# Patient Record
Sex: MALE | Race: WHITE | Employment: OTHER | URBAN - METROPOLITAN AREA
[De-identification: names, ages, dates, MRNs, and addresses within clinical notes are randomized per-mention and may not be internally consistent; named-entity substitution may affect disease eponyms.]

---

## 2018-07-26 ENCOUNTER — HOSPITAL ENCOUNTER (OUTPATIENT)
Age: 78
Discharge: HOME | End: 2018-07-28
Payer: MEDICARE

## 2018-07-26 LAB — PROSTATE SPECIFIC ANTIGEN: 5.52 NG/ML (ref 0–4)

## 2018-07-26 PROCEDURE — 84153 ASSAY OF PSA TOTAL: CPT

## 2018-10-19 ENCOUNTER — HOSPITAL ENCOUNTER (OUTPATIENT)
Age: 78
Discharge: HOME | End: 2018-10-19
Payer: MEDICARE

## 2018-10-19 LAB
BASOPHILS ABSOLUTE: 0.03 E9/L (ref 0–0.2)
BASOPHILS RELATIVE PERCENT: 0.5 % (ref 0–2)
EOSINOPHILS ABSOLUTE: 0.26 E9/L (ref 0.05–0.5)
EOSINOPHILS RELATIVE PERCENT: 4.3 % (ref 0–6)
HCT VFR BLD CALC: 37.9 % (ref 37–54)
HEMOGLOBIN: 12.4 G/DL (ref 12.5–16.5)
IMMATURE GRANULOCYTES #: 0.02 E9/L
IMMATURE GRANULOCYTES %: 0.3 % (ref 0–5)
IMMATURE RETIC FRACT: 12.1 % (ref 2.3–13.4)
LYMPHOCYTES ABSOLUTE: 1.32 E9/L (ref 1.5–4)
LYMPHOCYTES RELATIVE PERCENT: 21.9 % (ref 20–42)
MCH RBC QN AUTO: 34.1 PG (ref 26–35)
MCHC RBC AUTO-ENTMCNC: 32.7 % (ref 32–34.5)
MCV RBC AUTO: 104.1 FL (ref 80–99.9)
MONOCYTES ABSOLUTE: 0.42 E9/L (ref 0.1–0.95)
MONOCYTES RELATIVE PERCENT: 7 % (ref 2–12)
NEUTROPHILS ABSOLUTE: 3.99 E9/L (ref 1.8–7.3)
NEUTROPHILS RELATIVE PERCENT: 66 % (ref 43–80)
PDW BLD-RTO: 12.7 FL (ref 11.5–15)
PLATELET # BLD: 163 E9/L (ref 130–450)
PMV BLD AUTO: 10.1 FL (ref 7–12)
RBC # BLD: 3.64 E12/L (ref 3.8–5.8)
RETIC HGB EQUIVALENT: 38 PG (ref 28.2–36.6)
RETICULOCYTE ABSOLUTE COUNT: 0.12 E12/L
RETICULOCYTE COUNT PCT: 3.2 % (ref 0.4–1.9)
WBC # BLD: 6 E9/L (ref 4.5–11.5)

## 2018-10-19 PROCEDURE — 85025 COMPLETE CBC W/AUTO DIFF WBC: CPT

## 2018-10-19 PROCEDURE — 36415 COLL VENOUS BLD VENIPUNCTURE: CPT

## 2018-10-19 PROCEDURE — 85045 AUTOMATED RETICULOCYTE COUNT: CPT

## 2019-01-02 LAB
AVERAGE GLUCOSE: NORMAL
HBA1C MFR BLD: 4 %
TSH SERPL DL<=0.05 MIU/L-ACNC: NORMAL M[IU]/L

## 2019-05-14 ENCOUNTER — TELEPHONE (OUTPATIENT)
Dept: PRIMARY CARE CLINIC | Age: 79
End: 2019-05-14

## 2019-05-14 DIAGNOSIS — E29.1 HYPOGONADISM IN MALE: Primary | ICD-10-CM

## 2019-05-14 DIAGNOSIS — M15.8 OTHER OSTEOARTHRITIS INVOLVING MULTIPLE JOINTS: ICD-10-CM

## 2019-05-14 RX ORDER — SILDENAFIL 100 MG/1
100 TABLET, FILM COATED ORAL PRN
Qty: 50 TABLET | Refills: 12 | Status: SHIPPED | OUTPATIENT
Start: 2019-05-14 | End: 2019-07-11 | Stop reason: SDUPTHER

## 2019-05-14 RX ORDER — SILDENAFIL CITRATE 20 MG/1
20 TABLET ORAL PRN
COMMUNITY
End: 2020-01-15 | Stop reason: ALTCHOICE

## 2019-05-14 NOTE — TELEPHONE ENCOUNTER
Patient is requesting a refill for viagra. He wants to know if you can increase the quantity because he is using it more frequently. Also requesting a renewal for his handicap placard.

## 2019-07-11 DIAGNOSIS — E29.1 HYPOGONADISM IN MALE: ICD-10-CM

## 2019-07-11 RX ORDER — SILDENAFIL 100 MG/1
100 TABLET, FILM COATED ORAL PRN
Qty: 50 TABLET | Refills: 12 | Status: SHIPPED
Start: 2019-07-11 | End: 2020-08-07 | Stop reason: SDUPTHER

## 2019-12-27 ENCOUNTER — TELEPHONE (OUTPATIENT)
Dept: PRIMARY CARE CLINIC | Age: 79
End: 2019-12-27

## 2019-12-27 DIAGNOSIS — I10 ESSENTIAL HYPERTENSION: ICD-10-CM

## 2019-12-27 DIAGNOSIS — E03.9 ACQUIRED HYPOTHYROIDISM: ICD-10-CM

## 2019-12-27 DIAGNOSIS — E78.2 MIXED HYPERLIPIDEMIA: ICD-10-CM

## 2019-12-27 DIAGNOSIS — M81.0 AGE-RELATED OSTEOPOROSIS WITHOUT CURRENT PATHOLOGICAL FRACTURE: Primary | ICD-10-CM

## 2019-12-27 DIAGNOSIS — R97.20 ELEVATED PSA: ICD-10-CM

## 2019-12-30 ENCOUNTER — HOSPITAL ENCOUNTER (OUTPATIENT)
Age: 79
Discharge: HOME | End: 2020-01-01
Payer: MEDICARE

## 2019-12-30 LAB
ALBUMIN SERPL-MCNC: 4.2 G/DL (ref 3.5–5.2)
ALP BLD-CCNC: 75 U/L (ref 40–129)
ALT SERPL-CCNC: 17 U/L (ref 0–40)
ANION GAP SERPL CALCULATED.3IONS-SCNC: 15 MMOL/L (ref 7–16)
AST SERPL-CCNC: 20 U/L (ref 0–39)
BACTERIA: ABNORMAL /HPF
BASOPHILS ABSOLUTE: 0.03 E9/L (ref 0–0.2)
BASOPHILS RELATIVE PERCENT: 0.5 % (ref 0–2)
BILIRUB SERPL-MCNC: 0.6 MG/DL (ref 0–1.2)
BILIRUBIN URINE: NEGATIVE
BLOOD, URINE: NORMAL
BUN BLDV-MCNC: 23 MG/DL (ref 8–23)
CALCIUM SERPL-MCNC: 9.4 MG/DL (ref 8.6–10.2)
CHLORIDE BLD-SCNC: 106 MMOL/L (ref 98–107)
CHOLESTEROL, TOTAL: 161 MG/DL (ref 0–199)
CLARITY: CLEAR
CO2: 20 MMOL/L (ref 22–29)
COLOR: YELLOW
CREAT SERPL-MCNC: 0.8 MG/DL (ref 0.7–1.2)
EOSINOPHILS ABSOLUTE: 0.23 E9/L (ref 0.05–0.5)
EOSINOPHILS RELATIVE PERCENT: 3.8 % (ref 0–6)
GFR AFRICAN AMERICAN: >60
GFR NON-AFRICAN AMERICAN: >60 ML/MIN/1.73
GLUCOSE BLD-MCNC: 117 MG/DL (ref 74–99)
GLUCOSE URINE: NEGATIVE MG/DL
HCT VFR BLD CALC: 39.2 % (ref 37–54)
HDLC SERPL-MCNC: 50 MG/DL
HEMOGLOBIN: 12.3 G/DL (ref 12.5–16.5)
IMMATURE GRANULOCYTES #: 0.01 E9/L
IMMATURE GRANULOCYTES %: 0.2 % (ref 0–5)
KETONES, URINE: NEGATIVE MG/DL
LDL CHOLESTEROL CALCULATED: 93 MG/DL (ref 0–99)
LEUKOCYTE ESTERASE, URINE: NEGATIVE
LYMPHOCYTES ABSOLUTE: 1.12 E9/L (ref 1.5–4)
LYMPHOCYTES RELATIVE PERCENT: 18.5 % (ref 20–42)
MCH RBC QN AUTO: 31.8 PG (ref 26–35)
MCHC RBC AUTO-ENTMCNC: 31.4 % (ref 32–34.5)
MCV RBC AUTO: 101.3 FL (ref 80–99.9)
MONOCYTES ABSOLUTE: 0.46 E9/L (ref 0.1–0.95)
MONOCYTES RELATIVE PERCENT: 7.6 % (ref 2–12)
NEUTROPHILS ABSOLUTE: 4.22 E9/L (ref 1.8–7.3)
NEUTROPHILS RELATIVE PERCENT: 69.4 % (ref 43–80)
NITRITE, URINE: NEGATIVE
PDW BLD-RTO: 13 FL (ref 11.5–15)
PH UA: 5.5 (ref 5–9)
PLATELET # BLD: 152 E9/L (ref 130–450)
PMV BLD AUTO: 10.9 FL (ref 7–12)
POTASSIUM SERPL-SCNC: 4.6 MMOL/L (ref 3.5–5)
PROSTATE SPECIFIC ANTIGEN: 6.82 NG/ML (ref 0–4)
PROTEIN UA: NEGATIVE MG/DL
RBC # BLD: 3.87 E12/L (ref 3.8–5.8)
RBC UA: ABNORMAL /HPF (ref 0–2)
SODIUM BLD-SCNC: 141 MMOL/L (ref 132–146)
SPECIFIC GRAVITY UA: >=1.03 (ref 1–1.03)
T4 TOTAL: 6.3 MCG/DL (ref 4.5–11.7)
TOTAL PROTEIN: 6.2 G/DL (ref 6.4–8.3)
TRIGL SERPL-MCNC: 90 MG/DL (ref 0–149)
TSH SERPL DL<=0.05 MIU/L-ACNC: 2.31 UIU/ML (ref 0.27–4.2)
UROBILINOGEN, URINE: 0.2 E.U./DL
VITAMIN D 25-HYDROXY: 83 NG/ML (ref 30–100)
VLDLC SERPL CALC-MCNC: 18 MG/DL
WBC # BLD: 6.1 E9/L (ref 4.5–11.5)
WBC UA: ABNORMAL /HPF (ref 0–5)

## 2019-12-30 PROCEDURE — 84436 ASSAY OF TOTAL THYROXINE: CPT

## 2019-12-30 PROCEDURE — 36415 COLL VENOUS BLD VENIPUNCTURE: CPT

## 2019-12-30 PROCEDURE — 85025 COMPLETE CBC W/AUTO DIFF WBC: CPT

## 2019-12-30 PROCEDURE — 84443 ASSAY THYROID STIM HORMONE: CPT

## 2019-12-30 PROCEDURE — 84153 ASSAY OF PSA TOTAL: CPT

## 2019-12-30 PROCEDURE — 81001 URINALYSIS AUTO W/SCOPE: CPT

## 2019-12-30 PROCEDURE — 82306 VITAMIN D 25 HYDROXY: CPT

## 2019-12-30 PROCEDURE — 80061 LIPID PANEL: CPT

## 2019-12-30 PROCEDURE — 80053 COMPREHEN METABOLIC PANEL: CPT

## 2020-01-11 ENCOUNTER — OFFICE VISIT (OUTPATIENT)
Dept: PRIMARY CARE CLINIC | Age: 80
End: 2020-01-11
Payer: MEDICARE

## 2020-01-11 VITALS
WEIGHT: 192 LBS | DIASTOLIC BLOOD PRESSURE: 72 MMHG | TEMPERATURE: 97.7 F | SYSTOLIC BLOOD PRESSURE: 132 MMHG | HEIGHT: 70 IN | OXYGEN SATURATION: 96 % | BODY MASS INDEX: 27.49 KG/M2 | HEART RATE: 56 BPM

## 2020-01-11 PROBLEM — D64.9 ABSOLUTE ANEMIA: Status: ACTIVE | Noted: 2020-01-11

## 2020-01-11 PROBLEM — I49.3 PVC (PREMATURE VENTRICULAR CONTRACTION): Status: ACTIVE | Noted: 2020-01-11

## 2020-01-11 PROBLEM — I10 ESSENTIAL HYPERTENSION: Status: ACTIVE | Noted: 2020-01-11

## 2020-01-11 PROCEDURE — 99214 OFFICE O/P EST MOD 30 MIN: CPT | Performed by: FAMILY MEDICINE

## 2020-01-11 PROCEDURE — 93000 ELECTROCARDIOGRAM COMPLETE: CPT | Performed by: FAMILY MEDICINE

## 2020-01-11 ASSESSMENT — ENCOUNTER SYMPTOMS
EYES NEGATIVE: 1
GASTROINTESTINAL NEGATIVE: 1
ALLERGIC/IMMUNOLOGIC NEGATIVE: 1
RESPIRATORY NEGATIVE: 1

## 2020-01-11 ASSESSMENT — PATIENT HEALTH QUESTIONNAIRE - PHQ9
SUM OF ALL RESPONSES TO PHQ9 QUESTIONS 1 & 2: 0
SUM OF ALL RESPONSES TO PHQ QUESTIONS 1-9: 0
SUM OF ALL RESPONSES TO PHQ QUESTIONS 1-9: 0
2. FEELING DOWN, DEPRESSED OR HOPELESS: 0
1. LITTLE INTEREST OR PLEASURE IN DOING THINGS: 0

## 2020-01-11 NOTE — PROGRESS NOTES
20  Name: Dafne Cuba    : 1940    Sex: male    Age: 70 y.o. Subjective:  Chief Complaint: Patient is here for one year  Ck  Re  meds     Feel ok  No  Cp or sob    Less  Stiff in past    No  Cp or sob  Lab with   Chol ok     psa up  19----5-3----  6-8  Taught in fall and again next fall  Up 4 lbs  psa up  They dneiesd  Donating blood due to    Extra heart beat      Review of Systems   Constitutional: Negative. HENT: Negative. Eyes: Negative. Respiratory: Negative. Cardiovascular: Negative. Gastrointestinal: Negative. Endocrine: Negative. Genitourinary: Negative. Musculoskeletal: Negative. Skin: Negative. Allergic/Immunologic: Negative. Neurological: Negative. Hematological: Negative. Psychiatric/Behavioral: Negative. Current Outpatient Medications:     sildenafil (VIAGRA) 100 MG tablet, Take 1 tablet by mouth as needed for Erectile Dysfunction, Disp: 50 tablet, Rfl: 12    Handicap Placard MISC, by Does not apply route, Disp: 1 each, Rfl: 0    sildenafil (REVATIO) 20 MG tablet, Take 20 mg by mouth as needed, Disp: , Rfl:     Biotin 5000 MCG TABS, Take by mouth daily, Disp: , Rfl:     IRON, FERROUS GLUCONATE, PO, Take  by mouth. 65 mg every other day, Disp: , Rfl:     vitamin B-12 (CYANOCOBALAMIN) 1000 MCG tablet, Take 1,000 mcg by mouth daily. , Disp: , Rfl:     omeprazole (PRILOSEC) 20 MG capsule, Take 20 mg by mouth daily. , Disp: , Rfl:     Multiple Vitamins-Minerals (THERAPEUTIC MULTIVITAMIN-MINERALS) tablet, Take 1 tablet by mouth daily. , Disp: , Rfl:     dapsone 25 MG tablet, Take 25 mg by mouth 2 times daily. , Disp: , Rfl:     nefazodone (SERZONE) 100 MG tablet, Take 150 mg by mouth 4 times daily. , Disp: , Rfl:     VITAMIN D-3 (COLECALCIFEROL) 400 UNITS TABS, Take  by mouth daily. , Disp: , Rfl:   No Known Allergies  Social History     Socioeconomic History    Marital status:      Spouse name: Not on file    Number of children: Not on file    Years of education: Not on file    Highest education level: Not on file   Occupational History    Not on file   Social Needs    Financial resource strain: Not on file    Food insecurity:     Worry: Not on file     Inability: Not on file    Transportation needs:     Medical: Not on file     Non-medical: Not on file   Tobacco Use    Smoking status: Former Smoker     Types: Cigarettes, Pipe     Last attempt to quit: 1977     Years since quittin.0    Smokeless tobacco: Never Used   Substance and Sexual Activity    Alcohol use: Yes     Comment: beer three times weekly    Drug use: No    Sexual activity: Not on file   Lifestyle    Physical activity:     Days per week: Not on file     Minutes per session: Not on file    Stress: Not on file   Relationships    Social connections:     Talks on phone: Not on file     Gets together: Not on file     Attends Roman Catholic service: Not on file     Active member of club or organization: Not on file     Attends meetings of clubs or organizations: Not on file     Relationship status: Not on file    Intimate partner violence:     Fear of current or ex partner: Not on file     Emotionally abused: Not on file     Physically abused: Not on file     Forced sexual activity: Not on file   Other Topics Concern    Not on file   Social History Narrative            Chronic Diagnosis: Depressive disorder, Mixed hyperlipidemia, Anemia, Peptic reflux disease.     HH    GERD    GASTRITIS    DEP---DR [id number]    LIPID    FE DEF ANEMIA    CHOLELITHIASIS    OPEN GB OR    R [address] HERNIA OR    Dorthey Desai  1940 Page #2    Wash Jenifer 1975 BUT PIPE---    TICS    R EAR TUBE    EGD AND COLON---    TMT -    DERMATITIS HERPETIFORMIS - RX WITH GLUTEN FREE DIET--- [facility] STANISLAV MOVED TO [facility]--HER EX HUS OUT OF retirement AND LIVES IN    [place]----elaine ==grand elaine moved here with pt----hs--preg ab---dui---    Daniel Leiva ELAINE LIVES IN [place]  NO KIDS    SON LIVES IN [place]--    EVAL WITH DR FRIED 10-14 MILD AORTIC STENOSIS    re eval with dr Thomes Spatz 2-16    ELEV PSA 1-18          Past Medical History:   Diagnosis Date    Anemia     Cholelithiasis     Depressive disorder     Dermatitis herpetiformis     GLUTEN FREE DIET    Diverticulitis     Gastritis     GERD (gastroesophageal reflux disease)     Hiatal hernia     Hyperlipidemia     Iron deficiency anemia     Peptic reflux disease      Family History   Problem Relation Age of Onset    Stroke Father       Past Surgical History:   Procedure Laterality Date    CARDIOVASCULAR STRESS TEST  2004    CHOLECYSTECTOMY        Vitals:    01/11/20 0853   BP: 132/72   Pulse: 56   Temp: 97.7 °F (36.5 °C)   SpO2: 96%   Weight: 192 lb (87.1 kg)   Height: 5' 9.5\" (1.765 m)       Objective:    Physical Exam  Vitals signs reviewed. Constitutional:       Appearance: He is well-developed. HENT:      Head: Normocephalic. Eyes:      Pupils: Pupils are equal, round, and reactive to light. Neck:      Musculoskeletal: Normal range of motion. Cardiovascular:      Rate and Rhythm: Normal rate. Rhythm irregular. Pulmonary:      Effort: Pulmonary effort is normal.      Breath sounds: Normal breath sounds. Abdominal:      Palpations: Abdomen is soft. Musculoskeletal: Normal range of motion. Skin:     General: Skin is warm. Neurological:      Mental Status: He is alert and oriented to person, place, and time. Psychiatric:         Behavior: Behavior normal.         Wanda Finn was seen today for discuss labs. Diagnoses and all orders for this visit:    Essential hypertension  -     EKG 12 lead; Future  -     EKG 12 lead    Other iron deficiency anemia    PVC (premature ventricular contraction)  -     [address] [facility]    Mixed hyperlipidemia        Comments: KG.  Appointment cardiology. Do not donate blood until I see him in 4 months.   Call if any symptoms of

## 2020-01-13 PROBLEM — I10 ESSENTIAL HYPERTENSION: Chronic | Status: ACTIVE | Noted: 2020-01-11

## 2020-01-13 PROBLEM — I49.3 PVC (PREMATURE VENTRICULAR CONTRACTION): Chronic | Status: ACTIVE | Noted: 2020-01-11

## 2020-01-13 PROBLEM — D64.9 ABSOLUTE ANEMIA: Chronic | Status: ACTIVE | Noted: 2020-01-11

## 2020-01-14 NOTE — PROGRESS NOTES
hematuria  Derm: negative for rash and skin lesion(s)  Neurological: negative for seizures and tremors  Endocrine: negative for diabetic symptoms including polydipsia and polyuria  Musculoskeletal: negative for CTD  Psychiatric: negative for psychosis and major depression    On examination, elderly man in no distress he is an alert pleasant  Skin is warm and dry. Respirations are unlabored. /70   Pulse 81   Ht 5' 9\" (1.753 m)   Wt 192 lb (87.1 kg)   BMI 28.35 kg/m² . HEENT negative for scleral icterus. Extraocular muscles intact. No facial asymmetry or central cyanosis. Neck without masses or goiter. No bruit or JVD. Cardiac apex not displaced. Rhythm regular. Abdomen normal.  Extremities without edema. EKG today shows normal sinus rhythm 81/min. Isolated monomorphic PVCs. QTc 438    Based on today's exam, the patient's aortic valve stenosis remains mild. He is not having any symptoms of angina or congestive heart failure. Cardiovascular imaging is not currently recommended. However, he now meets criteria to benefit from statin. This was discussed with him. It is recommended he use Lipitor 40 mg/day or its Crestor equivalent. This will be left to PCP discretion for implementation. The patient will be seen in cardiac follow-up in 1 year. If he develops any symptoms he will be seen prior to that. The current dose of Viagra is acceptable as long as it results in no significant symptoms. At completion of today's visit, medications include the following:    Current Outpatient Medications:     latanoprost (XALATAN) 0.005 % ophthalmic solution, , Disp: , Rfl:     sildenafil (VIAGRA) 100 MG tablet, Take 1 tablet by mouth as needed for Erectile Dysfunction, Disp: 50 tablet, Rfl: 12    Handicap Placard MISC, by Does not apply route, Disp: 1 each, Rfl: 0    Biotin 5000 MCG TABS, Take by mouth daily, Disp: , Rfl:     IRON, FERROUS GLUCONATE, PO, Take  by mouth.  65 mg every other day, Disp: , Rfl:     vitamin B-12 (CYANOCOBALAMIN) 1000 MCG tablet, Take 1,000 mcg by mouth daily. , Disp: , Rfl:     omeprazole (PRILOSEC) 20 MG capsule, Take 20 mg by mouth daily. , Disp: , Rfl:     Multiple Vitamins-Minerals (THERAPEUTIC MULTIVITAMIN-MINERALS) tablet, Take 1 tablet by mouth daily. , Disp: , Rfl:     dapsone 25 MG tablet, Take 25 mg by mouth 2 times daily. , Disp: , Rfl:     nefazodone (SERZONE) 100 MG tablet, Take 150 mg by mouth 4 times daily. , Disp: , Rfl:     VITAMIN D-3 (COLECALCIFEROL) 400 UNITS TABS, Take  by mouth daily. , Disp: , Rfl:       Note: This report was completed utilizing computer voice recognition software. Every effort has been made to ensure accuracy, however; inadvertent computerized transcription errors may be present. Arnoldo Snow.  Parth Ruggiero MD

## 2020-01-15 ENCOUNTER — OFFICE VISIT (OUTPATIENT)
Dept: CARDIOLOGY CLINIC | Age: 80
End: 2020-01-15
Payer: MEDICARE

## 2020-01-15 VITALS
WEIGHT: 192 LBS | SYSTOLIC BLOOD PRESSURE: 118 MMHG | HEART RATE: 81 BPM | BODY MASS INDEX: 28.44 KG/M2 | DIASTOLIC BLOOD PRESSURE: 70 MMHG | HEIGHT: 69 IN

## 2020-01-15 PROCEDURE — 93000 ELECTROCARDIOGRAM COMPLETE: CPT | Performed by: INTERNAL MEDICINE

## 2020-01-15 PROCEDURE — 99213 OFFICE O/P EST LOW 20 MIN: CPT | Performed by: INTERNAL MEDICINE

## 2020-01-15 RX ORDER — LATANOPROST 50 UG/ML
SOLUTION/ DROPS OPHTHALMIC
COMMUNITY
Start: 2020-01-04

## 2020-06-22 ENCOUNTER — OFFICE VISIT (OUTPATIENT)
Dept: PRIMARY CARE CLINIC | Age: 80
End: 2020-06-22
Payer: MEDICARE

## 2020-06-22 VITALS
WEIGHT: 192 LBS | SYSTOLIC BLOOD PRESSURE: 128 MMHG | BODY MASS INDEX: 28.35 KG/M2 | TEMPERATURE: 98.2 F | DIASTOLIC BLOOD PRESSURE: 83 MMHG

## 2020-06-22 PROCEDURE — 99214 OFFICE O/P EST MOD 30 MIN: CPT | Performed by: FAMILY MEDICINE

## 2020-06-22 RX ORDER — ATORVASTATIN CALCIUM 40 MG/1
40 TABLET, FILM COATED ORAL DAILY
Qty: 90 TABLET | Refills: 1 | Status: SHIPPED
Start: 2020-06-22 | End: 2020-06-22

## 2020-06-22 RX ORDER — ATORVASTATIN CALCIUM 40 MG/1
40 TABLET, FILM COATED ORAL DAILY
Qty: 90 TABLET | Refills: 1 | Status: SHIPPED
Start: 2020-06-22 | End: 2021-01-29 | Stop reason: SDUPTHER

## 2020-06-22 ASSESSMENT — ENCOUNTER SYMPTOMS
RESPIRATORY NEGATIVE: 1
ALLERGIC/IMMUNOLOGIC NEGATIVE: 1
GASTROINTESTINAL NEGATIVE: 1
EYES NEGATIVE: 1

## 2020-06-22 ASSESSMENT — PATIENT HEALTH QUESTIONNAIRE - PHQ9
1. LITTLE INTEREST OR PLEASURE IN DOING THINGS: 0
2. FEELING DOWN, DEPRESSED OR HOPELESS: 0
SUM OF ALL RESPONSES TO PHQ QUESTIONS 1-9: 0
SUM OF ALL RESPONSES TO PHQ QUESTIONS 1-9: 0
SUM OF ALL RESPONSES TO PHQ9 QUESTIONS 1 & 2: 0

## 2020-06-22 NOTE — PROGRESS NOTES
20  Name: Taylor Ga    : 1940    Sex: male    Age: 70 y.o. Subjective:  Chief Complaint: Patient is here for 4 mo ck re  Iron def anemia     He faield to get lab done. He tried to donate blood and it would not accept it because of a condition with a heart. He saw cardiology. Cardiology advised Lipitor 40 mg daily. Patient failed to call here but he is willing to take it. He has no chest pain or shortness of breath. Due to see cardiology back in a year. Review of Systems   Constitutional: Negative. HENT: Negative. Eyes: Negative. Respiratory: Negative. Cardiovascular: Negative. Gastrointestinal: Negative. Endocrine: Negative. Genitourinary: Negative. Musculoskeletal: Negative. Skin: Negative. Allergic/Immunologic: Negative. Neurological: Negative. Hematological: Negative. Psychiatric/Behavioral: Negative. Current Outpatient Medications:     atorvastatin (LIPITOR) 40 MG tablet, Take 1 tablet by mouth daily, Disp: 90 tablet, Rfl: 1    latanoprost (XALATAN) 0.005 % ophthalmic solution, , Disp: , Rfl:     sildenafil (VIAGRA) 100 MG tablet, Take 1 tablet by mouth as needed for Erectile Dysfunction, Disp: 50 tablet, Rfl: 12    Handicap Placard MISC, by Does not apply route, Disp: 1 each, Rfl: 0    Biotin 5000 MCG TABS, Take by mouth daily, Disp: , Rfl:     IRON, FERROUS GLUCONATE, PO, Take  by mouth. 65 mg every other day, Disp: , Rfl:     vitamin B-12 (CYANOCOBALAMIN) 1000 MCG tablet, Take 1,000 mcg by mouth daily. , Disp: , Rfl:     omeprazole (PRILOSEC) 20 MG capsule, Take 20 mg by mouth daily. , Disp: , Rfl:     Multiple Vitamins-Minerals (THERAPEUTIC MULTIVITAMIN-MINERALS) tablet, Take 1 tablet by mouth daily. , Disp: , Rfl:     dapsone 25 MG tablet, Take 25 mg by mouth 2 times daily. , Disp: , Rfl:     nefazodone (SERZONE) 100 MG tablet, Take 150 mg by mouth 4 times daily. , Disp: , Rfl:     VITAMIN D-3 (COLECALCIFEROL) 400 UNITS TABS, Take  by mouth daily. , Disp: , Rfl:   No Known Allergies  Social History     Socioeconomic History    Marital status:      Spouse name: Not on file    Number of children: Not on file    Years of education: Not on file    Highest education level: Not on file   Occupational History    Not on file   Social Needs    Financial resource strain: Not on file    Food insecurity     Worry: Not on file     Inability: Not on file    Transportation needs     Medical: Not on file     Non-medical: Not on file   Tobacco Use    Smoking status: Former Smoker     Types: Cigarettes, Pipe     Last attempt to quit: 1977     Years since quittin.5    Smokeless tobacco: Never Used   Substance and Sexual Activity    Alcohol use: Yes     Comment: beer three times weekly    Drug use: No    Sexual activity: Not on file   Lifestyle    Physical activity     Days per week: Not on file     Minutes per session: Not on file    Stress: Not on file   Relationships    Social connections     Talks on phone: Not on file     Gets together: Not on file     Attends Restorationist service: Not on file     Active member of club or organization: Not on file     Attends meetings of clubs or organizations: Not on file     Relationship status: Not on file    Intimate partner violence     Fear of current or ex partner: Not on file     Emotionally abused: Not on file     Physically abused: Not on file     Forced sexual activity: Not on file   Other Topics Concern    Not on file   Social History Narrative            Chronic Diagnosis: Depressive disorder, Mixed hyperlipidemia, Anemia, Peptic reflux disease.     HH    GERD    GASTRITIS    DEP---DR [id number]    LIPID    FE DEF ANEMIA    CHOLELITHIASIS    OPEN GB OR    R ING HERNIA OR    Jerrye Fend  1940 Page #2    Hunter Soni 1975 BUT PIPE---    TICS    R EAR TUBE    EGD AND COLON---    TMT -    DERMATITIS HERPETIFORMIS - RX WITH GLUTEN FREE DIET--- [facility] Take 1 tablet by mouth daily    Other iron deficiency anemia    PVC (premature ventricular contraction)        Comments: Cardiology advised Lipitor 40 mg daily. We will start him on same have a do every other day for 1 month then daily thereafter and add co-Q10 100 mg daily. Have laboratory for next visit. Symptoms will notify. Range of motion exercises taught for arthritis. Check blood pressure weekly at home. Continue his iron medication. Do not donate blood. A great deal of time spent reviewing medications, diet, exercise, social issues. Also reviewing the chart before entering the room with patient and finishing charting after leaving patient's room. More than half of that time was spent face to face with the patient in counseling and coordinating care. Follow Up: Return in about 3 months (around 9/22/2020) for lab  bfeore.      Seen by:  Shaina Lawton DO

## 2020-07-27 ENCOUNTER — TELEPHONE (OUTPATIENT)
Dept: PRIMARY CARE CLINIC | Age: 80
End: 2020-07-27

## 2020-07-27 NOTE — TELEPHONE ENCOUNTER
Pt calling in to let you know that when he started taking his atorvastatin daily he was having bloating and gas and he switched back to every other day. Please advise on any instruction.

## 2020-08-07 RX ORDER — SILDENAFIL 100 MG/1
100 TABLET, FILM COATED ORAL PRN
Qty: 50 TABLET | Refills: 12 | Status: SHIPPED
Start: 2020-08-07 | End: 2021-08-30 | Stop reason: SDUPTHER

## 2020-09-14 ENCOUNTER — HOSPITAL ENCOUNTER (OUTPATIENT)
Age: 80
Discharge: HOME | End: 2020-09-14
Payer: MEDICARE

## 2020-09-14 LAB
ALBUMIN SERPL-MCNC: 4.2 G/DL (ref 3.5–5.2)
ALP BLD-CCNC: 83 U/L (ref 40–129)
ALT SERPL-CCNC: 21 U/L (ref 0–40)
ANION GAP SERPL CALCULATED.3IONS-SCNC: 12 MMOL/L (ref 7–16)
AST SERPL-CCNC: 19 U/L (ref 0–39)
BACTERIA: ABNORMAL /HPF
BASOPHILS ABSOLUTE: 0.03 E9/L (ref 0–0.2)
BASOPHILS RELATIVE PERCENT: 0.4 % (ref 0–2)
BILIRUB SERPL-MCNC: 0.9 MG/DL (ref 0–1.2)
BILIRUBIN URINE: ABNORMAL
BLOOD, URINE: ABNORMAL
BUN BLDV-MCNC: 19 MG/DL (ref 8–23)
CALCIUM SERPL-MCNC: 9.2 MG/DL (ref 8.6–10.2)
CHLORIDE BLD-SCNC: 100 MMOL/L (ref 98–107)
CHOLESTEROL, TOTAL: 123 MG/DL (ref 0–199)
CLARITY: CLEAR
CO2: 24 MMOL/L (ref 22–29)
COLOR: YELLOW
CREAT SERPL-MCNC: 1 MG/DL (ref 0.7–1.2)
EOSINOPHILS ABSOLUTE: 0.18 E9/L (ref 0.05–0.5)
EOSINOPHILS RELATIVE PERCENT: 2.5 % (ref 0–6)
GFR AFRICAN AMERICAN: >60
GFR NON-AFRICAN AMERICAN: >60 ML/MIN/1.73
GLUCOSE BLD-MCNC: 98 MG/DL (ref 74–99)
GLUCOSE URINE: NEGATIVE MG/DL
HBA1C MFR BLD: 4.1 % (ref 4–5.6)
HCT VFR BLD CALC: 41.6 % (ref 37–54)
HDLC SERPL-MCNC: 46 MG/DL
HEMOGLOBIN: 13.8 G/DL (ref 12.5–16.5)
IMMATURE GRANULOCYTES #: 0.01 E9/L
IMMATURE GRANULOCYTES %: 0.1 % (ref 0–5)
IMMATURE RETIC FRACT: 15.5 % (ref 2.3–13.4)
KETONES, URINE: ABNORMAL MG/DL
LDL CHOLESTEROL CALCULATED: 61 MG/DL (ref 0–99)
LEUKOCYTE ESTERASE, URINE: NEGATIVE
LYMPHOCYTES ABSOLUTE: 1.45 E9/L (ref 1.5–4)
LYMPHOCYTES RELATIVE PERCENT: 19.9 % (ref 20–42)
MCH RBC QN AUTO: 33.3 PG (ref 26–35)
MCHC RBC AUTO-ENTMCNC: 33.2 % (ref 32–34.5)
MCV RBC AUTO: 100.2 FL (ref 80–99.9)
MONOCYTES ABSOLUTE: 0.49 E9/L (ref 0.1–0.95)
MONOCYTES RELATIVE PERCENT: 6.7 % (ref 2–12)
NEUTROPHILS ABSOLUTE: 5.13 E9/L (ref 1.8–7.3)
NEUTROPHILS RELATIVE PERCENT: 70.4 % (ref 43–80)
NITRITE, URINE: NEGATIVE
PDW BLD-RTO: 12.7 FL (ref 11.5–15)
PH UA: 5.5 (ref 5–9)
PLATELET # BLD: 151 E9/L (ref 130–450)
PMV BLD AUTO: 10.3 FL (ref 7–12)
POTASSIUM SERPL-SCNC: 4.3 MMOL/L (ref 3.5–5)
PROTEIN UA: NEGATIVE MG/DL
RBC # BLD: 4.15 E12/L (ref 3.8–5.8)
RBC UA: ABNORMAL /HPF (ref 0–2)
RETIC HGB EQUIVALENT: 38.6 PG (ref 28.2–36.6)
RETICULOCYTE ABSOLUTE COUNT: 0.11 E12/L
RETICULOCYTE COUNT PCT: 2.6 % (ref 0.4–1.9)
SODIUM BLD-SCNC: 136 MMOL/L (ref 132–146)
SPECIFIC GRAVITY UA: 1.02 (ref 1–1.03)
TOTAL PROTEIN: 6.1 G/DL (ref 6.4–8.3)
TRIGL SERPL-MCNC: 80 MG/DL (ref 0–149)
UROBILINOGEN, URINE: 1 E.U./DL
VLDLC SERPL CALC-MCNC: 16 MG/DL
WBC # BLD: 7.3 E9/L (ref 4.5–11.5)
WBC UA: ABNORMAL /HPF (ref 0–5)

## 2020-09-14 PROCEDURE — 80053 COMPREHEN METABOLIC PANEL: CPT

## 2020-09-14 PROCEDURE — 81001 URINALYSIS AUTO W/SCOPE: CPT

## 2020-09-14 PROCEDURE — 83036 HEMOGLOBIN GLYCOSYLATED A1C: CPT

## 2020-09-14 PROCEDURE — 85045 AUTOMATED RETICULOCYTE COUNT: CPT

## 2020-09-14 PROCEDURE — 36415 COLL VENOUS BLD VENIPUNCTURE: CPT

## 2020-09-14 PROCEDURE — 85025 COMPLETE CBC W/AUTO DIFF WBC: CPT

## 2020-09-14 PROCEDURE — 80061 LIPID PANEL: CPT

## 2020-09-22 ENCOUNTER — OFFICE VISIT (OUTPATIENT)
Dept: PRIMARY CARE CLINIC | Age: 80
End: 2020-09-22
Payer: MEDICARE

## 2020-09-22 PROCEDURE — G0438 PPPS, INITIAL VISIT: HCPCS | Performed by: FAMILY MEDICINE

## 2020-09-22 ASSESSMENT — LIFESTYLE VARIABLES
HOW OFTEN DURING THE LAST YEAR HAVE YOU NEEDED AN ALCOHOLIC DRINK FIRST THING IN THE MORNING TO GET YOURSELF GOING AFTER A NIGHT OF HEAVY DRINKING: 0
AUDIT-C TOTAL SCORE: 3
HOW OFTEN DURING THE LAST YEAR HAVE YOU BEEN UNABLE TO REMEMBER WHAT HAPPENED THE NIGHT BEFORE BECAUSE YOU HAD BEEN DRINKING: 0
HOW MANY STANDARD DRINKS CONTAINING ALCOHOL DO YOU HAVE ON A TYPICAL DAY: 0
HOW OFTEN DURING THE LAST YEAR HAVE YOU FAILED TO DO WHAT WAS NORMALLY EXPECTED FROM YOU BECAUSE OF DRINKING: 0
HOW OFTEN DURING THE LAST YEAR HAVE YOU FOUND THAT YOU WERE NOT ABLE TO STOP DRINKING ONCE YOU HAD STARTED: 0
HOW OFTEN DURING THE LAST YEAR HAVE YOU HAD A FEELING OF GUILT OR REMORSE AFTER DRINKING: 0
HOW OFTEN DO YOU HAVE A DRINK CONTAINING ALCOHOL: 3
HAS A RELATIVE, FRIEND, DOCTOR, OR ANOTHER HEALTH PROFESSIONAL EXPRESSED CONCERN ABOUT YOUR DRINKING OR SUGGESTED YOU CUT DOWN: 0
AUDIT TOTAL SCORE: 3
HOW OFTEN DO YOU HAVE SIX OR MORE DRINKS ON ONE OCCASION: 0
HAVE YOU OR SOMEONE ELSE BEEN INJURED AS A RESULT OF YOUR DRINKING: 0

## 2020-09-22 NOTE — PROGRESS NOTES
Medicare Annual Wellness Visit  Name: Liang Bay Date: 2020   MRN: <[id number]> Sex: Male   Age: 70 y.o. Ethnicity: Non-/Non    : 1940 Race: Naeem Rios is here for Medicare AWV    And 4 m ock  Re chol  Hb    He feels well. His laboratory studies show that his hemoglobin has improved. He did decrease on his own his cholesterol statin from 40 to 20 mg. Cardiology increase it to 40 but he is unable to tolerate. His cholesterol did drop and. He only took the 40 mg for a few weeks. He has no chest pain or shortness of breath        Screenings for behavioral, psychosocial and functional/safety risks, and cognitive dysfunction are all negative except as indicated below. These results, as well as other patient data from the [address] form, are documented in Flowsheets linked to this Encounter. No Known Allergies      Prior to Visit Medications    Medication Sig Taking? Authorizing Provider   sildenafil (VIAGRA) 100 MG tablet Take 1 tablet by mouth as needed for Erectile Dysfunction  Haseeb Phan DO   atorvastatin (LIPITOR) 40 MG tablet Take 1 tablet by mouth daily  Haseeb Phan DO   latanoprost (XALATAN) 0.005 % ophthalmic solution   Historical Provider, MD   Handicap Placard MISC by Does not apply route  Haseeb Phan DO   Biotin 5000 MCG TABS Take by mouth daily  Historical Provider, MD   IRON, FERROUS GLUCONATE, PO Take  by mouth. 65 mg every other day  Historical Provider, MD   vitamin B-12 (CYANOCOBALAMIN) 1000 MCG tablet Take 1,000 mcg by mouth daily. Historical Provider, MD   omeprazole (PRILOSEC) 20 MG capsule Take 20 mg by mouth daily. Historical Provider, MD   Multiple Vitamins-Minerals (THERAPEUTIC MULTIVITAMIN-MINERALS) tablet Take 1 tablet by mouth daily. Historical Provider, MD   dapsone 25 MG tablet Take 25 mg by mouth 2 times daily.   Historical Provider, MD   nefazodone (SERZONE) 100 MG tablet Take 150 mg by mouth 4 times daily. Historical Provider, MD   VITAMIN D-3 (COLECALCIFEROL) 400 UNITS TABS Take  by mouth daily. Historical Provider, MD         Past Medical History:   Diagnosis Date    Anemia     Cholelithiasis     Depressive disorder     Dermatitis herpetiformis     GLUTEN FREE DIET    Diverticulitis     Gastritis     GERD (gastroesophageal reflux disease)     Hiatal hernia     Hyperlipidemia     Iron deficiency anemia     Peptic reflux disease        Past Surgical History:   Procedure Laterality Date    CARDIOVASCULAR STRESS TEST  2004    CHOLECYSTECTOMY           Family History   Problem Relation Age of Onset    Stroke Father        CareTeam (Including outside providers/suppliers regularly involved in providing care):   Patient Care Team:  [place] DO as PCP - General (Family Medicine)  [place] DO as PCP - [facility] Empaneled Provider    Wt Readings from Last 3 Encounters:   09/22/20 197 lb (89.4 kg)   06/22/20 192 lb (87.1 kg)   01/15/20 192 lb (87.1 kg)     Vitals:    09/22/20 1408   BP: 130/78   Pulse: 67   Resp: 16   Temp: 98.3 °F (36.8 °C)   TempSrc: Temporal   SpO2: 93%   Weight: 197 lb (89.4 kg)   Height: 5' 8\" (1.727 m)     Body mass index is 29.95 kg/m². Based upon direct observation of the patient, evaluation of cognition reveals recent and remote memory intact.     General Appearance: alert and oriented to person, place and time, well developed and well- nourished, in no acute distress  Skin: warm and dry, no rash or erythema  Head: normocephalic and atraumatic  Eyes: pupils equal, round, and reactive to light, extraocular eye movements intact, conjunctivae normal  ENT: tympanic membrane, external ear and ear canal normal bilaterally, nose without deformity, nasal mucosa and turbinates normal without polyps  Neck: supple and non-tender without mass, no thyromegaly or thyroid nodules, no cervical lymphadenopathy  Pulmonary/Chest: clear to auscultation bilaterally- no wheezes, rales or rhonchi, normal air movement, no respiratory distress  Cardiovascular: normal rate, regular rhythm, normal S1 and S2, no murmurs, rubs, clicks, or gallops, distal pulses intact, no carotid bruits  Abdomen: soft, non-tender, non-distended, normal bowel sounds, no masses or organomegaly  Extremities: no cyanosis, clubbing or edema  Musculoskeletal: normal range of motion, no joint swelling, deformity or tenderness  Neurologic: reflexes normal and symmetric, no cranial nerve deficit, gait, coordination and speech normal    Patient's complete Health Risk Assessment and screening values have been reviewed and are found in Flowsheets. The following problems were reviewed today and where indicated follow up appointments were made and/or referrals ordered. Positive Risk Factor Screenings with Interventions:     General Health:  General  In general, how would you say your health is?: Good  In the past 7 days, have you experienced any of the following? New or Increased Pain, New or Increased Fatigue, Loneliness, Social Isolation, Stress or Anger?: (!) New or Increased Pain  Do you get the social and emotional support that you need?: Yes  Do you have a Living Will?: Yes  General Health Risk Interventions:  · none    Health Habits/Nutrition:  Health Habits/Nutrition  Do you exercise for at least 20 minutes 2-3 times per week?: (!) No  Have you lost any weight without trying in the past 3 months?: No  Do you eat fewer than 2 meals per day?: No  Have you seen a dentist within the past year?: Yes  Body mass index is 29.95 kg/m².   Health Habits/Nutrition Interventions:  · Inadequate physical activity:  patient agrees to exercise for at least 150 minutes/week    Hearing/Vision:  No exam data present  Hearing/Vision  Do you or your family notice any trouble with your hearing?: (!) Yes  Do you have difficulty driving, watching TV, or doing any of your daily activities because of your eyesight?: No  Have you had an eye exam coordinating care.       4 mo lab  before

## 2020-09-22 NOTE — PATIENT INSTRUCTIONS
Personalized Preventive Plan for Chato Landon - 9/22/2020  Medicare offers a range of preventive health benefits. Some of the tests and screenings are paid in full while other may be subject to a deductible, co-insurance, and/or copay. Some of these benefits include a comprehensive review of your medical history including lifestyle, illnesses that may run in your family, and various assessments and screenings as appropriate. After reviewing your medical record and screening and assessments performed today your provider may have ordered immunizations, labs, imaging, and/or referrals for you. A list of these orders (if applicable) as well as your Preventive Care list are included within your After Visit Summary for your review. Other Preventive Recommendations:    · A preventive eye exam performed by an eye specialist is recommended every 1-2 years to screen for glaucoma; cataracts, macular degeneration, and other eye disorders. · A preventive dental visit is recommended every 6 months. · Try to get at least 150 minutes of exercise per week or 10,000 steps per day on a pedometer . · Order or download the FREE \"Exercise & Physical Activity: Your Everyday Guide\" from [employer]. Call 6-108.802.5755 or search [employer] online. · You need 0778-4828 mg of calcium and 4654-4616 IU of vitamin D per day. It is possible to meet your calcium requirement with diet alone, but a vitamin D supplement is usually necessary to meet this goal.  · When exposed to the sun, use a sunscreen that protects against both UVA and UVB radiation with an SPF of 30 or greater. Reapply every 2 to 3 hours or after sweating, drying off with a towel, or swimming. · Always wear a seat belt when traveling in a car. Always wear a helmet when riding a bicycle or motorcycle.

## 2020-09-23 VITALS
WEIGHT: 197 LBS | OXYGEN SATURATION: 93 % | HEIGHT: 68 IN | TEMPERATURE: 98.3 F | RESPIRATION RATE: 16 BRPM | BODY MASS INDEX: 29.86 KG/M2 | HEART RATE: 67 BPM | DIASTOLIC BLOOD PRESSURE: 78 MMHG | SYSTOLIC BLOOD PRESSURE: 130 MMHG

## 2020-09-23 ASSESSMENT — PATIENT HEALTH QUESTIONNAIRE - PHQ9
SUM OF ALL RESPONSES TO PHQ QUESTIONS 1-9: 0
2. FEELING DOWN, DEPRESSED OR HOPELESS: 0
SUM OF ALL RESPONSES TO PHQ9 QUESTIONS 1 & 2: 0
1. LITTLE INTEREST OR PLEASURE IN DOING THINGS: 0
SUM OF ALL RESPONSES TO PHQ QUESTIONS 1-9: 0

## 2020-11-24 ENCOUNTER — TELEPHONE (OUTPATIENT)
Dept: ADMINISTRATIVE | Age: 80
End: 2020-11-24

## 2020-11-24 NOTE — TELEPHONE ENCOUNTER
Pt called to schedule his yrly with Dr. Jigna Girard in January. Pt also states he usually gets bloodwork before seeing Dr. Jigna Girard. Please call and advise.

## 2020-11-25 NOTE — TELEPHONE ENCOUNTER
Patient returned call. He does have orders in [facility] for labs ordered by Dr. Cherry Vizcaino and will be having those done in the next few weeks. Advised that we would work off those results and if he needed any additional labs at his office visit, they would be drawn at the time of his visit. Patient verbalized understanding.

## 2020-12-08 ENCOUNTER — OFFICE VISIT (OUTPATIENT)
Dept: FAMILY MEDICINE CLINIC | Age: 80
End: 2020-12-08
Payer: MEDICARE

## 2020-12-08 VITALS
BODY MASS INDEX: 28.49 KG/M2 | SYSTOLIC BLOOD PRESSURE: 126 MMHG | DIASTOLIC BLOOD PRESSURE: 78 MMHG | HEIGHT: 68 IN | WEIGHT: 188 LBS | TEMPERATURE: 98.8 F

## 2020-12-08 PROCEDURE — G8510 SCR DEP NEG, NO PLAN REQD: HCPCS | Performed by: FAMILY MEDICINE

## 2020-12-08 PROCEDURE — 99213 OFFICE O/P EST LOW 20 MIN: CPT | Performed by: FAMILY MEDICINE

## 2020-12-08 PROCEDURE — 3288F FALL RISK ASSESSMENT DOCD: CPT | Performed by: FAMILY MEDICINE

## 2020-12-08 ASSESSMENT — PATIENT HEALTH QUESTIONNAIRE - PHQ9
SUM OF ALL RESPONSES TO PHQ QUESTIONS 1-9: 0
SUM OF ALL RESPONSES TO PHQ QUESTIONS 1-9: 0
1. LITTLE INTEREST OR PLEASURE IN DOING THINGS: 0
SUM OF ALL RESPONSES TO PHQ QUESTIONS 1-9: 0
2. FEELING DOWN, DEPRESSED OR HOPELESS: 0
SUM OF ALL RESPONSES TO PHQ9 QUESTIONS 1 & 2: 0

## 2020-12-08 ASSESSMENT — ENCOUNTER SYMPTOMS: ROS SKIN COMMENTS: SEE   HPI

## 2020-12-08 NOTE — PROGRESS NOTES
20  Name: Benita Moreno    : 1940    Sex: male    Age: 68 y.o. Subjective:  Chief Complaint: Patient is here for right index finger     He cut with hachet mid oct  And   Did not go to er and went to  uc  Next day and glued  Healed ok    But feel sensitive at times      Review of Systems   Skin:        See   hpi         Current Outpatient Medications:     sildenafil (VIAGRA) 100 MG tablet, Take 1 tablet by mouth as needed for Erectile Dysfunction, Disp: 50 tablet, Rfl: 12    atorvastatin (LIPITOR) 40 MG tablet, Take 1 tablet by mouth daily, Disp: 90 tablet, Rfl: 1    latanoprost (XALATAN) 0.005 % ophthalmic solution, , Disp: , Rfl:     Handicap Placard MISC, by Does not apply route, Disp: 1 each, Rfl: 0    Biotin 5000 MCG TABS, Take by mouth daily, Disp: , Rfl:     IRON, FERROUS GLUCONATE, PO, Take  by mouth. 65 mg every other day, Disp: , Rfl:     vitamin B-12 (CYANOCOBALAMIN) 1000 MCG tablet, Take 1,000 mcg by mouth daily. , Disp: , Rfl:     omeprazole (PRILOSEC) 20 MG capsule, Take 20 mg by mouth daily. , Disp: , Rfl:     Multiple Vitamins-Minerals (THERAPEUTIC MULTIVITAMIN-MINERALS) tablet, Take 1 tablet by mouth daily. , Disp: , Rfl:     dapsone 25 MG tablet, Take 25 mg by mouth 2 times daily. , Disp: , Rfl:     nefazodone (SERZONE) 100 MG tablet, Take 150 mg by mouth 4 times daily. , Disp: , Rfl:     VITAMIN D-3 (COLECALCIFEROL) 400 UNITS TABS, Take  by mouth daily. , Disp: , Rfl:   No Known Allergies  Social History     Socioeconomic History    Marital status:      Spouse name: Not on file    Number of children: Not on file    Years of education: Not on file    Highest education level: Not on file   Occupational History    Not on file   Social Needs    Financial resource strain: Not on file    Food insecurity     Worry: Not on file     Inability: Not on file    Transportation needs     Medical: Not on file     Non-medical: Not on file   Tobacco Use    Smoking status: Former Smoker     Packs/day: 0.10     Years: 17.00     Pack years: 1.70     Types: Cigarettes, Pipe     Start date: 1960     Last attempt to quit: 1977     Years since quittin.9    Smokeless tobacco: Never Used   Substance and Sexual Activity    Alcohol use: Yes     Comment: beer three times weekly    Drug use: No    Sexual activity: Not on file   Lifestyle    Physical activity     Days per week: Not on file     Minutes per session: Not on file    Stress: Not on file   Relationships    Social connections     Talks on phone: Not on file     Gets together: Not on file     Attends Zoroastrianism service: Not on file     Active member of club or organization: Not on file     Attends meetings of clubs or organizations: Not on file     Relationship status: Not on file    Intimate partner violence     Fear of current or ex partner: Not on file     Emotionally abused: Not on file     Physically abused: Not on file     Forced sexual activity: Not on file   Other Topics Concern    Not on file   Social History Narrative            Chronic Diagnosis: Depressive disorder, Mixed hyperlipidemia, Anemia, Peptic reflux disease.     HH    GERD    GASTRITIS    DEP---DR [id number]    LIPID    FE DEF ANEMIA    CHOLELITHIASIS    OPEN GB OR    [facility]    [place]  1940 Page #2    [place]  BUT PIPE---    TICS    R EAR TUBE    EGD AND COLON---    TMT -    DERMATITIS HERPETIFORMIS - RX WITH GLUTEN FREE DIET---DR MAHLE    MIDD[facility]--HER EX HUS OUT OF skilled nursing AND LIVES IN    [place]----elaine ==grand elaine moved here with pt----hs--preg ab---dui---    YOUMGEST ELAINE LIVES IN [place]  NO KIDS    SON LIVES IN [place]--    EVAL WITH DR FRIED 10-14 MILD AORTIC STENOSIS    re eval with dr Luisa Amin 2-16    ELEV PSA 1-18          Past Medical History:   Diagnosis Date    Anemia     Cholelithiasis     Depressive disorder     Dermatitis herpetiformis GLUTEN FREE DIET    Diverticulitis     Gastritis     GERD (gastroesophageal reflux disease)     Hiatal hernia     Hyperlipidemia     Iron deficiency anemia     Peptic reflux disease      Family History   Problem Relation Age of Onset    Stroke Father       Past Surgical History:   Procedure Laterality Date    CARDIOVASCULAR STRESS TEST  2004    CHOLECYSTECTOMY        Vitals:    12/08/20 0811   BP: 126/78   Temp: 98.8 °F (37.1 °C)   Weight: 188 lb (85.3 kg)   Height: 5' 8\" (1.727 m)       Objective:    Physical Exam  Skin:     Comments: Right  Lat  Mid  Index finger with   V  Scar that not totally approximated with   Bump jsut below that      Full rom  With    No loss of sensation         Julia Caceres was seen today for other. Diagnoses and all orders for this visit:    Laceration of right index finger without foreign body without damage to nail, subsequent encounter        Comments: offer appt hand ortho and he  watns to wait    A great deal of time spent reviewing medications, diet, exercise, social issues. Also reviewing the chart before entering the room with patient and finishing charting after leaving patient's room. More than half of that time was spent face to face with the patient in counseling and coordinating care. Follow Up: No follow-ups on file.      Seen by:  Flower Howell DO

## 2021-01-29 DIAGNOSIS — E78.2 MIXED HYPERLIPIDEMIA: Chronic | ICD-10-CM

## 2021-01-29 RX ORDER — ATORVASTATIN CALCIUM 40 MG/1
40 TABLET, FILM COATED ORAL DAILY
Qty: 90 TABLET | Refills: 3 | Status: SHIPPED
Start: 2021-01-29 | End: 2021-10-06 | Stop reason: SDUPTHER

## 2021-02-01 ENCOUNTER — IMMUNIZATION (OUTPATIENT)
Dept: PRIMARY CARE CLINIC | Age: 81
End: 2021-02-01
Payer: MEDICARE

## 2021-02-01 PROCEDURE — 91300 COVID-19, PFIZER VACCINE 30MCG/0.3ML DOSE: CPT | Performed by: CLINICAL NURSE SPECIALIST

## 2021-02-01 PROCEDURE — 0001A COVID-19, PFIZER VACCINE 30MCG/0.3ML DOSE: CPT | Performed by: CLINICAL NURSE SPECIALIST

## 2021-02-23 ENCOUNTER — IMMUNIZATION (OUTPATIENT)
Dept: PRIMARY CARE CLINIC | Age: 81
End: 2021-02-23
Payer: MEDICARE

## 2021-02-23 PROCEDURE — 0002A COVID-19, PFIZER VACCINE 30MCG/0.3ML DOSE: CPT | Performed by: CLINICAL NURSE SPECIALIST

## 2021-02-23 PROCEDURE — 91300 COVID-19, PFIZER VACCINE 30MCG/0.3ML DOSE: CPT | Performed by: CLINICAL NURSE SPECIALIST

## 2021-03-01 ENCOUNTER — HOSPITAL ENCOUNTER (OUTPATIENT)
Age: 81
Discharge: HOME | End: 2021-03-01
Payer: MEDICARE

## 2021-03-01 PROCEDURE — 93005 ELECTROCARDIOGRAM TRACING: CPT | Performed by: PSYCHIATRY & NEUROLOGY

## 2021-03-02 LAB
EKG ATRIAL RATE: 70 BPM
EKG P AXIS: 45 DEGREES
EKG P-R INTERVAL: 154 MS
EKG Q-T INTERVAL: 422 MS
EKG QRS DURATION: 98 MS
EKG QTC CALCULATION (BAZETT): 455 MS
EKG R AXIS: 66 DEGREES
EKG T AXIS: 25 DEGREES
EKG VENTRICULAR RATE: 70 BPM

## 2021-03-02 PROCEDURE — 93010 ELECTROCARDIOGRAM REPORT: CPT | Performed by: INTERNAL MEDICINE

## 2021-03-11 ENCOUNTER — OFFICE VISIT (OUTPATIENT)
Dept: CARDIOLOGY CLINIC | Age: 81
End: 2021-03-11
Payer: MEDICARE

## 2021-03-11 VITALS
RESPIRATION RATE: 18 BRPM | HEIGHT: 70 IN | BODY MASS INDEX: 27.29 KG/M2 | SYSTOLIC BLOOD PRESSURE: 132 MMHG | WEIGHT: 190.6 LBS | DIASTOLIC BLOOD PRESSURE: 64 MMHG | HEART RATE: 75 BPM

## 2021-03-11 DIAGNOSIS — I35.0 AORTIC VALVE STENOSIS, ETIOLOGY OF CARDIAC VALVE DISEASE UNSPECIFIED: ICD-10-CM

## 2021-03-11 DIAGNOSIS — I49.3 FREQUENT UNIFOCAL PVCS: ICD-10-CM

## 2021-03-11 DIAGNOSIS — I10 ESSENTIAL HYPERTENSION: Primary | Chronic | ICD-10-CM

## 2021-03-11 DIAGNOSIS — R94.31 ABNORMAL EKG: ICD-10-CM

## 2021-03-11 DIAGNOSIS — R06.02 SHORTNESS OF BREATH: ICD-10-CM

## 2021-03-11 DIAGNOSIS — I35.2 NONRHEUMATIC AORTIC INSUFFICIENCY WITH AORTIC STENOSIS: ICD-10-CM

## 2021-03-11 PROCEDURE — 93000 ELECTROCARDIOGRAM COMPLETE: CPT | Performed by: INTERNAL MEDICINE

## 2021-03-11 PROCEDURE — 99213 OFFICE O/P EST LOW 20 MIN: CPT | Performed by: INTERNAL MEDICINE

## 2021-03-11 RX ORDER — VITAMIN B COMPLEX
100 TABLET ORAL DAILY
COMMUNITY

## 2021-03-11 NOTE — PROGRESS NOTES
Systems:  Constitutional: negative for fever and chills  Respiratory: negative for cough and hemoptysis  Cardiovascular:   Gastrointestinal: negative for abdominal pain, diarrhea, nausea and vomiting  Genitourinary:negative for dysuria and hematuria  Derm: negative for rash and skin lesion(s)  Neurological: negative for seizures and tremors  Endocrine: negative for diabetic symptoms including polydipsia and polyuria  Musculoskeletal: negative for CTD  Psychiatric: negative for psychosis and major depression    On examination he is alert pleasant elderly man in no distress   skin is warm and dry. Respirations are unlabored. /64   Pulse 75   Resp 18   Ht 5' 10\" (1.778 m)   Wt 190 lb 9.6 oz (86.5 kg)   BMI 27.35 kg/m² HEENT negative for scleral icterus. Extraocular muscles intact. No facial asymmetry or central cyanosis. Neck without masses or goiter. No bruit or JVD. Cardiac apex not displaced. Rhythm regular. Harsh low pitched grade 2 JOSE upper left sternal border radiating to the base of the carotids. Short decrescendo diastolic blowing murmur along the left sternal border. No definite gallop. Abdomen normal.  Extremities without edema. Lungs are clear    EKG today s sinus mechanism with monomorphic ventricular bigeminy. Normal OK interval.  Normal QRS duration    The patient is difficult to assess because he is sedentary for noncardiovascular reasons. However, he now notices dyspnea which was not present last year at the same level of activity. His murmur also has changed and now may be hemodynamically significant. He is also having very frequent PVCs. The substrate for his symptoms and ectopy may be ischemic or valvular. Therefore both an echo and a Lexiscan perfusion study will be obtained to make further recommendations. This was reviewed h with him at length and questions answered. He took notes and acknowledged understanding the approach.     At completion of today's visit, medications include the following:    Current Outpatient Medications:     Coenzyme Q10 (COQ10) 100 MG CAPS, Take 100 mg by mouth daily, Disp: , Rfl:     atorvastatin (LIPITOR) 40 MG tablet, Take 1 tablet by mouth daily, Disp: 90 tablet, Rfl: 3    sildenafil (VIAGRA) 100 MG tablet, Take 1 tablet by mouth as needed for Erectile Dysfunction, Disp: 50 tablet, Rfl: 12    latanoprost (XALATAN) 0.005 % ophthalmic solution, , Disp: , Rfl:     Handicap Placard MISC, by Does not apply route, Disp: 1 each, Rfl: 0    Biotin 5000 MCG TABS, Take by mouth daily, Disp: , Rfl:     IRON, FERROUS GLUCONATE, PO, Take  by mouth. 65 mg every other day, Disp: , Rfl:     vitamin B-12 (CYANOCOBALAMIN) 1000 MCG tablet, Take 1,000 mcg by mouth daily. , Disp: , Rfl:     omeprazole (PRILOSEC) 20 MG capsule, Take 20 mg by mouth daily. , Disp: , Rfl:     Multiple Vitamins-Minerals (THERAPEUTIC MULTIVITAMIN-MINERALS) tablet, Take 1 tablet by mouth daily. , Disp: , Rfl:     dapsone 25 MG tablet, Take 25 mg by mouth 2 times daily. , Disp: , Rfl:     nefazodone (SERZONE) 100 MG tablet, Take 150 mg by mouth 2 times daily , Disp: , Rfl:     VITAMIN D-3 (COLECALCIFEROL) 400 UNITS TABS, Take  by mouth daily. , Disp: , Rfl:       Note: This report was completed utilizing computer voice recognition software. Every effort has been made to ensure accuracy, however; inadvertent computerized transcription errors may be present.     --Kori Win MD on 3/11/2021 at 9:52 AM

## 2021-03-18 ENCOUNTER — OFFICE VISIT (OUTPATIENT)
Dept: PRIMARY CARE CLINIC | Age: 81
End: 2021-03-18
Payer: MEDICARE

## 2021-03-18 VITALS
SYSTOLIC BLOOD PRESSURE: 130 MMHG | HEIGHT: 70 IN | WEIGHT: 195 LBS | BODY MASS INDEX: 27.92 KG/M2 | TEMPERATURE: 98.9 F | DIASTOLIC BLOOD PRESSURE: 72 MMHG

## 2021-03-18 DIAGNOSIS — E78.2 MIXED HYPERLIPIDEMIA: Chronic | ICD-10-CM

## 2021-03-18 DIAGNOSIS — I49.3 PVC (PREMATURE VENTRICULAR CONTRACTION): Chronic | ICD-10-CM

## 2021-03-18 DIAGNOSIS — M15.9 PRIMARY OSTEOARTHRITIS INVOLVING MULTIPLE JOINTS: ICD-10-CM

## 2021-03-18 DIAGNOSIS — N52.8 OTHER MALE ERECTILE DYSFUNCTION: ICD-10-CM

## 2021-03-18 DIAGNOSIS — I49.9 IRREGULAR HEART BEAT: Chronic | ICD-10-CM

## 2021-03-18 DIAGNOSIS — D50.8 OTHER IRON DEFICIENCY ANEMIA: Chronic | ICD-10-CM

## 2021-03-18 DIAGNOSIS — I10 ESSENTIAL HYPERTENSION: Primary | Chronic | ICD-10-CM

## 2021-03-18 PROBLEM — M15.0 PRIMARY OSTEOARTHRITIS INVOLVING MULTIPLE JOINTS: Status: ACTIVE | Noted: 2021-03-18

## 2021-03-18 PROCEDURE — 99214 OFFICE O/P EST MOD 30 MIN: CPT | Performed by: FAMILY MEDICINE

## 2021-03-18 ASSESSMENT — PATIENT HEALTH QUESTIONNAIRE - PHQ9
1. LITTLE INTEREST OR PLEASURE IN DOING THINGS: 0
SUM OF ALL RESPONSES TO PHQ QUESTIONS 1-9: 0
SUM OF ALL RESPONSES TO PHQ QUESTIONS 1-9: 0
2. FEELING DOWN, DEPRESSED OR HOPELESS: 0

## 2021-03-18 ASSESSMENT — ENCOUNTER SYMPTOMS
RESPIRATORY NEGATIVE: 1
GASTROINTESTINAL NEGATIVE: 1
EYES NEGATIVE: 1
ALLERGIC/IMMUNOLOGIC NEGATIVE: 1

## 2021-03-18 NOTE — PROGRESS NOTES
3/18/21  Name: Estefani Martinez    : 1940    Sex: male    Age: 68 y.o. Subjective:  Chief Complaint: Patient is here for 6 mock  Re  choll   Anemia     cardic     He  Field to get lab done    sw   Dr Kameron Groves nd for  Echo and  TMT soon   No cp ros ob  inative       To get my lab in am and call for next or luiz  In  6 months   He wirste  eveyrting down    Erections poor   And   He used  Two  100 mg  viagra and wanred him not to do so      Review of Systems   Constitutional: Negative. HENT: Negative. Eyes: Negative. Respiratory: Negative. Cardiovascular: Negative. Gastrointestinal: Negative. Endocrine: Negative. Genitourinary: Negative. Musculoskeletal: Positive for arthralgias. Skin: Negative. Allergic/Immunologic: Negative. Neurological: Negative. Hematological: Negative. Psychiatric/Behavioral: Negative. Current Outpatient Medications:     Coenzyme Q10 (COQ10) 100 MG CAPS, Take 100 mg by mouth daily, Disp: , Rfl:     atorvastatin (LIPITOR) 40 MG tablet, Take 1 tablet by mouth daily, Disp: 90 tablet, Rfl: 3    sildenafil (VIAGRA) 100 MG tablet, Take 1 tablet by mouth as needed for Erectile Dysfunction, Disp: 50 tablet, Rfl: 12    latanoprost (XALATAN) 0.005 % ophthalmic solution, , Disp: , Rfl:     Handicap Placard MISC, by Does not apply route, Disp: 1 each, Rfl: 0    Biotin 5000 MCG TABS, Take by mouth daily, Disp: , Rfl:     IRON, FERROUS GLUCONATE, PO, Take  by mouth. 65 mg every other day, Disp: , Rfl:     vitamin B-12 (CYANOCOBALAMIN) 1000 MCG tablet, Take 1,000 mcg by mouth daily. , Disp: , Rfl:     omeprazole (PRILOSEC) 20 MG capsule, Take 20 mg by mouth daily. , Disp: , Rfl:     Multiple Vitamins-Minerals (THERAPEUTIC MULTIVITAMIN-MINERALS) tablet, Take 1 tablet by mouth daily. , Disp: , Rfl:     dapsone 25 MG tablet, Take 25 mg by mouth 2 times daily. , Disp: , Rfl:     nefazodone (SERZONE) 100 MG tablet, Take 150 mg by mouth 2 times daily , Disp: , Rfl:     VITAMIN D-3 (COLECALCIFEROL) 400 UNITS TABS, Take  by mouth daily. , Disp: , Rfl:   No Known Allergies  Social History     Socioeconomic History    Marital status:      Spouse name: Not on file    Number of children: Not on file    Years of education: Not on file    Highest education level: Not on file   Occupational History    Not on file   Social Needs    Financial resource strain: Not on file    Food insecurity     Worry: Not on file     Inability: Not on file    Transportation needs     Medical: Not on file     Non-medical: Not on file   Tobacco Use    Smoking status: Former Smoker     Packs/day: 0.10     Years: 17.00     Pack years: 1.70     Types: Cigarettes, Pipe     Start date: 1960     Quit date: 1977     Years since quittin.2    Smokeless tobacco: Never Used   Substance and Sexual Activity    Alcohol use: Yes     Comment: beer three times weekly    Drug use: No    Sexual activity: Not on file   Lifestyle    Physical activity     Days per week: Not on file     Minutes per session: Not on file    Stress: Not on file   Relationships    Social connections     Talks on phone: Not on file     Gets together: Not on file     Attends Restoration service: Not on file     Active member of club or organization: Not on file     Attends meetings of clubs or organizations: Not on file     Relationship status: Not on file    Intimate partner violence     Fear of current or ex partner: Not on file     Emotionally abused: Not on file     Physically abused: Not on file     Forced sexual activity: Not on file   Other Topics Concern    Not on file   Social History Narrative            Chronic Diagnosis: Depressive disorder, Mixed hyperlipidemia, Anemia, Peptic reflux disease.     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 Also reviewing the chart before entering the room with patient and finishing charting after leaving patient's room. More than half of that time was spent face to face with the patient in counseling and coordinating care. Follow Up: Return in about 6 months (around 9/18/2021) for Lab Before.      Seen by:  Ángela Bliss DO

## 2021-03-19 ENCOUNTER — HOSPITAL ENCOUNTER (OUTPATIENT)
Age: 81
Discharge: HOME | End: 2021-03-19
Payer: MEDICARE

## 2021-03-19 DIAGNOSIS — Z12.5 PROSTATE CANCER SCREENING: ICD-10-CM

## 2021-03-19 DIAGNOSIS — E03.9 ACQUIRED HYPOTHYROIDISM: ICD-10-CM

## 2021-03-19 DIAGNOSIS — E78.2 MIXED HYPERLIPIDEMIA: Chronic | ICD-10-CM

## 2021-03-19 DIAGNOSIS — I10 ESSENTIAL HYPERTENSION: Chronic | ICD-10-CM

## 2021-03-19 DIAGNOSIS — D50.8 OTHER IRON DEFICIENCY ANEMIA: Chronic | ICD-10-CM

## 2021-03-19 LAB
ALBUMIN SERPL-MCNC: 4.3 G/DL (ref 3.5–5.2)
ALP BLD-CCNC: 86 U/L (ref 40–129)
ALT SERPL-CCNC: 19 U/L (ref 0–40)
ANION GAP SERPL CALCULATED.3IONS-SCNC: 6 MMOL/L (ref 7–16)
AST SERPL-CCNC: 19 U/L (ref 0–39)
BACTERIA: NORMAL /HPF
BASOPHILS ABSOLUTE: 0.04 E9/L (ref 0–0.2)
BASOPHILS RELATIVE PERCENT: 0.6 % (ref 0–2)
BILIRUB SERPL-MCNC: 1.1 MG/DL (ref 0–1.2)
BILIRUBIN URINE: NEGATIVE
BLOOD, URINE: ABNORMAL
BUN BLDV-MCNC: 22 MG/DL (ref 8–23)
CALCIUM SERPL-MCNC: 9 MG/DL (ref 8.6–10.2)
CHLORIDE BLD-SCNC: 105 MMOL/L (ref 98–107)
CHOLESTEROL, TOTAL: 127 MG/DL (ref 0–199)
CLARITY: CLEAR
CO2: 28 MMOL/L (ref 22–29)
COLOR: YELLOW
CREAT SERPL-MCNC: 0.8 MG/DL (ref 0.7–1.2)
EOSINOPHILS ABSOLUTE: 0.25 E9/L (ref 0.05–0.5)
EOSINOPHILS RELATIVE PERCENT: 3.9 % (ref 0–6)
GFR AFRICAN AMERICAN: >60
GFR NON-AFRICAN AMERICAN: >60 ML/MIN/1.73
GLUCOSE BLD-MCNC: 108 MG/DL (ref 74–99)
GLUCOSE URINE: NEGATIVE MG/DL
HCT VFR BLD CALC: 40.8 % (ref 37–54)
HDLC SERPL-MCNC: 53 MG/DL
HEMOGLOBIN: 13.4 G/DL (ref 12.5–16.5)
IMMATURE GRANULOCYTES #: 0.03 E9/L
IMMATURE GRANULOCYTES %: 0.5 % (ref 0–5)
IRON: 132 MCG/DL (ref 59–158)
KETONES, URINE: NEGATIVE MG/DL
LDL CHOLESTEROL CALCULATED: 60 MG/DL (ref 0–99)
LEUKOCYTE ESTERASE, URINE: NEGATIVE
LYMPHOCYTES ABSOLUTE: 1.36 E9/L (ref 1.5–4)
LYMPHOCYTES RELATIVE PERCENT: 21.1 % (ref 20–42)
MCH RBC QN AUTO: 33.8 PG (ref 26–35)
MCHC RBC AUTO-ENTMCNC: 32.8 % (ref 32–34.5)
MCV RBC AUTO: 103 FL (ref 80–99.9)
MONOCYTES ABSOLUTE: 0.43 E9/L (ref 0.1–0.95)
MONOCYTES RELATIVE PERCENT: 6.7 % (ref 2–12)
NEUTROPHILS ABSOLUTE: 4.33 E9/L (ref 1.8–7.3)
NEUTROPHILS RELATIVE PERCENT: 67.2 % (ref 43–80)
NITRITE, URINE: NEGATIVE
PDW BLD-RTO: 12.9 FL (ref 11.5–15)
PH UA: 6 (ref 5–9)
PLATELET # BLD: 146 E9/L (ref 130–450)
PMV BLD AUTO: 10.1 FL (ref 7–12)
POTASSIUM SERPL-SCNC: 4.2 MMOL/L (ref 3.5–5)
PROSTATE SPECIFIC ANTIGEN: 8.28 NG/ML (ref 0–4)
PROTEIN UA: NEGATIVE MG/DL
RBC # BLD: 3.96 E12/L (ref 3.8–5.8)
RBC UA: NORMAL /HPF (ref 0–2)
SODIUM BLD-SCNC: 139 MMOL/L (ref 132–146)
SPECIFIC GRAVITY UA: 1.02 (ref 1–1.03)
T4 TOTAL: 6.6 MCG/DL (ref 4.5–11.7)
TOTAL PROTEIN: 6.5 G/DL (ref 6.4–8.3)
TRIGL SERPL-MCNC: 72 MG/DL (ref 0–149)
TSH SERPL DL<=0.05 MIU/L-ACNC: 1.88 UIU/ML (ref 0.27–4.2)
UROBILINOGEN, URINE: 1 E.U./DL
VLDLC SERPL CALC-MCNC: 14 MG/DL
WBC # BLD: 6.4 E9/L (ref 4.5–11.5)
WBC UA: NORMAL /HPF (ref 0–5)

## 2021-03-19 PROCEDURE — G0103 PSA SCREENING: HCPCS

## 2021-03-19 PROCEDURE — 81001 URINALYSIS AUTO W/SCOPE: CPT

## 2021-03-19 PROCEDURE — 84443 ASSAY THYROID STIM HORMONE: CPT

## 2021-03-19 PROCEDURE — 36415 COLL VENOUS BLD VENIPUNCTURE: CPT

## 2021-03-19 PROCEDURE — 85025 COMPLETE CBC W/AUTO DIFF WBC: CPT

## 2021-03-19 PROCEDURE — 83540 ASSAY OF IRON: CPT

## 2021-03-19 PROCEDURE — 84436 ASSAY OF TOTAL THYROXINE: CPT

## 2021-03-19 PROCEDURE — 80053 COMPREHEN METABOLIC PANEL: CPT

## 2021-03-19 PROCEDURE — 80061 LIPID PANEL: CPT

## 2021-03-22 ENCOUNTER — TELEPHONE (OUTPATIENT)
Dept: PRIMARY CARE CLINIC | Age: 81
End: 2021-03-22

## 2021-03-22 DIAGNOSIS — R97.20 ELEVATED PSA: Primary | ICD-10-CM

## 2021-03-22 NOTE — TELEPHONE ENCOUNTER
All lab is okay but PSA is higher at 8.28.   He really should see a urologist.  If he is willing to go and we need to do a referral let me know

## 2021-03-30 ENCOUNTER — TELEPHONE (OUTPATIENT)
Dept: CARDIOLOGY | Age: 81
End: 2021-03-30

## 2021-03-30 NOTE — TELEPHONE ENCOUNTER
Spoke with patient and confirmed echocardiogram and Lexiscan stress test appointments on April 1, 2021 starting at [address]. Instructions for tests and COVID-19 preprocedure checklist reviewed with patient.

## 2021-04-01 ENCOUNTER — HOSPITAL ENCOUNTER (OUTPATIENT)
Dept: CARDIOLOGY | Age: 81
Discharge: HOME | End: 2021-04-01
Payer: MEDICARE

## 2021-04-01 VITALS
DIASTOLIC BLOOD PRESSURE: 80 MMHG | HEART RATE: 66 BPM | WEIGHT: 195 LBS | HEIGHT: 70 IN | BODY MASS INDEX: 27.92 KG/M2 | SYSTOLIC BLOOD PRESSURE: 150 MMHG | TEMPERATURE: 96.9 F

## 2021-04-01 DIAGNOSIS — R06.02 SHORTNESS OF BREATH: ICD-10-CM

## 2021-04-01 DIAGNOSIS — R94.31 ABNORMAL EKG: ICD-10-CM

## 2021-04-01 DIAGNOSIS — I35.0 AORTIC VALVE STENOSIS, ETIOLOGY OF CARDIAC VALVE DISEASE UNSPECIFIED: ICD-10-CM

## 2021-04-01 LAB
LV EF: 60 %
LVEF MODALITY: NORMAL

## 2021-04-01 PROCEDURE — 93017 CV STRESS TEST TRACING ONLY: CPT

## 2021-04-01 PROCEDURE — 6360000002 HC RX W HCPCS: Performed by: INTERNAL MEDICINE

## 2021-04-01 PROCEDURE — 78452 HT MUSCLE IMAGE SPECT MULT: CPT

## 2021-04-01 PROCEDURE — 93306 TTE W/DOPPLER COMPLETE: CPT

## 2021-04-01 PROCEDURE — 3430000000 HC RX DIAGNOSTIC RADIOPHARMACEUTICAL: Performed by: INTERNAL MEDICINE

## 2021-04-01 PROCEDURE — 2580000003 HC RX 258: Performed by: INTERNAL MEDICINE

## 2021-04-01 PROCEDURE — A9500 TC99M SESTAMIBI: HCPCS | Performed by: INTERNAL MEDICINE

## 2021-04-01 RX ORDER — SODIUM CHLORIDE 0.9 % (FLUSH) 0.9 %
10 SYRINGE (ML) INJECTION PRN
Status: DISCONTINUED | OUTPATIENT
Start: 2021-04-01 | End: 2021-04-02 | Stop reason: HOSPADM

## 2021-04-01 RX ORDER — VENLAFAXINE HYDROCHLORIDE 75 MG/1
75 CAPSULE, EXTENDED RELEASE ORAL DAILY
COMMUNITY
Start: 2021-03-18

## 2021-04-01 RX ADMIN — SODIUM CHLORIDE, PRESERVATIVE FREE 10 ML: 5 INJECTION INTRAVENOUS at 11:49

## 2021-04-01 RX ADMIN — SODIUM CHLORIDE, PRESERVATIVE FREE 10 ML: 5 INJECTION INTRAVENOUS at 09:12

## 2021-04-01 RX ADMIN — SODIUM CHLORIDE, PRESERVATIVE FREE 10 ML: 5 INJECTION INTRAVENOUS at 11:50

## 2021-04-01 RX ADMIN — Medication 32.1 MILLICURIE: at 11:49

## 2021-04-01 RX ADMIN — Medication 10 MILLICURIE: at 09:12

## 2021-04-01 RX ADMIN — REGADENOSON 0.4 MG: 0.08 INJECTION, SOLUTION INTRAVENOUS at 11:49

## 2021-04-01 NOTE — PROCEDURES
[id number] [address]  [phone]                Pharmacologic Stress Nuclear Gated SPECT Study    Name: [facility] Account Number: [de-identified]    :  1940          Sex: male         Date of Study:  2021    Height: 5' 10\" (177.8 cm)         Weight: 195 lb (88.5 kg)     Ordering Provider: Shireen Rodriguez MD          PCP: Mario Hightower DO      Cardiologist: Shireen Rodriguez MD             Interpreting Physician: Shireen Rodriguez MD  _________________________________________________________________________________    Indication:   Detecting the presence and location of coronary artery disease    Clinical History:   Patient has no known history of coronary artery disease. Resting ECG:    MO int 0.16 sec, QRS int 0.08 sec, QT int  sec; HR 66 bpm  Normal sinus. Rhythm. Atrial bigeminy. Probable LVH    Procedure:   Pharmacologic stress testing was performed with regadenoson 0.4 mg for 15 seconds. Additionally, low-level exercise was performed along with the infusion. The heart rate was 66 at baseline and krupa to 103 beats during the infusion. This corresponds to 73% of maximum predicted heart rate. The blood pressure at baseline was 150/80 and blood pressure at the end of infusion was 110/70. Blood pressure response was hypotensive during the stress procedure. The patient tolerated the infusion well. ECG during the infusion did not change. IMAGING: Myocardial perfusion imaging was performed at rest 30-35 minutes following the intravenous injection of 10.0 mCi of (Tc-Sestamibi) followed by 10 ml of Normal Saline. As per infusion protocol, the patient was injected intravenously with 32.1 mCi of (Tc-Sestamibi) followed by 10 ml of Normal Saline. Gated post-stress tomographic imaging was performed 20-25 minutes after stress.      FINDINGS: The overall quality of the study was

## 2021-04-05 ENCOUNTER — TELEPHONE (OUTPATIENT)
Dept: CARDIOLOGY CLINIC | Age: 81
End: 2021-04-05

## 2021-04-05 NOTE — TELEPHONE ENCOUNTER
Spoke with patient's wife with test results and recommendations per Dr. Dylon Orlando. She verbalized understanding. Patient will have follow-up in one year. Letter forwarded to Dr. Sarah Borrego.    ----- Message from Lyudmila Kenney MD sent at 4/5/2021 12:26 PM EDT -----    Based on noninvasive testing, dyspnea may be multifactorial and in part related to diastolic dysfunction. Unless he has worsening symptoms or exertional symptoms, no medication changes or further cardiac testing is currently recommended.   He will be seen back in the office in 1 year or sooner at your request.

## 2021-07-23 DIAGNOSIS — C61 PROSTATE CA (HCC): Primary | ICD-10-CM

## 2021-08-03 ENCOUNTER — HOSPITAL ENCOUNTER (OUTPATIENT)
Age: 81
Discharge: HOME | End: 2021-08-03
Payer: MEDICARE

## 2021-08-03 LAB — PROSTATE SPECIFIC ANTIGEN: 8.93 NG/ML (ref 0–4)

## 2021-08-03 PROCEDURE — 84153 ASSAY OF PSA TOTAL: CPT

## 2021-08-03 PROCEDURE — 36415 COLL VENOUS BLD VENIPUNCTURE: CPT

## 2021-08-30 DIAGNOSIS — E29.1 HYPOGONADISM IN MALE: ICD-10-CM

## 2021-08-30 RX ORDER — SILDENAFIL 100 MG/1
100 TABLET, FILM COATED ORAL PRN
Qty: 50 TABLET | Refills: 12 | Status: SHIPPED | OUTPATIENT
Start: 2021-08-30

## 2021-09-09 ENCOUNTER — TELEPHONE (OUTPATIENT)
Dept: PRIMARY CARE CLINIC | Age: 81
End: 2021-09-09

## 2021-09-09 DIAGNOSIS — E11.9 DIET-CONTROLLED DIABETES MELLITUS (HCC): ICD-10-CM

## 2021-09-09 DIAGNOSIS — E78.2 MIXED HYPERLIPIDEMIA: ICD-10-CM

## 2021-09-09 DIAGNOSIS — D50.8 OTHER IRON DEFICIENCY ANEMIA: ICD-10-CM

## 2021-09-09 DIAGNOSIS — I10 ESSENTIAL HYPERTENSION: Primary | ICD-10-CM

## 2021-09-09 DIAGNOSIS — M81.0 AGE-RELATED OSTEOPOROSIS WITHOUT CURRENT PATHOLOGICAL FRACTURE: ICD-10-CM

## 2021-09-10 NOTE — TELEPHONE ENCOUNTER
Patient notified. Will fast for 12 hours but drink plenty of water.   [facility] [address]

## 2021-09-11 ENCOUNTER — HOSPITAL ENCOUNTER (OUTPATIENT)
Age: 81
Discharge: HOME | End: 2021-09-11
Payer: MEDICARE

## 2021-09-11 DIAGNOSIS — E11.9 DIET-CONTROLLED DIABETES MELLITUS (HCC): ICD-10-CM

## 2021-09-11 DIAGNOSIS — M81.0 AGE-RELATED OSTEOPOROSIS WITHOUT CURRENT PATHOLOGICAL FRACTURE: ICD-10-CM

## 2021-09-11 DIAGNOSIS — D50.8 OTHER IRON DEFICIENCY ANEMIA: ICD-10-CM

## 2021-09-11 DIAGNOSIS — I10 ESSENTIAL HYPERTENSION: ICD-10-CM

## 2021-09-11 LAB
ALBUMIN SERPL-MCNC: 4.3 G/DL (ref 3.5–5.2)
ALP BLD-CCNC: 85 U/L (ref 40–129)
ALT SERPL-CCNC: 19 U/L (ref 0–40)
ANION GAP SERPL CALCULATED.3IONS-SCNC: 9 MMOL/L (ref 7–16)
AST SERPL-CCNC: 18 U/L (ref 0–39)
BACTERIA: NORMAL /HPF
BASOPHILS ABSOLUTE: 0.04 E9/L (ref 0–0.2)
BASOPHILS RELATIVE PERCENT: 0.5 % (ref 0–2)
BILIRUB SERPL-MCNC: 1.1 MG/DL (ref 0–1.2)
BILIRUBIN URINE: NEGATIVE
BLOOD, URINE: ABNORMAL
BUN BLDV-MCNC: 27 MG/DL (ref 6–23)
CALCIUM SERPL-MCNC: 9.4 MG/DL (ref 8.6–10.2)
CHLORIDE BLD-SCNC: 103 MMOL/L (ref 98–107)
CHOLESTEROL, TOTAL: 104 MG/DL (ref 0–199)
CLARITY: CLEAR
CO2: 26 MMOL/L (ref 22–29)
COLOR: YELLOW
CREAT SERPL-MCNC: 1.1 MG/DL (ref 0.7–1.2)
EOSINOPHILS ABSOLUTE: 0.19 E9/L (ref 0.05–0.5)
EOSINOPHILS RELATIVE PERCENT: 2.6 % (ref 0–6)
EPITHELIAL CELLS, UA: NORMAL /HPF
GFR AFRICAN AMERICAN: >60
GFR NON-AFRICAN AMERICAN: >60 ML/MIN/1.73
GLUCOSE BLD-MCNC: 111 MG/DL (ref 74–99)
GLUCOSE URINE: NEGATIVE MG/DL
HBA1C MFR BLD: 4.2 % (ref 4–5.6)
HCT VFR BLD CALC: 40.7 % (ref 37–54)
HDLC SERPL-MCNC: 47 MG/DL
HEMOGLOBIN: 13.6 G/DL (ref 12.5–16.5)
IMMATURE GRANULOCYTES #: 0.01 E9/L
IMMATURE GRANULOCYTES %: 0.1 % (ref 0–5)
IRON: 88 MCG/DL (ref 59–158)
KETONES, URINE: NEGATIVE MG/DL
LDL CHOLESTEROL CALCULATED: 44 MG/DL (ref 0–99)
LEUKOCYTE ESTERASE, URINE: NEGATIVE
LYMPHOCYTES ABSOLUTE: 1.39 E9/L (ref 1.5–4)
LYMPHOCYTES RELATIVE PERCENT: 18.9 % (ref 20–42)
MCH RBC QN AUTO: 33 PG (ref 26–35)
MCHC RBC AUTO-ENTMCNC: 33.4 % (ref 32–34.5)
MCV RBC AUTO: 98.8 FL (ref 80–99.9)
MONOCYTES ABSOLUTE: 0.48 E9/L (ref 0.1–0.95)
MONOCYTES RELATIVE PERCENT: 6.5 % (ref 2–12)
NEUTROPHILS ABSOLUTE: 5.25 E9/L (ref 1.8–7.3)
NEUTROPHILS RELATIVE PERCENT: 71.4 % (ref 43–80)
NITRITE, URINE: NEGATIVE
PDW BLD-RTO: 13.1 FL (ref 11.5–15)
PH UA: 5.5 (ref 5–9)
PLATELET # BLD: 162 E9/L (ref 130–450)
PMV BLD AUTO: 10 FL (ref 7–12)
POTASSIUM SERPL-SCNC: 4.3 MMOL/L (ref 3.5–5)
PROTEIN UA: NEGATIVE MG/DL
RBC # BLD: 4.12 E12/L (ref 3.8–5.8)
RBC UA: NORMAL /HPF (ref 0–2)
SODIUM BLD-SCNC: 138 MMOL/L (ref 132–146)
SPECIFIC GRAVITY UA: >=1.03 (ref 1–1.03)
TOTAL PROTEIN: 6.4 G/DL (ref 6.4–8.3)
TRIGL SERPL-MCNC: 64 MG/DL (ref 0–149)
UROBILINOGEN, URINE: 0.2 E.U./DL
VITAMIN D 25-HYDROXY: 103 NG/ML (ref 30–100)
VLDLC SERPL CALC-MCNC: 13 MG/DL
WBC # BLD: 7.4 E9/L (ref 4.5–11.5)
WBC UA: NORMAL /HPF (ref 0–5)

## 2021-09-11 PROCEDURE — 85025 COMPLETE CBC W/AUTO DIFF WBC: CPT

## 2021-09-11 PROCEDURE — 80061 LIPID PANEL: CPT

## 2021-09-11 PROCEDURE — 81001 URINALYSIS AUTO W/SCOPE: CPT

## 2021-09-11 PROCEDURE — 83540 ASSAY OF IRON: CPT

## 2021-09-11 PROCEDURE — 80053 COMPREHEN METABOLIC PANEL: CPT

## 2021-09-11 PROCEDURE — 36415 COLL VENOUS BLD VENIPUNCTURE: CPT

## 2021-09-11 PROCEDURE — 83036 HEMOGLOBIN GLYCOSYLATED A1C: CPT

## 2021-09-11 PROCEDURE — 82306 VITAMIN D 25 HYDROXY: CPT

## 2021-09-16 ENCOUNTER — OFFICE VISIT (OUTPATIENT)
Dept: PRIMARY CARE CLINIC | Age: 81
End: 2021-09-16
Payer: MEDICARE

## 2021-09-16 VITALS
RESPIRATION RATE: 16 BRPM | WEIGHT: 185 LBS | HEART RATE: 55 BPM | OXYGEN SATURATION: 97 % | SYSTOLIC BLOOD PRESSURE: 126 MMHG | DIASTOLIC BLOOD PRESSURE: 78 MMHG | BODY MASS INDEX: 26.48 KG/M2 | HEIGHT: 70 IN | TEMPERATURE: 97.1 F

## 2021-09-16 DIAGNOSIS — E78.2 MIXED HYPERLIPIDEMIA: Chronic | ICD-10-CM

## 2021-09-16 DIAGNOSIS — E11.9 DIET-CONTROLLED DIABETES MELLITUS (HCC): ICD-10-CM

## 2021-09-16 DIAGNOSIS — D50.8 OTHER IRON DEFICIENCY ANEMIA: Chronic | ICD-10-CM

## 2021-09-16 DIAGNOSIS — M15.9 PRIMARY OSTEOARTHRITIS INVOLVING MULTIPLE JOINTS: ICD-10-CM

## 2021-09-16 DIAGNOSIS — E03.9 ACQUIRED HYPOTHYROIDISM: ICD-10-CM

## 2021-09-16 DIAGNOSIS — I49.3 PVC (PREMATURE VENTRICULAR CONTRACTION): Chronic | ICD-10-CM

## 2021-09-16 DIAGNOSIS — I10 ESSENTIAL HYPERTENSION: Primary | Chronic | ICD-10-CM

## 2021-09-16 DIAGNOSIS — M81.0 AGE-RELATED OSTEOPOROSIS WITHOUT CURRENT PATHOLOGICAL FRACTURE: ICD-10-CM

## 2021-09-16 PROCEDURE — 99214 OFFICE O/P EST MOD 30 MIN: CPT | Performed by: FAMILY MEDICINE

## 2021-09-16 ASSESSMENT — PATIENT HEALTH QUESTIONNAIRE - PHQ9
2. FEELING DOWN, DEPRESSED OR HOPELESS: 0
1. LITTLE INTEREST OR PLEASURE IN DOING THINGS: 0
SUM OF ALL RESPONSES TO PHQ QUESTIONS 1-9: 0
SUM OF ALL RESPONSES TO PHQ9 QUESTIONS 1 & 2: 0

## 2021-09-16 NOTE — PROGRESS NOTES
21  Name: Lottie Snow    : 1940    Sex: male    Age: 68 y.o. Subjective:  Chief Complaint: Patient is here for checkup regarding cholesterol, anemia, cardiology, blood pressure, rectal dysfunction, GERD, vitamin deficiency, anxiety    As well. No chest pain or shortness of breath. His laboratory studies show his hemoglobin is good. Sugars are staying elevated. BUN and creatinine normal.  Cholesterol good. Vitamin D level too high. 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 HENT: Negative. Eyes: Negative. Respiratory: Negative. Cardiovascular: Negative. Gastrointestinal: Negative. Endocrine: Negative. Genitourinary: Negative. Musculoskeletal: Negative. Skin: Negative. Allergic/Immunologic: Negative. Neurological: Negative. Hematological: Negative. Psychiatric/Behavioral: Negative. Current Outpatient Medications:     sildenafil (VIAGRA) 100 MG tablet, Take 1 tablet by mouth as needed for Erectile Dysfunction, Disp: 50 tablet, Rfl: 12    venlafaxine (EFFEXOR XR) 75 MG extended release capsule, Take 75 mg by mouth daily, Disp: , Rfl:     Coenzyme Q10 (COQ10) 100 MG CAPS, Take 100 mg by mouth daily, Disp: , Rfl:     atorvastatin (LIPITOR) 40 MG tablet, Take 1 tablet by mouth daily, Disp: 90 tablet, Rfl: 3    latanoprost (XALATAN) 0.005 % ophthalmic solution, , Disp: , Rfl:     Handicap Placard MISC, by Does not apply route, Disp: 1 each, Rfl: 0    Biotin 5000 MCG TABS, Take by mouth daily, Disp: , Rfl:     IRON, FERROUS GLUCONATE, PO, Take  by mouth. 65 mg every other day, Disp: , Rfl:     vitamin B-12 (CYANOCOBALAMIN) 1000 MCG tablet, Take 1,000 mcg by mouth daily. , Disp: , Rfl:     omeprazole (PRILOSEC) 20 MG capsule, Take 20 mg by mouth daily. , Disp: , Rfl:     Multiple Vitamins-Minerals (THERAPEUTIC MULTIVITAMIN-MINERALS) tablet, Take 1 tablet by mouth daily. , Disp: , Rfl:     dapsone 25 MG tablet, Take 25 mg by mouth 2 times daily. , Disp: , Rfl:     nefazodone (SERZONE) 100 MG tablet, Take 150 mg by mouth 2 times daily Indications: patient no longer takes , Disp: , Rfl:     VITAMIN D-3 (COLECALCIFEROL) 400 UNITS TABS, Take  by mouth daily. , Disp: , Rfl:   No Known Allergies  Social History     Socioeconomic History    Marital status:      Spouse name: Not on file    Number of children: Not on file    Years of education: Not on file    Highest education level: Not on file   Occupational History    Not on file   Tobacco Use    Smoking status: Former Smoker     Packs/day: 0.10     Years: 17.00     Pack years: 1.70     Types: Cigarettes, Pipe     Start date: 1960     Quit date: 1977     Years since quittin.7    Smokeless tobacco: Never Used   Vaping Use    Vaping Use: Never used   Substance and Sexual Activity    Alcohol use: Yes     Comment: beer three times weekly    Drug use: No    Sexual activity: Not on file   Other Topics Concern    Not on file   Social History Narrative            Chronic Diagnosis: Depressive disorder, Mixed hyperlipidemia, Anemia, Peptic reflux disease.         GERD    GASTRITIS    DEP--- [id number]    LIPID    FE DEF ANEMIA    CHOLELITHIASIS    OPEN GB OR    R ING HERNIA OR    [place]  1940 Page #2    [place]  BUT PIPE---    TICS    R EAR TUBE    EGD AND COLON--- -    DERMATITIS HERPETIFORMIS - RX WITH GLUTEN FREE DIET--- [facility] STANISLAV MOVED TO [facility]--HER EX HUS OUT OF [facility] AND LIVES IN    [place]----elaine ==grand elaine moved here with pt----hs--preg ab---dui---    YOUMGEST ELAINE LIVES IN [place]  NO KIDS    SON LIVES IN [place]--    EVAL WITH DR FRIED 10-14 MILD AORTIC STENOSIS    re eval with dr Melani Meek -16-----

## 2021-10-06 DIAGNOSIS — E78.2 MIXED HYPERLIPIDEMIA: Chronic | ICD-10-CM

## 2021-10-06 RX ORDER — ATORVASTATIN CALCIUM 40 MG/1
40 TABLET, FILM COATED ORAL DAILY
Qty: 90 TABLET | Refills: 3 | Status: SHIPPED | OUTPATIENT
Start: 2021-10-06

## 2021-10-19 DIAGNOSIS — R41.3 MEMORY IMPAIRMENT: Primary | ICD-10-CM

## 2021-12-09 ENCOUNTER — TELEPHONE (OUTPATIENT)
Dept: PRIMARY CARE CLINIC | Age: 81
End: 2021-12-09

## 2021-12-09 DIAGNOSIS — Z01.818 PRE-OP EVALUATION: ICD-10-CM

## 2021-12-09 DIAGNOSIS — I10 ESSENTIAL HYPERTENSION: Primary | ICD-10-CM

## 2021-12-09 NOTE — TELEPHONE ENCOUNTER
Pt is having surgery on 12/16 for prostate cancer at [facility]. They are asking if you can order a BMP and CBC. Pt unable to go to [facility] to have his labs drawn.

## 2021-12-10 ENCOUNTER — HOSPITAL ENCOUNTER (OUTPATIENT)
Age: 81
Discharge: HOME | End: 2021-12-10
Payer: MEDICARE

## 2021-12-10 DIAGNOSIS — E11.9 DIET-CONTROLLED DIABETES MELLITUS (HCC): ICD-10-CM

## 2021-12-10 DIAGNOSIS — E03.9 ACQUIRED HYPOTHYROIDISM: ICD-10-CM

## 2021-12-10 DIAGNOSIS — M81.0 AGE-RELATED OSTEOPOROSIS WITHOUT CURRENT PATHOLOGICAL FRACTURE: ICD-10-CM

## 2021-12-10 DIAGNOSIS — D50.8 OTHER IRON DEFICIENCY ANEMIA: Chronic | ICD-10-CM

## 2021-12-10 DIAGNOSIS — Z01.818 PRE-OP EVALUATION: ICD-10-CM

## 2021-12-10 DIAGNOSIS — E78.2 MIXED HYPERLIPIDEMIA: Chronic | ICD-10-CM

## 2021-12-10 DIAGNOSIS — I10 ESSENTIAL HYPERTENSION: ICD-10-CM

## 2021-12-10 LAB
ALBUMIN SERPL-MCNC: 4.4 G/DL (ref 3.5–5.2)
ALP BLD-CCNC: 77 U/L (ref 40–129)
ALT SERPL-CCNC: 18 U/L (ref 0–40)
ANION GAP SERPL CALCULATED.3IONS-SCNC: 13 MMOL/L (ref 7–16)
AST SERPL-CCNC: 17 U/L (ref 0–39)
BACTERIA: ABNORMAL /HPF
BASOPHILS ABSOLUTE: 0.03 E9/L (ref 0–0.2)
BASOPHILS RELATIVE PERCENT: 0.4 % (ref 0–2)
BILIRUB SERPL-MCNC: 1 MG/DL (ref 0–1.2)
BILIRUBIN URINE: NEGATIVE
BLOOD, URINE: ABNORMAL
BUN BLDV-MCNC: 23 MG/DL (ref 6–23)
CALCIUM SERPL-MCNC: 9.2 MG/DL (ref 8.6–10.2)
CHLORIDE BLD-SCNC: 105 MMOL/L (ref 98–107)
CHOLESTEROL, TOTAL: 131 MG/DL (ref 0–199)
CLARITY: CLEAR
CO2: 20 MMOL/L (ref 22–29)
COLOR: YELLOW
CREAT SERPL-MCNC: 0.9 MG/DL (ref 0.7–1.2)
EOSINOPHILS ABSOLUTE: 0.1 E9/L (ref 0.05–0.5)
EOSINOPHILS RELATIVE PERCENT: 1.3 % (ref 0–6)
EPITHELIAL CELLS, UA: ABNORMAL /HPF
GFR AFRICAN AMERICAN: >60
GFR NON-AFRICAN AMERICAN: >60 ML/MIN/1.73
GLUCOSE BLD-MCNC: 155 MG/DL (ref 74–99)
GLUCOSE URINE: NEGATIVE MG/DL
HBA1C MFR BLD: 4.2 % (ref 4–5.6)
HCT VFR BLD CALC: 39.3 % (ref 37–54)
HDLC SERPL-MCNC: 49 MG/DL
HEMOGLOBIN: 13.1 G/DL (ref 12.5–16.5)
IMMATURE GRANULOCYTES #: 0.02 E9/L
IMMATURE GRANULOCYTES %: 0.3 % (ref 0–5)
IRON: 109 MCG/DL (ref 59–158)
KETONES, URINE: ABNORMAL MG/DL
LDL CHOLESTEROL CALCULATED: 69 MG/DL (ref 0–99)
LEUKOCYTE ESTERASE, URINE: NEGATIVE
LYMPHOCYTES ABSOLUTE: 1.27 E9/L (ref 1.5–4)
LYMPHOCYTES RELATIVE PERCENT: 16.2 % (ref 20–42)
MCH RBC QN AUTO: 34.4 PG (ref 26–35)
MCHC RBC AUTO-ENTMCNC: 33.3 % (ref 32–34.5)
MCV RBC AUTO: 103.1 FL (ref 80–99.9)
MONOCYTES ABSOLUTE: 0.31 E9/L (ref 0.1–0.95)
MONOCYTES RELATIVE PERCENT: 3.9 % (ref 2–12)
NEUTROPHILS ABSOLUTE: 6.13 E9/L (ref 1.8–7.3)
NEUTROPHILS RELATIVE PERCENT: 77.9 % (ref 43–80)
NITRITE, URINE: NEGATIVE
PDW BLD-RTO: 12.9 FL (ref 11.5–15)
PH UA: 5.5 (ref 5–9)
PLATELET # BLD: 160 E9/L (ref 130–450)
PMV BLD AUTO: 10.5 FL (ref 7–12)
POTASSIUM SERPL-SCNC: 3.7 MMOL/L (ref 3.5–5)
PROTEIN UA: NEGATIVE MG/DL
RBC # BLD: 3.81 E12/L (ref 3.8–5.8)
RBC UA: ABNORMAL /HPF (ref 0–2)
SODIUM BLD-SCNC: 138 MMOL/L (ref 132–146)
SPECIFIC GRAVITY UA: >=1.03 (ref 1–1.03)
T4 TOTAL: 6.4 MCG/DL (ref 4.5–11.7)
TOTAL PROTEIN: 6 G/DL (ref 6.4–8.3)
TRIGL SERPL-MCNC: 66 MG/DL (ref 0–149)
TSH SERPL DL<=0.05 MIU/L-ACNC: 1.39 UIU/ML (ref 0.27–4.2)
UROBILINOGEN, URINE: 1 E.U./DL
VITAMIN D 25-HYDROXY: 87 NG/ML (ref 30–100)
VLDLC SERPL CALC-MCNC: 13 MG/DL
WBC # BLD: 7.9 E9/L (ref 4.5–11.5)
WBC UA: ABNORMAL /HPF (ref 0–5)

## 2021-12-10 PROCEDURE — 36415 COLL VENOUS BLD VENIPUNCTURE: CPT

## 2021-12-10 PROCEDURE — 82306 VITAMIN D 25 HYDROXY: CPT

## 2021-12-10 PROCEDURE — 83036 HEMOGLOBIN GLYCOSYLATED A1C: CPT

## 2021-12-10 PROCEDURE — 81001 URINALYSIS AUTO W/SCOPE: CPT

## 2021-12-10 PROCEDURE — 84436 ASSAY OF TOTAL THYROXINE: CPT

## 2021-12-10 PROCEDURE — 80053 COMPREHEN METABOLIC PANEL: CPT

## 2021-12-10 PROCEDURE — 84443 ASSAY THYROID STIM HORMONE: CPT

## 2021-12-10 PROCEDURE — 80061 LIPID PANEL: CPT

## 2021-12-10 PROCEDURE — 83540 ASSAY OF IRON: CPT

## 2021-12-10 PROCEDURE — 85025 COMPLETE CBC W/AUTO DIFF WBC: CPT

## 2021-12-11 ENCOUNTER — TELEPHONE (OUTPATIENT)
Dept: PRIMARY CARE CLINIC | Age: 81
End: 2021-12-11

## 2021-12-11 NOTE — TELEPHONE ENCOUNTER
Notify patient all lab okay. Is that we are getting it done before her March visit.   If he wants a copy mailed to him if he cannot get it on my chart

## 2021-12-17 ENCOUNTER — OFFICE VISIT (OUTPATIENT)
Dept: PRIMARY CARE CLINIC | Age: 81
End: 2021-12-17
Payer: MEDICARE

## 2021-12-17 VITALS
TEMPERATURE: 97.3 F | WEIGHT: 185 LBS | DIASTOLIC BLOOD PRESSURE: 68 MMHG | BODY MASS INDEX: 28.04 KG/M2 | SYSTOLIC BLOOD PRESSURE: 136 MMHG | HEIGHT: 68 IN | OXYGEN SATURATION: 96 % | HEART RATE: 40 BPM

## 2021-12-17 DIAGNOSIS — C61 CANCER OF PROSTATE (HCC): ICD-10-CM

## 2021-12-17 DIAGNOSIS — I10 ESSENTIAL HYPERTENSION: Primary | ICD-10-CM

## 2021-12-17 DIAGNOSIS — I49.3 PVC (PREMATURE VENTRICULAR CONTRACTION): Chronic | ICD-10-CM

## 2021-12-17 DIAGNOSIS — Z20.822 EXPOSURE TO COVID-19 VIRUS: ICD-10-CM

## 2021-12-17 PROCEDURE — 99214 OFFICE O/P EST MOD 30 MIN: CPT | Performed by: FAMILY MEDICINE

## 2021-12-17 NOTE — PROGRESS NOTES
21  Name: Byron Mckeon    : 1940    Sex: male    Age: 80 y.o. Subjective:  Chief Complaint: Patient is here for hospital discharge regarding see the prostate and blood pressure. She presents accompanied by his wife. He was diagnosed with prostate cancer had seed implant in [facility] clinic on . His blood pressure is elevated there at 171/87. He was discharged with Flomax 2 nightly and Cipro 500 twice daily. He was exposed to Covid on Tuesday has been isolating but he repetitiously was negative. His blood pressure at home is running around 135/95. Review of Systems   Constitutional: Negative. HENT: Negative. Eyes: Negative. Respiratory: Negative. Cardiovascular: Negative. Gastrointestinal: Negative. Endocrine: Negative. Genitourinary: Negative. Musculoskeletal: Negative. Skin: Negative. Allergic/Immunologic: Negative. Neurological: Negative. Hematological: Negative. Psychiatric/Behavioral: Negative. Current Outpatient Medications:     atorvastatin (LIPITOR) 40 MG tablet, Take 1 tablet by mouth daily, Disp: 90 tablet, Rfl: 3    sildenafil (VIAGRA) 100 MG tablet, Take 1 tablet by mouth as needed for Erectile Dysfunction, Disp: 50 tablet, Rfl: 12    venlafaxine (EFFEXOR XR) 75 MG extended release capsule, Take 75 mg by mouth daily, Disp: , Rfl:     Coenzyme Q10 (COQ10) 100 MG CAPS, Take 100 mg by mouth daily, Disp: , Rfl:     latanoprost (XALATAN) 0.005 % ophthalmic solution, , Disp: , Rfl:     Handicap Placard MISC, by Does not apply route, Disp: 1 each, Rfl: 0    Biotin 5000 MCG TABS, Take by mouth daily, Disp: , Rfl:     IRON, FERROUS GLUCONATE, PO, Take  by mouth. 65 mg every other day, Disp: , Rfl:     vitamin B-12 (CYANOCOBALAMIN) 1000 MCG tablet, Take 1,000 mcg by mouth daily. , Disp: , Rfl:     omeprazole (PRILOSEC) 20 MG capsule, Take 20 mg by mouth daily. , Disp: , Rfl:     Multiple Vitamins-Minerals (THERAPEUTIC MULTIVITAMIN-MINERALS) tablet, Take 1 tablet by mouth daily. , Disp: , Rfl:     dapsone 25 MG tablet, Take 25 mg by mouth 2 times daily. , Disp: , Rfl:     nefazodone (SERZONE) 100 MG tablet, Take 150 mg by mouth 2 times daily Indications: patient no longer takes , Disp: , Rfl:     VITAMIN D-3 (COLECALCIFEROL) 400 UNITS TABS, Take  by mouth daily. , Disp: , Rfl:   No Known Allergies  Social History     Socioeconomic History    Marital status:      Spouse name: Not on file    Number of children: Not on file    Years of education: Not on file    Highest education level: Not on file   Occupational History    Not on file   Tobacco Use    Smoking status: Former Smoker     Packs/day: 0.10     Years: 17.00     Pack years: 1.70     Types: Cigarettes, Pipe     Start date: 1960     Quit date: 1977     Years since quittin.9    Smokeless tobacco: Never Used   Vaping Use    Vaping Use: Never used   Substance and Sexual Activity    Alcohol use: Yes     Comment: beer three times weekly    Drug use: No    Sexual activity: Not on file   Other Topics Concern    Not on file   Social History Narrative            Chronic Diagnosis: Depressive disorder, Mixed hyperlipidemia, Anemia, Peptic reflux disease.         GERD    GASTRITIS    DEP---DR REYNOLDS    LIPID    FE DEF ANEMIA    CHOLELITHIASIS    OPEN GB OR    R ING HERNIA OR    Jean Severe  1940 Page #2    Unruly Rodriguez  BUT PIPE---    TICS    R EAR TUBE    EGD AND COLON--- -    DERMATITIS HERPETIFORMIS - RX WITH GLUTEN FREE DIET--- [facility] MOV[facility] TO [facility]--HER EX HUS OUT OF senior living AND LIVES IN    [place]----elaine ==grand elaine moved here with pt----hs--preg ab---dui---    YOUMGEST ELAINE LIVES IN [place]  NO KIDS    SON LIVES IN [place]--    EVAL WITH DR FRIED 10-14 MILD AORTIC STENOSIS    re eval with dr Jered Lawrence ----- galarza--------------------------------------plan chg to [facility] spring 2022    ELEV PSA 1-18------ca prostate--starts seed  12-21----[facility]dr Sidhu Slight dr smiley    echo and TMT done  4-21    Dec memory per wife   10-21     Social Determinants of Health     Financial Resource Strain:     Difficulty of Paying Living Expenses: Not on file   Food Insecurity:     Worried About Running Out of Food in the Last Year: Not on file    Jerad of Food in the Last Year: Not on file   Transportation Needs:     Lack of Transportation (Medical): Not on file    Lack of Transportation (Non-Medical):  Not on file   Physical Activity:     Days of Exercise per Week: Not on file    Minutes of Exercise per Session: Not on file   Stress:     Feeling of Stress : Not on file   Social Connections:     Frequency of Communication with Friends and Family: Not on file    Frequency of Social Gatherings with Friends and Family: Not on file    Attends Church Services: Not on file    Active Member of [address] [place] [address] [employer] or Organizations: Not on file    Attends Club or Organization Meetings: Not on file    Marital Status: Not on file   Intimate Partner Violence:     Fear of Current or Ex-Partner: Not on file    Emotionally Abused: Not on file    Physically Abused: Not on file    Sexually Abused: Not on file   Housing Stability:     Unable to Pay for Housing in the Last Year: Not on file    Number of [place] in the Last Year: Not on file    Unstable Housing in the Last Year: Not on file      Past Medical History:   Diagnosis Date    Anemia     Cholelithiasis     Depressive disorder     Dermatitis herpetiformis     GLUTEN FREE DIET    Diverticulitis     Gastritis     GERD (gastroesophageal reflux disease)     Hiatal hernia     Hyperlipidemia     Iron deficiency anemia     Peptic reflux disease      Family History   Problem Relation Age of Onset    Stroke Father       Past Surgical History:   Procedure Laterality Date    CARDIOVASCULAR STRESS TEST  2004    CHOLECYSTECTOMY        Vitals:    12/17/21 1052   BP: 136/68   Pulse: (!) 40   Temp: 97.3 °F (36.3 °C)   SpO2: 96%   Weight: 185 lb (83.9 kg)   Height: 5' 8\" (1.727 m)       Objective:    Physical Exam  Vitals reviewed. Constitutional:       Appearance: He is well-developed. HENT:      Head: Normocephalic. Eyes:      Pupils: Pupils are equal, round, and reactive to light. Cardiovascular:      Rate and Rhythm: Normal rate and regular rhythm. Comments: With occasional PVC  Pulmonary:      Effort: Pulmonary effort is normal.      Breath sounds: Normal breath sounds. Abdominal:      Palpations: Abdomen is soft. Musculoskeletal:         General: Normal range of motion. Cervical back: Normal range of motion. Skin:     General: Skin is warm. Neurological:      Mental Status: He is alert and oriented to person, place, and time. Psychiatric:         Behavior: Behavior normal.         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 Diagnoses and all orders for this visit:    Essential hypertension    Cancer of prostate ([address]    Exposure to COVID-19 virus    PVC (premature ventricular contraction)        Comments: There daughter brought him a new blood pressure cuff which is digital.  But I think the bottom number is reading way too high. I got a very good number today. Advised him check it and call me with the numbers twice a day. See me in 2 weeks. We will up with [facility] clinic. Stay isolated for another 7 days. Call if any palpitations. Check blood pressure at home twice a day. Low-salt low caffeine diet. Call if systolic blood pressure is above 194 and diastolic blood pressures above 85. Only use a upper arm digital cuff. I educated the patient about all medications. Make sure they were correct and educated  on the risk associated with missing meds or taking them incorrectly.   A list of medications is being sent home with patient

## 2021-12-19 PROBLEM — C61 CANCER OF PROSTATE (HCC): Status: ACTIVE | Noted: 2021-12-19

## 2021-12-19 PROBLEM — Z20.822 EXPOSURE TO COVID-19 VIRUS: Status: ACTIVE | Noted: 2021-12-19

## 2021-12-19 ASSESSMENT — PATIENT HEALTH QUESTIONNAIRE - PHQ9
1. LITTLE INTEREST OR PLEASURE IN DOING THINGS: 0
SUM OF ALL RESPONSES TO PHQ9 QUESTIONS 1 & 2: 0
SUM OF ALL RESPONSES TO PHQ QUESTIONS 1-9: 0
2. FEELING DOWN, DEPRESSED OR HOPELESS: 0

## 2021-12-19 ASSESSMENT — ENCOUNTER SYMPTOMS
GASTROINTESTINAL NEGATIVE: 1
EYES NEGATIVE: 1
RESPIRATORY NEGATIVE: 1
ALLERGIC/IMMUNOLOGIC NEGATIVE: 1

## 2022-01-03 ENCOUNTER — OFFICE VISIT (OUTPATIENT)
Dept: PRIMARY CARE CLINIC | Age: 82
End: 2022-01-03
Payer: MEDICARE

## 2022-01-03 VITALS
TEMPERATURE: 97.1 F | SYSTOLIC BLOOD PRESSURE: 134 MMHG | WEIGHT: 186 LBS | DIASTOLIC BLOOD PRESSURE: 78 MMHG | BODY MASS INDEX: 28.28 KG/M2

## 2022-01-03 DIAGNOSIS — I49.3 PVC (PREMATURE VENTRICULAR CONTRACTION): ICD-10-CM

## 2022-01-03 DIAGNOSIS — Z00.00 ROUTINE GENERAL MEDICAL EXAMINATION AT A HEALTH CARE FACILITY: Primary | ICD-10-CM

## 2022-01-03 DIAGNOSIS — R00.1 BRADYCARDIA: ICD-10-CM

## 2022-01-03 DIAGNOSIS — E11.9 DIET-CONTROLLED DIABETES MELLITUS (HCC): ICD-10-CM

## 2022-01-03 DIAGNOSIS — C61 CANCER OF PROSTATE (HCC): ICD-10-CM

## 2022-01-03 PROCEDURE — 93000 ELECTROCARDIOGRAM COMPLETE: CPT | Performed by: FAMILY MEDICINE

## 2022-01-03 PROCEDURE — G0439 PPPS, SUBSEQ VISIT: HCPCS | Performed by: FAMILY MEDICINE

## 2022-01-03 ASSESSMENT — PATIENT HEALTH QUESTIONNAIRE - PHQ9
1. LITTLE INTEREST OR PLEASURE IN DOING THINGS: 0
SUM OF ALL RESPONSES TO PHQ QUESTIONS 1-9: 0
2. FEELING DOWN, DEPRESSED OR HOPELESS: 0
SUM OF ALL RESPONSES TO PHQ9 QUESTIONS 1 & 2: 0
SUM OF ALL RESPONSES TO PHQ QUESTIONS 1-9: 0

## 2022-01-03 ASSESSMENT — LIFESTYLE VARIABLES
HOW OFTEN DO YOU HAVE A DRINK CONTAINING ALCOHOL: 0
HOW OFTEN DO YOU HAVE A DRINK CONTAINING ALCOHOL: NEVER
AUDIT TOTAL SCORE: INCOMPLETE
AUDIT-C TOTAL SCORE: INCOMPLETE

## 2022-01-03 NOTE — PATIENT INSTRUCTIONS
Personalized Preventive Plan for Princess Dmaon - 1/3/2022  Medicare offers a range of preventive health benefits. Some of the tests and screenings are paid in full while other may be subject to a deductible, co-insurance, and/or copay. Some of these benefits include a comprehensive review of your medical history including lifestyle, illnesses that may run in your family, and various assessments and screenings as appropriate. After reviewing your medical record and screening and assessments performed today your provider may have ordered immunizations, labs, imaging, and/or referrals for you. A list of these orders (if applicable) as well as your Preventive Care list are included within your After Visit Summary for your review. Other Preventive Recommendations:    · A preventive eye exam performed by an eye specialist is recommended every 1-2 years to screen for glaucoma; cataracts, macular degeneration, and other eye disorders. · A preventive dental visit is recommended every 6 months. · Try to get at least 150 minutes of exercise per week or 10,000 steps per day on a pedometer . · Order or download the FREE \"Exercise & Physical Activity: Your Everyday Guide\" from [employer]. Call 2-176.670.7087 or search [employer] online. · You need 3144-3423 mg of calcium and 7807-8491 IU of vitamin D per day. It is possible to meet your calcium requirement with diet alone, but a vitamin D supplement is usually necessary to meet this goal.  · When exposed to the sun, use a sunscreen that protects against both UVA and UVB radiation with an SPF of 30 or greater. Reapply every 2 to 3 hours or after sweating, drying off with a towel, or swimming. · Always wear a seat belt when traveling in a car. Always wear a helmet when riding a bicycle or motorcycle.

## 2022-01-03 NOTE — PROGRESS NOTES
Medicare Annual Wellness Visit  Name: Gabby Edwards Date: 2022   MRN: <[id number]> Sex: Male   Age: 80 y.o. Ethnicity: Non- / Non    : 1940 Race: White (non-)      Mohan Tatum is here for Medicare AWV   two week  Ck re  bp   Ca prostate    Urine ok      Some  freq    He ses cc   urol soob    He  Only bough t two bp radings from a friend  A roudn   80-70        Screenings for behavioral, psychosocial and functional/safety risks, and cognitive dysfunction are all negative except as indicated below. These results, as well as other patient data from the [address] form, are documented in Flowsheets linked to this Encounter. No Known Allergies      Prior to Visit Medications    Medication Sig Taking? Authorizing Provider   atorvastatin (LIPITOR) 40 MG tablet Take 1 tablet by mouth daily  Haseeb Phan DO   sildenafil (VIAGRA) 100 MG tablet Take 1 tablet by mouth as needed for Erectile Dysfunction  Haseeb Phan DO   venlafaxine (EFFEXOR XR) 75 MG extended release capsule Take 75 mg by mouth daily  Historical Provider, MD   Coenzyme Q10 (COQ10) 100 MG CAPS Take 100 mg by mouth daily  Historical Provider, MD   latanoprost (XALATAN) 0.005 % ophthalmic solution   Historical Provider, MD   Handicap Placard MISC by Does not apply route  Haseeb Phan DO   Biotin 5000 MCG TABS Take by mouth daily  Historical Provider, MD   IRON, FERROUS GLUCONATE, PO Take  by mouth. 65 mg every other day  Historical Provider, MD   vitamin B-12 (CYANOCOBALAMIN) 1000 MCG tablet Take 1,000 mcg by mouth daily. Historical Provider, MD   omeprazole (PRILOSEC) 20 MG capsule Take 20 mg by mouth daily. Historical Provider, MD   Multiple Vitamins-Minerals (THERAPEUTIC MULTIVITAMIN-MINERALS) tablet Take 1 tablet by mouth daily. Historical Provider, MD   dapsone 25 MG tablet Take 25 mg by mouth 2 times daily.   Historical Provider, MD   nefazodone (SERZONE) 100 MG tablet Take 150 mg by mouth 2 times daily Indications: patient no longer takes   Historical Provider, MD   VITAMIN D-3 (COLECALCIFEROL) 400 UNITS TABS Take  by mouth daily. Historical Provider, MD         Past Medical History:   Diagnosis Date    Anemia     Cholelithiasis     Depressive disorder     Dermatitis herpetiformis     GLUTEN FREE DIET    Diverticulitis     Gastritis     GERD (gastroesophageal reflux disease)     Hiatal hernia     Hyperlipidemia     Iron deficiency anemia     Peptic reflux disease        Past Surgical History:   Procedure Laterality Date    CARDIOVASCULAR STRESS TEST  2004    CHOLECYSTECTOMY           Family History   Problem Relation Age of Onset    Stroke Father        CareTeam (Including outside providers/suppliers regularly involved in providing care):   Patient Care Team:  Theresa Friend DO as PCP - General (Family Medicine)  Theresa Friend DO as PCP - REHABILITATION [facility] Empaneled Provider  Cb Meyer MD as Consulting Physician (Cardiology)    Wt Readings from Last 3 Encounters:   01/03/22 186 lb (84.4 kg)   12/17/21 185 lb (83.9 kg)   09/16/21 185 lb (83.9 kg)     Vitals:    01/03/22 1033   BP: 134/78   Temp: 97.1 °F (36.2 °C)   TempSrc: Infrared   Weight: 186 lb (84.4 kg)     Body mass index is 28.28 kg/m². Based upon direct observation of the patient, evaluation of cognition reveals recent and remote memory intact.     General Appearance: alert and oriented to person, place and time, well developed and well- nourished, in no acute distress  Skin: warm and dry, no rash or erythema  Head: normocephalic and atraumatic  Eyes: pupils equal, round, and reactive to light, extraocular eye movements intact, conjunctivae normal  ENT: tympanic membrane, external ear and ear canal normal bilaterally, nose without deformity, nasal mucosa and turbinates normal without polyps  Neck: supple and non-tender without mass, no thyromegaly or thyroid nodules, no cervical lymphadenopathy  Pulmonary/Chest: clear to auscultation bilaterally- no wheezes, rales or rhonchi, normal air movement, no respiratory distress  Cardiovascular: irregular , normal S1 and S2, no murmurs, rubs, clicks, or gallops, distal pulses intact, no carotid bruits  Abdomen: soft, non-tender, non-distended, normal bowel sounds, no masses or organomegaly  Extremities: no cyanosis, clubbing or edema  Musculoskeletal: normal range of motion, no joint swelling, deformity or tenderness  Neurologic: reflexes normal and symmetric, no cranial nerve deficit, gait, coordination and speech normal    Patient's complete Health Risk Assessment and screening values have been reviewed and are found in Flowsheets. The following problems were reviewed today and where indicated follow up appointments were made and/or referrals ordered. Positive Risk Factor Screenings with Interventions:              General Health and ACP:  General  In general, how would you say your health is?: Good  In the past 7 days, have you experienced any of the following?  New or Increased Pain, New or Increased Fatigue, Loneliness, Social Isolation, Stress or Anger?: None of These  Do you get the social and emotional support that you need?: Yes  Do you have a Living Will?: Yes  Advance Directives     Power of [address] [place] [address] Will ACP-Advance Directive ACP-Power of     Not on File Not on File Not on File Not on File      General Health Risk Interventions:  · No Living Will: Advance Care Planning addressed with patient today    Health Habits/Nutrition:  Health Habits/Nutrition  Do you exercise for at least 20 minutes 2-3 times per week?: (!) No  Have you lost any weight without trying in the past 3 months?: No  Do you eat only one meal per day?: No  Have you seen the dentist within the past year?: Yes     Health Habits/Nutrition Interventions:  · Inadequate physical activity:  patient agrees to exercise for at least 150 minutes/week         Personalized Preventive Plan   Current Health Maintenance Status  Immunization History   Administered Date(s) Administered    COVID-19, Pfizer, PF, 30mcg/0.3mL 02/01/2021, 02/23/2021, 10/05/2021    Influenza A ([id number]) Vaccine PF IM 12/15/2009    Influenza Virus Vaccine 10/31/2014    Influenza, High Dose (Fluzone 65 yrs and older) 10/04/2017    Influenza, Quadv, IM, PF (6 mo and older Fluzone, Flulaval, Fluarix, and 3 yrs and older Afluria) 09/22/2015, 08/26/2020    Influenza, Triv, inactivated, subunit, adjuvanted, IM (Fluad 65 yrs and older) 10/24/2018, 09/11/2019    Pneumococcal Polysaccharide ([id number]) 04/11/2019    Td vaccine (adult) 08/18/2009    Td, unspecified formulation 08/18/2009    Tetanus Toxoid, absorbed 09/07/2004    Zoster Recombinant (Shingrix) 06/25/2019, 09/11/2019        Health Maintenance   Topic Date Due    Hepatitis C screen  Never done    DTaP/Tdap/Td vaccine (1 - Tdap) 08/19/2009    Annual Wellness Visit (AWV)  09/23/2021    PSA counseling  08/03/2022    Lipid screen  12/10/2022    Potassium monitoring  12/10/2022    Creatinine monitoring  12/10/2022    Depression Screen  01/03/2023    Flu vaccine  Completed    Shingles Vaccine  Completed    Pneumococcal 65+ years Vaccine  Completed    COVID-19 Vaccine  Completed    Hepatitis A vaccine  Aged Out    Hib vaccine  Aged Out    Meningococcal (ACWY) vaccine  Aged Out     Recommendations for Appsee Due: see orders and patient instructions/AVS.  . Recommended screening schedule for the next 5-10 years is provided to the patient in written form: see Patient Instructions/AVS.    Teo Choi was seen today for medicare awv.     Diagnoses and all orders for this visit:    Routine general medical examination at a health care facility    Bradycardia  -     EKG 12 Lead; Future  -     EKG 12 [facility] Cardiology    Diet-controlled diabetes mellitus ([address]    Cancer of prostate ([facility] [address]    PVC (premature ventricular contraction)  -     [facility]

## 2022-01-04 ENCOUNTER — TELEPHONE (OUTPATIENT)
Dept: ADMINISTRATIVE | Age: 82
End: 2022-01-04

## 2022-01-04 PROBLEM — R00.1 BRADYCARDIA: Status: ACTIVE | Noted: 2022-01-04

## 2022-01-04 NOTE — TELEPHONE ENCOUNTER
Referral in the workque on Dr. Lyda Schirmer pt per Dr. Dave Lancaster dx: Bradycardia; PVCs. Last saw [facility] in [address] on: 3/11/21. Scheduling advise please. Scheduling into March with Dr. Dixie Root.

## 2022-01-27 ENCOUNTER — OFFICE VISIT (OUTPATIENT)
Dept: CARDIOLOGY CLINIC | Age: 82
End: 2022-01-27
Payer: MEDICARE

## 2022-01-27 VITALS
BODY MASS INDEX: 28.64 KG/M2 | SYSTOLIC BLOOD PRESSURE: 122 MMHG | WEIGHT: 189 LBS | DIASTOLIC BLOOD PRESSURE: 70 MMHG | RESPIRATION RATE: 18 BRPM | HEART RATE: 69 BPM | HEIGHT: 68 IN

## 2022-01-27 DIAGNOSIS — I10 ESSENTIAL HYPERTENSION: ICD-10-CM

## 2022-01-27 DIAGNOSIS — I35.2 NONRHEUMATIC AORTIC INSUFFICIENCY WITH AORTIC STENOSIS: ICD-10-CM

## 2022-01-27 DIAGNOSIS — R00.1 BRADYCARDIA: ICD-10-CM

## 2022-01-27 DIAGNOSIS — I49.3 PVC'S (PREMATURE VENTRICULAR CONTRACTIONS): Primary | ICD-10-CM

## 2022-01-27 DIAGNOSIS — I35.0 AORTIC VALVE STENOSIS, ETIOLOGY OF CARDIAC VALVE DISEASE UNSPECIFIED: ICD-10-CM

## 2022-01-27 PROCEDURE — 99213 OFFICE O/P EST LOW 20 MIN: CPT | Performed by: INTERNAL MEDICINE

## 2022-01-27 PROCEDURE — 93000 ELECTROCARDIOGRAM COMPLETE: CPT | Performed by: INTERNAL MEDICINE

## 2022-01-27 RX ORDER — FERROUS SULFATE 325(65) MG
325 TABLET ORAL
COMMUNITY

## 2022-01-27 RX ORDER — BUPROPION HYDROCHLORIDE 100 MG/1
100 TABLET, EXTENDED RELEASE ORAL DAILY
COMMUNITY

## 2022-01-27 RX ORDER — TAMSULOSIN HYDROCHLORIDE 0.4 MG/1
0.8 CAPSULE ORAL NIGHTLY
COMMUNITY
Start: 2021-12-15 | End: 2022-06-16 | Stop reason: SDUPTHER

## 2022-03-09 ENCOUNTER — HOSPITAL ENCOUNTER (OUTPATIENT)
Age: 82
Discharge: HOME | End: 2022-03-09
Payer: MEDICARE

## 2022-03-09 ENCOUNTER — TELEPHONE (OUTPATIENT)
Dept: PRIMARY CARE CLINIC | Age: 82
End: 2022-03-09

## 2022-03-09 DIAGNOSIS — D50.8 OTHER IRON DEFICIENCY ANEMIA: ICD-10-CM

## 2022-03-09 DIAGNOSIS — E11.9 DIET-CONTROLLED DIABETES MELLITUS (HCC): ICD-10-CM

## 2022-03-09 DIAGNOSIS — C61 CANCER OF PROSTATE (HCC): ICD-10-CM

## 2022-03-09 DIAGNOSIS — C61 CANCER OF PROSTATE (HCC): Primary | ICD-10-CM

## 2022-03-09 DIAGNOSIS — E03.9 ACQUIRED HYPOTHYROIDISM: ICD-10-CM

## 2022-03-09 DIAGNOSIS — M81.0 AGE-RELATED OSTEOPOROSIS WITHOUT CURRENT PATHOLOGICAL FRACTURE: ICD-10-CM

## 2022-03-09 DIAGNOSIS — I10 ESSENTIAL HYPERTENSION: ICD-10-CM

## 2022-03-09 LAB
ALBUMIN SERPL-MCNC: 4.3 G/DL (ref 3.5–5.2)
ALP BLD-CCNC: 87 U/L (ref 40–129)
ALT SERPL-CCNC: 23 U/L (ref 0–40)
ANION GAP SERPL CALCULATED.3IONS-SCNC: 16 MMOL/L (ref 7–16)
AST SERPL-CCNC: 20 U/L (ref 0–39)
BACTERIA: NORMAL /HPF
BASOPHILS ABSOLUTE: 0.03 E9/L (ref 0–0.2)
BASOPHILS RELATIVE PERCENT: 0.5 % (ref 0–2)
BILIRUB SERPL-MCNC: 1.1 MG/DL (ref 0–1.2)
BILIRUBIN URINE: NEGATIVE
BLOOD, URINE: ABNORMAL
BUN BLDV-MCNC: 21 MG/DL (ref 6–23)
CALCIUM SERPL-MCNC: 9 MG/DL (ref 8.6–10.2)
CHLORIDE BLD-SCNC: 106 MMOL/L (ref 98–107)
CHOLESTEROL, TOTAL: 110 MG/DL (ref 0–199)
CLARITY: CLEAR
CO2: 20 MMOL/L (ref 22–29)
COLOR: YELLOW
CREAT SERPL-MCNC: 1 MG/DL (ref 0.7–1.2)
EOSINOPHILS ABSOLUTE: 0.18 E9/L (ref 0.05–0.5)
EOSINOPHILS RELATIVE PERCENT: 3 % (ref 0–6)
GFR AFRICAN AMERICAN: >60
GFR NON-AFRICAN AMERICAN: >60 ML/MIN/1.73
GLUCOSE BLD-MCNC: 90 MG/DL (ref 74–99)
GLUCOSE URINE: NEGATIVE MG/DL
HBA1C MFR BLD: 3.9 % (ref 4–5.6)
HCT VFR BLD CALC: 39.2 % (ref 37–54)
HDLC SERPL-MCNC: 51 MG/DL
HEMOGLOBIN: 12.7 G/DL (ref 12.5–16.5)
IMMATURE GRANULOCYTES #: 0.01 E9/L
IMMATURE GRANULOCYTES %: 0.2 % (ref 0–5)
IRON: 81 MCG/DL (ref 59–158)
KETONES, URINE: NEGATIVE MG/DL
LDL CHOLESTEROL CALCULATED: 49 MG/DL (ref 0–99)
LEUKOCYTE ESTERASE, URINE: NEGATIVE
LYMPHOCYTES ABSOLUTE: 0.92 E9/L (ref 1.5–4)
LYMPHOCYTES RELATIVE PERCENT: 15.1 % (ref 20–42)
MCH RBC QN AUTO: 33 PG (ref 26–35)
MCHC RBC AUTO-ENTMCNC: 32.4 % (ref 32–34.5)
MCV RBC AUTO: 101.8 FL (ref 80–99.9)
MONOCYTES ABSOLUTE: 0.42 E9/L (ref 0.1–0.95)
MONOCYTES RELATIVE PERCENT: 6.9 % (ref 2–12)
NEUTROPHILS ABSOLUTE: 4.53 E9/L (ref 1.8–7.3)
NEUTROPHILS RELATIVE PERCENT: 74.3 % (ref 43–80)
NITRITE, URINE: NEGATIVE
PDW BLD-RTO: 13 FL (ref 11.5–15)
PH UA: 5.5 (ref 5–9)
PLATELET # BLD: 147 E9/L (ref 130–450)
PMV BLD AUTO: 10.3 FL (ref 7–12)
POTASSIUM SERPL-SCNC: 4.3 MMOL/L (ref 3.5–5)
PROSTATE SPECIFIC ANTIGEN: 2.46 NG/ML (ref 0–4)
PROTEIN UA: NEGATIVE MG/DL
RBC # BLD: 3.85 E12/L (ref 3.8–5.8)
RBC UA: NORMAL /HPF (ref 0–2)
SODIUM BLD-SCNC: 142 MMOL/L (ref 132–146)
SPECIFIC GRAVITY UA: 1.01 (ref 1–1.03)
T4 TOTAL: 6.1 MCG/DL (ref 4.5–11.7)
TOTAL PROTEIN: 6.3 G/DL (ref 6.4–8.3)
TRIGL SERPL-MCNC: 50 MG/DL (ref 0–149)
TSH SERPL DL<=0.05 MIU/L-ACNC: 4.34 UIU/ML (ref 0.27–4.2)
UROBILINOGEN, URINE: 0.2 E.U./DL
VITAMIN D 25-HYDROXY: 86 NG/ML (ref 30–100)
VLDLC SERPL CALC-MCNC: 10 MG/DL
WBC # BLD: 6.1 E9/L (ref 4.5–11.5)
WBC UA: NORMAL /HPF (ref 0–5)

## 2022-03-09 PROCEDURE — 82306 VITAMIN D 25 HYDROXY: CPT

## 2022-03-09 PROCEDURE — 83036 HEMOGLOBIN GLYCOSYLATED A1C: CPT

## 2022-03-09 PROCEDURE — 81001 URINALYSIS AUTO W/SCOPE: CPT

## 2022-03-09 PROCEDURE — 80053 COMPREHEN METABOLIC PANEL: CPT

## 2022-03-09 PROCEDURE — 84436 ASSAY OF TOTAL THYROXINE: CPT

## 2022-03-09 PROCEDURE — 84443 ASSAY THYROID STIM HORMONE: CPT

## 2022-03-09 PROCEDURE — 80061 LIPID PANEL: CPT

## 2022-03-09 PROCEDURE — 36415 COLL VENOUS BLD VENIPUNCTURE: CPT

## 2022-03-09 PROCEDURE — 84153 ASSAY OF PSA TOTAL: CPT

## 2022-03-09 PROCEDURE — 85025 COMPLETE CBC W/AUTO DIFF WBC: CPT

## 2022-03-09 PROCEDURE — 83540 ASSAY OF IRON: CPT

## 2022-03-09 NOTE — TELEPHONE ENCOUNTER
The pt is at the lab at [facility] to get his labs drawn but there are no orders can you please place an order for him

## 2022-03-17 ENCOUNTER — OFFICE VISIT (OUTPATIENT)
Dept: PRIMARY CARE CLINIC | Age: 82
End: 2022-03-17
Payer: MEDICARE

## 2022-03-17 VITALS
WEIGHT: 184 LBS | OXYGEN SATURATION: 95 % | SYSTOLIC BLOOD PRESSURE: 128 MMHG | HEART RATE: 56 BPM | BODY MASS INDEX: 27.98 KG/M2 | DIASTOLIC BLOOD PRESSURE: 78 MMHG | TEMPERATURE: 98.1 F

## 2022-03-17 DIAGNOSIS — C61 CANCER OF PROSTATE (HCC): Primary | ICD-10-CM

## 2022-03-17 DIAGNOSIS — E03.9 ACQUIRED HYPOTHYROIDISM: ICD-10-CM

## 2022-03-17 DIAGNOSIS — I10 ESSENTIAL HYPERTENSION: Chronic | ICD-10-CM

## 2022-03-17 DIAGNOSIS — D50.8 OTHER IRON DEFICIENCY ANEMIA: Chronic | ICD-10-CM

## 2022-03-17 PROCEDURE — 99214 OFFICE O/P EST MOD 30 MIN: CPT | Performed by: FAMILY MEDICINE

## 2022-03-17 ASSESSMENT — ENCOUNTER SYMPTOMS
ALLERGIC/IMMUNOLOGIC NEGATIVE: 1
EYES NEGATIVE: 1
RESPIRATORY NEGATIVE: 1
GASTROINTESTINAL NEGATIVE: 1

## 2022-03-17 ASSESSMENT — PATIENT HEALTH QUESTIONNAIRE - PHQ9
SUM OF ALL RESPONSES TO PHQ QUESTIONS 1-9: 0
SUM OF ALL RESPONSES TO PHQ9 QUESTIONS 1 & 2: 0
SUM OF ALL RESPONSES TO PHQ QUESTIONS 1-9: 0
2. FEELING DOWN, DEPRESSED OR HOPELESS: 0
1. LITTLE INTEREST OR PLEASURE IN DOING THINGS: 0
SUM OF ALL RESPONSES TO PHQ QUESTIONS 1-9: 0
SUM OF ALL RESPONSES TO PHQ QUESTIONS 1-9: 0

## 2022-03-17 NOTE — PROGRESS NOTES
3/17/22  Name: Felipe Fleischer    : 1940    Sex: male    Age: 80 y.o. Subjective:  Chief Complaint: Patient is here for 1 checkup regarding cardiology evaluation. Anemia,, blood pressure, GERD, anxiety, vitamin deficiency,    Nocturia  severl  And he has not claled [facility]   Yet---due there in July  No cp or sob---pt to call dr Roberto Keane cog  fx  Noted  tsh sl up  psa    2,46      Review of Systems   Constitutional: Negative. HENT: Negative. Eyes: Negative. Respiratory: Negative. Cardiovascular: Negative. Gastrointestinal: Negative. Endocrine: Negative. Genitourinary: Positive for frequency. Musculoskeletal: Negative. Skin: Negative. Allergic/Immunologic: Negative. Neurological: Negative. Hematological: Negative. Psychiatric/Behavioral: Negative. Current Outpatient Medications:     buPROPion (WELLBUTRIN SR) 100 MG extended release tablet, Take 100 mg by mouth daily, Disp: , Rfl:     tamsulosin (FLOMAX) 0.4 MG capsule, Take 0.8 mg by mouth nightly, Disp: , Rfl:     ferrous sulfate (IRON 325) 325 (65 Fe) MG tablet, Take 325 mg by mouth daily (with breakfast), Disp: , Rfl:     atorvastatin (LIPITOR) 40 MG tablet, Take 1 tablet by mouth daily, Disp: 90 tablet, Rfl: 3    sildenafil (VIAGRA) 100 MG tablet, Take 1 tablet by mouth as needed for Erectile Dysfunction, Disp: 50 tablet, Rfl: 12    venlafaxine (EFFEXOR XR) 75 MG extended release capsule, Take 75 mg by mouth daily, Disp: , Rfl:     Coenzyme Q10 (COQ10) 100 MG CAPS, Take 100 mg by mouth daily, Disp: , Rfl:     latanoprost (XALATAN) 0.005 % ophthalmic solution, , Disp: , Rfl:     Biotin 5000 MCG TABS, Take by mouth daily, Disp: , Rfl:     vitamin B-12 (CYANOCOBALAMIN) 1000 MCG tablet, Take 1,000 mcg by mouth daily. , Disp: , Rfl:     omeprazole (PRILOSEC) 20 MG capsule, Take 20 mg by mouth daily. , Disp: , Rfl:     Multiple Vitamins-Minerals (THERAPEUTIC MULTIVITAMIN-MINERALS) tablet, Take 1 tablet by mouth daily. , Disp: , Rfl:     dapsone 25 MG tablet, Take 25 mg by mouth 2 times daily. , Disp: , Rfl:     nefazodone (SERZONE) 100 MG tablet, Take 150 mg by mouth 2 times daily Indications: patient no longer takes , Disp: , Rfl:     VITAMIN D-3 (COLECALCIFEROL) 400 UNITS TABS, Take by mouth daily , Disp: , Rfl:   No Known Allergies  Social History     Socioeconomic History    Marital status:      Spouse name: Not on file    Number of children: Not on file    Years of education: Not on file    Highest education level: Not on file   Occupational History    Not on file   Tobacco Use    Smoking status: Former Smoker     Packs/day: 0.10     Years: 17.00     Pack years: 1.70     Types: Cigarettes, Pipe     Start date: 1960     Quit date: 1977     Years since quittin.2    Smokeless tobacco: Never Used   Vaping Use    Vaping Use: Never used   Substance and Sexual Activity    Alcohol use: Yes     Comment: beer three times weekly    Drug use: No    Sexual activity: Not on file   Other Topics Concern    Not on file   Social History Narrative            Chronic Diagnosis: Depressive disorder, Mixed hyperlipidemia, Anemia, Peptic reflux disease.         GERD    GASTRITIS    DEP---DR SUTTON    LIPID    FE DEF ANEMIA    CHOLELITHIASIS    OPEN GB OR    R ING HERNIA OR    Betty Damien  1940 Page #2    Lena Welch  BUT PIPE---    TICS    R EAR TUBE    EGD AND COLON---    TMT -04    DERMATITIS HERPETIFORMIS - RX WITH GLUTEN FREE DIET---DR MAHLE    MIDDLE DAUGTHER MOVED TO [facility]--HER EX HUS OUT OF retirement AND LIVES IN    [place]----carol ==grand carol moved here with pt----hs--preg ab---dui---    Shannon HENRY LIVES IN [place]  NO KIDS    SON LIVES IN [place]--    EVAL WITH DR FRIED 10-14 MILD AORTIC STENOSIS    re eval with dr Stacy Huitron -----dr galarza--------------------------------------plan chg to [facility] spring 2022    ELEV PSA 1-18------ca prostate--starts seed  12-21----[facility]----dr Simran armijo and TMT done  4-21    Dec memory per wife   10-21    Eval  memory with apez   1-21  very mild cognitive deficit    Early hypothyroid  3-22     Social Determinants of Health     Financial Resource Strain:     Difficulty of Paying Living Expenses: Not on file   Food Insecurity:     Worried About Running Out of Food in the Last Year: Not on file    Jerad of Food in the Last Year: Not on file   Transportation Needs:     Lack of Transportation (Medical): Not on file    Lack of Transportation (Non-Medical):  Not on file   Physical Activity:     Days of Exercise per Week: Not on file    Minutes of Exercise per Session: Not on file   Stress:     Feeling of Stress : Not on file   Social Connections:     Frequency of Communication with Friends and Family: Not on file    Frequency of Social Gatherings with Friends and Family: Not on file    Attends Restorationism Services: Not on file    Active Member of [address] [place] [address] or Organizations: Not on file    Attends Club or Organization Meetings: Not on file    Marital Status: Not on file   Intimate Partner Violence:     Fear of Current or Ex-Partner: Not on file    Emotionally Abused: Not on file    Physically Abused: Not on file    Sexually Abused: Not on file   Housing Stability:     Unable to Pay for Housing in the Last Year: Not on file    Number of [place] in the Last Year: Not on file    Unstable Housing in the Last Year: Not on file      Past Medical History:   Diagnosis Date    Anemia     Cholelithiasis     Depressive disorder     Dermatitis herpetiformis     GLUTEN FREE DIET    Diverticulitis     Gastritis     GERD (gastroesophageal reflux disease)     Hiatal hernia     Hyperlipidemia     Iron deficiency anemia     Peptic reflux disease      Family History   Problem Relation Age of Onset    Stroke Father       Past Surgical History:   Procedure Laterality Date  CARDIOVASCULAR STRESS TEST  2004    CHOLECYSTECTOMY        Vitals:    03/17/22 0902   BP: 128/78   Pulse: 56   Temp: 98.1 °F (36.7 °C)   SpO2: 95%   Weight: 184 lb (83.5 kg)       Objective:    Physical Exam  Vitals reviewed. Constitutional:       Appearance: He is well-developed. HENT:      Head: Normocephalic. Eyes:      Pupils: Pupils are equal, round, and reactive to light. Cardiovascular:      Rate and Rhythm: Normal rate and regular rhythm. Pulmonary:      Effort: Pulmonary effort is normal.      Breath sounds: Normal breath sounds. Abdominal:      Palpations: Abdomen is soft. Musculoskeletal:         General: Normal range of motion. Cervical back: Normal range of motion. Skin:     General: Skin is warm. Neurological:      Mental Status: He is alert and oriented to person, place, and time. Psychiatric:         Behavior: Behavior normal.         Tang Blackwell was seen today for discuss labs. Diagnoses and all orders for this visit:    Cancer of prostate ([facility] [address]  -     CBC with Auto Differential; Future  -     Comprehensive Metabolic Panel; Future  -     T4; Future  -     TSH; Future  -     PSA, Diagnostic; Future    Essential hypertension  -     CBC with Auto Differential; Future  -     Comprehensive Metabolic Panel; Future  -     T4; Future  -     TSH; Future  -     PSA, Diagnostic; Future    Other iron deficiency anemia  -     CBC with Auto Differential; Future  -     Comprehensive Metabolic Panel; Future  -     T4; Future  -     TSH; Future  -     PSA, Diagnostic; Future    Acquired hypothyroidism  -     T4; Future  -     TSH; Future        Comments: diet exer  Hm  Issues    fuwith  Dr Maxi Valle re  psa and  Rad onc  Due  In July      Watch thyroid      I educated the patient about all medications. Make sure they were correct and educated  on the risk associated with missing meds or taking them incorrectly. A list of medications is being sent home with patient today.     Check blood

## 2022-03-30 ENCOUNTER — TELEPHONE (OUTPATIENT)
Dept: PRIMARY CARE CLINIC | Age: 82
End: 2022-03-30

## 2022-06-08 ENCOUNTER — HOSPITAL ENCOUNTER (OUTPATIENT)
Age: 82
Discharge: HOME | End: 2022-06-08
Payer: MEDICARE

## 2022-06-08 DIAGNOSIS — E03.9 ACQUIRED HYPOTHYROIDISM: ICD-10-CM

## 2022-06-08 DIAGNOSIS — I10 ESSENTIAL HYPERTENSION: Chronic | ICD-10-CM

## 2022-06-08 DIAGNOSIS — D50.8 OTHER IRON DEFICIENCY ANEMIA: Chronic | ICD-10-CM

## 2022-06-08 DIAGNOSIS — C61 CANCER OF PROSTATE (HCC): ICD-10-CM

## 2022-06-08 LAB
ALBUMIN SERPL-MCNC: 4.2 G/DL (ref 3.5–5.2)
ALP BLD-CCNC: 94 U/L (ref 40–129)
ALT SERPL-CCNC: 26 U/L (ref 0–40)
ANION GAP SERPL CALCULATED.3IONS-SCNC: 11 MMOL/L (ref 7–16)
AST SERPL-CCNC: 21 U/L (ref 0–39)
BASOPHILS ABSOLUTE: 0.04 E9/L (ref 0–0.2)
BASOPHILS RELATIVE PERCENT: 0.7 % (ref 0–2)
BILIRUB SERPL-MCNC: 1.1 MG/DL (ref 0–1.2)
BUN BLDV-MCNC: 23 MG/DL (ref 6–23)
CALCIUM SERPL-MCNC: 9.2 MG/DL (ref 8.6–10.2)
CHLORIDE BLD-SCNC: 105 MMOL/L (ref 98–107)
CO2: 22 MMOL/L (ref 22–29)
CREAT SERPL-MCNC: 0.9 MG/DL (ref 0.7–1.2)
EOSINOPHILS ABSOLUTE: 0.16 E9/L (ref 0.05–0.5)
EOSINOPHILS RELATIVE PERCENT: 2.7 % (ref 0–6)
GFR AFRICAN AMERICAN: >60
GFR NON-AFRICAN AMERICAN: >60 ML/MIN/1.73
GLUCOSE BLD-MCNC: 100 MG/DL (ref 74–99)
HCT VFR BLD CALC: 38.8 % (ref 37–54)
HEMOGLOBIN: 12.7 G/DL (ref 12.5–16.5)
IMMATURE GRANULOCYTES #: 0.02 E9/L
IMMATURE GRANULOCYTES %: 0.3 % (ref 0–5)
LYMPHOCYTES ABSOLUTE: 0.9 E9/L (ref 1.5–4)
LYMPHOCYTES RELATIVE PERCENT: 15.3 % (ref 20–42)
MCH RBC QN AUTO: 34.2 PG (ref 26–35)
MCHC RBC AUTO-ENTMCNC: 32.7 % (ref 32–34.5)
MCV RBC AUTO: 104.6 FL (ref 80–99.9)
MONOCYTES ABSOLUTE: 0.43 E9/L (ref 0.1–0.95)
MONOCYTES RELATIVE PERCENT: 7.3 % (ref 2–12)
NEUTROPHILS ABSOLUTE: 4.34 E9/L (ref 1.8–7.3)
NEUTROPHILS RELATIVE PERCENT: 73.7 % (ref 43–80)
PDW BLD-RTO: 13.3 FL (ref 11.5–15)
PLATELET # BLD: 113 E9/L (ref 130–450)
PMV BLD AUTO: 10.2 FL (ref 7–12)
POTASSIUM SERPL-SCNC: 4.4 MMOL/L (ref 3.5–5)
PROSTATE SPECIFIC ANTIGEN: 1.44 NG/ML (ref 0–4)
RBC # BLD: 3.71 E12/L (ref 3.8–5.8)
SODIUM BLD-SCNC: 138 MMOL/L (ref 132–146)
T4 TOTAL: 6.3 MCG/DL (ref 4.5–11.7)
TOTAL PROTEIN: 6.2 G/DL (ref 6.4–8.3)
TSH SERPL DL<=0.05 MIU/L-ACNC: 3.46 UIU/ML (ref 0.27–4.2)
WBC # BLD: 5.9 E9/L (ref 4.5–11.5)

## 2022-06-08 PROCEDURE — 84443 ASSAY THYROID STIM HORMONE: CPT

## 2022-06-08 PROCEDURE — 80053 COMPREHEN METABOLIC PANEL: CPT

## 2022-06-08 PROCEDURE — 85025 COMPLETE CBC W/AUTO DIFF WBC: CPT

## 2022-06-08 PROCEDURE — 84436 ASSAY OF TOTAL THYROXINE: CPT

## 2022-06-08 PROCEDURE — 36415 COLL VENOUS BLD VENIPUNCTURE: CPT

## 2022-06-08 PROCEDURE — 84153 ASSAY OF PSA TOTAL: CPT

## 2022-06-16 ENCOUNTER — OFFICE VISIT (OUTPATIENT)
Dept: PRIMARY CARE CLINIC | Age: 82
End: 2022-06-16
Payer: MEDICARE

## 2022-06-16 VITALS
DIASTOLIC BLOOD PRESSURE: 78 MMHG | TEMPERATURE: 98.7 F | BODY MASS INDEX: 27.22 KG/M2 | WEIGHT: 179 LBS | SYSTOLIC BLOOD PRESSURE: 126 MMHG

## 2022-06-16 DIAGNOSIS — M81.0 AGE-RELATED OSTEOPOROSIS WITHOUT CURRENT PATHOLOGICAL FRACTURE: ICD-10-CM

## 2022-06-16 DIAGNOSIS — E11.9 DIET-CONTROLLED DIABETES MELLITUS (HCC): ICD-10-CM

## 2022-06-16 DIAGNOSIS — I10 ESSENTIAL HYPERTENSION: Chronic | ICD-10-CM

## 2022-06-16 DIAGNOSIS — C61 CANCER OF PROSTATE (HCC): ICD-10-CM

## 2022-06-16 DIAGNOSIS — E03.9 ACQUIRED HYPOTHYROIDISM: ICD-10-CM

## 2022-06-16 DIAGNOSIS — D50.8 OTHER IRON DEFICIENCY ANEMIA: Primary | Chronic | ICD-10-CM

## 2022-06-16 PROCEDURE — 3044F HG A1C LEVEL LT 7.0%: CPT | Performed by: FAMILY MEDICINE

## 2022-06-16 PROCEDURE — 1123F ACP DISCUSS/DSCN MKR DOCD: CPT | Performed by: FAMILY MEDICINE

## 2022-06-16 PROCEDURE — 99214 OFFICE O/P EST MOD 30 MIN: CPT | Performed by: FAMILY MEDICINE

## 2022-06-16 RX ORDER — TAMSULOSIN HYDROCHLORIDE 0.4 MG/1
0.8 CAPSULE ORAL NIGHTLY
Qty: 60 CAPSULE | Refills: 0 | Status: SHIPPED
Start: 2022-06-16 | End: 2022-07-16 | Stop reason: SDUPTHER

## 2022-06-16 ASSESSMENT — PATIENT HEALTH QUESTIONNAIRE - PHQ9
SUM OF ALL RESPONSES TO PHQ QUESTIONS 1-9: 0
SUM OF ALL RESPONSES TO PHQ QUESTIONS 1-9: 0
1. LITTLE INTEREST OR PLEASURE IN DOING THINGS: 0
SUM OF ALL RESPONSES TO PHQ QUESTIONS 1-9: 0
2. FEELING DOWN, DEPRESSED OR HOPELESS: 0
SUM OF ALL RESPONSES TO PHQ9 QUESTIONS 1 & 2: 0
SUM OF ALL RESPONSES TO PHQ QUESTIONS 1-9: 0

## 2022-06-16 NOTE — PROGRESS NOTES
22  Name: Lottie Snow    : 1940    Sex: male    Age: 80 y.o. Subjective:  Chief Complaint: Patient is here for   3 mock   Re      Anemia  thryoid  bp     anx    Vit  D     heart     Feel ok   No  Cp ro sob   Re ck  Due to thryoid        Due to see dr Renee Anderson in [facility]  And also rad onc  Lab with  tsh better    Lab ntoed  Hb ok    Wt sl dec    Stress with no cnetral air conditionng  At home      Review of Systems   Constitutional: Negative. HENT: Negative. Eyes: Negative. Respiratory: Negative. Cardiovascular: Negative. Gastrointestinal: Negative. Endocrine: Negative. Genitourinary: Negative. Musculoskeletal: Negative. Skin: Negative. Allergic/Immunologic: Negative. Neurological: Negative. Hematological: Negative. Psychiatric/Behavioral: Negative. Current Outpatient Medications:     buPROPion (WELLBUTRIN SR) 100 MG extended release tablet, Take 100 mg by mouth daily, Disp: , Rfl:     tamsulosin (FLOMAX) 0.4 MG capsule, Take 0.8 mg by mouth nightly, Disp: , Rfl:     ferrous sulfate (IRON 325) 325 (65 Fe) MG tablet, Take 325 mg by mouth daily (with breakfast), Disp: , Rfl:     atorvastatin (LIPITOR) 40 MG tablet, Take 1 tablet by mouth daily, Disp: 90 tablet, Rfl: 3    sildenafil (VIAGRA) 100 MG tablet, Take 1 tablet by mouth as needed for Erectile Dysfunction, Disp: 50 tablet, Rfl: 12    venlafaxine (EFFEXOR XR) 75 MG extended release capsule, Take 75 mg by mouth daily, Disp: , Rfl:     Coenzyme Q10 (COQ10) 100 MG CAPS, Take 100 mg by mouth daily, Disp: , Rfl:     latanoprost (XALATAN) 0.005 % ophthalmic solution, , Disp: , Rfl:     Biotin 5000 MCG TABS, Take by mouth daily, Disp: , Rfl:     vitamin B-12 (CYANOCOBALAMIN) 1000 MCG tablet, Take 1,000 mcg by mouth daily. , Disp: , Rfl:     omeprazole (PRILOSEC) 20 MG capsule, Take 20 mg by mouth daily. , Disp: , Rfl:     Multiple Vitamins-Minerals (THERAPEUTIC MULTIVITAMIN-MINERALS) tablet, Take 1 tablet by mouth daily. , Disp: , Rfl:     dapsone 25 MG tablet, Take 25 mg by mouth 2 times daily. , Disp: , Rfl:     nefazodone (SERZONE) 100 MG tablet, Take 150 mg by mouth 2 times daily Indications: patient no longer takes , Disp: , Rfl:     VITAMIN D-3 (COLECALCIFEROL) 400 UNITS TABS, Take by mouth daily , Disp: , Rfl:   No Known Allergies  Social History     Socioeconomic History    Marital status:      Spouse name: Not on file    Number of children: Not on file    Years of education: Not on file    Highest education level: Not on file   Occupational History    Not on file   Tobacco Use    Smoking status: Former Smoker     Packs/day: 0.10     Years: 17.00     Pack years: 1.70     Types: Cigarettes, Pipe     Start date: 1960     Quit date: 1977     Years since quittin.4    Smokeless tobacco: Never Used   Vaping Use    Vaping Use: Never used   Substance and Sexual Activity    Alcohol use: Yes     Comment: beer three times weekly    Drug use: No    Sexual activity: Not on file   Other Topics Concern    Not on file   Social History Narrative            Chronic Diagnosis: Depressive disorder, Mixed hyperlipidemia, Anemia, Peptic reflux disease.         GERD    GASTRITIS    DEP--- [id number]    LIPID    FE DEF ANEMIA    CHOLELITHIASIS    OPEN GB OR    R ING HERNIA OR    Bonnee Alexanders  1940 Page #2    Pamela Glarosita  BUT PIPE---    TICS    R EAR TUBE    EGD AND COLON---    TMT -04    DERMATITIS HERPETIFORMIS - RX WITH GLUTEN FREE DIET[facility]HER EX HUS [facility] OF [facility] AND LIVES IN    [place]----carol ==grand carol moved here with pt----hs--preg ab---dui---    Lazaro HENRY LIVES IN [place]  NO KIDS    SON LIVES IN [place]--    EVAL WITH DR Vazquez [facility], [address] 10-14 MILD AORTIC STENOSIS    re eval with dr Yuridia Win -----dr galarza--------------------------------------plan chg to [facility] 2022    ELEV PSA 1-18------ca prostate--starts seed  12-21----[facility]----dr Ruddy Whitaker  and  rad onc    Eval dr Ashish Ortega    echo and TMT done  4-21    Dec memory per wife   10-21    Eval  memory with apez   1-21  very mild cognitive deficit    Early hypothyroid  3-22    Refused colonosopy    6-22     Social Determinants of Health     Financial Resource Strain:     Difficulty of Paying Living Expenses: Not on file   Food Insecurity:     Worried About Running Out of Food in the Last Year: Not on file    Jerad of Food in the Last Year: Not on file   Transportation Needs:     Lack of Transportation (Medical): Not on file    Lack of Transportation (Non-Medical):  Not on file   Physical Activity:     Days of Exercise per Week: Not on file    Minutes of Exercise per Session: Not on file   Stress:     Feeling of Stress : Not on file   Social Connections:     Frequency of Communication with Friends and Family: Not on file    Frequency of Social Gatherings with Friends and Family: Not on file    Attends Anglican Services: Not on file    Active Member of [address] [place] [address] [employer] or Organizations: Not on file    Attends Club or Organization Meetings: Not on file    Marital Status: Not on file   Intimate Partner Violence:     Fear of Current or Ex-Partner: Not on file    Emotionally Abused: Not on file    Physically Abused: Not on file    Sexually Abused: Not on file   Housing Stability:     Unable to Pay for Housing in the Last Year: Not on file    Number of [place] in the Last Year: Not on file    Unstable Housing in the Last Year: Not on file      Past Medical History:   Diagnosis Date    Anemia     Cholelithiasis     Depressive disorder     Dermatitis herpetiformis     GLUTEN FREE DIET    Diverticulitis     Gastritis     GERD (gastroesophageal reflux disease)     Hiatal hernia     Hyperlipidemia     Iron deficiency anemia     Peptic reflux disease      Family History   Problem Relation Age of Onset    Stroke Father Past Surgical History:   Procedure Laterality Date    CARDIOVASCULAR STRESS TEST  2004    CHOLECYSTECTOMY        Vitals:    06/16/22 0752   BP: 126/78   Temp: 98.7 °F (37.1 °C)   TempSrc: Infrared   Weight: 179 lb (81.2 kg)       Objective:    Physical Exam  Vitals reviewed. Constitutional:       Appearance: He is well-developed. HENT:      Head: Normocephalic. Eyes:      Pupils: Pupils are equal, round, and reactive to light. Cardiovascular:      Rate and Rhythm: Normal rate and regular rhythm. Pulmonary:      Effort: Pulmonary effort is normal.      Breath sounds: Normal breath sounds. Abdominal:      Palpations: Abdomen is soft. Musculoskeletal:         General: Normal range of motion. Cervical back: Normal range of motion. Skin:     General: Skin is warm. Neurological:      Mental Status: He is alert and oriented to person, place, and time. Psychiatric:         Behavior: Behavior normal.         Qi Collins was seen today for discuss labs. Diagnoses and all orders for this visit:    Other iron deficiency anemia    Acquired hypothyroidism    Cancer of prostate [facility])    Essential hypertension        Comments: low sugar     Chol  Diet    Sees  sia vicente urol and ra onc    colonosocpy and pt  Ref  Aware risk  I educated the patient about all medications. Make sure they were correct and educated  on the risk associated with missing meds or taking them incorrectly. A list of medications is being sent home with patient today. Check blood pressure at home twice a day. Low-salt low caffeine diet. Call if systolic blood pressure is above 093 and diastolic blood pressures above 85. Only use a upper arm digital cuff. Aggressive low-fat diet. Avoid red meats, greasy fried foods, dairy products. Avoid processed foods. Take cholesterol medications without food.       I informed patient about the risk associated with noncompliance of medication and taking medications incorrectly. Appropriate follow-up with myself and all specialist.  Encourage family members to take active role in assisting with medications and medical care. If any confusion should develop to notify my office immediately to avoid risk of worsening medical condition    A great deal of time spent reviewing medications, diet, exercise, social issues. Also reviewing the chart before entering the room with patient and finishing charting after leaving patient's room. More than half of that time was spent face to face with the patient in counseling and coordinating care. Follow Up: Return in about 6 months (around 12/16/2022) for Lab Before.      Seen by:  Mi Naqvi DO

## 2022-07-16 RX ORDER — TAMSULOSIN HYDROCHLORIDE 0.4 MG/1
0.8 CAPSULE ORAL NIGHTLY
Qty: 180 CAPSULE | Refills: 3 | Status: SHIPPED | OUTPATIENT
Start: 2022-07-16

## 2022-09-17 DIAGNOSIS — U07.1 COVID-19: Primary | ICD-10-CM

## 2022-09-17 RX ORDER — AZITHROMYCIN 250 MG/1
250 TABLET, FILM COATED ORAL SEE ADMIN INSTRUCTIONS
Qty: 6 TABLET | Refills: 0 | Status: SHIPPED | OUTPATIENT
Start: 2022-09-17 | End: 2022-09-22

## 2022-09-17 RX ORDER — METHYLPREDNISOLONE 4 MG/1
TABLET ORAL
Qty: 1 KIT | Refills: 0 | Status: SHIPPED | OUTPATIENT
Start: 2022-09-17

## 2022-09-17 NOTE — PROGRESS NOTES
Pt called   with  coiv d but mild sx    daily testing turned out positive I gave him my instructions. To meds sent in.  Zinc vitamin C increase fluids eat 3 meals or protein shakes. Pulse ox if it goes below 90 go to ER. See me in 2 days      I educated the patient about all medications. Make sure they were correct and educated  on the risk associated with missing meds or taking them incorrectly. A list of medications is being sent home with patient today.

## 2022-10-10 ENCOUNTER — HOSPITAL ENCOUNTER (OUTPATIENT)
Age: 82
Discharge: HOME | End: 2022-10-10
Payer: MEDICARE

## 2022-10-10 LAB
BASOPHILS ABSOLUTE: 0.02 E9/L (ref 0–0.2)
BASOPHILS RELATIVE PERCENT: 0.3 % (ref 0–2)
EOSINOPHILS ABSOLUTE: 0.21 E9/L (ref 0.05–0.5)
EOSINOPHILS RELATIVE PERCENT: 3.2 % (ref 0–6)
HCT VFR BLD CALC: 39.5 % (ref 37–54)
HEMOGLOBIN: 13 G/DL (ref 12.5–16.5)
IMMATURE GRANULOCYTES #: 0.02 E9/L
IMMATURE GRANULOCYTES %: 0.3 % (ref 0–5)
IMMATURE RETIC FRACT: 13 % (ref 2.3–13.4)
LYMPHOCYTES ABSOLUTE: 1.22 E9/L (ref 1.5–4)
LYMPHOCYTES RELATIVE PERCENT: 18.6 % (ref 20–42)
MCH RBC QN AUTO: 34.4 PG (ref 26–35)
MCHC RBC AUTO-ENTMCNC: 32.9 % (ref 32–34.5)
MCV RBC AUTO: 104.5 FL (ref 80–99.9)
MONOCYTES ABSOLUTE: 0.5 E9/L (ref 0.1–0.95)
MONOCYTES RELATIVE PERCENT: 7.6 % (ref 2–12)
NEUTROPHILS ABSOLUTE: 4.59 E9/L (ref 1.8–7.3)
NEUTROPHILS RELATIVE PERCENT: 70 % (ref 43–80)
PDW BLD-RTO: 14.2 FL (ref 11.5–15)
PLATELET # BLD: 138 E9/L (ref 130–450)
PMV BLD AUTO: 10.1 FL (ref 7–12)
RBC # BLD: 3.78 E12/L (ref 3.8–5.8)
RETIC HGB EQUIVALENT: 37.2 PG (ref 28.2–36.6)
RETICULOCYTE ABSOLUTE COUNT: 0.13 E12/L
RETICULOCYTE COUNT PCT: 3.4 % (ref 0.4–1.9)
WBC # BLD: 6.6 E9/L (ref 4.5–11.5)

## 2022-10-10 PROCEDURE — 85045 AUTOMATED RETICULOCYTE COUNT: CPT

## 2022-10-10 PROCEDURE — 36415 COLL VENOUS BLD VENIPUNCTURE: CPT

## 2022-10-10 PROCEDURE — 85025 COMPLETE CBC W/AUTO DIFF WBC: CPT

## 2022-10-11 ENCOUNTER — HOSPITAL ENCOUNTER (OUTPATIENT)
Age: 82
Discharge: HOME | End: 2022-10-11
Payer: MEDICARE

## 2022-10-11 LAB
ANION GAP SERPL CALCULATED.3IONS-SCNC: 14 MMOL/L (ref 7–16)
BUN BLDV-MCNC: 20 MG/DL (ref 6–23)
CALCIUM SERPL-MCNC: 9.1 MG/DL (ref 8.6–10.2)
CHLORIDE BLD-SCNC: 106 MMOL/L (ref 98–107)
CO2: 20 MMOL/L (ref 22–29)
CREAT SERPL-MCNC: 0.9 MG/DL (ref 0.7–1.2)
GFR AFRICAN AMERICAN: >60
GFR NON-AFRICAN AMERICAN: >60 ML/MIN/1.73
GLUCOSE BLD-MCNC: 98 MG/DL (ref 74–99)
HCT VFR BLD CALC: 38.9 % (ref 37–54)
HEMOGLOBIN: 13.2 G/DL (ref 12.5–16.5)
MCH RBC QN AUTO: 34.9 PG (ref 26–35)
MCHC RBC AUTO-ENTMCNC: 33.9 % (ref 32–34.5)
MCV RBC AUTO: 102.9 FL (ref 80–99.9)
PDW BLD-RTO: 14.2 FL (ref 11.5–15)
PLATELET # BLD: 134 E9/L (ref 130–450)
PMV BLD AUTO: 10 FL (ref 7–12)
POTASSIUM SERPL-SCNC: 4.3 MMOL/L (ref 3.5–5)
RBC # BLD: 3.78 E12/L (ref 3.8–5.8)
SODIUM BLD-SCNC: 140 MMOL/L (ref 132–146)
WBC # BLD: 7.5 E9/L (ref 4.5–11.5)

## 2022-10-11 PROCEDURE — G0103 PSA SCREENING: HCPCS

## 2022-10-11 PROCEDURE — 85027 COMPLETE CBC AUTOMATED: CPT

## 2022-10-11 PROCEDURE — 36415 COLL VENOUS BLD VENIPUNCTURE: CPT

## 2022-10-11 PROCEDURE — 84153 ASSAY OF PSA TOTAL: CPT

## 2022-10-11 PROCEDURE — 80048 BASIC METABOLIC PNL TOTAL CA: CPT

## 2022-10-12 LAB — PROSTATE SPECIFIC ANTIGEN: 0.93 NG/ML (ref 0–4)

## 2022-11-21 DIAGNOSIS — E78.2 MIXED HYPERLIPIDEMIA: Chronic | ICD-10-CM

## 2022-11-22 RX ORDER — ATORVASTATIN CALCIUM 40 MG/1
40 TABLET, FILM COATED ORAL DAILY
Qty: 90 TABLET | Refills: 3 | Status: SHIPPED | OUTPATIENT
Start: 2022-11-22

## 2022-12-09 ENCOUNTER — HOSPITAL ENCOUNTER (OUTPATIENT)
Age: 82
Discharge: HOME | End: 2022-12-09
Payer: MEDICARE

## 2022-12-09 DIAGNOSIS — M81.0 AGE-RELATED OSTEOPOROSIS WITHOUT CURRENT PATHOLOGICAL FRACTURE: ICD-10-CM

## 2022-12-09 DIAGNOSIS — E03.9 ACQUIRED HYPOTHYROIDISM: ICD-10-CM

## 2022-12-09 DIAGNOSIS — C61 CANCER OF PROSTATE (HCC): ICD-10-CM

## 2022-12-09 DIAGNOSIS — D50.8 OTHER IRON DEFICIENCY ANEMIA: Chronic | ICD-10-CM

## 2022-12-09 DIAGNOSIS — E11.9 DIET-CONTROLLED DIABETES MELLITUS (HCC): ICD-10-CM

## 2022-12-09 DIAGNOSIS — I10 ESSENTIAL HYPERTENSION: Chronic | ICD-10-CM

## 2022-12-09 LAB
ALBUMIN SERPL-MCNC: 4.1 G/DL (ref 3.5–5.2)
ALP BLD-CCNC: 112 U/L (ref 40–129)
ALT SERPL-CCNC: 27 U/L (ref 0–40)
ANION GAP SERPL CALCULATED.3IONS-SCNC: 10 MMOL/L (ref 7–16)
AST SERPL-CCNC: 20 U/L (ref 0–39)
BACTERIA: ABNORMAL /HPF
BASOPHILS ABSOLUTE: 0.04 E9/L (ref 0–0.2)
BASOPHILS RELATIVE PERCENT: 0.6 % (ref 0–2)
BILIRUB SERPL-MCNC: 0.7 MG/DL (ref 0–1.2)
BILIRUBIN URINE: NEGATIVE
BLOOD, URINE: NORMAL
BUN BLDV-MCNC: 26 MG/DL (ref 6–23)
CALCIUM SERPL-MCNC: 9.4 MG/DL (ref 8.6–10.2)
CHLORIDE BLD-SCNC: 106 MMOL/L (ref 98–107)
CHOLESTEROL, TOTAL: 104 MG/DL (ref 0–199)
CLARITY: CLEAR
CO2: 25 MMOL/L (ref 22–29)
COLOR: YELLOW
CREAT SERPL-MCNC: 1 MG/DL (ref 0.7–1.2)
EOSINOPHILS ABSOLUTE: 0.2 E9/L (ref 0.05–0.5)
EOSINOPHILS RELATIVE PERCENT: 3.2 % (ref 0–6)
EPITHELIAL CELLS, UA: ABNORMAL /HPF
GFR SERPL CREATININE-BSD FRML MDRD: >60 ML/MIN/1.73
GLUCOSE BLD-MCNC: 110 MG/DL (ref 74–99)
GLUCOSE URINE: NEGATIVE MG/DL
HBA1C MFR BLD: 5.1 % (ref 4–5.6)
HCT VFR BLD CALC: 43.3 % (ref 37–54)
HDLC SERPL-MCNC: 47 MG/DL
HEMOGLOBIN: 14 G/DL (ref 12.5–16.5)
IMMATURE GRANULOCYTES #: 0.02 E9/L
IMMATURE GRANULOCYTES %: 0.3 % (ref 0–5)
IRON: 83 MCG/DL (ref 59–158)
KETONES, URINE: NEGATIVE MG/DL
LDL CHOLESTEROL CALCULATED: 50 MG/DL (ref 0–99)
LEUKOCYTE ESTERASE, URINE: NEGATIVE
LYMPHOCYTES ABSOLUTE: 1.31 E9/L (ref 1.5–4)
LYMPHOCYTES RELATIVE PERCENT: 20.8 % (ref 20–42)
MCH RBC QN AUTO: 32.6 PG (ref 26–35)
MCHC RBC AUTO-ENTMCNC: 32.3 % (ref 32–34.5)
MCV RBC AUTO: 100.9 FL (ref 80–99.9)
MONOCYTES ABSOLUTE: 0.38 E9/L (ref 0.1–0.95)
MONOCYTES RELATIVE PERCENT: 6 % (ref 2–12)
NEUTROPHILS ABSOLUTE: 4.35 E9/L (ref 1.8–7.3)
NEUTROPHILS RELATIVE PERCENT: 69.1 % (ref 43–80)
NITRITE, URINE: NEGATIVE
PDW BLD-RTO: 12.4 FL (ref 11.5–15)
PH UA: 5.5 (ref 5–9)
PLATELET # BLD: 175 E9/L (ref 130–450)
PMV BLD AUTO: 10.1 FL (ref 7–12)
POTASSIUM SERPL-SCNC: 4.4 MMOL/L (ref 3.5–5)
PROSTATE SPECIFIC ANTIGEN: 0.92 NG/ML (ref 0–4)
PROTEIN UA: NEGATIVE MG/DL
RBC # BLD: 4.29 E12/L (ref 3.8–5.8)
RBC UA: ABNORMAL /HPF (ref 0–2)
SODIUM BLD-SCNC: 141 MMOL/L (ref 132–146)
SPECIFIC GRAVITY UA: 1.02 (ref 1–1.03)
T4 TOTAL: 6.8 MCG/DL (ref 4.5–11.7)
TOTAL PROTEIN: 6.2 G/DL (ref 6.4–8.3)
TRIGL SERPL-MCNC: 37 MG/DL (ref 0–149)
TSH SERPL DL<=0.05 MIU/L-ACNC: 2.13 UIU/ML (ref 0.27–4.2)
UROBILINOGEN, URINE: 0.2 E.U./DL
VITAMIN D 25-HYDROXY: 107 NG/ML (ref 30–100)
VLDLC SERPL CALC-MCNC: 7 MG/DL
WBC # BLD: 6.3 E9/L (ref 4.5–11.5)
WBC UA: ABNORMAL /HPF (ref 0–5)

## 2022-12-09 PROCEDURE — 84443 ASSAY THYROID STIM HORMONE: CPT

## 2022-12-09 PROCEDURE — 83540 ASSAY OF IRON: CPT

## 2022-12-09 PROCEDURE — 80061 LIPID PANEL: CPT

## 2022-12-09 PROCEDURE — 83036 HEMOGLOBIN GLYCOSYLATED A1C: CPT

## 2022-12-09 PROCEDURE — 84436 ASSAY OF TOTAL THYROXINE: CPT

## 2022-12-09 PROCEDURE — 81001 URINALYSIS AUTO W/SCOPE: CPT

## 2022-12-09 PROCEDURE — 36415 COLL VENOUS BLD VENIPUNCTURE: CPT

## 2022-12-09 PROCEDURE — 85025 COMPLETE CBC W/AUTO DIFF WBC: CPT

## 2022-12-09 PROCEDURE — 82306 VITAMIN D 25 HYDROXY: CPT

## 2022-12-09 PROCEDURE — 84153 ASSAY OF PSA TOTAL: CPT

## 2022-12-09 PROCEDURE — 80053 COMPREHEN METABOLIC PANEL: CPT

## 2022-12-16 ENCOUNTER — TELEPHONE (OUTPATIENT)
Dept: PRIMARY CARE CLINIC | Age: 82
End: 2022-12-16

## 2022-12-16 ENCOUNTER — OFFICE VISIT (OUTPATIENT)
Dept: PRIMARY CARE CLINIC | Age: 82
End: 2022-12-16

## 2022-12-16 VITALS
SYSTOLIC BLOOD PRESSURE: 132 MMHG | HEIGHT: 68 IN | BODY MASS INDEX: 27.43 KG/M2 | DIASTOLIC BLOOD PRESSURE: 68 MMHG | TEMPERATURE: 97.1 F | WEIGHT: 181 LBS

## 2022-12-16 DIAGNOSIS — C61 CANCER OF PROSTATE (HCC): Chronic | ICD-10-CM

## 2022-12-16 DIAGNOSIS — M81.0 AGE-RELATED OSTEOPOROSIS WITHOUT CURRENT PATHOLOGICAL FRACTURE: ICD-10-CM

## 2022-12-16 DIAGNOSIS — E53.8 VITAMIN B 12 DEFICIENCY: ICD-10-CM

## 2022-12-16 DIAGNOSIS — E03.9 ACQUIRED HYPOTHYROIDISM: Chronic | ICD-10-CM

## 2022-12-16 DIAGNOSIS — E11.9 DIET-CONTROLLED DIABETES MELLITUS (HCC): ICD-10-CM

## 2022-12-16 DIAGNOSIS — E78.2 MIXED HYPERLIPIDEMIA: Chronic | ICD-10-CM

## 2022-12-16 DIAGNOSIS — R41.89 COGNITIVE IMPAIRMENT: Primary | ICD-10-CM

## 2022-12-16 DIAGNOSIS — I10 ESSENTIAL HYPERTENSION: Primary | Chronic | ICD-10-CM

## 2022-12-16 DIAGNOSIS — R41.89 COGNITIVE IMPAIRMENT: ICD-10-CM

## 2022-12-16 RX ORDER — DONEPEZIL HYDROCHLORIDE 5 MG/1
5 TABLET, FILM COATED ORAL NIGHTLY
Qty: 30 TABLET | Refills: 5 | Status: SHIPPED | OUTPATIENT
Start: 2022-12-16

## 2022-12-16 RX ORDER — DONEPEZIL HYDROCHLORIDE 5 MG/1
5 TABLET, FILM COATED ORAL NIGHTLY
Qty: 30 TABLET | Refills: 5 | Status: SHIPPED
Start: 2022-12-16 | End: 2022-12-16

## 2022-12-16 ASSESSMENT — ENCOUNTER SYMPTOMS
EYES NEGATIVE: 1
GASTROINTESTINAL NEGATIVE: 1
RESPIRATORY NEGATIVE: 1
ALLERGIC/IMMUNOLOGIC NEGATIVE: 1

## 2022-12-16 ASSESSMENT — PATIENT HEALTH QUESTIONNAIRE - PHQ9
SUM OF ALL RESPONSES TO PHQ QUESTIONS 1-9: 0
2. FEELING DOWN, DEPRESSED OR HOPELESS: 0
SUM OF ALL RESPONSES TO PHQ QUESTIONS 1-9: 0
SUM OF ALL RESPONSES TO PHQ9 QUESTIONS 1 & 2: 0
SUM OF ALL RESPONSES TO PHQ QUESTIONS 1-9: 0
1. LITTLE INTEREST OR PLEASURE IN DOING THINGS: 0
SUM OF ALL RESPONSES TO PHQ QUESTIONS 1-9: 0

## 2022-12-16 NOTE — PROGRESS NOTES
22  Name: Harriet Maria    : 1940    Sex: male    Age: [de-identified] y.o. Subjective:  Chief Complaint: Patient is here for   6mo ck  re  anemia  thryoid   bp  anx   vit  d  heart  cancer  prostae     memory    Wife    says was set up for penile impalnt nov      but    cancled    due to heart         sees cardio to clear furgery     set up  12-    Feels ok  no cp or sob  Her with wife--psa sl dec  he refiuses to see dr Madelin Carreon   ccf  urol and    rad onc  Lab with  c up    ghb ok  Wife feels memory worse      Review of Systems   Constitutional: Negative. HENT: Negative. Eyes: Negative. Respiratory: Negative. Cardiovascular: Negative. Gastrointestinal: Negative. Endocrine: Negative. Genitourinary: Negative. Musculoskeletal: Negative. Skin: Negative. Allergic/Immunologic: Negative. Neurological: Negative. Hematological: Negative. Psychiatric/Behavioral: Negative.          Current Outpatient Medications:     donepezil (ARICEPT) 5 MG tablet, Take 1 tablet by mouth nightly, Disp: 30 tablet, Rfl: 5    atorvastatin (LIPITOR) 40 MG tablet, Take 1 tablet by mouth daily, Disp: 90 tablet, Rfl: 3    tamsulosin (FLOMAX) 0.4 MG capsule, Take 2 capsules by mouth nightly, Disp: 180 capsule, Rfl: 3    buPROPion (WELLBUTRIN SR) 100 MG extended release tablet, Take 100 mg by mouth daily, Disp: , Rfl:     ferrous sulfate (IRON 325) 325 (65 Fe) MG tablet, Take 325 mg by mouth daily (with breakfast), Disp: , Rfl:     sildenafil (VIAGRA) 100 MG tablet, Take 1 tablet by mouth as needed for Erectile Dysfunction, Disp: 50 tablet, Rfl: 12    venlafaxine (EFFEXOR XR) 75 MG extended release capsule, Take 75 mg by mouth daily, Disp: , Rfl:     Coenzyme Q10 (COQ10) 100 MG CAPS, Take 100 mg by mouth daily, Disp: , Rfl:     latanoprost (XALATAN) 0.005 % ophthalmic solution, , Disp: , Rfl:     Biotin 5000 MCG TABS, Take by mouth daily, Disp: , Rfl:     vitamin B-12 (CYANOCOBALAMIN) 1000 MCG tablet, Take 1,000 mcg by mouth daily. , Disp: , Rfl:     omeprazole (PRILOSEC) 20 MG capsule, Take 20 mg by mouth daily. , Disp: , Rfl:     Multiple Vitamins-Minerals (THERAPEUTIC MULTIVITAMIN-MINERALS) tablet, Take 1 tablet by mouth daily. , Disp: , Rfl:     dapsone 25 MG tablet, Take 25 mg by mouth 2 times daily. , Disp: , Rfl:     nefazodone (SERZONE) 100 MG tablet, Take 150 mg by mouth 2 times daily Indications: patient no longer takes , Disp: , Rfl:     VITAMIN D-3 (COLECALCIFEROL) 400 UNITS TABS, Take by mouth daily , Disp: , Rfl:   No Known Allergies  Social History     Socioeconomic History    Marital status:      Spouse name: Not on file    Number of children: Not on file    Years of education: Not on file    Highest education level: Not on file   Occupational History    Not on file   Tobacco Use    Smoking status: Former     Packs/day: 0.10     Years: 17.00     Pack years: 1.70     Types: Cigarettes, Pipe     Start date: 1960     Quit date: 1977     Years since quittin.9    Smokeless tobacco: Never   Vaping Use    Vaping Use: Never used   Substance and Sexual Activity    Alcohol use: Yes     Comment: beer three times weekly    Drug use: No    Sexual activity: Not on file   Other Topics Concern    Not on file   Social History Narrative            Chronic Diagnosis: Depressive disorder, Mixed hyperlipidemia, Anemia, Peptic reflux disease.     HH    GERD    GASTRITIS    DEP---DR [id number]    LIPID    FE DEF ANEMIA    CHOLELITHIASIS    OPEN GB OR    R ING HERNIA OR    Lamond Foot  1940 Page #2    Rajinder Amaya  BUT PIPE---    TICS    R EAR TUBE    EGD AND COLON---    TMT -04    DERMATITIS HERPETIFORMIS - RX WITH GLUTEN FREE DIET--- [facility]    MIDDLE DAUGTHER MOVED TO [facility]--HER EX HUS OUT OF [facility] AND LIVES IN    [place]----elaine ==grand elaine moved here with pt----hs--preg ab---dui---    YOUMGEST ELAINE LIVES IN [place]  NO KIDS    SON LIVES IN [place]--    EVAL WITH DR Fabiola Rios 10-14 MILD AORTIC STENOSIS    re eval with dr Lutricia Fabry 2-16-----dr galarza--------------------------------------plan chg to new cardio spring 2022    ELEV PSA 1-18------ca prostate--starts seed  12-21----[facility]----dr Lavell Zamora  and  rad onc    Eval dr Moody Avelar    echo and TMT done  4-21    Dec memory per wife   10-21    Eval  memory with apez   1-21  very mild cognitive deficit    Early hypothyroid  3-22    Refused colonosopy    6-22    Pre op clearance with cardio [facility] and started beta blocker---for  penile implant for 12-29-22    Holter [facility] with lots of  pvc's    Mild AS  3-21  then   mod  severe AS   12-22  at [facility]     Social Determinants of Health     Financial Resource Strain: Not on file   Food Insecurity: Not on file   Transportation Needs: Not on file   Physical Activity: Not on file   Stress: Not on file   Social Connections: Not on file   Intimate Partner Violence: Not on file   Housing Stability: Not on file      Past Medical History:   Diagnosis Date    Anemia     Cholelithiasis     Depressive disorder     Dermatitis herpetiformis     GLUTEN FREE DIET    Diverticulitis     Gastritis     GERD (gastroesophageal reflux disease)     Hiatal hernia     Hyperlipidemia     Iron deficiency anemia     Peptic reflux disease      Family History   Problem Relation Age of Onset    Stroke Father       Past Surgical History:   Procedure Laterality Date    CARDIOVASCULAR STRESS TEST  2004    CHOLECYSTECTOMY        Vitals:    12/16/22 0753   BP: 132/68   Temp: 97.1 °F (36.2 °C)   Weight: 181 lb (82.1 kg)   Height: 5' 8\" (1.727 m)       Objective:    Physical Exam  Vitals reviewed. Constitutional:       Appearance: Normal appearance. He is well-developed. HENT:      Head: Normocephalic.       Right Ear: Tympanic membrane normal.      Left Ear: Tympanic membrane normal.      Nose: Nose normal.      Mouth/Throat:      Mouth: Mucous membranes are moist.   Eyes:      Pupils: Pupils are equal, round, and reactive to light. Cardiovascular:      Rate and Rhythm: Regular rhythm. Bradycardia present. Comments: Mul ectopy  Pulmonary:      Effort: Pulmonary effort is normal.      Breath sounds: Normal breath sounds. Abdominal:      General: Bowel sounds are normal.      Palpations: Abdomen is soft. Musculoskeletal:         General: Normal range of motion. Cervical back: Normal range of motion. Skin:     General: Skin is warm. Neurological:      Mental Status: He is alert and oriented to person, place, and time. Psychiatric:         Behavior: Behavior normal.       Sohail Emerson was seen today for discuss labs. Diagnoses and all orders for this visit:    Essential hypertension    Cancer of prostate ([address]    Mixed hyperlipidemia    Acquired hypothyroidism    Cognitive impairment  -     Discontinue: donepezil (ARICEPT) 5 MG tablet; Take 1 tablet by mouth nightly  -     Dain Etienne MD, Geriatric Assessment, [place]  -     donepezil (ARICEPT) 5 MG tablet; Take 1 tablet by mouth nightly      Comments: wife  request   aricpet      but iill also  do referrl  dr Scarlett mendiola  sees   tomgary toddgn end [place] ndthey wil keep it      I educated the patient about all medications. Make sure they were correct and educated  on the risk associated with missing meds or taking them incorrectly. A list of medications is being sent home with patient today. Check blood pressure at home twice a day. Low-salt low caffeine diet. Call if systolic blood pressure is above 503 and diastolic blood pressures above 85. Only use a upper arm digital cuff. Aggressive low-fat diet. Avoid red meats, greasy fried foods, dairy products. Avoid processed foods. Take cholesterol medications without food. I informed patient about the risk associated with noncompliance of medication and taking medications incorrectly.   Appropriate follow-up with myself and all specialist.  Encourage family members to take active role in assisting with medications and medical care. If any confusion should develop to notify my office immediately to avoid risk of worsening medical condition    A great deal of time spent reviewing medications, diet, exercise, social issues. Also reviewing the chart before entering the room with patient and finishing charting after leaving patient's room. More than half of that time was spent face to face with the patient in counseling and coordinating care.       Follow Up: Return in about 6 months (around 6/16/2023) for Lab Before, See Referral.     Seen by:  Michelle Kearns DO

## 2022-12-16 NOTE — TELEPHONE ENCOUNTER
Received a call from [facility] at Dr Wilson English office. She said for some reason the referral for Mozella Mary disappeared from their workqueue and that it says all visits were scheduled. They need you to put in another referral for Mozella Mary. Thank You.

## 2023-04-06 ENCOUNTER — OFFICE VISIT (OUTPATIENT)
Dept: PRIMARY CARE CLINIC | Age: 83
End: 2023-04-06

## 2023-04-06 VITALS
HEART RATE: 99 BPM | SYSTOLIC BLOOD PRESSURE: 132 MMHG | HEIGHT: 68 IN | DIASTOLIC BLOOD PRESSURE: 74 MMHG | TEMPERATURE: 97.7 F | BODY MASS INDEX: 27.13 KG/M2 | OXYGEN SATURATION: 96 % | WEIGHT: 179 LBS

## 2023-04-06 DIAGNOSIS — E11.9 DIET-CONTROLLED DIABETES MELLITUS (HCC): ICD-10-CM

## 2023-04-06 DIAGNOSIS — Z87.74 STATUS POST REPAIR OF SUPRAVALVAR AORTIC STENOSIS: ICD-10-CM

## 2023-04-06 DIAGNOSIS — C61 CANCER OF PROSTATE (HCC): ICD-10-CM

## 2023-04-06 DIAGNOSIS — I25.119 CORONARY ARTERY DISEASE INVOLVING NATIVE CORONARY ARTERY OF NATIVE HEART WITH ANGINA PECTORIS (HCC): ICD-10-CM

## 2023-04-06 DIAGNOSIS — Z00.00 MEDICARE ANNUAL WELLNESS VISIT, SUBSEQUENT: Primary | ICD-10-CM

## 2023-04-06 ASSESSMENT — PATIENT HEALTH QUESTIONNAIRE - PHQ9
SUM OF ALL RESPONSES TO PHQ QUESTIONS 1-9: 0
1. LITTLE INTEREST OR PLEASURE IN DOING THINGS: 0
2. FEELING DOWN, DEPRESSED OR HOPELESS: 0
SUM OF ALL RESPONSES TO PHQ9 QUESTIONS 1 & 2: 0
SUM OF ALL RESPONSES TO PHQ QUESTIONS 1-9: 0

## 2023-04-06 ASSESSMENT — LIFESTYLE VARIABLES: HOW MANY STANDARD DRINKS CONTAINING ALCOHOL DO YOU HAVE ON A TYPICAL DAY: PATIENT DOES NOT DRINK

## 2023-04-06 NOTE — PATIENT INSTRUCTIONS
Wait for an ambulance. Do not try to drive yourself. Watch closely for changes in your health, and be sure to contact your doctor if you have any problems. Where can you learn more? Go to http://www.nugent.com/ and enter F075 to learn more about \"A Healthy Heart: Care Instructions. \"  Current as of: September 7, 2022               Content Version: 13.6  © 2006-2023 [employer]. Care instructions adapted under license by [employer] [facility] ([facility]). If you have questions about a medical condition or this instruction, always ask your healthcare professional. [place] [address] any warranty or liability for your use of this information. Personalized Preventive Plan for Clare Rodriguez - 4/6/2023  Medicare offers a range of preventive health benefits. Some of the tests and screenings are paid in full while other may be subject to a deductible, co-insurance, and/or copay. Some of these benefits include a comprehensive review of your medical history including lifestyle, illnesses that may run in your family, and various assessments and screenings as appropriate. After reviewing your medical record and screening and assessments performed today your provider may have ordered immunizations, labs, imaging, and/or referrals for you. A list of these orders (if applicable) as well as your Preventive Care list are included within your After Visit Summary for your review. Other Preventive Recommendations:    A preventive eye exam performed by an eye specialist is recommended every 1-2 years to screen for glaucoma; cataracts, macular degeneration, and other eye disorders. A preventive dental visit is recommended every 6 months. Try to get at least 150 minutes of exercise per week or 10,000 steps per day on a pedometer . Order or download the FREE \"Exercise & Physical Activity: Your Everyday Guide\" from [employer].  Call [phone] or search [facility]

## 2023-04-06 NOTE — PROGRESS NOTES
Medicare Annual Wellness Visit    Therese Thompson is here for Medicare AWV    Assessment & Plan   Medicare annual wellness visit, subsequent  Status post repair of supravalvar aortic stenosis  Cancer of prostate ([facility] [address]  Diet-controlled diabetes mellitus ([facility]  Coronary artery disease involving native coronary artery of native heart with angina pectoris ([facility] [address]    Recommendations for Preventive Services Due: see orders and patient instructions/AVS.  Recommended screening schedule for the next 5-10 years is provided to the patient in written form: see Patient Instructions/AVS.     Return for Reg Appt. Subjective   The following acute and/or chronic problems were also addressed today:  Hospital discharge. Presents accompanied by daughter. He was discharged from the [facility] he had a aortic valve surgery 7 days ago. Prior to that he had a heart catheterization with minimal disease apparently treating medically. He was discharged on the same medications he was on antibiotic few days he is off that now. He was placed on a beta-blocker but stopped he was discharged with bigeminy he is not checking his blood pressure at home because his machine does not work well he said because of the bigeminy. He starts to do cardiac rehab very soon. She is [facility] [place] back in 2 weeks. He has been driving discharge and was in no driving for 2 weeks    Patient's complete Health Risk Assessment and screening values have been reviewed and are found in Flowsheets. The following problems were reviewed today and where indicated follow up appointments were made and/or referrals ordered.     Positive Risk Factor Screenings with Interventions:                 Weight and Activity:  Physical Activity: Inactive    Days of Exercise per Week: 0 days    Minutes of Exercise per Session: 0 min     On average, how many days per week do you engage in moderate to strenuous exercise (like a brisk walk)?: 0 days  Have you lost any

## 2023-05-19 ENCOUNTER — INITIAL CONSULT (OUTPATIENT)
Dept: GERIATRIC MEDICINE | Age: 83
End: 2023-05-19
Payer: MEDICARE

## 2023-05-19 VITALS
HEART RATE: 98 BPM | TEMPERATURE: 98.1 F | WEIGHT: 178 LBS | SYSTOLIC BLOOD PRESSURE: 128 MMHG | BODY MASS INDEX: 26.36 KG/M2 | DIASTOLIC BLOOD PRESSURE: 82 MMHG | RESPIRATION RATE: 12 BRPM | HEIGHT: 69 IN

## 2023-05-19 DIAGNOSIS — G31.84 MCI (MILD COGNITIVE IMPAIRMENT): Primary | ICD-10-CM

## 2023-05-19 DIAGNOSIS — I21.4 NON-ST ELEVATION MYOCARDIAL INFARCTION (NSTEMI) (HCC): ICD-10-CM

## 2023-05-19 PROCEDURE — 3078F DIAST BP <80 MM HG: CPT | Performed by: INTERNAL MEDICINE

## 2023-05-19 PROCEDURE — 3074F SYST BP LT 130 MM HG: CPT | Performed by: INTERNAL MEDICINE

## 2023-05-19 PROCEDURE — 99212 OFFICE O/P EST SF 10 MIN: CPT | Performed by: INTERNAL MEDICINE

## 2023-05-19 PROCEDURE — 1123F ACP DISCUSS/DSCN MKR DOCD: CPT | Performed by: INTERNAL MEDICINE

## 2023-05-19 RX ORDER — UBIDECARENONE 75 MG
100 CAPSULE ORAL 2 TIMES DAILY
COMMUNITY

## 2023-05-19 RX ORDER — MELATONIN
1000
COMMUNITY

## 2023-05-19 RX ORDER — BIOTIN 2500 MCG
1 CAPSULE ORAL DAILY
COMMUNITY

## 2023-05-26 ENCOUNTER — HOSPITAL ENCOUNTER (OUTPATIENT)
Dept: CARDIAC REHAB | Age: 83
Setting detail: THERAPIES SERIES
Discharge: HOME | End: 2023-05-26
Payer: MEDICARE

## 2023-05-26 ENCOUNTER — TELEPHONE (OUTPATIENT)
Dept: PRIMARY CARE CLINIC | Age: 83
End: 2023-05-26

## 2023-05-26 VITALS
BODY MASS INDEX: 26.36 KG/M2 | OXYGEN SATURATION: 98 % | WEIGHT: 178 LBS | HEIGHT: 69 IN | HEART RATE: 94 BPM | RESPIRATION RATE: 20 BRPM

## 2023-05-26 DIAGNOSIS — I25.119 CORONARY ARTERY DISEASE INVOLVING NATIVE CORONARY ARTERY OF NATIVE HEART WITH ANGINA PECTORIS (HCC): Primary | ICD-10-CM

## 2023-05-26 PROCEDURE — 93797 PHYS/QHP OP CAR RHAB WO ECG: CPT

## 2023-05-26 PROCEDURE — 93798 PHYS/QHP OP CAR RHAB W/ECG: CPT

## 2023-05-26 ASSESSMENT — PATIENT HEALTH QUESTIONNAIRE - PHQ9
SUM OF ALL RESPONSES TO PHQ QUESTIONS 1-9: 0
SUM OF ALL RESPONSES TO PHQ QUESTIONS 1-9: 0
3. TROUBLE FALLING OR STAYING ASLEEP: 0
1. LITTLE INTEREST OR PLEASURE IN DOING THINGS: 0
9. THOUGHTS THAT YOU WOULD BE BETTER OFF DEAD, OR OF HURTING YOURSELF: 0
4. FEELING TIRED OR HAVING LITTLE ENERGY: 0
6. FEELING BAD ABOUT YOURSELF - OR THAT YOU ARE A FAILURE OR HAVE LET YOURSELF OR YOUR FAMILY DOWN: 0
2. FEELING DOWN, DEPRESSED OR HOPELESS: 0
SUM OF ALL RESPONSES TO PHQ QUESTIONS 1-9: 0
8. MOVING OR SPEAKING SO SLOWLY THAT OTHER PEOPLE COULD HAVE NOTICED. OR THE OPPOSITE, BEING SO FIGETY OR RESTLESS THAT YOU HAVE BEEN MOVING AROUND A LOT MORE THAN USUAL: 0
SUM OF ALL RESPONSES TO PHQ9 QUESTIONS 1 & 2: 0
10. IF YOU CHECKED OFF ANY PROBLEMS, HOW DIFFICULT HAVE THESE PROBLEMS MADE IT FOR YOU TO DO YOUR WORK, TAKE CARE OF THINGS AT HOME, OR GET ALONG WITH OTHER PEOPLE: 0
SUM OF ALL RESPONSES TO PHQ QUESTIONS 1-9: 0
5. POOR APPETITE OR OVEREATING: 0
7. TROUBLE CONCENTRATING ON THINGS, SUCH AS READING THE NEWSPAPER OR WATCHING TELEVISION: 0

## 2023-05-26 ASSESSMENT — EXERCISE STRESS TEST
PEAK_BP: 156/82
PEAK_RPE: 10
PEAK_BP: 156/82
PEAK_HR: 101

## 2023-05-26 ASSESSMENT — EJECTION FRACTION: EF_VALUE: 55

## 2023-05-26 ASSESSMENT — LIFESTYLE VARIABLES
SMOKELESS_TOBACCO: NO
ALCOHOL_USE: SPECIAL

## 2023-05-26 NOTE — CARDIO/PULMONARY
PT. SEEN IN CARDIAC REHAB FOR INITIAL EVALUATION AND EXERCISE. PT. IS S/P TAVR ON 3-31-23 AT THE [facility]. PT HAS A HX OF AFIB. HE WAS TO HAVE A CARDIOVERSION THIS PAST WEEK. PROCEDURE WAS CANCELLED AND HE WILL HAVE IT IN 2-3 WEEKS. PT. STATES HE DOES HAVE SOME BILAT FOOT PAIN AND CAN NOT WALK FOR A LONG PERIOD OF TIME. NO OTHER EXERCISE OR EQUIPMENT LIMITATIONS. HE STATES HE DOES HAVE SOME BACK PAIN. NO SWELLING OF FEET OR ANKLES. PHQ9 SCORE IS A 0. PT STATES HE IS UNDER A DOCTOR'S CARE FOR DEPRESSION AND ON MEDICATION. HE STATES HIS DEPRESSION HAS BEEN MANAGED WELL. FALLS RISK  IS A 1/8 LOW. PT.'S GOAL IS TO IMPROVE HIS STRENGTH AND ENDURANCE THROUGH EXERCISE AT CARDIAC REHAB.

## 2023-05-26 NOTE — PROGRESS NOTES
activity 30 + minutes/day  5 days/week   Patient Stated Exercise Goals Pt wants to build up his endurance in order to have more engery throughout the day. Psychosocial   Stages of Change Contemplate;Preparation;Maintenance; Action   Psychosocial Intervention   Interventions No intervention indicated;Currently under treatment for depression   Consults   (NONE)   Resources Provided   (NONE)   Currently Taking Psychotropic Meds Yes   Medication Changes No   Psychosocial Education   Education Cardiac meds; Impact self care behaviors on health;Signs/symptoms of depression   Psychosocial Target Goals   Target Goal(s) Assess presence or absence of depression using a valid screening tool;Engages in self-care behaviors;Maximizes coping skills   Uses Stress Mgmt Techniques Yes  (READING, LISTENS TO MUSIC)   Patient Stated Psychosocial Goals DEPRESSION IS UNDER CONTROL WITH MEDICATION. Tobacco   Stages of Change Maintenance; Action   Tobacco Use Yes   Quit Greater than 6 month   Date Started   (PT SMOKED FOR 15 YEARS. .. QUIT IN 0)   Date Quit   (PT. [address]. PT SMOKED FOR 15 YEARS)   # Cigarette Smoked/Day 1PACK/WEEK   Smokeless Tobacco Use No   Tobacco Cessation Intervention   Smoking Cessation Referral No   Tobacco Adjunct No   Tobacco Education   Resource Information Provided No   Target Goal   Target Goal Complete cessation of tobacco use   Patient Stated Tobacco Goals REMAIN SMOKE FREE   Nutrition   Stages of Change Contemplate;Preparation;Maintenance; Action   Diabetes No   Lipids   Date Lipids Drawn 12/16/22   Total 104   HDL 47   LDL 50   Triglycerides 37   Lipid Med(s) LIPITOR   Lipid Med Change(s) No   Weight Management   Alcohol Special   Weight  178 lb (80.7 kg)   Height  5' 9\" (1.753 m)   BMI 26.34   Nutrition Intervention   Dietitian Consult No   Nurse/Patient Discussion Yes  (HEART HEALTHY HANDOUTS GIVEN AND REVIEWED WITH PT.)   Nutrition Goals HEALTHY DIET   Nutrition Class No   Diabetes Education

## 2023-05-31 ENCOUNTER — HOSPITAL ENCOUNTER (OUTPATIENT)
Dept: CARDIAC REHAB | Age: 83
Setting detail: THERAPIES SERIES
Discharge: HOME | End: 2023-05-31
Payer: MEDICARE

## 2023-05-31 PROCEDURE — 93798 PHYS/QHP OP CAR RHAB W/ECG: CPT

## 2023-06-01 ENCOUNTER — TELEPHONE (OUTPATIENT)
Dept: CARDIAC REHAB | Age: 83
End: 2023-06-01

## 2023-06-01 NOTE — TELEPHONE ENCOUNTER
Contacted pt regarding continuing cardiac rehab exercise sessions. facility sent over pt exercise session from yesterday regarding elevated HR and BP. Dr. Kelly Dotson would like pt to delay exercise until cardioversion is completed. Pt had to reschedule initial cardioversion. Pt will be placed on a medical exercise hold until he is able to return. Updated pt on exercise status.

## 2023-06-02 ENCOUNTER — HOSPITAL ENCOUNTER (OUTPATIENT)
Dept: CARDIAC REHAB | Age: 83
Setting detail: THERAPIES SERIES
End: 2023-06-02
Payer: MEDICARE

## 2023-06-02 PROBLEM — I21.9 ACUTE MYOCARDIAL INFARCTION, UNSPECIFIED (HCC): Status: ACTIVE | Noted: 2023-06-02

## 2023-06-02 PROBLEM — I21.4 NON-ST ELEVATION MYOCARDIAL INFARCTION (NSTEMI) (HCC): Status: ACTIVE | Noted: 2023-06-02

## 2023-06-02 PROBLEM — I21.3 ST ELEVATION (STEMI) MYOCARDIAL INFARCTION OF UNSPECIFIED SITE (HCC): Status: ACTIVE | Noted: 2023-06-02

## 2023-06-05 ENCOUNTER — APPOINTMENT (OUTPATIENT)
Dept: CARDIAC REHAB | Age: 83
End: 2023-06-05
Payer: MEDICARE

## 2023-06-05 ENCOUNTER — HOSPITAL ENCOUNTER (OUTPATIENT)
Dept: CARDIAC REHAB | Age: 83
Setting detail: THERAPIES SERIES
Discharge: HOME | End: 2023-06-05
Payer: MEDICARE

## 2023-06-05 PROCEDURE — 93798 PHYS/QHP OP CAR RHAB W/ECG: CPT

## 2023-06-07 ENCOUNTER — HOSPITAL ENCOUNTER (OUTPATIENT)
Dept: CARDIAC REHAB | Age: 83
Setting detail: THERAPIES SERIES
Discharge: HOME | End: 2023-06-07
Payer: MEDICARE

## 2023-06-07 ENCOUNTER — APPOINTMENT (OUTPATIENT)
Dept: CARDIAC REHAB | Age: 83
End: 2023-06-07
Payer: MEDICARE

## 2023-06-07 PROCEDURE — 93798 PHYS/QHP OP CAR RHAB W/ECG: CPT

## 2023-06-09 ENCOUNTER — HOSPITAL ENCOUNTER (OUTPATIENT)
Dept: CARDIAC REHAB | Age: 83
Setting detail: THERAPIES SERIES
Discharge: HOME | End: 2023-06-09
Payer: MEDICARE

## 2023-06-09 ENCOUNTER — APPOINTMENT (OUTPATIENT)
Dept: CARDIAC REHAB | Age: 83
End: 2023-06-09
Payer: MEDICARE

## 2023-06-09 PROCEDURE — 93798 PHYS/QHP OP CAR RHAB W/ECG: CPT

## 2023-06-12 ENCOUNTER — APPOINTMENT (OUTPATIENT)
Dept: CARDIAC REHAB | Age: 83
End: 2023-06-12
Payer: MEDICARE

## 2023-06-14 ENCOUNTER — APPOINTMENT (OUTPATIENT)
Dept: CARDIAC REHAB | Age: 83
End: 2023-06-14
Payer: MEDICARE

## 2023-06-16 ENCOUNTER — APPOINTMENT (OUTPATIENT)
Dept: CARDIAC REHAB | Age: 83
End: 2023-06-16
Payer: MEDICARE

## 2023-06-16 ENCOUNTER — HOSPITAL ENCOUNTER (OUTPATIENT)
Dept: CARDIAC REHAB | Age: 83
Setting detail: THERAPIES SERIES
End: 2023-06-16
Payer: MEDICARE

## 2023-06-16 PROBLEM — I25.10 CORONARY ARTERY DISEASE INVOLVING NATIVE CORONARY ARTERY OF NATIVE HEART WITHOUT ANGINA PECTORIS: Status: ACTIVE | Noted: 2023-06-16

## 2023-06-19 ENCOUNTER — APPOINTMENT (OUTPATIENT)
Dept: CARDIAC REHAB | Age: 83
End: 2023-06-19
Payer: MEDICARE

## 2023-06-21 ENCOUNTER — APPOINTMENT (OUTPATIENT)
Dept: CARDIAC REHAB | Age: 83
End: 2023-06-21
Payer: MEDICARE

## 2023-06-23 ENCOUNTER — APPOINTMENT (OUTPATIENT)
Dept: CARDIAC REHAB | Age: 83
End: 2023-06-23
Payer: MEDICARE

## 2023-06-26 ENCOUNTER — APPOINTMENT (OUTPATIENT)
Dept: CARDIAC REHAB | Age: 83
End: 2023-06-26
Payer: MEDICARE

## 2023-06-28 ENCOUNTER — APPOINTMENT (OUTPATIENT)
Dept: CARDIAC REHAB | Age: 83
End: 2023-06-28
Payer: MEDICARE

## 2023-06-30 ENCOUNTER — APPOINTMENT (OUTPATIENT)
Dept: CARDIAC REHAB | Age: 83
End: 2023-06-30
Payer: MEDICARE

## 2023-07-21 ENCOUNTER — TELEPHONE (OUTPATIENT)
Dept: CARDIAC REHAB | Age: 83
End: 2023-07-21

## 2023-07-21 NOTE — TELEPHONE ENCOUNTER
LVM for pt to return call regarding outpatient cardiac rehab. Pt last exercise session on 6/12823 d/t Afib RVR, exercise was placed on hold. Pt has compelted 6/36 telemetry monitored exercise sessions.

## 2023-08-04 ENCOUNTER — TELEPHONE (OUTPATIENT)
Dept: PRIMARY CARE CLINIC | Age: 83
End: 2023-08-04

## 2023-08-04 NOTE — TELEPHONE ENCOUNTER
REESE for pt to call. We received a request from [facility] to do labs and an EKG, want to know when the pt wants to come in.

## 2023-08-08 ENCOUNTER — OFFICE VISIT (OUTPATIENT)
Dept: PRIMARY CARE CLINIC | Age: 83
End: 2023-08-08
Payer: MEDICARE

## 2023-08-08 VITALS
WEIGHT: 174 LBS | DIASTOLIC BLOOD PRESSURE: 68 MMHG | TEMPERATURE: 97.9 F | BODY MASS INDEX: 25.7 KG/M2 | SYSTOLIC BLOOD PRESSURE: 124 MMHG

## 2023-08-08 DIAGNOSIS — M15.9 PRIMARY OSTEOARTHRITIS INVOLVING MULTIPLE JOINTS: ICD-10-CM

## 2023-08-08 DIAGNOSIS — C61 CANCER OF PROSTATE (HCC): Chronic | ICD-10-CM

## 2023-08-08 DIAGNOSIS — I48.0 PAROXYSMAL ATRIAL FIBRILLATION (HCC): Primary | ICD-10-CM

## 2023-08-08 DIAGNOSIS — I48.0 PAROXYSMAL ATRIAL FIBRILLATION (HCC): ICD-10-CM

## 2023-08-08 DIAGNOSIS — I10 ESSENTIAL HYPERTENSION: Chronic | ICD-10-CM

## 2023-08-08 LAB
ANION GAP SERPL CALCULATED.3IONS-SCNC: 8 MMOL/L (ref 7–16)
BUN BLDV-MCNC: 26 MG/DL (ref 6–23)
CALCIUM SERPL-MCNC: 9.3 MG/DL (ref 8.6–10.2)
CHLORIDE BLD-SCNC: 104 MMOL/L (ref 98–107)
CO2: 26 MMOL/L (ref 22–29)
CREAT SERPL-MCNC: 0.9 MG/DL (ref 0.7–1.2)
GFR SERPL CREATININE-BSD FRML MDRD: >60 ML/MIN/1.73M2
GLUCOSE BLD-MCNC: 99 MG/DL (ref 74–99)
MAGNESIUM: 2 MG/DL (ref 1.6–2.6)
POTASSIUM SERPL-SCNC: 4.1 MMOL/L (ref 3.5–5)
SODIUM BLD-SCNC: 138 MMOL/L (ref 132–146)

## 2023-08-08 PROCEDURE — 99214 OFFICE O/P EST MOD 30 MIN: CPT | Performed by: FAMILY MEDICINE

## 2023-08-08 PROCEDURE — 3078F DIAST BP <80 MM HG: CPT | Performed by: FAMILY MEDICINE

## 2023-08-08 PROCEDURE — 3074F SYST BP LT 130 MM HG: CPT | Performed by: FAMILY MEDICINE

## 2023-08-08 PROCEDURE — 1123F ACP DISCUSS/DSCN MKR DOCD: CPT | Performed by: FAMILY MEDICINE

## 2023-08-08 PROCEDURE — 93000 ELECTROCARDIOGRAM COMPLETE: CPT | Performed by: FAMILY MEDICINE

## 2023-08-08 ASSESSMENT — PATIENT HEALTH QUESTIONNAIRE - PHQ9
SUM OF ALL RESPONSES TO PHQ QUESTIONS 1-9: 0
2. FEELING DOWN, DEPRESSED OR HOPELESS: 0
SUM OF ALL RESPONSES TO PHQ QUESTIONS 1-9: 0
SUM OF ALL RESPONSES TO PHQ9 QUESTIONS 1 & 2: 0
1. LITTLE INTEREST OR PLEASURE IN DOING THINGS: 0

## 2023-08-08 NOTE — PROGRESS NOTES
23  Name: Palmer Koehler    : 1940    Sex: male    Age: 82 y.o. Ck  re      Subjective:  Chief Complaint: Patient is here for   ck re   AF  vlavle    bp chol  anemuia  throid  anx   vit  d  pros   memory anemia     Feel ok     needs ekg      lab   with mg and bmp   for [facility]   demetria fax   ekg   rsr  today      Review of Systems   Constitutional: Negative. HENT: Negative. Eyes: Negative. Respiratory: Negative. Cardiovascular: Negative. Gastrointestinal: Negative. Endocrine: Negative. Genitourinary: Negative. Musculoskeletal: Negative. Skin: Negative. Allergic/Immunologic: Negative. Neurological: Negative. Hematological: Negative. Psychiatric/Behavioral: Negative.          Current Outpatient Medications:     metoprolol succinate (TOPROL XL) 25 MG extended release tablet, Take 0.5 tablets by mouth daily, Disp: 45 tablet, Rfl: 5    vitamin D3 (CHOLECALCIFEROL) 25 MCG (1000 UT) TABS tablet, Take 1 tablet by mouth 5 times a week., Disp: , Rfl:     vitamin B-12 (CYANOCOBALAMIN) 100 MCG tablet, Take 1 tablet by mouth in the morning and 1 tablet in the evening., Disp: , Rfl:     Biotin 2500 MCG CAPS, Take 1 tablet by mouth daily, Disp: , Rfl:     ferrous sulfate 27 MG TABS, Take 1 tablet by mouth daily, Disp: , Rfl:     apixaban (ELIQUIS) 5 MG TABS tablet, Take by mouth 2 times daily, Disp: , Rfl:     atorvastatin (LIPITOR) 40 MG tablet, Take 1 tablet by mouth daily, Disp: 90 tablet, Rfl: 3    tamsulosin (FLOMAX) 0.4 MG capsule, Take 2 capsules by mouth nightly, Disp: 180 capsule, Rfl: 3    buPROPion (WELLBUTRIN SR) 100 MG extended release tablet, Take 1 tablet by mouth daily, Disp: , Rfl:     sildenafil (VIAGRA) 100 MG tablet, Take 1 tablet by mouth as needed for Erectile Dysfunction, Disp: 50 tablet, Rfl: 12    Coenzyme Q10 (COQ10) 100 MG CAPS, Take 100 mg by mouth daily, Disp: , Rfl:     latanoprost (XALATAN) 0.005 % ophthalmic solution, Place 1 drop into both eyes

## 2023-08-09 ENCOUNTER — TELEPHONE (OUTPATIENT)
Dept: PRIMARY CARE CLINIC | Age: 83
End: 2023-08-09

## 2023-08-09 NOTE — TELEPHONE ENCOUNTER
EKG and lab results from 8/8 faxed to Dr. Kristal Tobias @ [facility].   Confirmation received

## 2023-08-10 ENCOUNTER — TELEPHONE (OUTPATIENT)
Dept: PRIMARY CARE CLINIC | Age: 83
End: 2023-08-10

## 2023-08-10 NOTE — TELEPHONE ENCOUNTER
Notify patient lab was okay.     Fax  that and EKG the number on the paper that I  gave to ROSELIA DAVIS

## 2023-08-22 ENCOUNTER — TELEPHONE (OUTPATIENT)
Dept: PRIMARY CARE CLINIC | Age: 83
End: 2023-08-22

## 2023-08-22 DIAGNOSIS — I48.0 PAROXYSMAL ATRIAL FIBRILLATION (HCC): Primary | ICD-10-CM

## 2023-08-22 DIAGNOSIS — I25.119 CORONARY ARTERY DISEASE INVOLVING NATIVE CORONARY ARTERY OF NATIVE HEART WITH ANGINA PECTORIS (HCC): ICD-10-CM

## 2023-08-22 NOTE — TELEPHONE ENCOUNTER
Mary Borrego calling from Cardiac Rehab at [facility] calling for referral. Diagnosis of CAD   Fax. [phone]

## 2023-08-23 ENCOUNTER — HOSPITAL ENCOUNTER (OUTPATIENT)
Dept: CARDIAC REHAB | Age: 83
Setting detail: THERAPIES SERIES
Discharge: HOME | End: 2023-08-23
Payer: MEDICARE

## 2023-08-23 PROCEDURE — 93798 PHYS/QHP OP CAR RHAB W/ECG: CPT

## 2023-08-25 ENCOUNTER — HOSPITAL ENCOUNTER (OUTPATIENT)
Dept: CARDIAC REHAB | Age: 83
Setting detail: THERAPIES SERIES
Discharge: HOME | End: 2023-08-25
Payer: MEDICARE

## 2023-08-25 PROCEDURE — 93798 PHYS/QHP OP CAR RHAB W/ECG: CPT

## 2023-08-28 ENCOUNTER — HOSPITAL ENCOUNTER (OUTPATIENT)
Dept: CARDIAC REHAB | Age: 83
Setting detail: THERAPIES SERIES
Discharge: HOME | End: 2023-08-28
Payer: MEDICARE

## 2023-08-28 PROCEDURE — 93798 PHYS/QHP OP CAR RHAB W/ECG: CPT

## 2023-08-30 ENCOUNTER — HOSPITAL ENCOUNTER (OUTPATIENT)
Dept: CARDIAC REHAB | Age: 83
Setting detail: THERAPIES SERIES
Discharge: HOME | End: 2023-08-30
Payer: MEDICARE

## 2023-08-30 PROCEDURE — 93798 PHYS/QHP OP CAR RHAB W/ECG: CPT

## 2023-09-01 ENCOUNTER — HOSPITAL ENCOUNTER (OUTPATIENT)
Dept: CARDIAC REHAB | Age: 83
Setting detail: THERAPIES SERIES
Discharge: HOME | End: 2023-09-01
Payer: MEDICARE

## 2023-09-01 PROCEDURE — 93798 PHYS/QHP OP CAR RHAB W/ECG: CPT

## 2023-09-04 ENCOUNTER — HOSPITAL ENCOUNTER (OUTPATIENT)
Dept: CARDIAC REHAB | Age: 83
Setting detail: THERAPIES SERIES
End: 2023-09-04
Payer: MEDICARE

## 2023-09-06 ENCOUNTER — APPOINTMENT (OUTPATIENT)
Dept: CARDIAC REHAB | Age: 83
End: 2023-09-06
Payer: MEDICARE

## 2023-09-06 ENCOUNTER — HOSPITAL ENCOUNTER (OUTPATIENT)
Dept: CARDIAC REHAB | Age: 83
Setting detail: THERAPIES SERIES
Discharge: HOME | End: 2023-09-06
Payer: MEDICARE

## 2023-09-06 PROCEDURE — 93798 PHYS/QHP OP CAR RHAB W/ECG: CPT

## 2023-09-08 ENCOUNTER — HOSPITAL ENCOUNTER (OUTPATIENT)
Dept: CARDIAC REHAB | Age: 83
Setting detail: THERAPIES SERIES
Discharge: HOME | End: 2023-09-08
Payer: MEDICARE

## 2023-09-08 PROCEDURE — 93798 PHYS/QHP OP CAR RHAB W/ECG: CPT

## 2023-09-11 ENCOUNTER — HOSPITAL ENCOUNTER (OUTPATIENT)
Dept: CARDIAC REHAB | Age: 83
Setting detail: THERAPIES SERIES
Discharge: HOME | End: 2023-09-11
Payer: MEDICARE

## 2023-09-11 PROCEDURE — 93798 PHYS/QHP OP CAR RHAB W/ECG: CPT

## 2023-09-13 ENCOUNTER — HOSPITAL ENCOUNTER (OUTPATIENT)
Dept: CARDIAC REHAB | Age: 83
Setting detail: THERAPIES SERIES
Discharge: HOME | End: 2023-09-13
Payer: MEDICARE

## 2023-09-13 PROCEDURE — 93798 PHYS/QHP OP CAR RHAB W/ECG: CPT

## 2023-09-15 ENCOUNTER — OFFICE VISIT (OUTPATIENT)
Dept: PRIMARY CARE CLINIC | Age: 83
End: 2023-09-15

## 2023-09-15 ENCOUNTER — HOSPITAL ENCOUNTER (OUTPATIENT)
Dept: CARDIAC REHAB | Age: 83
Setting detail: THERAPIES SERIES
Discharge: HOME | End: 2023-09-15
Payer: MEDICARE

## 2023-09-15 VITALS
HEIGHT: 69 IN | TEMPERATURE: 98.4 F | HEART RATE: 74 BPM | DIASTOLIC BLOOD PRESSURE: 78 MMHG | SYSTOLIC BLOOD PRESSURE: 128 MMHG | WEIGHT: 176 LBS | BODY MASS INDEX: 26.07 KG/M2 | OXYGEN SATURATION: 96 %

## 2023-09-15 DIAGNOSIS — M15.9 PRIMARY OSTEOARTHRITIS INVOLVING MULTIPLE JOINTS: ICD-10-CM

## 2023-09-15 DIAGNOSIS — K40.90 LEFT INGUINAL HERNIA: Primary | ICD-10-CM

## 2023-09-15 DIAGNOSIS — I25.10 CORONARY ARTERY DISEASE INVOLVING NATIVE CORONARY ARTERY OF NATIVE HEART WITHOUT ANGINA PECTORIS: ICD-10-CM

## 2023-09-15 DIAGNOSIS — I10 ESSENTIAL HYPERTENSION: Chronic | ICD-10-CM

## 2023-09-15 PROCEDURE — 93798 PHYS/QHP OP CAR RHAB W/ECG: CPT

## 2023-09-15 SDOH — ECONOMIC STABILITY: HOUSING INSECURITY
IN THE LAST 12 MONTHS, WAS THERE A TIME WHEN YOU DID NOT HAVE A STEADY PLACE TO SLEEP OR SLEPT IN A SHELTER (INCLUDING NOW)?: NO

## 2023-09-15 SDOH — ECONOMIC STABILITY: INCOME INSECURITY: HOW HARD IS IT FOR YOU TO PAY FOR THE VERY BASICS LIKE FOOD, HOUSING, MEDICAL CARE, AND HEATING?: NOT HARD AT ALL

## 2023-09-15 SDOH — ECONOMIC STABILITY: FOOD INSECURITY: WITHIN THE PAST 12 MONTHS, YOU WORRIED THAT YOUR FOOD WOULD RUN OUT BEFORE YOU GOT MONEY TO BUY MORE.: NEVER TRUE

## 2023-09-15 SDOH — ECONOMIC STABILITY: FOOD INSECURITY: WITHIN THE PAST 12 MONTHS, THE FOOD YOU BOUGHT JUST DIDN'T LAST AND YOU DIDN'T HAVE MONEY TO GET MORE.: NEVER TRUE

## 2023-09-15 ASSESSMENT — ENCOUNTER SYMPTOMS
EYES NEGATIVE: 1
ALLERGIC/IMMUNOLOGIC NEGATIVE: 1
RESPIRATORY NEGATIVE: 1

## 2023-09-15 NOTE — PROGRESS NOTES
9/15/23  Name: Sarah Rivera    : 1940    Sex: male    Age: 82 y.o. Subjective:  Chief Complaint: Patient is here for abd pain  and bullge      cad  arth  bp    Several months   bu sl bigger    more with bend and  thinks      goes dwon when he lies dwon  Wors last three weeks   after     caryring    wood  He ruest [facility]  gs          Review of Systems   Constitutional: Negative. HENT: Negative. Eyes: Negative. Respiratory: Negative. Cardiovascular: Negative. Gastrointestinal:         Hpi   Endocrine: Negative. Genitourinary: Negative. Musculoskeletal: Negative. Skin: Negative. Allergic/Immunologic: Negative. Neurological: Negative. Hematological: Negative. Psychiatric/Behavioral: Negative.            Current Outpatient Medications:     metoprolol succinate (TOPROL XL) 25 MG extended release tablet, Take 0.5 tablets by mouth daily, Disp: 45 tablet, Rfl: 5    vitamin D3 (CHOLECALCIFEROL) 25 MCG (1000 UT) TABS tablet, Take 1 tablet by mouth 5 times a week., Disp: , Rfl:     vitamin B-12 (CYANOCOBALAMIN) 100 MCG tablet, Take 1 tablet by mouth in the morning and 1 tablet in the evening., Disp: , Rfl:     Biotin 2500 MCG CAPS, Take 1 tablet by mouth daily, Disp: , Rfl:     ferrous sulfate 27 MG TABS, Take 1 tablet by mouth daily, Disp: , Rfl:     apixaban (ELIQUIS) 5 MG TABS tablet, Take by mouth 2 times daily, Disp: , Rfl:     atorvastatin (LIPITOR) 40 MG tablet, Take 1 tablet by mouth daily, Disp: 90 tablet, Rfl: 3    tamsulosin (FLOMAX) 0.4 MG capsule, Take 2 capsules by mouth nightly, Disp: 180 capsule, Rfl: 3    buPROPion (WELLBUTRIN SR) 100 MG extended release tablet, Take 1 tablet by mouth daily, Disp: , Rfl:     sildenafil (VIAGRA) 100 MG tablet, Take 1 tablet by mouth as needed for Erectile Dysfunction, Disp: 50 tablet, Rfl: 12    Coenzyme Q10 (COQ10) 100 MG CAPS, Take 100 mg by mouth daily, Disp: , Rfl:     latanoprost (XALATAN) 0.005 % ophthalmic

## 2023-09-18 ENCOUNTER — HOSPITAL ENCOUNTER (OUTPATIENT)
Dept: CARDIAC REHAB | Age: 83
Setting detail: THERAPIES SERIES
Discharge: HOME | End: 2023-09-18
Payer: MEDICARE

## 2023-09-18 PROCEDURE — 93798 PHYS/QHP OP CAR RHAB W/ECG: CPT

## 2023-09-19 ASSESSMENT — PATIENT HEALTH QUESTIONNAIRE - PHQ9
4. FEELING TIRED OR HAVING LITTLE ENERGY: 0
SUM OF ALL RESPONSES TO PHQ9 QUESTIONS 1 & 2: 0
6. FEELING BAD ABOUT YOURSELF - OR THAT YOU ARE A FAILURE OR HAVE LET YOURSELF OR YOUR FAMILY DOWN: 0
SUM OF ALL RESPONSES TO PHQ QUESTIONS 1-9: 0
8. MOVING OR SPEAKING SO SLOWLY THAT OTHER PEOPLE COULD HAVE NOTICED. OR THE OPPOSITE, BEING SO FIGETY OR RESTLESS THAT YOU HAVE BEEN MOVING AROUND A LOT MORE THAN USUAL: 0
SUM OF ALL RESPONSES TO PHQ QUESTIONS 1-9: 0
7. TROUBLE CONCENTRATING ON THINGS, SUCH AS READING THE NEWSPAPER OR WATCHING TELEVISION: 0
9. THOUGHTS THAT YOU WOULD BE BETTER OFF DEAD, OR OF HURTING YOURSELF: 0
SUM OF ALL RESPONSES TO PHQ QUESTIONS 1-9: 0
1. LITTLE INTEREST OR PLEASURE IN DOING THINGS: 0
5. POOR APPETITE OR OVEREATING: 0
3. TROUBLE FALLING OR STAYING ASLEEP: 0
SUM OF ALL RESPONSES TO PHQ QUESTIONS 1-9: 0
10. IF YOU CHECKED OFF ANY PROBLEMS, HOW DIFFICULT HAVE THESE PROBLEMS MADE IT FOR YOU TO DO YOUR WORK, TAKE CARE OF THINGS AT HOME, OR GET ALONG WITH OTHER PEOPLE: 0
2. FEELING DOWN, DEPRESSED OR HOPELESS: 0

## 2023-09-19 ASSESSMENT — LIFESTYLE VARIABLES
ALCOHOL_USE: SPECIAL
SMOKELESS_TOBACCO: NO

## 2023-09-19 ASSESSMENT — EXERCISE STRESS TEST: PEAK_BP: 150/78

## 2023-09-19 ASSESSMENT — EJECTION FRACTION: EF_VALUE: 55

## 2023-09-19 NOTE — PROGRESS NOTES
09/19/23 1043   Treatment Diagnosis   Treatment Diagnosis 1 KATYA   Valve Date 03/31/23   Referral Date 04/17/23   Significant Cardiovascular History Chronic atrial fibrillation   Co-morbidities Malignancy  (PROSTRATE CA)   Oxygen Intervention   Oxygen Use No   O2 Sat Greater Than 90% Yes   Individual Treatment Plan   ITP Visit Type Re-assessment   1st Date of Exercise  05/26/23   ITP Next Review Date 10/13/23   Visit #/Total Visits 17/36   EF% 55 %   Risk Stratification Low   ITP Exercise;Psychosocial;Tobacco;Nutrition;Education   Exercise    Stages of Change Action  (last session on 6/12. pt send home that day d/t AFib RVR during treadmill. cardiologist contact and pt told to put exercise on hold until seen by electrophsyiologist. pt scheduled for ablation end of Aug. Will call to return once cleared from EP)   Assisted Devices None   Test Six minute walk test  (will be reassessed at the end of the program)   Data Measured Immediately After Walk   RPE 10   Exercise Prescription   Mode Treadmill;Bike;Stepper;Ergometer   Frequency per week 3   Duration Per Session 31-60  (Pt currently at 37 minutes of exercise)   Intensity METS       2-5  (Pt currently at 2.7-2.8 METS)   RPE 7-10   Progression work to 60 mins at 3-5 METS and 11-14 RPE as with cardiac conditions. Symptoms with Exercise Other (comment)  (none)   Target Heart Rate 106-136   Resistance Training Yes   Exercise Blood Pressures   Resting /60  (9/18/2023)   Peak /78   Is BP WDL No   Exercise Activity at Home   Type none at this time   Exercise Education   Education Exercise safety;RPE scale;Signs/symptoms to report;Equipment orientation; Warm up/cool down;Physically active   Exercise Target Goal   Target Goal(s) Individual exercise RX; Aerobic activity 30 + minutes/day  5 days/week   Patient Stated Exercise Goals Pt wants to build up his endurance in order to have more engery throughout the day.    Psychosocial   Stages of Change Action   PHQ-9

## 2023-09-23 RX ORDER — TAMSULOSIN HYDROCHLORIDE 0.4 MG/1
0.8 CAPSULE ORAL NIGHTLY
Qty: 180 CAPSULE | Refills: 3 | Status: SHIPPED | OUTPATIENT
Start: 2023-09-23

## 2023-10-10 ENCOUNTER — HOSPITAL ENCOUNTER (OUTPATIENT)
Age: 83
Discharge: HOME | End: 2023-10-10
Payer: MEDICARE

## 2023-10-10 LAB
BASOPHILS # BLD: 0.04 K/UL (ref 0–0.2)
BASOPHILS NFR BLD: 1 % (ref 0–2)
EOSINOPHIL # BLD: 0.22 K/UL (ref 0.05–0.5)
EOSINOPHILS RELATIVE PERCENT: 3 % (ref 0–6)
ERYTHROCYTE [DISTWIDTH] IN BLOOD BY AUTOMATED COUNT: 12.7 % (ref 11.5–15)
HCT VFR BLD AUTO: 37.6 % (ref 37–54)
HGB BLD-MCNC: 12.8 G/DL (ref 12.5–16.5)
IMM GRANULOCYTES # BLD AUTO: <0.03 K/UL (ref 0–0.58)
IMM GRANULOCYTES NFR BLD: 0 % (ref 0–5)
IMM RETICS NFR: 13.6 % (ref 2.3–13.4)
LYMPHOCYTES NFR BLD: 1.14 K/UL (ref 1.5–4)
LYMPHOCYTES RELATIVE PERCENT: 15 % (ref 20–42)
MCH RBC QN AUTO: 34 PG (ref 26–35)
MCHC RBC AUTO-ENTMCNC: 34 G/DL (ref 32–34.5)
MCV RBC AUTO: 99.7 FL (ref 80–99.9)
MONOCYTES NFR BLD: 0.45 K/UL (ref 0.1–0.95)
MONOCYTES NFR BLD: 6 % (ref 2–12)
NEUTROPHILS NFR BLD: 75 % (ref 43–80)
NEUTS SEG NFR BLD: 5.54 K/UL (ref 1.8–7.3)
PLATELET, FLUORESCENCE: 121 K/UL (ref 130–450)
PMV BLD AUTO: 9.6 FL (ref 7–12)
RBC # BLD AUTO: 3.77 M/UL (ref 3.8–5.8)
RETIC HEMOGLOBIN: 39.1 PG (ref 28.2–36.6)
RETICS # AUTO: 0.08 M/UL
RETICS/RBC NFR AUTO: 2.2 % (ref 0.4–1.9)
WBC OTHER # BLD: 7.4 K/UL (ref 4.5–11.5)

## 2023-10-10 PROCEDURE — 36415 COLL VENOUS BLD VENIPUNCTURE: CPT

## 2023-10-10 PROCEDURE — 85045 AUTOMATED RETICULOCYTE COUNT: CPT

## 2023-10-10 PROCEDURE — 85025 COMPLETE CBC W/AUTO DIFF WBC: CPT

## 2023-10-18 ENCOUNTER — TELEPHONE (OUTPATIENT)
Dept: CARDIAC REHAB | Age: 83
End: 2023-10-18

## 2023-10-18 ASSESSMENT — LIFESTYLE VARIABLES
ALCOHOL_USE: SPECIAL
SMOKELESS_TOBACCO: NO

## 2023-10-18 ASSESSMENT — EXERCISE STRESS TEST: PEAK_BP: 150/78

## 2023-10-18 ASSESSMENT — EJECTION FRACTION: EF_VALUE: 55

## 2023-10-18 NOTE — TELEPHONE ENCOUNTER
Spoke with Pt, he stated he is having inguinal hernia surgery at [facility] on 10/24/2023. Wants placed on hold until released to return.

## 2023-11-17 ASSESSMENT — LIFESTYLE VARIABLES
ALCOHOL_USE: SPECIAL
SMOKELESS_TOBACCO: NO

## 2023-11-17 ASSESSMENT — EXERCISE STRESS TEST: PEAK_BP: 150/78

## 2023-11-17 ASSESSMENT — EJECTION FRACTION: EF_VALUE: 55

## 2023-11-20 ENCOUNTER — HOSPITAL ENCOUNTER (OUTPATIENT)
Dept: CARDIAC REHAB | Age: 83
Setting detail: THERAPIES SERIES
Discharge: HOME | End: 2023-11-20
Payer: MEDICARE

## 2023-11-20 PROCEDURE — 93798 PHYS/QHP OP CAR RHAB W/ECG: CPT

## 2023-11-22 ENCOUNTER — HOSPITAL ENCOUNTER (OUTPATIENT)
Dept: CARDIAC REHAB | Age: 83
Setting detail: THERAPIES SERIES
Discharge: HOME | End: 2023-11-22
Payer: MEDICARE

## 2023-11-22 PROCEDURE — 93798 PHYS/QHP OP CAR RHAB W/ECG: CPT

## 2023-11-27 ENCOUNTER — HOSPITAL ENCOUNTER (OUTPATIENT)
Dept: CARDIAC REHAB | Age: 83
Setting detail: THERAPIES SERIES
Discharge: HOME | End: 2023-11-27
Payer: MEDICARE

## 2023-11-27 PROCEDURE — 93798 PHYS/QHP OP CAR RHAB W/ECG: CPT

## 2023-11-29 ENCOUNTER — HOSPITAL ENCOUNTER (OUTPATIENT)
Dept: CARDIAC REHAB | Age: 83
Setting detail: THERAPIES SERIES
Discharge: HOME | End: 2023-11-29
Payer: MEDICARE

## 2023-11-29 PROCEDURE — 93798 PHYS/QHP OP CAR RHAB W/ECG: CPT

## 2023-12-01 ENCOUNTER — HOSPITAL ENCOUNTER (OUTPATIENT)
Dept: CARDIAC REHAB | Age: 83
Setting detail: THERAPIES SERIES
Discharge: HOME | End: 2023-12-01
Payer: MEDICARE

## 2023-12-01 ENCOUNTER — TELEPHONE (OUTPATIENT)
Dept: PRIMARY CARE CLINIC | Age: 83
End: 2023-12-01

## 2023-12-01 DIAGNOSIS — E78.2 MIXED HYPERLIPIDEMIA: Chronic | ICD-10-CM

## 2023-12-01 PROCEDURE — 93798 PHYS/QHP OP CAR RHAB W/ECG: CPT

## 2023-12-01 NOTE — TELEPHONE ENCOUNTER
----- Message from Derrick Guan sent at 11/29/2023  1:32 PM EST -----  Subject: Refill Request    QUESTIONS  Name of Medication? atorvastatin (LIPITOR) 40 MG tablet  Patient-reported dosage and instructions? one a day   How many days do you have left? 0  Preferred Pharmacy? [facility] phone number (if available)? [phone]  ---------------------------------------------------------------------------  --------------  CALL BACK INFO  What is the best way for the office to contact you? OK to leave message on   voicemail  Preferred Call Back Phone Number? [phone]  ---------------------------------------------------------------------------  --------------  SCRIPT ANSWERS  Relationship to Patient? Covered Entity  Covered Entity Type? Pharmacy? Representative Name?  [employer]

## 2023-12-02 RX ORDER — ATORVASTATIN CALCIUM 40 MG/1
40 TABLET, FILM COATED ORAL DAILY
Qty: 90 TABLET | Refills: 3 | Status: SHIPPED | OUTPATIENT
Start: 2023-12-02

## 2023-12-04 ENCOUNTER — HOSPITAL ENCOUNTER (OUTPATIENT)
Dept: CARDIAC REHAB | Age: 83
Setting detail: THERAPIES SERIES
Discharge: HOME | End: 2023-12-04
Payer: MEDICARE

## 2023-12-04 PROCEDURE — 93798 PHYS/QHP OP CAR RHAB W/ECG: CPT

## 2023-12-05 ENCOUNTER — OFFICE VISIT (OUTPATIENT)
Dept: GERIATRIC MEDICINE | Age: 83
End: 2023-12-05
Payer: MEDICARE

## 2023-12-05 VITALS
DIASTOLIC BLOOD PRESSURE: 82 MMHG | HEART RATE: 70 BPM | BODY MASS INDEX: 26.43 KG/M2 | SYSTOLIC BLOOD PRESSURE: 148 MMHG | WEIGHT: 179 LBS | TEMPERATURE: 97.8 F | RESPIRATION RATE: 14 BRPM

## 2023-12-05 DIAGNOSIS — D69.6 THROMBOCYTOPENIA, UNSPECIFIED (HCC): ICD-10-CM

## 2023-12-05 DIAGNOSIS — G31.84 MCI (MILD COGNITIVE IMPAIRMENT): Primary | ICD-10-CM

## 2023-12-05 PROCEDURE — 1123F ACP DISCUSS/DSCN MKR DOCD: CPT | Performed by: INTERNAL MEDICINE

## 2023-12-05 PROCEDURE — 3074F SYST BP LT 130 MM HG: CPT | Performed by: INTERNAL MEDICINE

## 2023-12-05 PROCEDURE — 99213 OFFICE O/P EST LOW 20 MIN: CPT | Performed by: INTERNAL MEDICINE

## 2023-12-05 PROCEDURE — 3078F DIAST BP <80 MM HG: CPT | Performed by: INTERNAL MEDICINE

## 2023-12-05 RX ORDER — DOFETILIDE 0.12 MG/1
125 CAPSULE ORAL 2 TIMES DAILY
COMMUNITY

## 2023-12-05 RX ORDER — BRIMONIDINE TARTRATE AND TIMOLOL MALEATE 2; 5 MG/ML; MG/ML
1 SOLUTION OPHTHALMIC EVERY 12 HOURS
COMMUNITY

## 2023-12-05 NOTE — PROGRESS NOTES
Chief Complaint   Patient presents with    Follow-up     Second visit last seen in May. Patient is here with his wife.      Blood Pressure Check     /82 repeat 148/82       Dr Raquel Ojeda notified of the above

## 2023-12-05 NOTE — PROGRESS NOTES
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

## 2023-12-06 ENCOUNTER — HOSPITAL ENCOUNTER (OUTPATIENT)
Dept: CARDIAC REHAB | Age: 83
Setting detail: THERAPIES SERIES
Discharge: HOME | End: 2023-12-06
Payer: MEDICARE

## 2023-12-06 PROCEDURE — 93798 PHYS/QHP OP CAR RHAB W/ECG: CPT

## 2023-12-08 ENCOUNTER — HOSPITAL ENCOUNTER (OUTPATIENT)
Dept: CARDIAC REHAB | Age: 83
Setting detail: THERAPIES SERIES
Discharge: HOME | End: 2023-12-08
Payer: MEDICARE

## 2023-12-08 PROCEDURE — 93798 PHYS/QHP OP CAR RHAB W/ECG: CPT

## 2023-12-11 ENCOUNTER — HOSPITAL ENCOUNTER (OUTPATIENT)
Dept: CARDIAC REHAB | Age: 83
Setting detail: THERAPIES SERIES
Discharge: HOME | End: 2023-12-11
Payer: MEDICARE

## 2023-12-11 PROCEDURE — 93798 PHYS/QHP OP CAR RHAB W/ECG: CPT

## 2023-12-13 ENCOUNTER — HOSPITAL ENCOUNTER (OUTPATIENT)
Dept: CARDIAC REHAB | Age: 83
Setting detail: THERAPIES SERIES
Discharge: HOME | End: 2023-12-13
Payer: MEDICARE

## 2023-12-13 PROBLEM — D69.6 THROMBOCYTOPENIA, UNSPECIFIED (HCC): Status: ACTIVE | Noted: 2023-12-13

## 2023-12-13 PROCEDURE — 93798 PHYS/QHP OP CAR RHAB W/ECG: CPT

## 2023-12-14 ENCOUNTER — HOSPITAL ENCOUNTER (OUTPATIENT)
Age: 83
Discharge: HOME | End: 2023-12-14
Payer: MEDICARE

## 2023-12-14 DIAGNOSIS — E78.2 MIXED HYPERLIPIDEMIA: Chronic | ICD-10-CM

## 2023-12-14 DIAGNOSIS — M81.0 AGE-RELATED OSTEOPOROSIS WITHOUT CURRENT PATHOLOGICAL FRACTURE: ICD-10-CM

## 2023-12-14 DIAGNOSIS — I10 ESSENTIAL HYPERTENSION: Chronic | ICD-10-CM

## 2023-12-14 DIAGNOSIS — C61 CANCER OF PROSTATE (HCC): Chronic | ICD-10-CM

## 2023-12-14 LAB
ALBUMIN SERPL-MCNC: 4.2 G/DL (ref 3.5–5.2)
ALP SERPL-CCNC: 107 U/L (ref 40–129)
ALT SERPL-CCNC: 22 U/L (ref 0–40)
ANION GAP SERPL CALCULATED.3IONS-SCNC: 14 MMOL/L (ref 7–16)
AST SERPL-CCNC: 22 U/L (ref 0–39)
BASOPHILS # BLD: 0.04 K/UL (ref 0–0.2)
BASOPHILS NFR BLD: 1 % (ref 0–2)
BILIRUB SERPL-MCNC: 0.8 MG/DL (ref 0–1.2)
BILIRUB UR QL STRIP: NEGATIVE
BUN SERPL-MCNC: 27 MG/DL (ref 6–23)
CALCIUM SERPL-MCNC: 9.3 MG/DL (ref 8.6–10.2)
CHLORIDE SERPL-SCNC: 108 MMOL/L (ref 98–107)
CHOLEST SERPL-MCNC: 114 MG/DL
CLARITY UR: CLEAR
CO2 SERPL-SCNC: 21 MMOL/L (ref 22–29)
COLOR UR: YELLOW
CREAT SERPL-MCNC: 0.8 MG/DL (ref 0.7–1.2)
EOSINOPHIL # BLD: 0.23 K/UL (ref 0.05–0.5)
EOSINOPHILS RELATIVE PERCENT: 4 % (ref 0–6)
ERYTHROCYTE [DISTWIDTH] IN BLOOD BY AUTOMATED COUNT: 12.5 % (ref 11.5–15)
GFR SERPL CREATININE-BSD FRML MDRD: >60 ML/MIN/1.73M2
GLUCOSE SERPL-MCNC: 98 MG/DL (ref 74–99)
GLUCOSE UR STRIP-MCNC: NEGATIVE MG/DL
HCT VFR BLD AUTO: 39.6 % (ref 37–54)
HDLC SERPL-MCNC: 49 MG/DL
HGB BLD-MCNC: 13.5 G/DL (ref 12.5–16.5)
HGB UR QL STRIP.AUTO: ABNORMAL
IMM GRANULOCYTES # BLD AUTO: <0.03 K/UL (ref 0–0.58)
IMM GRANULOCYTES NFR BLD: 0 % (ref 0–5)
KETONES UR STRIP-MCNC: NEGATIVE MG/DL
LDLC SERPL CALC-MCNC: 54 MG/DL
LEUKOCYTE ESTERASE UR QL STRIP: NEGATIVE
LYMPHOCYTES NFR BLD: 1.1 K/UL (ref 1.5–4)
LYMPHOCYTES RELATIVE PERCENT: 17 % (ref 20–42)
MCH RBC QN AUTO: 33.7 PG (ref 26–35)
MCHC RBC AUTO-ENTMCNC: 34.1 G/DL (ref 32–34.5)
MCV RBC AUTO: 98.8 FL (ref 80–99.9)
MONOCYTES NFR BLD: 0.35 K/UL (ref 0.1–0.95)
MONOCYTES NFR BLD: 6 % (ref 2–12)
NEUTROPHILS NFR BLD: 73 % (ref 43–80)
NEUTS SEG NFR BLD: 4.68 K/UL (ref 1.8–7.3)
NITRITE UR QL STRIP: NEGATIVE
PH UR STRIP: 6 [PH] (ref 5–9)
PLATELET, FLUORESCENCE: 111 K/UL (ref 130–450)
PMV BLD AUTO: 10 FL (ref 7–12)
POTASSIUM SERPL-SCNC: 4.3 MMOL/L (ref 3.5–5)
PROT SERPL-MCNC: 6.2 G/DL (ref 6.4–8.3)
PROT UR STRIP-MCNC: NEGATIVE MG/DL
PSA SERPL-MCNC: 0.19 NG/ML (ref 0–4)
RBC # BLD AUTO: 4.01 M/UL (ref 3.8–5.8)
RBC #/AREA URNS HPF: NORMAL /HPF
SODIUM SERPL-SCNC: 143 MMOL/L (ref 132–146)
SP GR UR STRIP: 1.02 (ref 1–1.03)
TRIGL SERPL-MCNC: 54 MG/DL
UROBILINOGEN UR STRIP-ACNC: 0.2 EU/DL (ref 0–1)
VLDLC SERPL CALC-MCNC: 11 MG/DL
WBC #/AREA URNS HPF: NORMAL /HPF
WBC OTHER # BLD: 6.4 K/UL (ref 4.5–11.5)

## 2023-12-14 PROCEDURE — 80061 LIPID PANEL: CPT

## 2023-12-14 PROCEDURE — 81001 URINALYSIS AUTO W/SCOPE: CPT

## 2023-12-14 PROCEDURE — 82306 VITAMIN D 25 HYDROXY: CPT

## 2023-12-14 PROCEDURE — 80053 COMPREHEN METABOLIC PANEL: CPT

## 2023-12-14 PROCEDURE — 84153 ASSAY OF PSA TOTAL: CPT

## 2023-12-14 PROCEDURE — 85025 COMPLETE CBC W/AUTO DIFF WBC: CPT

## 2023-12-14 PROCEDURE — 36415 COLL VENOUS BLD VENIPUNCTURE: CPT

## 2023-12-15 ENCOUNTER — HOSPITAL ENCOUNTER (OUTPATIENT)
Dept: CARDIAC REHAB | Age: 83
Setting detail: THERAPIES SERIES
Discharge: HOME | End: 2023-12-15
Payer: MEDICARE

## 2023-12-15 LAB — 25(OH)D3 SERPL-MCNC: 77.3 NG/ML (ref 30–100)

## 2023-12-15 PROCEDURE — 93798 PHYS/QHP OP CAR RHAB W/ECG: CPT

## 2023-12-27 ENCOUNTER — HOSPITAL ENCOUNTER (OUTPATIENT)
Dept: CARDIAC REHAB | Age: 83
Setting detail: THERAPIES SERIES
Discharge: HOME | End: 2023-12-27
Payer: MEDICARE

## 2023-12-27 PROCEDURE — 93798 PHYS/QHP OP CAR RHAB W/ECG: CPT

## 2023-12-29 ENCOUNTER — HOSPITAL ENCOUNTER (OUTPATIENT)
Dept: CARDIAC REHAB | Age: 83
Setting detail: THERAPIES SERIES
Discharge: HOME | End: 2023-12-29
Payer: MEDICARE

## 2023-12-29 PROCEDURE — 93798 PHYS/QHP OP CAR RHAB W/ECG: CPT

## 2024-01-03 ENCOUNTER — HOSPITAL ENCOUNTER (OUTPATIENT)
Dept: CARDIAC REHAB | Age: 84
Setting detail: THERAPIES SERIES
Discharge: HOME | End: 2024-01-03
Payer: MEDICARE

## 2024-01-03 PROCEDURE — 93798 PHYS/QHP OP CAR RHAB W/ECG: CPT

## 2024-01-05 ENCOUNTER — HOSPITAL ENCOUNTER (OUTPATIENT)
Dept: CARDIAC REHAB | Age: 84
Setting detail: THERAPIES SERIES
Discharge: HOME | End: 2024-01-05
Payer: MEDICARE

## 2024-01-05 PROCEDURE — 93798 PHYS/QHP OP CAR RHAB W/ECG: CPT

## 2024-01-08 ENCOUNTER — HOSPITAL ENCOUNTER (OUTPATIENT)
Dept: CARDIAC REHAB | Age: 84
Setting detail: THERAPIES SERIES
Discharge: HOME | End: 2024-01-08
Payer: MEDICARE

## 2024-01-08 PROCEDURE — 93798 PHYS/QHP OP CAR RHAB W/ECG: CPT

## 2024-01-08 ASSESSMENT — PATIENT HEALTH QUESTIONNAIRE - PHQ9
SUM OF ALL RESPONSES TO PHQ QUESTIONS 1-9: 1
SUM OF ALL RESPONSES TO PHQ QUESTIONS 1-9: 1
9. THOUGHTS THAT YOU WOULD BE BETTER OFF DEAD, OR OF HURTING YOURSELF: 0
6. FEELING BAD ABOUT YOURSELF - OR THAT YOU ARE A FAILURE OR HAVE LET YOURSELF OR YOUR FAMILY DOWN: 0
10. IF YOU CHECKED OFF ANY PROBLEMS, HOW DIFFICULT HAVE THESE PROBLEMS MADE IT FOR YOU TO DO YOUR WORK, TAKE CARE OF THINGS AT HOME, OR GET ALONG WITH OTHER PEOPLE: 0
1. LITTLE INTEREST OR PLEASURE IN DOING THINGS: 0
7. TROUBLE CONCENTRATING ON THINGS, SUCH AS READING THE NEWSPAPER OR WATCHING TELEVISION: 0
SUM OF ALL RESPONSES TO PHQ9 QUESTIONS 1 & 2: 0
5. POOR APPETITE OR OVEREATING: 0
4. FEELING TIRED OR HAVING LITTLE ENERGY: 1
SUM OF ALL RESPONSES TO PHQ QUESTIONS 1-9: 1
3. TROUBLE FALLING OR STAYING ASLEEP: 0
2. FEELING DOWN, DEPRESSED OR HOPELESS: 0
8. MOVING OR SPEAKING SO SLOWLY THAT OTHER PEOPLE COULD HAVE NOTICED. OR THE OPPOSITE, BEING SO FIGETY OR RESTLESS THAT YOU HAVE BEEN MOVING AROUND A LOT MORE THAN USUAL: 0
SUM OF ALL RESPONSES TO PHQ QUESTIONS 1-9: 1

## 2024-01-08 ASSESSMENT — LIFESTYLE VARIABLES
ALCOHOL_USE: SPECIAL
SMOKELESS_TOBACCO: NO

## 2024-01-08 ASSESSMENT — EXERCISE STRESS TEST
PEAK_BP: 160/78
PEAK_BP: 160/78
PEAK_RPE: 11
PEAK_HR: 104

## 2024-01-08 ASSESSMENT — EJECTION FRACTION: EF_VALUE: 55

## 2024-01-08 ASSESSMENT — NEW YORK HEART ASSOCIATION (NYHA) CLASSIFICATION: NYHA FUNCTIONAL CLASS: NO NYHA CLASS OR UNABLE TO DETERMINE

## 2024-01-08 NOTE — PROGRESS NOTES
01/08/24 0918   Treatment Diagnosis   Treatment Diagnosis 1 KATYA   Valve Date 03/31/23   Referral Date 04/17/23   Significant Cardiovascular History Chronic atrial fibrillation   Co-morbidities   (prostrate)   Oxygen Saturation / Titration    Stages of Change  Maintenance   Oxygen Intervention   Oxygen Use No   O2 Sat Greater Than 90% Yes   Individual Treatment Plan   ITP Visit Type Discharge, completed program   1st Date of Exercise  05/26/23   Visit #/Total Visits 36/36   EF% 55 %   Risk Stratification Low   ITP Exercise;Psychosocial;Tobacco;Nutrition;Education   Exercise    Stages of Change Action   Assisted Devices None   Test Six minute walk test  (6MWT completed for final session. pt showed an improvement from inital at 1.9 METs to 4.6 METs)   Data Measured Before Walk   Heart Rate 61   Blood Pressure 134/71   O2 Saturation 98   O2 Device Room air   RPE 7   Data Measured during Walk   Indicate Mode of RPE 6 minutes   RPE Data Measured During   Treadmill Speed 2.7 mph   Treadmill Grade 4 %   Symptoms   (none)   Any problems while exercising none.   Do You Have Shortness of Breath No   Peak RPE 11   NYHA Heart Failure Classification No NYHA class or unable to determine   Data Measured Immediately After Walk   Distance Walked in  mi   Distance Walked (miles) 0.27   Distance Walked in Feet (Calculated) 1425.6 ft   Peak Heart Rate 104   Peak Blood Pressure 160/78   RPE 11   O2 Saturation 97   Data Measured at 5 Minutes After Walk   Heart Rate 71   Blood Pressure 140/76   Comments 6MWT completed for final session. pt showed an improvement from inital at 1.9 METs to 4.6 METs   Exercise Prescription   Mode Treadmill;Bike;Stepper;Ergometer   Frequency per week 3   Duration Per Session 31-60  (pt averaged 31+ mins of cardio per session)   Intensity METS       2-5  (pt avg2.3-4.6 METs per session)   RPE 7-10   Progression work to 60 mins at 3-5 METS and 11-14 RPE as with cardiac conditions.   Symptoms with Exercise

## 2024-02-05 ENCOUNTER — HOSPITAL ENCOUNTER (OUTPATIENT)
Age: 84
Discharge: HOME | End: 2024-02-05
Payer: MEDICARE

## 2024-02-05 LAB
ANION GAP SERPL CALCULATED.3IONS-SCNC: 11 MMOL/L (ref 7–16)
BUN SERPL-MCNC: 23 MG/DL (ref 6–23)
CALCIUM SERPL-MCNC: 8.7 MG/DL (ref 8.6–10.2)
CHLORIDE SERPL-SCNC: 106 MMOL/L (ref 98–107)
CO2 SERPL-SCNC: 23 MMOL/L (ref 22–29)
CREAT SERPL-MCNC: 1 MG/DL (ref 0.7–1.2)
ERYTHROCYTE [DISTWIDTH] IN BLOOD BY AUTOMATED COUNT: 12.6 % (ref 11.5–15)
GFR SERPL CREATININE-BSD FRML MDRD: >60 ML/MIN/1.73M2
GLUCOSE SERPL-MCNC: 113 MG/DL (ref 74–99)
HCT VFR BLD AUTO: 37.3 % (ref 37–54)
HGB BLD-MCNC: 12.5 G/DL (ref 12.5–16.5)
MCH RBC QN AUTO: 33.8 PG (ref 26–35)
MCHC RBC AUTO-ENTMCNC: 33.5 G/DL (ref 32–34.5)
MCV RBC AUTO: 100.8 FL (ref 80–99.9)
PLATELET # BLD AUTO: 102 K/UL (ref 130–450)
PMV BLD AUTO: 10.4 FL (ref 7–12)
POTASSIUM SERPL-SCNC: 4.4 MMOL/L (ref 3.5–5)
RBC # BLD AUTO: 3.7 M/UL (ref 3.8–5.8)
SODIUM SERPL-SCNC: 140 MMOL/L (ref 132–146)
WBC OTHER # BLD: 5.7 K/UL (ref 4.5–11.5)

## 2024-02-05 PROCEDURE — 85027 COMPLETE CBC AUTOMATED: CPT

## 2024-02-05 PROCEDURE — 36415 COLL VENOUS BLD VENIPUNCTURE: CPT

## 2024-02-05 PROCEDURE — 80048 BASIC METABOLIC PNL TOTAL CA: CPT

## 2024-02-22 PROBLEM — I21.3 ST ELEVATION (STEMI) MYOCARDIAL INFARCTION OF UNSPECIFIED SITE (HCC): Status: RESOLVED | Noted: 2023-06-02 | Resolved: 2024-02-22

## 2024-02-22 PROBLEM — I25.2 HISTORY OF ACUTE MYOCARDIAL INFARCTION: Status: ACTIVE | Noted: 2023-06-02

## 2024-02-22 PROBLEM — I21.4 NON-ST ELEVATION MYOCARDIAL INFARCTION (NSTEMI) (HCC): Status: RESOLVED | Noted: 2023-06-02 | Resolved: 2024-02-22

## 2024-03-04 ENCOUNTER — HOSPITAL ENCOUNTER (OUTPATIENT)
Age: 84
Discharge: HOME | End: 2024-03-04
Payer: MEDICARE

## 2024-03-04 LAB
ANION GAP SERPL CALCULATED.3IONS-SCNC: 11 MMOL/L (ref 7–16)
BUN SERPL-MCNC: 27 MG/DL (ref 6–23)
CALCIUM SERPL-MCNC: 9.2 MG/DL (ref 8.6–10.2)
CHLORIDE SERPL-SCNC: 105 MMOL/L (ref 98–107)
CO2 SERPL-SCNC: 25 MMOL/L (ref 22–29)
CREAT SERPL-MCNC: 0.9 MG/DL (ref 0.7–1.2)
GFR SERPL CREATININE-BSD FRML MDRD: >60 ML/MIN/1.73M2
GLUCOSE SERPL-MCNC: 112 MG/DL (ref 74–99)
MAGNESIUM SERPL-MCNC: 2.1 MG/DL (ref 1.6–2.6)
POTASSIUM SERPL-SCNC: 4.2 MMOL/L (ref 3.5–5)
SODIUM SERPL-SCNC: 141 MMOL/L (ref 132–146)

## 2024-03-04 PROCEDURE — 83735 ASSAY OF MAGNESIUM: CPT

## 2024-03-04 PROCEDURE — 80048 BASIC METABOLIC PNL TOTAL CA: CPT

## 2024-03-04 PROCEDURE — 93005 ELECTROCARDIOGRAM TRACING: CPT | Performed by: INTERNAL MEDICINE

## 2024-03-04 PROCEDURE — 36415 COLL VENOUS BLD VENIPUNCTURE: CPT

## 2024-03-05 LAB
EKG ATRIAL RATE: 54 BPM
EKG P AXIS: 32 DEGREES
EKG P-R INTERVAL: 156 MS
EKG Q-T INTERVAL: 476 MS
EKG QRS DURATION: 100 MS
EKG QTC CALCULATION (BAZETT): 451 MS
EKG R AXIS: 48 DEGREES
EKG T AXIS: 69 DEGREES
EKG VENTRICULAR RATE: 54 BPM

## 2024-03-05 PROCEDURE — 93010 ELECTROCARDIOGRAM REPORT: CPT | Performed by: INTERNAL MEDICINE

## 2024-03-15 NOTE — PROGRESS NOTES
Patient Active Problem List   Diagnosis    Irregular heart beat    Hyperlipidemia    Essential hypertension    Absolute anemia    PVC (premature ventricular contraction)    Primary osteoarthritis involving multiple joints    Other male erectile dysfunction    [facility]    Exposure to COVID-19 virus    Diet-controlled diabetes mellitus ([facility]    Bradycardia       Current Outpatient Medications   Medication Sig Dispense Refill    buPROPion (WELLBUTRIN SR) 100 MG extended release tablet Take 100 mg by mouth daily      tamsulosin (FLOMAX) 0.4 MG capsule Take 0.8 mg by mouth nightly      ferrous sulfate (IRON 325) 325 (65 Fe) MG tablet Take 325 mg by mouth daily (with breakfast)      atorvastatin (LIPITOR) 40 MG tablet Take 1 tablet by mouth daily 90 tablet 3    sildenafil (VIAGRA) 100 MG tablet Take 1 tablet by mouth as needed for Erectile Dysfunction 50 tablet 12    Coenzyme Q10 (COQ10) 100 MG CAPS Take 100 mg by mouth daily      latanoprost (XALATAN) 0.005 % ophthalmic solution       Handicap Placard MISC by Does not apply route 1 each 0    Biotin 5000 MCG TABS Take by mouth daily      vitamin B-12 (CYANOCOBALAMIN) 1000 MCG tablet Take 1,000 mcg by mouth daily.  omeprazole (PRILOSEC) 20 MG capsule Take 20 mg by mouth daily.  Multiple Vitamins-Minerals (THERAPEUTIC MULTIVITAMIN-MINERALS) tablet Take 1 tablet by mouth daily.  dapsone 25 MG tablet Take 25 mg by mouth 2 times daily.  venlafaxine (EFFEXOR XR) 75 MG extended release capsule Take 75 mg by mouth daily      nefazodone (SERZONE) 100 MG tablet Take 150 mg by mouth 2 times daily Indications: patient no longer takes  (Patient not taking: Reported on 1/27/2022)      VITAMIN D-3 (COLECALCIFEROL) 400 UNITS TABS Take  by mouth daily. (Patient not taking: Reported on 1/27/2022)       No current facility-administered medications for this visit. CC:    Patient is seen in follow up for:  1.  PVC's (premature ventricular contractions)    2. Bradycardia    3. Aortic valve stenosis, etiology of cardiac valve disease unspecified    4. Essential hypertension    5. Nonrheumatic aortic insufficiency with aortic stenosis        HPI:  Patient is doing well without any specific cardiac problems. Patient denies any shortness of breath, chest pain, lightheadedness or dizziness. Patient is tolerating medications well without side effects. ROS:   General: No unusual weight gain, no change in exercise tolerance  Skin: No rash or itching  EENT: No vision changes or nosebleeds  Cardiovascular: No orthopnea or paroxysmal nocturnal dyspnea  Respiratory: No cough or hemoptysis  Gastrointestinal: No hematemesis or recent changes in bowel habits  Genitourinary: No hematuria, urgency or frequency  Musculoskeletal: No muscular weakness or joint swelling   Neurologic / Psychiatric: No incoordination or convulsions  Allergic / Immunologic/ Lymphatic / Endocrine: No anemia or bleeding tendency    Social History     Socioeconomic History    Marital status:      Spouse name: Not on file    Number of children: Not on file    Years of education: Not on file    Highest education level: Not on file   Occupational History    Not on file   Tobacco Use    Smoking status: Former Smoker     Packs/day: 0.10     Years: 17.00     Pack years: 1.70     Types: Cigarettes, Pipe     Start date: 1960     Quit date: 1977     Years since quittin.1    Smokeless tobacco: Never Used   Vaping Use    Vaping Use: Never used   Substance and Sexual Activity    Alcohol use: Yes     Comment: beer three times weekly    Drug use: No    Sexual activity: Not on file   Other Topics Concern    Not on file   Social History Narrative            Chronic Diagnosis: Depressive disorder, Mixed hyperlipidemia, Anemia, Peptic reflux disease.     HH    GERD    GASTRITIS    DEP---DR SHAHNYNIKKO    LIPID    FE DEF ANEMIA    CHOLELITHIASIS    OPEN GB OR    R Pierre  1940 Page #2    Joseph Parsons 1975 BUT PIPE---    TICS    R EAR TUBE    EGD AND COLON---    TMT -04    DERMATITIS HERPETIFORMIS - RX WITH GLUTEN FREE DIET---DR REESE GRANADO STANISLAV MOVED TO [facility]--HER EX HUS OUT OF intermediate AND LIVES IN    [place]----carol ==grand carol moved here with pt----hs--preg ab---dui---    Saba HENRY LIVES IN [place]  NO KIDS    SON LIVES IN [place]--    EVAL WITH DR Vazquez [address], [address] 10-14 MILD AORTIC STENOSIS    re eval with dr Shelton Garcia -----dr galarza--------------------------------------plan chg to [facility] spring 2022    ELEV PSA ------ca prostate--starts seed  ----[facility]----dr Elizabeth armijo and TMT done  4-21    Dec memory per wife   10-21     Social Determinants of Health     Financial Resource Strain:     Difficulty of Paying Living Expenses: Not on file   Food Insecurity:     Worried About Running Out of Food in the Last Year: Not on file    Jerad of Food in the Last Year: Not on file   Transportation Needs:     Lack of Transportation (Medical): Not on file    Lack of Transportation (Non-Medical):  Not on file   Physical Activity:     Days of Exercise per Week: Not on file    Minutes of Exercise per Session: Not on file   Stress:     Feeling of Stress : Not on file   Social Connections:     Frequency of Communication with Friends and Family: Not on file    Frequency of Social Gatherings with Friends and Family: Not on file    Attends Anglican Services: Not on file    Active Member of Clubs or Organizations: Not on file    Attends Club or Organization Meetings: Not on file    Marital Status: Not on file   Intimate Partner Violence:     Fear of Current or Ex-Partner: Not on file    Emotionally Abused: Not on file    Physically Abused: Not on file    Sexually Abused: Not on file   Housing Stability:     Unable to Pay for Housing in the Last Year: Not on file    Number of Places Lived in the Last Year: Not on file    Unstable Housing in the Last Year: Not on file       Family History   Problem Relation Age of Onset    Stroke Father        Past Medical History:   Diagnosis Date    Anemia     Cholelithiasis     Depressive disorder     Dermatitis herpetiformis     GLUTEN FREE DIET    Diverticulitis     Gastritis     GERD (gastroesophageal reflux disease)     Hiatal hernia     Hyperlipidemia     Iron deficiency anemia     Peptic reflux disease        PHYSICAL EXAM:  CONSTITUTIONAL:  Well developed, well nourished    Vitals:    01/27/22 0846   BP: 122/70   Pulse: 69   Resp: 18   Weight: 189 lb (85.7 kg)   Height: 5' 8\" (1.727 m)     HEAD & FACE: Normocephalic. Symmetric. EYES: No xanthelasma. Conjunctivae not injected. EARS, NOSE, MOUTH & THROAT: Good dentition. No oral pallor or cyanosis. NECK: No JVD at 30 degrees. No thyromegaly. RESPIRATORY: Clear to auscultation and percussion in all fields. No use of accessory muscle or intercostal retractions. CARDIOVASCULAR: Regular rate and rhythm. Occasional ectopy. No lifts or thrills on palpitation. Auscultation with normal S1-S2 in intensity and splitting. Grade 2/6 early to mid peaking systolic ejection murmur at right upper sternal border. No carotid bruits. Abdominal aorta not enlarged. Femoral arteries without bruits. Pedal pulses 2+. No edema. ABDOMEN: Soft without hepatic or splenic enlargement. No tenderness. MUSCULOSKELETAL: No kyphosis or scoliosis of the back. Good muscle strength and tone. No muscle atrophy. Normal gait and ability to undergo exercise stress testing. EXTREMITIES: No clubbing or cyanosis. SKIN: No Xanthomas or ulcerations. NEUROLOGIC: Oriented to time, place and person. Normal mood and affect. LYMPHATIC:  No palpable neck or supraclavicular nodes. No splenomegaly. EKG: the EKG tracing was reviewed and found to reveal: Normal sinus rhythm. Bigeminy. QTc 429 ms. No change compared to prior tracing. ASSESSMENT:                                                     ORDERS:       Diagnosis Orders   1. PVC's (premature ventricular contractions)  EKG 12 lead   2. Bradycardia  EKG 12 lead   3. Aortic valve stenosis, etiology of cardiac valve disease unspecified  EKG 12 lead   4. Essential hypertension  EKG 12 lead   5. Nonrheumatic aortic insufficiency with aortic stenosis  EKG 12 lead     Above assessment cardiac issues stable. PLAN:   See above orders. Medication reconciliation completed. Old records were reviewed and found to reveal: LDL cholesterol 69 on 12/10/2021. Discussed issues that would prompt earlier evaluation. Same cardiac medications. Follow-up office visit in 1 year. Normal rate, regular rhythm.  Heart sounds S1, S2.  No murmurs, rubs or gallops.

## 2024-04-23 ENCOUNTER — OFFICE VISIT (OUTPATIENT)
Dept: PRIMARY CARE CLINIC | Age: 84
End: 2024-04-23
Payer: MEDICARE

## 2024-04-23 VITALS
DIASTOLIC BLOOD PRESSURE: 82 MMHG | SYSTOLIC BLOOD PRESSURE: 136 MMHG | BODY MASS INDEX: 26.83 KG/M2 | TEMPERATURE: 98.3 F | HEIGHT: 68 IN | WEIGHT: 177 LBS

## 2024-04-23 DIAGNOSIS — S50.11XA CONTUSION OF RIGHT LOWER ARM, INITIAL ENCOUNTER: ICD-10-CM

## 2024-04-23 DIAGNOSIS — L03.115 CELLULITIS OF RIGHT LOWER EXTREMITY: ICD-10-CM

## 2024-04-23 DIAGNOSIS — Z00.00 MEDICARE ANNUAL WELLNESS VISIT, SUBSEQUENT: Primary | ICD-10-CM

## 2024-04-23 PROCEDURE — 1123F ACP DISCUSS/DSCN MKR DOCD: CPT | Performed by: FAMILY MEDICINE

## 2024-04-23 PROCEDURE — 3079F DIAST BP 80-89 MM HG: CPT | Performed by: FAMILY MEDICINE

## 2024-04-23 PROCEDURE — 3075F SYST BP GE 130 - 139MM HG: CPT | Performed by: FAMILY MEDICINE

## 2024-04-23 PROCEDURE — G0439 PPPS, SUBSEQ VISIT: HCPCS | Performed by: FAMILY MEDICINE

## 2024-04-23 RX ORDER — CEPHALEXIN 500 MG/1
500 CAPSULE ORAL 3 TIMES DAILY
Qty: 21 CAPSULE | Refills: 1 | Status: SHIPPED | OUTPATIENT
Start: 2024-04-23

## 2024-04-23 SDOH — ECONOMIC STABILITY: FOOD INSECURITY: WITHIN THE PAST 12 MONTHS, THE FOOD YOU BOUGHT JUST DIDN'T LAST AND YOU DIDN'T HAVE MONEY TO GET MORE.: NEVER TRUE

## 2024-04-23 SDOH — ECONOMIC STABILITY: FOOD INSECURITY: WITHIN THE PAST 12 MONTHS, YOU WORRIED THAT YOUR FOOD WOULD RUN OUT BEFORE YOU GOT MONEY TO BUY MORE.: NEVER TRUE

## 2024-04-23 SDOH — ECONOMIC STABILITY: INCOME INSECURITY: HOW HARD IS IT FOR YOU TO PAY FOR THE VERY BASICS LIKE FOOD, HOUSING, MEDICAL CARE, AND HEATING?: NOT HARD AT ALL

## 2024-04-23 ASSESSMENT — PATIENT HEALTH QUESTIONNAIRE - PHQ9
SUM OF ALL RESPONSES TO PHQ QUESTIONS 1-9: 0
SUM OF ALL RESPONSES TO PHQ QUESTIONS 1-9: 0
SUM OF ALL RESPONSES TO PHQ9 QUESTIONS 1 & 2: 0
SUM OF ALL RESPONSES TO PHQ QUESTIONS 1-9: 0
1. LITTLE INTEREST OR PLEASURE IN DOING THINGS: NOT AT ALL
2. FEELING DOWN, DEPRESSED OR HOPELESS: NOT AT ALL
SUM OF ALL RESPONSES TO PHQ QUESTIONS 1-9: 0

## 2024-04-23 ASSESSMENT — LIFESTYLE VARIABLES: HOW MANY STANDARD DRINKS CONTAINING ALCOHOL DO YOU HAVE ON A TYPICAL DAY: PATIENT DOES NOT DRINK

## 2024-04-23 NOTE — PROGRESS NOTES
Medicare Annual Wellness Visit    [place] Gerard is here for Medicare AWV and Leg Injury (Rt shin)    Assessment & Plan   Medicare annual wellness visit, subsequent  Contusion of right lower arm, initial encounter  Cellulitis of right lower extremity    Recommendations for Preventive Services Due: see orders and patient instructions/AVS.  Recommended screening schedule for the next 5-10 years is provided to the patient in written form: see Patient Instructions/AVS.     Return for Reg Appt, Lab Before, Meds.  If worse go to ER. Not better in 24 hr see.     Subjective   The following acute and/or chronic problems were also addressed today:   Bumped right lowe rexrem with log whn cutting tree at [employer]     Bumped righ tmid atnr   leg with  krystina olvera an dsl  ganesh        Patient's complete Health Risk Assessment and screening values have been reviewed and are found in Flowsheets. The following problems were reviewed today and where indicated follow up appointments were made and/or referrals ordered.    Positive Risk Factor Screenings with Interventions:                Activity, Diet, and Weight:  On average, how many days per week do you engage in moderate to strenuous exercise (like a brisk walk)?: 0 days  On average, how many minutes do you engage in exercise at this level?: 0 min    Do you eat balanced/healthy meals regularly?: Yes    Body mass index is 26.91 kg/m².      Inactivity Interventions:  Patient declined any further interventions or treatment             Advanced Directives:  Do you have a Living Will?: (!) No    Intervention:  has an advanced directive - a copy HAS NOT been provided.                         LDCT Screening: Discussed with patient the benefits and harms of screening, follow-up diagnostic testing, over-diagnosis, false positive rate, and total radiation exposure. Counseled on the importance of adherence to annual lung cancer LDCT screening, impact of comorbidities, ability and

## 2024-04-23 NOTE — PATIENT INSTRUCTIONS
Learning About Being Active as an Older Adult  Why is being active important as you get older?     Being active is one of the best things you can do for your health. And it's never too late to start. Being active--or getting active, if you aren't already--has definite benefits. It can:  Give you more energy,  Keep your mind sharp.  Improve balance to reduce your risk of falls.  Help you manage chronic illness with fewer medicines.  No matter how old you are, how fit you are, or what health problems you have, there is a form of activity that will work for you. And the more physical activity you can do, the better your overall health will be.  What kinds of activity can help you stay healthy?  Being more active will make your daily activities easier. Physical activity includes planned exercise and things you do in daily life. There are four types of activity:  Aerobic.  Doing aerobic activity makes your heart and lungs strong.  Includes walking, dancing, and gardening.  Aim for at least 2½ hours spread throughout the week.  It improves your energy and can help you sleep better.  Muscle-strengthening.  This type of activity can help maintain muscle and strengthen bones.  Includes climbing stairs, using resistance bands, and lifting or carrying heavy loads.  Aim for at least twice a week.  It can help protect the knees and other joints.  Stretching.  Stretching gives you better range of motion in joints and muscles.  Includes upper arm stretches, calf stretches, and gentle yoga.  Aim for at least twice a week, preferably after your muscles are warmed up from other activities.  It can help you function better in daily life.  Balancing.  This helps you stay coordinated and have good posture.  Includes heel-to-toe walking, david chi, and certain types of yoga.  Aim for at least 3 days a week.  It can reduce your risk of falling.  Even if you have a hard time meeting the recommendations, it's better to be more active

## 2024-05-13 ENCOUNTER — TELEPHONE (OUTPATIENT)
Dept: PRIMARY CARE CLINIC | Age: 84
End: 2024-05-13

## 2024-05-13 NOTE — TELEPHONE ENCOUNTER
Pt needs an EKG for  the Tikosyn he's on, Per [facility] and Labs, which are Magnesium, PSA diagnostic. Wife  says [place] was to order these and havent and he has an appt. On Thursday. Will you do these for him?

## 2024-05-14 ENCOUNTER — OFFICE VISIT (OUTPATIENT)
Dept: PRIMARY CARE CLINIC | Age: 84
End: 2024-05-14

## 2024-05-14 VITALS
SYSTOLIC BLOOD PRESSURE: 126 MMHG | BODY MASS INDEX: 26.22 KG/M2 | TEMPERATURE: 98.6 F | DIASTOLIC BLOOD PRESSURE: 68 MMHG | HEIGHT: 68 IN | WEIGHT: 173 LBS

## 2024-05-14 DIAGNOSIS — I48.19 ATRIAL FIBRILLATION, PERSISTENT (HCC): ICD-10-CM

## 2024-05-14 DIAGNOSIS — C61 CANCER OF PROSTATE (HCC): ICD-10-CM

## 2024-05-14 DIAGNOSIS — I10 ESSENTIAL HYPERTENSION: Chronic | ICD-10-CM

## 2024-05-14 DIAGNOSIS — E11.9 DIET-CONTROLLED DIABETES MELLITUS (HCC): ICD-10-CM

## 2024-05-14 DIAGNOSIS — I48.19 ATRIAL FIBRILLATION, PERSISTENT (HCC): Primary | ICD-10-CM

## 2024-05-14 DIAGNOSIS — I25.119 CORONARY ARTERY DISEASE INVOLVING NATIVE CORONARY ARTERY OF NATIVE HEART WITH ANGINA PECTORIS (HCC): ICD-10-CM

## 2024-05-14 DIAGNOSIS — D69.6 THROMBOCYTOPENIA, UNSPECIFIED (HCC): ICD-10-CM

## 2024-05-14 LAB
ANION GAP SERPL CALCULATED.3IONS-SCNC: 16 MMOL/L (ref 7–16)
BUN BLDV-MCNC: 29 MG/DL (ref 6–23)
CALCIUM SERPL-MCNC: 9.6 MG/DL (ref 8.6–10.2)
CHLORIDE BLD-SCNC: 107 MMOL/L (ref 98–107)
CO2: 19 MMOL/L (ref 22–29)
CREAT SERPL-MCNC: 0.9 MG/DL (ref 0.7–1.2)
GFR, ESTIMATED: 86 ML/MIN/1.73M2
GLUCOSE BLD-MCNC: 97 MG/DL (ref 74–99)
MAGNESIUM: 1.9 MG/DL (ref 1.6–2.6)
POTASSIUM SERPL-SCNC: 4.6 MMOL/L (ref 3.5–5)
PROSTATE SPECIFIC ANTIGEN: 0.14 NG/ML (ref 0–4)
SODIUM BLD-SCNC: 142 MMOL/L (ref 132–146)

## 2024-05-14 NOTE — TELEPHONE ENCOUNTER
Patients last appointment 4/23/2024.  Patients next scheduled appointment   Future Appointments   Date Time Provider Department Center   5/14/2024  2:30 PM Haseeb Phan DO [facility]   6/10/2024  1:15 PM Quentin Novak MD [facility]   6/18/2024 11:15 AM Haseeb Phan DO [facility]        Patient scheduled

## 2024-05-14 NOTE — PROGRESS NOTES
Palpations: Abdomen is soft.   Musculoskeletal:         General: Normal range of motion.      Cervical back: Normal range of motion.   Skin:     General: Skin is warm.   Neurological:      Mental Status: He is alert and oriented to person, place, and time.   Psychiatric:         Behavior: Behavior normal.         [place] was seen today for other.    Diagnoses and all orders for this visit:    Atrial fibrillation, persistent (HCC)  -     EKG 12 lead; Future  -     EKG 12 lead  -     Basic Metabolic Panel; Future  -     Magnesium; Future    Essential hypertension  -     Basic Metabolic Panel; Future  -     Magnesium; Future    Thrombocytopenia, unspecified (HCC)    Cancer of prostate (HCC)  -     PSA, Diagnostic; Future    Diet-controlled diabetes mellitus (HCC)    Coronary artery disease involving native coronary artery of native heart with angina pectoris (HCC)        Comments: EKG    bmp     mag     psa daig    Fax psa to     [phone]   Fax    bmp and mg  to   [phone]     sieus      I educated the patient about all medications.  Make sure they were correct and educated  on the risk associated with missing meds or taking them incorrectly.  A list of medications is being sent home with patient today.    A great deal of time spent reviewing medications, diet, exercise, social issues. Also reviewing the chart before entering the room with patient and finishing charting after leaving patient's room. More than half of that time was spent face to face with the patient in counseling and coordinating care.  Check blood pressure at home twice a day.  Low-salt low caffeine diet.  Call if systolic blood pressure is above 150 and diastolic blood pressures above 85.  Only use a upper arm digital cuff.    I informed patient about the risk associated with noncompliance of medication and taking medications incorrectly.  Appropriate follow-up with myself and all specialist.  Encourage family members to take active role in

## 2024-06-10 ENCOUNTER — OFFICE VISIT (OUTPATIENT)
Dept: GERIATRIC MEDICINE | Age: 84
End: 2024-06-10
Payer: MEDICARE

## 2024-06-10 VITALS
BODY MASS INDEX: 26.78 KG/M2 | RESPIRATION RATE: 12 BRPM | WEIGHT: 176.1 LBS | HEART RATE: 56 BPM | TEMPERATURE: 98.1 F | SYSTOLIC BLOOD PRESSURE: 164 MMHG | DIASTOLIC BLOOD PRESSURE: 80 MMHG

## 2024-06-10 DIAGNOSIS — G31.84 MCI (MILD COGNITIVE IMPAIRMENT): Primary | ICD-10-CM

## 2024-06-10 PROCEDURE — 1123F ACP DISCUSS/DSCN MKR DOCD: CPT | Performed by: INTERNAL MEDICINE

## 2024-06-10 PROCEDURE — 3077F SYST BP >= 140 MM HG: CPT | Performed by: INTERNAL MEDICINE

## 2024-06-10 PROCEDURE — 99213 OFFICE O/P EST LOW 20 MIN: CPT | Performed by: INTERNAL MEDICINE

## 2024-06-10 PROCEDURE — 3079F DIAST BP 80-89 MM HG: CPT | Performed by: INTERNAL MEDICINE

## 2024-06-10 NOTE — PROGRESS NOTES
BP up today  Sleeps well with up 3 to 4 times at night   Hearing is worse  Here with wife and she thinks he is worse  Good recall JFK assassination  IADL's Driving is OK and he asks wife for directions  Visits sons house in [place] and may not remember last turn  Pills independent and bills per wife  Figures out computer when he is stumped   Change is difficult  ADL independent   Presbyopia   NO mac degeneration and positive hx of COAG  Hx of AFib and Tikosyn  and Eliquis    Stopped Aricept for dreams??  Hx of heart valve Sx  Youngest daughter  end of February  Remembered [place] revolver story and [place] Ceja dance   Impression: MCI and stable   Plan: Consider Aricept or Namenda at some time

## 2024-06-10 NOTE — PROGRESS NOTES
Chief Complaint   Patient presents with    6 Month Follow-Up     December follow up.  Here with wife.     Blood Pressure Check     172/84.  Repeat 164/80.

## 2024-06-14 ENCOUNTER — HOSPITAL ENCOUNTER (OUTPATIENT)
Age: 84
Discharge: HOME | End: 2024-06-14
Payer: MEDICARE

## 2024-06-14 DIAGNOSIS — I25.10 CORONARY ARTERY DISEASE INVOLVING NATIVE CORONARY ARTERY OF NATIVE HEART WITHOUT ANGINA PECTORIS: ICD-10-CM

## 2024-06-14 DIAGNOSIS — C61 CANCER OF PROSTATE (HCC): Chronic | ICD-10-CM

## 2024-06-14 DIAGNOSIS — E11.9 DIET-CONTROLLED DIABETES MELLITUS (HCC): ICD-10-CM

## 2024-06-14 DIAGNOSIS — M81.0 AGE-RELATED OSTEOPOROSIS WITHOUT CURRENT PATHOLOGICAL FRACTURE: ICD-10-CM

## 2024-06-14 DIAGNOSIS — E53.8 VITAMIN B 12 DEFICIENCY: ICD-10-CM

## 2024-06-14 DIAGNOSIS — E03.9 ACQUIRED HYPOTHYROIDISM: ICD-10-CM

## 2024-06-14 LAB
25(OH)D3 SERPL-MCNC: 76 NG/ML (ref 30–100)
ALBUMIN SERPL-MCNC: 4.3 G/DL (ref 3.5–5.2)
ALP SERPL-CCNC: 84 U/L (ref 40–129)
ALT SERPL-CCNC: 16 U/L (ref 0–40)
ANION GAP SERPL CALCULATED.3IONS-SCNC: 13 MMOL/L (ref 7–16)
AST SERPL-CCNC: 18 U/L (ref 0–39)
BASOPHILS # BLD: 0.03 K/UL (ref 0–0.2)
BASOPHILS NFR BLD: 0 % (ref 0–2)
BILIRUB UR QL STRIP: NEGATIVE
BUN SERPL-MCNC: 31 MG/DL (ref 6–23)
CALCIUM SERPL-MCNC: 9.3 MG/DL (ref 8.6–10.2)
CHLORIDE SERPL-SCNC: 105 MMOL/L (ref 98–107)
CHOLEST SERPL-MCNC: 101 MG/DL
CLARITY UR: CLEAR
CO2 SERPL-SCNC: 21 MMOL/L (ref 22–29)
COLOR UR: YELLOW
CREAT SERPL-MCNC: 1 MG/DL (ref 0.7–1.2)
EOSINOPHIL # BLD: 0.2 K/UL (ref 0.05–0.5)
EOSINOPHILS RELATIVE PERCENT: 3 % (ref 0–6)
ERYTHROCYTE [DISTWIDTH] IN BLOOD BY AUTOMATED COUNT: 13.7 % (ref 11.5–15)
GFR, ESTIMATED: 80 ML/MIN/1.73M2
GLUCOSE SERPL-MCNC: 94 MG/DL (ref 74–99)
GLUCOSE UR STRIP-MCNC: NEGATIVE MG/DL
HBA1C MFR BLD: 4.7 % (ref 4–5.6)
HCT VFR BLD AUTO: 35.9 % (ref 37–54)
HDLC SERPL-MCNC: 40 MG/DL
HGB BLD-MCNC: 11.9 G/DL (ref 12.5–16.5)
HGB UR QL STRIP.AUTO: ABNORMAL
IMM GRANULOCYTES # BLD AUTO: 0.03 K/UL (ref 0–0.58)
IMM GRANULOCYTES NFR BLD: 0 % (ref 0–5)
KETONES UR STRIP-MCNC: NEGATIVE MG/DL
LDLC SERPL CALC-MCNC: 50 MG/DL
LEUKOCYTE ESTERASE UR QL STRIP: NEGATIVE
LYMPHOCYTES NFR BLD: 1.17 K/UL (ref 1.5–4)
LYMPHOCYTES RELATIVE PERCENT: 16 % (ref 20–42)
MCH RBC QN AUTO: 32.9 PG (ref 26–35)
MCHC RBC AUTO-ENTMCNC: 33.1 G/DL (ref 32–34.5)
MCV RBC AUTO: 99.2 FL (ref 80–99.9)
MONOCYTES NFR BLD: 0.35 K/UL (ref 0.1–0.95)
MONOCYTES NFR BLD: 5 % (ref 2–12)
NEUTROPHILS NFR BLD: 75 % (ref 43–80)
NEUTS SEG NFR BLD: 5.42 K/UL (ref 1.8–7.3)
NITRITE UR QL STRIP: NEGATIVE
PH UR STRIP: 6 [PH] (ref 5–9)
PLATELET, FLUORESCENCE: 123 K/UL (ref 130–450)
PMV BLD AUTO: 10 FL (ref 7–12)
POTASSIUM SERPL-SCNC: 4.3 MMOL/L (ref 3.5–5)
PROT SERPL-MCNC: 6.2 G/DL (ref 6.4–8.3)
PROT UR STRIP-MCNC: NEGATIVE MG/DL
PSA SERPL-MCNC: 0.1 NG/ML (ref 0–4)
RBC # BLD AUTO: 3.62 M/UL (ref 3.8–5.8)
RBC #/AREA URNS HPF: NORMAL /HPF
SODIUM SERPL-SCNC: 139 MMOL/L (ref 132–146)
SP GR UR STRIP: 1.02 (ref 1–1.03)
T4 SERPL-MCNC: 7.4 UG/DL (ref 4.5–11.7)
TRIGL SERPL-MCNC: 56 MG/DL
TSH SERPL DL<=0.05 MIU/L-ACNC: 2.64 UIU/ML (ref 0.27–4.2)
UROBILINOGEN UR STRIP-ACNC: 1 EU/DL (ref 0–1)
VIT B12 SERPL-MCNC: 665 PG/ML (ref 211–946)
VLDLC SERPL CALC-MCNC: 11 MG/DL
WBC #/AREA URNS HPF: NORMAL /HPF
WBC OTHER # BLD: 7.2 K/UL (ref 4.5–11.5)

## 2024-06-14 PROCEDURE — 84153 ASSAY OF PSA TOTAL: CPT

## 2024-06-14 PROCEDURE — 36415 COLL VENOUS BLD VENIPUNCTURE: CPT

## 2024-06-14 PROCEDURE — 80053 COMPREHEN METABOLIC PANEL: CPT

## 2024-06-14 PROCEDURE — 81001 URINALYSIS AUTO W/SCOPE: CPT

## 2024-06-14 PROCEDURE — 85025 COMPLETE CBC W/AUTO DIFF WBC: CPT

## 2024-06-14 PROCEDURE — 84436 ASSAY OF TOTAL THYROXINE: CPT

## 2024-06-14 PROCEDURE — 84443 ASSAY THYROID STIM HORMONE: CPT

## 2024-06-14 PROCEDURE — 83036 HEMOGLOBIN GLYCOSYLATED A1C: CPT

## 2024-06-14 PROCEDURE — 82306 VITAMIN D 25 HYDROXY: CPT

## 2024-06-14 PROCEDURE — 80061 LIPID PANEL: CPT

## 2024-06-14 PROCEDURE — 82607 VITAMIN B-12: CPT

## 2024-06-18 ENCOUNTER — OFFICE VISIT (OUTPATIENT)
Dept: PRIMARY CARE CLINIC | Age: 84
End: 2024-06-18
Payer: MEDICARE

## 2024-06-18 VITALS
DIASTOLIC BLOOD PRESSURE: 83 MMHG | TEMPERATURE: 97.5 F | HEART RATE: 68 BPM | RESPIRATION RATE: 17 BRPM | OXYGEN SATURATION: 97 % | BODY MASS INDEX: 26.74 KG/M2 | SYSTOLIC BLOOD PRESSURE: 138 MMHG | WEIGHT: 175.8 LBS

## 2024-06-18 DIAGNOSIS — I10 ESSENTIAL HYPERTENSION: Primary | Chronic | ICD-10-CM

## 2024-06-18 DIAGNOSIS — E78.2 MIXED HYPERLIPIDEMIA: Chronic | ICD-10-CM

## 2024-06-18 DIAGNOSIS — E11.9 DIET-CONTROLLED DIABETES MELLITUS (HCC): ICD-10-CM

## 2024-06-18 DIAGNOSIS — C61 CANCER OF PROSTATE (HCC): Chronic | ICD-10-CM

## 2024-06-18 DIAGNOSIS — M81.0 AGE-RELATED OSTEOPOROSIS WITHOUT CURRENT PATHOLOGICAL FRACTURE: ICD-10-CM

## 2024-06-18 DIAGNOSIS — D69.6 THROMBOCYTOPENIA, UNSPECIFIED (HCC): ICD-10-CM

## 2024-06-18 DIAGNOSIS — D50.8 OTHER IRON DEFICIENCY ANEMIA: ICD-10-CM

## 2024-06-18 DIAGNOSIS — E03.9 ACQUIRED HYPOTHYROIDISM: ICD-10-CM

## 2024-06-18 DIAGNOSIS — I25.119 CORONARY ARTERY DISEASE INVOLVING NATIVE CORONARY ARTERY OF NATIVE HEART WITH ANGINA PECTORIS (HCC): ICD-10-CM

## 2024-06-18 DIAGNOSIS — E53.8 VITAMIN B 12 DEFICIENCY: ICD-10-CM

## 2024-06-18 DIAGNOSIS — M15.9 PRIMARY OSTEOARTHRITIS INVOLVING MULTIPLE JOINTS: ICD-10-CM

## 2024-06-18 PROCEDURE — 3075F SYST BP GE 130 - 139MM HG: CPT | Performed by: FAMILY MEDICINE

## 2024-06-18 PROCEDURE — 1123F ACP DISCUSS/DSCN MKR DOCD: CPT | Performed by: FAMILY MEDICINE

## 2024-06-18 PROCEDURE — 3044F HG A1C LEVEL LT 7.0%: CPT | Performed by: FAMILY MEDICINE

## 2024-06-18 PROCEDURE — 3079F DIAST BP 80-89 MM HG: CPT | Performed by: FAMILY MEDICINE

## 2024-06-18 PROCEDURE — 99214 OFFICE O/P EST MOD 30 MIN: CPT | Performed by: FAMILY MEDICINE

## 2024-06-18 RX ORDER — ATORVASTATIN CALCIUM 40 MG/1
40 TABLET, FILM COATED ORAL DAILY
Qty: 90 TABLET | Refills: 3 | Status: SHIPPED | OUTPATIENT
Start: 2024-06-18

## 2024-06-18 SDOH — ECONOMIC STABILITY: INCOME INSECURITY: HOW HARD IS IT FOR YOU TO PAY FOR THE VERY BASICS LIKE FOOD, HOUSING, MEDICAL CARE, AND HEATING?: NOT HARD AT ALL

## 2024-06-18 SDOH — ECONOMIC STABILITY: FOOD INSECURITY: WITHIN THE PAST 12 MONTHS, THE FOOD YOU BOUGHT JUST DIDN'T LAST AND YOU DIDN'T HAVE MONEY TO GET MORE.: NEVER TRUE

## 2024-06-18 SDOH — ECONOMIC STABILITY: FOOD INSECURITY: WITHIN THE PAST 12 MONTHS, YOU WORRIED THAT YOUR FOOD WOULD RUN OUT BEFORE YOU GOT MONEY TO BUY MORE.: NEVER TRUE

## 2024-06-18 ASSESSMENT — PATIENT HEALTH QUESTIONNAIRE - PHQ9
SUM OF ALL RESPONSES TO PHQ QUESTIONS 1-9: 0
SUM OF ALL RESPONSES TO PHQ9 QUESTIONS 1 & 2: 0
1. LITTLE INTEREST OR PLEASURE IN DOING THINGS: NOT AT ALL
SUM OF ALL RESPONSES TO PHQ QUESTIONS 1-9: 0
2. FEELING DOWN, DEPRESSED OR HOPELESS: NOT AT ALL

## 2024-06-18 ASSESSMENT — ENCOUNTER SYMPTOMS
EYES NEGATIVE: 1
ALLERGIC/IMMUNOLOGIC NEGATIVE: 1
GASTROINTESTINAL NEGATIVE: 1
RESPIRATORY NEGATIVE: 1

## 2024-08-08 ENCOUNTER — LAB (OUTPATIENT)
Dept: PRIMARY CARE CLINIC | Age: 84
End: 2024-08-08
Payer: MEDICARE

## 2024-08-08 DIAGNOSIS — Z79.899 MEDICATION MANAGEMENT: Primary | ICD-10-CM

## 2024-08-08 PROCEDURE — 93000 ELECTROCARDIOGRAM COMPLETE: CPT | Performed by: FAMILY MEDICINE

## 2024-08-26 ENCOUNTER — HOSPITAL ENCOUNTER (OUTPATIENT)
Age: 84
Discharge: HOME | End: 2024-08-26
Payer: MEDICARE

## 2024-08-26 LAB
BASOPHILS # BLD: 0.04 K/UL (ref 0–0.2)
BASOPHILS NFR BLD: 1 % (ref 0–2)
BILIRUB UR QL STRIP: NEGATIVE
CLARITY UR: CLEAR
COLOR UR: YELLOW
EOSINOPHIL # BLD: 0.24 K/UL (ref 0.05–0.5)
EOSINOPHILS RELATIVE PERCENT: 4 % (ref 0–6)
ERYTHROCYTE [DISTWIDTH] IN BLOOD BY AUTOMATED COUNT: 13 % (ref 11.5–15)
GLUCOSE UR STRIP-MCNC: NEGATIVE MG/DL
HBA1C MFR BLD: 4.3 % (ref 4–5.6)
HCT VFR BLD AUTO: 36.6 % (ref 37–54)
HGB BLD-MCNC: 12.5 G/DL (ref 12.5–16.5)
HGB UR QL STRIP.AUTO: NEGATIVE
IMM GRANULOCYTES # BLD AUTO: 0.03 K/UL (ref 0–0.58)
IMM GRANULOCYTES NFR BLD: 1 % (ref 0–5)
KETONES UR STRIP-MCNC: NEGATIVE MG/DL
LEUKOCYTE ESTERASE UR QL STRIP: NEGATIVE
LYMPHOCYTES NFR BLD: 1.38 K/UL (ref 1.5–4)
LYMPHOCYTES RELATIVE PERCENT: 21 % (ref 20–42)
MCH RBC QN AUTO: 34.2 PG (ref 26–35)
MCHC RBC AUTO-ENTMCNC: 34.2 G/DL (ref 32–34.5)
MCV RBC AUTO: 100.3 FL (ref 80–99.9)
MONOCYTES NFR BLD: 0.43 K/UL (ref 0.1–0.95)
MONOCYTES NFR BLD: 7 % (ref 2–12)
NEUTROPHILS NFR BLD: 67 % (ref 43–80)
NEUTS SEG NFR BLD: 4.32 K/UL (ref 1.8–7.3)
NITRITE UR QL STRIP: NEGATIVE
PH UR STRIP: 6 [PH] (ref 5–9)
PLATELET # BLD AUTO: 116 K/UL (ref 130–450)
PMV BLD AUTO: 10.2 FL (ref 7–12)
PROT UR STRIP-MCNC: NEGATIVE MG/DL
RBC # BLD AUTO: 3.65 M/UL (ref 3.8–5.8)
SP GR UR STRIP: 1.02 (ref 1–1.03)
UROBILINOGEN UR STRIP-ACNC: 1 EU/DL (ref 0–1)
WBC OTHER # BLD: 6.4 K/UL (ref 4.5–11.5)

## 2024-08-26 PROCEDURE — 82607 VITAMIN B-12: CPT

## 2024-08-26 PROCEDURE — 80053 COMPREHEN METABOLIC PANEL: CPT

## 2024-08-26 PROCEDURE — 85025 COMPLETE CBC W/AUTO DIFF WBC: CPT

## 2024-08-26 PROCEDURE — 80061 LIPID PANEL: CPT

## 2024-08-26 PROCEDURE — 83540 ASSAY OF IRON: CPT

## 2024-08-26 PROCEDURE — 84153 ASSAY OF PSA TOTAL: CPT

## 2024-08-26 PROCEDURE — 36415 COLL VENOUS BLD VENIPUNCTURE: CPT

## 2024-08-26 PROCEDURE — 83036 HEMOGLOBIN GLYCOSYLATED A1C: CPT

## 2024-08-26 PROCEDURE — 84436 ASSAY OF TOTAL THYROXINE: CPT

## 2024-08-26 PROCEDURE — 81003 URINALYSIS AUTO W/O SCOPE: CPT

## 2024-08-26 PROCEDURE — 84443 ASSAY THYROID STIM HORMONE: CPT

## 2024-08-26 PROCEDURE — 82306 VITAMIN D 25 HYDROXY: CPT

## 2024-08-27 LAB
25(OH)D3 SERPL-MCNC: 83.4 NG/ML (ref 30–100)
ALBUMIN SERPL-MCNC: 4.2 G/DL (ref 3.5–5.2)
ALP SERPL-CCNC: 92 U/L (ref 40–129)
ALT SERPL-CCNC: 20 U/L (ref 0–40)
ANION GAP SERPL CALCULATED.3IONS-SCNC: 12 MMOL/L (ref 7–16)
AST SERPL-CCNC: 22 U/L (ref 0–39)
BILIRUB SERPL-MCNC: 0.8 MG/DL (ref 0–1.2)
BUN SERPL-MCNC: 27 MG/DL (ref 6–23)
CALCIUM SERPL-MCNC: 9.4 MG/DL (ref 8.6–10.2)
CHLORIDE SERPL-SCNC: 105 MMOL/L (ref 98–107)
CHOLEST SERPL-MCNC: 108 MG/DL
CO2 SERPL-SCNC: 24 MMOL/L (ref 22–29)
CREAT SERPL-MCNC: 1 MG/DL (ref 0.7–1.2)
GFR, ESTIMATED: 80 ML/MIN/1.73M2
GLUCOSE SERPL-MCNC: 85 MG/DL (ref 74–99)
HDLC SERPL-MCNC: 41 MG/DL
IRON SERPL-MCNC: 115 UG/DL (ref 59–158)
LDLC SERPL CALC-MCNC: 51 MG/DL
POTASSIUM SERPL-SCNC: 4.2 MMOL/L (ref 3.5–5)
PROT SERPL-MCNC: 6.1 G/DL (ref 6.4–8.3)
PSA SERPL-MCNC: 0.09 NG/ML (ref 0–4)
SODIUM SERPL-SCNC: 141 MMOL/L (ref 132–146)
T4 SERPL-MCNC: 7 UG/DL (ref 4.5–11.7)
TRIGL SERPL-MCNC: 78 MG/DL
TSH SERPL DL<=0.05 MIU/L-ACNC: 2.5 UIU/ML (ref 0.27–4.2)
VLDLC SERPL CALC-MCNC: 16 MG/DL

## 2024-08-28 LAB — VIT B12 SERPL-MCNC: 687 PG/ML (ref 211–946)

## 2024-10-16 ENCOUNTER — HOSPITAL ENCOUNTER (OUTPATIENT)
Age: 84
Discharge: HOME | End: 2024-10-16
Payer: MEDICARE

## 2024-10-16 LAB
BASOPHILS # BLD: 0.03 K/UL (ref 0–0.2)
BASOPHILS NFR BLD: 1 % (ref 0–2)
EOSINOPHIL # BLD: 0.18 K/UL (ref 0.05–0.5)
EOSINOPHILS RELATIVE PERCENT: 3 % (ref 0–6)
ERYTHROCYTE [DISTWIDTH] IN BLOOD BY AUTOMATED COUNT: 12.3 % (ref 11.5–15)
HCT VFR BLD AUTO: 36.9 % (ref 37–54)
HGB BLD-MCNC: 12.3 G/DL (ref 12.5–16.5)
IMM GRANULOCYTES # BLD AUTO: <0.03 K/UL (ref 0–0.58)
IMM GRANULOCYTES NFR BLD: 0 % (ref 0–5)
IMM RETICS NFR: 12 % (ref 2.3–13.4)
LYMPHOCYTES NFR BLD: 1.41 K/UL (ref 1.5–4)
LYMPHOCYTES RELATIVE PERCENT: 21 % (ref 20–42)
MCH RBC QN AUTO: 33.2 PG (ref 26–35)
MCHC RBC AUTO-ENTMCNC: 33.3 G/DL (ref 32–34.5)
MCV RBC AUTO: 99.7 FL (ref 80–99.9)
MONOCYTES NFR BLD: 0.4 K/UL (ref 0.1–0.95)
MONOCYTES NFR BLD: 6 % (ref 2–12)
NEUTROPHILS NFR BLD: 69 % (ref 43–80)
NEUTS SEG NFR BLD: 4.57 K/UL (ref 1.8–7.3)
PLATELET, FLUORESCENCE: 135 K/UL (ref 130–450)
PMV BLD AUTO: 10.4 FL (ref 7–12)
RBC # BLD AUTO: 3.7 M/UL (ref 3.8–5.8)
RETIC HEMOGLOBIN: 37.4 PG (ref 28.2–36.6)
RETICS # AUTO: 0.09 M/UL
RETICS/RBC NFR AUTO: 2.5 % (ref 0.4–1.9)
WBC OTHER # BLD: 6.6 K/UL (ref 4.5–11.5)

## 2024-10-16 PROCEDURE — 85025 COMPLETE CBC W/AUTO DIFF WBC: CPT

## 2024-10-16 PROCEDURE — 85045 AUTOMATED RETICULOCYTE COUNT: CPT

## 2024-10-16 PROCEDURE — 36415 COLL VENOUS BLD VENIPUNCTURE: CPT

## 2024-11-08 DIAGNOSIS — E11.9 DIET-CONTROLLED DIABETES MELLITUS (HCC): ICD-10-CM

## 2024-11-08 DIAGNOSIS — C61 CANCER OF PROSTATE (HCC): Chronic | ICD-10-CM

## 2024-11-08 DIAGNOSIS — M81.0 AGE-RELATED OSTEOPOROSIS WITHOUT CURRENT PATHOLOGICAL FRACTURE: ICD-10-CM

## 2024-11-08 DIAGNOSIS — E53.8 VITAMIN B 12 DEFICIENCY: ICD-10-CM

## 2024-11-08 DIAGNOSIS — I10 ESSENTIAL HYPERTENSION: Chronic | ICD-10-CM

## 2024-11-08 DIAGNOSIS — D50.8 OTHER IRON DEFICIENCY ANEMIA: ICD-10-CM

## 2024-11-08 DIAGNOSIS — E03.9 ACQUIRED HYPOTHYROIDISM: ICD-10-CM

## 2024-11-08 DIAGNOSIS — I25.119 CORONARY ARTERY DISEASE INVOLVING NATIVE CORONARY ARTERY OF NATIVE HEART WITH ANGINA PECTORIS (HCC): ICD-10-CM

## 2024-11-08 DIAGNOSIS — E78.2 MIXED HYPERLIPIDEMIA: Chronic | ICD-10-CM

## 2024-11-08 LAB
ALBUMIN: 4.4 G/DL (ref 3.5–5.2)
ALP BLD-CCNC: 79 U/L (ref 40–129)
ALT SERPL-CCNC: 17 U/L (ref 0–40)
ANION GAP SERPL CALCULATED.3IONS-SCNC: 15 MMOL/L (ref 7–16)
AST SERPL-CCNC: 22 U/L (ref 0–39)
BASOPHILS ABSOLUTE: 0.04 K/UL (ref 0–0.2)
BASOPHILS RELATIVE PERCENT: 1 % (ref 0–2)
BILIRUB SERPL-MCNC: 1 MG/DL (ref 0–1.2)
BILIRUBIN, URINE: NEGATIVE
BUN BLDV-MCNC: 29 MG/DL (ref 6–23)
CALCIUM SERPL-MCNC: 9.5 MG/DL (ref 8.6–10.2)
CHLORIDE BLD-SCNC: 103 MMOL/L (ref 98–107)
CHOLESTEROL, TOTAL: 110 MG/DL
CO2: 21 MMOL/L (ref 22–29)
COLOR, UA: YELLOW
CREAT SERPL-MCNC: 0.9 MG/DL (ref 0.7–1.2)
EOSINOPHILS ABSOLUTE: 0.19 K/UL (ref 0.05–0.5)
EOSINOPHILS RELATIVE PERCENT: 3 % (ref 0–6)
GFR, ESTIMATED: 81 ML/MIN/1.73M2
GLUCOSE BLD-MCNC: 115 MG/DL (ref 74–99)
GLUCOSE URINE: NEGATIVE MG/DL
HBA1C MFR BLD: 4.4 % (ref 4–5.6)
HCT VFR BLD CALC: 38.9 % (ref 37–54)
HDLC SERPL-MCNC: 43 MG/DL
HEMOGLOBIN: 13 G/DL (ref 12.5–16.5)
IMMATURE GRANULOCYTES %: 0 % (ref 0–5)
IMMATURE GRANULOCYTES ABSOLUTE: 0.03 K/UL (ref 0–0.58)
IRON: 105 UG/DL (ref 59–158)
KETONES, URINE: NEGATIVE MG/DL
LDL CHOLESTEROL: 53 MG/DL
LEUKOCYTE ESTERASE, URINE: NEGATIVE
LYMPHOCYTES ABSOLUTE: 1.01 K/UL (ref 1.5–4)
LYMPHOCYTES RELATIVE PERCENT: 14 % (ref 20–42)
MCH RBC QN AUTO: 33.9 PG (ref 26–35)
MCHC RBC AUTO-ENTMCNC: 33.4 G/DL (ref 32–34.5)
MCV RBC AUTO: 101.6 FL (ref 80–99.9)
MONOCYTES ABSOLUTE: 0.43 K/UL (ref 0.1–0.95)
MONOCYTES RELATIVE PERCENT: 6 % (ref 2–12)
NEUTROPHILS ABSOLUTE: 5.48 K/UL (ref 1.8–7.3)
NEUTROPHILS RELATIVE PERCENT: 76 % (ref 43–80)
NITRITE, URINE: NEGATIVE
PDW BLD-RTO: 12.6 % (ref 11.5–15)
PH, URINE: 5.5 (ref 5–9)
PLATELET # BLD: 128 K/UL (ref 130–450)
PMV BLD AUTO: 10.6 FL (ref 7–12)
POTASSIUM SERPL-SCNC: 4.4 MMOL/L (ref 3.5–5)
PROSTATE SPECIFIC ANTIGEN: 0.09 NG/ML (ref 0–4)
PROTEIN UA: NEGATIVE MG/DL
RBC # BLD: 3.83 M/UL (ref 3.8–5.8)
RBC UA: NORMAL /HPF
SODIUM BLD-SCNC: 139 MMOL/L (ref 132–146)
SPECIFIC GRAVITY UA: >1.03 (ref 1–1.03)
THYROXINE (T4): 7.9 UG/DL (ref 4.5–11.7)
TOTAL PROTEIN: 6.4 G/DL (ref 6.4–8.3)
TRIGL SERPL-MCNC: 68 MG/DL
TSH SERPL DL<=0.05 MIU/L-ACNC: 2.68 UIU/ML (ref 0.27–4.2)
TURBIDITY: CLEAR
URINE HGB: ABNORMAL
UROBILINOGEN, URINE: 0.2 EU/DL (ref 0–1)
VITAMIN B-12: 740 PG/ML (ref 211–946)
VITAMIN D 25-HYDROXY: 87.6 NG/ML (ref 30–100)
VLDLC SERPL CALC-MCNC: 14 MG/DL
WBC # BLD: 7.2 K/UL (ref 4.5–11.5)
WBC UA: NORMAL /HPF

## 2024-11-12 ENCOUNTER — OFFICE VISIT (OUTPATIENT)
Dept: PRIMARY CARE CLINIC | Age: 84
End: 2024-11-12
Payer: MEDICARE

## 2024-11-12 VITALS
SYSTOLIC BLOOD PRESSURE: 134 MMHG | HEART RATE: 64 BPM | OXYGEN SATURATION: 97 % | WEIGHT: 173 LBS | BODY MASS INDEX: 26.31 KG/M2 | DIASTOLIC BLOOD PRESSURE: 82 MMHG | TEMPERATURE: 97.9 F | RESPIRATION RATE: 16 BRPM

## 2024-11-12 DIAGNOSIS — I48.19 ATRIAL FIBRILLATION, PERSISTENT (HCC): Primary | ICD-10-CM

## 2024-11-12 DIAGNOSIS — C61 CANCER OF PROSTATE (HCC): Chronic | ICD-10-CM

## 2024-11-12 DIAGNOSIS — I25.119 CORONARY ARTERY DISEASE INVOLVING NATIVE CORONARY ARTERY OF NATIVE HEART WITH ANGINA PECTORIS (HCC): ICD-10-CM

## 2024-11-12 DIAGNOSIS — E78.2 MIXED HYPERLIPIDEMIA: Chronic | ICD-10-CM

## 2024-11-12 DIAGNOSIS — I10 ESSENTIAL HYPERTENSION: Chronic | ICD-10-CM

## 2024-11-12 PROCEDURE — 93000 ELECTROCARDIOGRAM COMPLETE: CPT | Performed by: FAMILY MEDICINE

## 2024-11-12 PROCEDURE — 1123F ACP DISCUSS/DSCN MKR DOCD: CPT | Performed by: FAMILY MEDICINE

## 2024-11-12 PROCEDURE — 3075F SYST BP GE 130 - 139MM HG: CPT | Performed by: FAMILY MEDICINE

## 2024-11-12 PROCEDURE — 99214 OFFICE O/P EST MOD 30 MIN: CPT | Performed by: FAMILY MEDICINE

## 2024-11-12 PROCEDURE — 3079F DIAST BP 80-89 MM HG: CPT | Performed by: FAMILY MEDICINE

## 2024-11-12 SDOH — ECONOMIC STABILITY: FOOD INSECURITY: WITHIN THE PAST 12 MONTHS, THE FOOD YOU BOUGHT JUST DIDN'T LAST AND YOU DIDN'T HAVE MONEY TO GET MORE.: NEVER TRUE

## 2024-11-12 SDOH — ECONOMIC STABILITY: FOOD INSECURITY: WITHIN THE PAST 12 MONTHS, YOU WORRIED THAT YOUR FOOD WOULD RUN OUT BEFORE YOU GOT MONEY TO BUY MORE.: NEVER TRUE

## 2024-11-12 SDOH — ECONOMIC STABILITY: INCOME INSECURITY: HOW HARD IS IT FOR YOU TO PAY FOR THE VERY BASICS LIKE FOOD, HOUSING, MEDICAL CARE, AND HEATING?: NOT HARD AT ALL

## 2024-11-12 ASSESSMENT — PATIENT HEALTH QUESTIONNAIRE - PHQ9
SUM OF ALL RESPONSES TO PHQ QUESTIONS 1-9: 0
2. FEELING DOWN, DEPRESSED OR HOPELESS: NOT AT ALL
1. LITTLE INTEREST OR PLEASURE IN DOING THINGS: NOT AT ALL
SUM OF ALL RESPONSES TO PHQ QUESTIONS 1-9: 0
SUM OF ALL RESPONSES TO PHQ9 QUESTIONS 1 & 2: 0

## 2024-11-12 NOTE — PROGRESS NOTES
Cardiovascular:      Rate and Rhythm: Normal rate and regular rhythm.   Pulmonary:      Effort: Pulmonary effort is normal.      Breath sounds: Normal breath sounds.   Abdominal:      General: Bowel sounds are normal.      Palpations: Abdomen is soft.   Musculoskeletal:         General: Normal range of motion.      Cervical back: Normal range of motion.   Skin:     General: Skin is warm.   Neurological:      Mental Status: He is alert and oriented to person, place, and time.   Psychiatric:         Behavior: Behavior normal.         [place] was seen today for other.    Diagnoses and all orders for this visit:    Atrial fibrillation, persistent (HCC)  -     EKG 12 lead; Future  -     EKG 12 lead  -     Basic Metabolic Panel; Standing  -     Magnesium; Standing    Essential hypertension  -     Basic Metabolic Panel; Standing  -     Magnesium; Standing    Mixed hyperlipidemia    Cancer of prostate (HCC)    Coronary artery disease involving native coronary artery of native heart with angina pectoris (HCC)        Comments: [facility]  EKG now and needs every  3 months    Needs  bmp and mg every   3 mo with EKG and fax dto dr Ld Boyle  [phone]      I educated the patient about all medications.  Make sure they were correct and educated  on the risk associated with missing meds or taking them incorrectly.  A list of medications is being sent home with patient today.    Check blood pressure at home twice a day.  Low-salt low caffeine diet.  Call if systolic blood pressure is above 150 and diastolic blood pressures above 85.  Only use a upper arm digital cuff.        Aggressive low-fat diet.  Avoid red meats, greasy fried foods, dairy products.  Avoid processed foods.  Take cholesterol medications without food.  A great deal of time spent reviewing medications, diet, exercise, social issues. Also reviewing the chart before entering the room with patient and finishing charting after leaving patient's

## 2024-12-09 RX ORDER — TAMSULOSIN HYDROCHLORIDE 0.4 MG/1
0.8 CAPSULE ORAL NIGHTLY
Qty: 180 CAPSULE | Refills: 3 | Status: SHIPPED | OUTPATIENT
Start: 2024-12-09

## 2024-12-09 NOTE — TELEPHONE ENCOUNTER
Name of Medication(s) Requested:  Requested Prescriptions     Pending Prescriptions Disp Refills    tamsulosin (FLOMAX) 0.4 MG capsule 180 capsule 3     Sig: Take 2 capsules by mouth nightly       Medication is on current medication list Yes    Dosage and directions were verified? Yes    Quantity verified: 90 day supply     Pharmacy Verified?  Yes    Last Appointment:  11/12/2024    Future appts:  Future Appointments   Date Time Provider Department Center   12/13/2024 12:45 PM Quentin Novak MD [facility]        (If no appt send self scheduling link. .REFILLAPPT)  Scheduling request sent?     [] Yes  [x] No    Does patient need updated?  [] Yes  [x] No

## 2024-12-13 ENCOUNTER — OFFICE VISIT (OUTPATIENT)
Dept: GERIATRIC MEDICINE | Age: 84
End: 2024-12-13
Payer: MEDICARE

## 2024-12-13 VITALS
WEIGHT: 174.7 LBS | BODY MASS INDEX: 26.57 KG/M2 | DIASTOLIC BLOOD PRESSURE: 68 MMHG | RESPIRATION RATE: 16 BRPM | TEMPERATURE: 97.5 F | HEART RATE: 60 BPM | SYSTOLIC BLOOD PRESSURE: 122 MMHG | OXYGEN SATURATION: 97 %

## 2024-12-13 DIAGNOSIS — G31.84 MCI (MILD COGNITIVE IMPAIRMENT): Primary | ICD-10-CM

## 2024-12-13 PROCEDURE — 1123F ACP DISCUSS/DSCN MKR DOCD: CPT | Performed by: INTERNAL MEDICINE

## 2024-12-13 PROCEDURE — 99213 OFFICE O/P EST LOW 20 MIN: CPT | Performed by: INTERNAL MEDICINE

## 2024-12-13 PROCEDURE — 1159F MED LIST DOCD IN RCRD: CPT | Performed by: INTERNAL MEDICINE

## 2024-12-13 PROCEDURE — 1160F RVW MEDS BY RX/DR IN RCRD: CPT | Performed by: INTERNAL MEDICINE

## 2024-12-13 PROCEDURE — 3078F DIAST BP <80 MM HG: CPT | Performed by: INTERNAL MEDICINE

## 2024-12-13 PROCEDURE — 3074F SYST BP LT 130 MM HG: CPT | Performed by: INTERNAL MEDICINE

## 2024-12-13 NOTE — PROGRESS NOTES
Chief Complaint   Patient presents with    6 Month Follow-Up     June follow up.  Here with wife.

## 2024-12-13 NOTE — PROGRESS NOTES
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

## 2025-01-27 ENCOUNTER — TELEMEDICINE (OUTPATIENT)
Dept: PRIMARY CARE CLINIC | Age: 85
End: 2025-01-27

## 2025-01-27 DIAGNOSIS — S39.012A STRAIN OF LUMBAR REGION, INITIAL ENCOUNTER: Primary | ICD-10-CM

## 2025-01-27 DIAGNOSIS — C61 CANCER OF PROSTATE (HCC): ICD-10-CM

## 2025-01-27 DIAGNOSIS — I48.19 ATRIAL FIBRILLATION, PERSISTENT (HCC): ICD-10-CM

## 2025-01-27 RX ORDER — BACLOFEN 10 MG/1
10 TABLET ORAL 3 TIMES DAILY PRN
Qty: 60 TABLET | Refills: 2 | Status: SHIPPED | OUTPATIENT
Start: 2025-01-27

## 2025-01-27 RX ORDER — PREDNISONE 10 MG/1
10 TABLET ORAL
Qty: 15 TABLET | Refills: 0 | Status: SHIPPED | OUTPATIENT
Start: 2025-01-27 | End: 2025-02-01

## 2025-01-27 SDOH — ECONOMIC STABILITY: FOOD INSECURITY: WITHIN THE PAST 12 MONTHS, THE FOOD YOU BOUGHT JUST DIDN'T LAST AND YOU DIDN'T HAVE MONEY TO GET MORE.: NEVER TRUE

## 2025-01-27 SDOH — ECONOMIC STABILITY: FOOD INSECURITY: WITHIN THE PAST 12 MONTHS, YOU WORRIED THAT YOUR FOOD WOULD RUN OUT BEFORE YOU GOT MONEY TO BUY MORE.: NEVER TRUE

## 2025-01-27 ASSESSMENT — PATIENT HEALTH QUESTIONNAIRE - PHQ9
SUM OF ALL RESPONSES TO PHQ QUESTIONS 1-9: 0
2. FEELING DOWN, DEPRESSED OR HOPELESS: NOT AT ALL
SUM OF ALL RESPONSES TO PHQ QUESTIONS 1-9: 0
SUM OF ALL RESPONSES TO PHQ QUESTIONS 1-9: 0
1. LITTLE INTEREST OR PLEASURE IN DOING THINGS: NOT AT ALL
SUM OF ALL RESPONSES TO PHQ9 QUESTIONS 1 & 2: 0
SUM OF ALL RESPONSES TO PHQ QUESTIONS 1-9: 0

## 2025-01-27 ASSESSMENT — ENCOUNTER SYMPTOMS
EYES NEGATIVE: 1
RESPIRATORY NEGATIVE: 1
ALLERGIC/IMMUNOLOGIC NEGATIVE: 1
GASTROINTESTINAL NEGATIVE: 1
BACK PAIN: 1

## 2025-01-27 NOTE — PROGRESS NOTES
25  Name: Montana Gerard    : 1940    Sex: male    Age: 84 y.o.        Subjective:    eMedicine Video Visit    This visit was performed as a virtual video visit using a synchronous, two-way, audio-video telehealth technology platform. Patient identification was verified at the start of the visit, including the patient's telephone number and physical location. I discussed with the patient the nature of our telehealth visits, that:     Due to the nature of an audio- video modality, the only components of a physical exam that could be done are the elements supported by direct observation.  I would evaluate the patient and recommend diagnostics and treatments based on my assessment.  If it was felt that the patient should be evaluated in clinic or an emergency room setting, then they would be directed there.  Our sessions are not being recorded and that personal health information is protected.  Our team would provide follow up care in person if/when the patient needs it.       Patient does agree to proceed with telemedicine consultation.    Patient's location: home address in [place]  Physician  location home address in [place] other people involved in call [facility]      Time spent: Greater than Not billed by time    This visit was completed virtually using [email or web address]       Patient has requested an audio/video evaluation for the following concern(s):    Back pain    Week ago today   water inbasment--- by wed he workled hard doing lot of work with pump and lots of benidng  Sicne with low back pain     C/o right low back pain worwe witjh chg positin  in  rcliner chair now  Pain with stadnig up  No trauma      Review of Systems   Constitutional: Negative.    HENT: Negative.     Eyes: Negative.    Respiratory: Negative.     Cardiovascular: Negative.    Gastrointestinal: Negative.    Endocrine: Negative.    Genitourinary: Negative.    Musculoskeletal:  Positive for back pain.   Skin: Negative.

## 2025-02-03 ENCOUNTER — TELEPHONE (OUTPATIENT)
Dept: PRIMARY CARE CLINIC | Age: 85
End: 2025-02-03

## 2025-02-03 NOTE — TELEPHONE ENCOUNTER
Pt finished with steroids and wondering if he needs an xray next, still having pain enough that he can't move around or do stairs, confined to lounge chair in living room. Pain is in lumbar region on the right side.  Do you still want him on the baclofen also?

## 2025-02-04 NOTE — TELEPHONE ENCOUNTER
Pt scheduled 2/7/25, wife wants him to go to ER, pt decclined. Pt advised if the pain worsen prior to 2/7/25 he is to go to ER. Pt verbalized understanding

## 2025-02-07 ENCOUNTER — OFFICE VISIT (OUTPATIENT)
Dept: PRIMARY CARE CLINIC | Age: 85
End: 2025-02-07

## 2025-02-07 VITALS
WEIGHT: 156 LBS | RESPIRATION RATE: 16 BRPM | TEMPERATURE: 98 F | BODY MASS INDEX: 24.48 KG/M2 | HEIGHT: 67 IN | HEART RATE: 72 BPM

## 2025-02-07 DIAGNOSIS — C61 CANCER OF PROSTATE (HCC): Chronic | ICD-10-CM

## 2025-02-07 DIAGNOSIS — N30.00 ACUTE CYSTITIS WITHOUT HEMATURIA: ICD-10-CM

## 2025-02-07 DIAGNOSIS — D50.8 OTHER IRON DEFICIENCY ANEMIA: ICD-10-CM

## 2025-02-07 DIAGNOSIS — I25.119 CORONARY ARTERY DISEASE INVOLVING NATIVE CORONARY ARTERY OF NATIVE HEART WITH ANGINA PECTORIS (HCC): ICD-10-CM

## 2025-02-07 DIAGNOSIS — I50.43 SYSTOLIC AND DIASTOLIC CHF, ACUTE ON CHRONIC (HCC): ICD-10-CM

## 2025-02-07 DIAGNOSIS — S39.012A STRAIN OF LUMBAR REGION, INITIAL ENCOUNTER: ICD-10-CM

## 2025-02-07 DIAGNOSIS — I48.19 ATRIAL FIBRILLATION, PERSISTENT (HCC): ICD-10-CM

## 2025-02-07 DIAGNOSIS — M54.50 ACUTE RIGHT-SIDED LOW BACK PAIN WITHOUT SCIATICA: ICD-10-CM

## 2025-02-07 DIAGNOSIS — R10.84 GENERALIZED ABDOMINAL PAIN: ICD-10-CM

## 2025-02-07 DIAGNOSIS — Z00.00 MEDICARE ANNUAL WELLNESS VISIT, SUBSEQUENT: Primary | ICD-10-CM

## 2025-02-07 LAB
ALBUMIN: 3.6 G/DL (ref 3.5–5.2)
ALP BLD-CCNC: 128 U/L (ref 40–129)
ALT SERPL-CCNC: 33 U/L (ref 0–40)
AMYLASE: 33 U/L (ref 20–100)
ANION GAP SERPL CALCULATED.3IONS-SCNC: 14 MMOL/L (ref 7–16)
AST SERPL-CCNC: 27 U/L (ref 0–39)
BASOPHILS ABSOLUTE: 0.02 K/UL (ref 0–0.2)
BASOPHILS RELATIVE PERCENT: 0 % (ref 0–2)
BILIRUB SERPL-MCNC: 0.9 MG/DL (ref 0–1.2)
BILIRUBIN, URINE: NEGATIVE
BUN BLDV-MCNC: 30 MG/DL (ref 6–23)
CALCIUM SERPL-MCNC: 9.4 MG/DL (ref 8.6–10.2)
CHLORIDE BLD-SCNC: 102 MMOL/L (ref 98–107)
CO2: 22 MMOL/L (ref 22–29)
COLOR, UA: YELLOW
COMMENT: ABNORMAL
CREAT SERPL-MCNC: 1 MG/DL (ref 0.7–1.2)
EOSINOPHILS ABSOLUTE: 0.07 K/UL (ref 0.05–0.5)
EOSINOPHILS RELATIVE PERCENT: 1 % (ref 0–6)
GFR, ESTIMATED: 80 ML/MIN/1.73M2
GLUCOSE BLD-MCNC: 97 MG/DL (ref 74–99)
GLUCOSE URINE: NEGATIVE MG/DL
HCT VFR BLD CALC: 32.8 % (ref 37–54)
HEMOGLOBIN: 10.2 G/DL (ref 12.5–16.5)
IMMATURE GRANULOCYTES %: 1 % (ref 0–5)
IMMATURE GRANULOCYTES ABSOLUTE: 0.06 K/UL (ref 0–0.58)
IRON: 52 UG/DL (ref 59–158)
KETONES, URINE: NEGATIVE MG/DL
LEUKOCYTE ESTERASE, URINE: NEGATIVE
LYMPHOCYTES ABSOLUTE: 0.48 K/UL (ref 1.5–4)
LYMPHOCYTES RELATIVE PERCENT: 5 % (ref 20–42)
MCH RBC QN AUTO: 32 PG (ref 26–35)
MCHC RBC AUTO-ENTMCNC: 31.1 G/DL (ref 32–34.5)
MCV RBC AUTO: 102.8 FL (ref 80–99.9)
MONOCYTES ABSOLUTE: 0.54 K/UL (ref 0.1–0.95)
MONOCYTES RELATIVE PERCENT: 5 % (ref 2–12)
NEUTROPHILS ABSOLUTE: 8.99 K/UL (ref 1.8–7.3)
NEUTROPHILS RELATIVE PERCENT: 89 % (ref 43–80)
NITRITE, URINE: NEGATIVE
NT PRO BNP: 482 PG/ML (ref 0–450)
PDW BLD-RTO: 14.1 % (ref 11.5–15)
PH, URINE: 6 (ref 5–8)
PLATELET # BLD: 106 K/UL (ref 130–450)
PMV BLD AUTO: 10.8 FL (ref 7–12)
POTASSIUM SERPL-SCNC: 4.4 MMOL/L (ref 3.5–5)
PROSTATE SPECIFIC ANTIGEN: 0.12 NG/ML (ref 0–4)
PROTEIN UA: NEGATIVE MG/DL
RBC # BLD: 3.19 M/UL (ref 3.8–5.8)
RBC # BLD: ABNORMAL 10*6/UL
SODIUM BLD-SCNC: 138 MMOL/L (ref 132–146)
SPECIFIC GRAVITY UA: 1.02 (ref 1–1.03)
TOTAL PROTEIN: 6.4 G/DL (ref 6.4–8.3)
TURBIDITY: CLEAR
URINE HGB: NEGATIVE
UROBILINOGEN, URINE: 4 EU/DL (ref 0–1)
WBC # BLD: 10.2 K/UL (ref 4.5–11.5)
WBC # BLD: ABNORMAL 10*3/UL

## 2025-02-07 ASSESSMENT — LIFESTYLE VARIABLES: HOW MANY STANDARD DRINKS CONTAINING ALCOHOL DO YOU HAVE ON A TYPICAL DAY: PATIENT DOES NOT DRINK
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
## 2025-02-08 ENCOUNTER — TELEPHONE (OUTPATIENT)
Dept: PRIMARY CARE CLINIC | Age: 85
End: 2025-02-08

## 2025-02-08 LAB
CULTURE: NORMAL
MAGNESIUM: 2 MG/DL (ref 1.6–2.6)
SPECIMEN DESCRIPTION: NORMAL

## 2025-02-08 NOTE — TELEPHONE ENCOUNTER
Notify the wife that the x-rays do not show any fractures or some arthritis.    His laboratory studies show his hemoglobin is down iron levels are low.  Ask if she still taking iron.  I am concerned with his weight loss.  Encourage protein shakes 2 or 3 a day.  I am setting up with hematology at [facility] on [address].  He may need iron infusions.  We need to find a cause of why he is becoming anemic and weight loss.  I want him to see me in 2 weeks.  Make that appointment

## 2025-02-10 ENCOUNTER — TELEPHONE (OUTPATIENT)
Dept: PRIMARY CARE CLINIC | Age: 85
End: 2025-02-10

## 2025-02-12 ENCOUNTER — TELEPHONE (OUTPATIENT)
Dept: PRIMARY CARE CLINIC | Age: 85
End: 2025-02-12

## 2025-02-12 DIAGNOSIS — I25.119 CORONARY ARTERY DISEASE INVOLVING NATIVE CORONARY ARTERY OF NATIVE HEART WITH ANGINA PECTORIS (HCC): ICD-10-CM

## 2025-02-12 DIAGNOSIS — R26.9 GAIT ABNORMALITY: ICD-10-CM

## 2025-02-12 DIAGNOSIS — S39.012A STRAIN OF LUMBAR REGION, INITIAL ENCOUNTER: ICD-10-CM

## 2025-02-12 DIAGNOSIS — M54.50 ACUTE RIGHT-SIDED LOW BACK PAIN WITHOUT SCIATICA: Primary | ICD-10-CM

## 2025-02-12 NOTE — TELEPHONE ENCOUNTER
Not making much progress.  Feet are still swollen, moving a little better, but very difficult.  Asking if Home Health evaluation for PT/OT and aid needs.  Wife had great experience with [facility]

## 2025-02-16 ENCOUNTER — HOSPITAL ENCOUNTER (INPATIENT)
Age: 85
LOS: 9 days | Discharge: NURSING, REHAB OR LONG-TERM CARE | DRG: 308 | End: 2025-02-25
Attending: EMERGENCY MEDICINE | Admitting: INTERNAL MEDICINE
Payer: MEDICARE

## 2025-02-16 ENCOUNTER — APPOINTMENT (OUTPATIENT)
Dept: CT IMAGING | Age: 85
DRG: 308 | End: 2025-02-16
Attending: EMERGENCY MEDICINE
Payer: MEDICARE

## 2025-02-16 ENCOUNTER — APPOINTMENT (OUTPATIENT)
Dept: GENERAL RADIOLOGY | Age: 85
DRG: 308 | End: 2025-02-16
Payer: MEDICARE

## 2025-02-16 DIAGNOSIS — R62.7 FAILURE TO THRIVE IN ADULT: Primary | ICD-10-CM

## 2025-02-16 DIAGNOSIS — K92.2 GASTROINTESTINAL HEMORRHAGE, UNSPECIFIED GASTROINTESTINAL HEMORRHAGE TYPE: ICD-10-CM

## 2025-02-16 DIAGNOSIS — M54.50 LOW BACK PAIN, UNSPECIFIED BACK PAIN LATERALITY, UNSPECIFIED CHRONICITY, UNSPECIFIED WHETHER SCIATICA PRESENT: ICD-10-CM

## 2025-02-16 DIAGNOSIS — Z79.01 CHRONIC ANTICOAGULATION: ICD-10-CM

## 2025-02-16 DIAGNOSIS — I48.91 ATRIAL FIBRILLATION WITH RAPID VENTRICULAR RESPONSE (HCC): ICD-10-CM

## 2025-02-16 LAB
ABO + RH BLD: NORMAL
ALBUMIN SERPL-MCNC: 3.7 G/DL (ref 3.5–5.2)
ALP SERPL-CCNC: 129 U/L (ref 40–129)
ALT SERPL-CCNC: 28 U/L (ref 0–40)
ANION GAP SERPL CALCULATED.3IONS-SCNC: 10 MMOL/L (ref 7–16)
ARM BAND NUMBER: NORMAL
AST SERPL-CCNC: 25 U/L (ref 0–39)
B PARAP IS1001 DNA NPH QL NAA+NON-PROBE: NOT DETECTED
B PERT DNA SPEC QL NAA+PROBE: NOT DETECTED
BACTERIA URNS QL MICRO: ABNORMAL
BASOPHILS # BLD: 0.01 K/UL (ref 0–0.2)
BASOPHILS NFR BLD: 0 % (ref 0–2)
BILIRUB DIRECT SERPL-MCNC: 0.3 MG/DL (ref 0–0.3)
BILIRUB INDIRECT SERPL-MCNC: 0.4 MG/DL (ref 0–1)
BILIRUB SERPL-MCNC: 0.7 MG/DL (ref 0–1.2)
BILIRUB UR QL STRIP: NEGATIVE
BLOOD BANK SAMPLE EXPIRATION: NORMAL
BLOOD GROUP ANTIBODIES SERPL: NEGATIVE
BNP SERPL-MCNC: 1713 PG/ML (ref 0–450)
BUN SERPL-MCNC: 37 MG/DL (ref 6–23)
C PNEUM DNA NPH QL NAA+NON-PROBE: NOT DETECTED
CALCIUM SERPL-MCNC: 9.2 MG/DL (ref 8.6–10.2)
CHLORIDE SERPL-SCNC: 101 MMOL/L (ref 98–107)
CHP ED QC CHECK: NORMAL
CLARITY UR: CLEAR
CO2 SERPL-SCNC: 24 MMOL/L (ref 22–29)
COLOR UR: YELLOW
CREAT SERPL-MCNC: 0.9 MG/DL (ref 0.7–1.2)
EOSINOPHIL # BLD: 0.01 K/UL (ref 0.05–0.5)
EOSINOPHILS RELATIVE PERCENT: 0 % (ref 0–6)
EPI CELLS #/AREA URNS HPF: ABNORMAL /HPF
ERYTHROCYTE [DISTWIDTH] IN BLOOD BY AUTOMATED COUNT: 14.6 % (ref 11.5–15)
FLUAV RNA NPH QL NAA+NON-PROBE: NOT DETECTED
FLUBV RNA NPH QL NAA+NON-PROBE: NOT DETECTED
GFR, ESTIMATED: 84 ML/MIN/1.73M2
GLUCOSE BLD-MCNC: 120 MG/DL
GLUCOSE BLD-MCNC: 120 MG/DL (ref 74–99)
GLUCOSE SERPL-MCNC: 120 MG/DL (ref 74–99)
GLUCOSE UR STRIP-MCNC: NEGATIVE MG/DL
HADV DNA NPH QL NAA+NON-PROBE: NOT DETECTED
HCOV 229E RNA NPH QL NAA+NON-PROBE: NOT DETECTED
HCOV HKU1 RNA NPH QL NAA+NON-PROBE: NOT DETECTED
HCOV NL63 RNA NPH QL NAA+NON-PROBE: NOT DETECTED
HCOV OC43 RNA NPH QL NAA+NON-PROBE: NOT DETECTED
HCT VFR BLD AUTO: 27.5 % (ref 37–54)
HCT VFR BLD AUTO: 29.4 % (ref 37–54)
HGB BLD-MCNC: 8.8 G/DL (ref 12.5–16.5)
HGB BLD-MCNC: 9.2 G/DL (ref 12.5–16.5)
HGB UR QL STRIP.AUTO: NEGATIVE
HMPV RNA NPH QL NAA+NON-PROBE: NOT DETECTED
HPIV1 RNA NPH QL NAA+NON-PROBE: NOT DETECTED
HPIV2 RNA NPH QL NAA+NON-PROBE: NOT DETECTED
HPIV3 RNA NPH QL NAA+NON-PROBE: NOT DETECTED
HPIV4 RNA NPH QL NAA+NON-PROBE: NOT DETECTED
IMM GRANULOCYTES # BLD AUTO: 0.05 K/UL (ref 0–0.58)
IMM GRANULOCYTES NFR BLD: 1 % (ref 0–5)
KETONES UR STRIP-MCNC: NEGATIVE MG/DL
LACTATE BLDV-SCNC: 1.5 MMOL/L (ref 0.5–2.2)
LEUKOCYTE ESTERASE UR QL STRIP: NEGATIVE
LIPASE SERPL-CCNC: 22 U/L (ref 13–60)
LYMPHOCYTES NFR BLD: 0.61 K/UL (ref 1.5–4)
LYMPHOCYTES RELATIVE PERCENT: 8 % (ref 20–42)
M PNEUMO DNA NPH QL NAA+NON-PROBE: NOT DETECTED
MCH RBC QN AUTO: 31.2 PG (ref 26–35)
MCHC RBC AUTO-ENTMCNC: 32 G/DL (ref 32–34.5)
MCV RBC AUTO: 97.5 FL (ref 80–99.9)
MONOCYTES NFR BLD: 0.86 K/UL (ref 0.1–0.95)
MONOCYTES NFR BLD: 12 % (ref 2–12)
NEUTROPHILS NFR BLD: 79 % (ref 43–80)
NEUTS SEG NFR BLD: 5.92 K/UL (ref 1.8–7.3)
NITRITE UR QL STRIP: NEGATIVE
PH UR STRIP: 6 [PH] (ref 5–8)
PLATELET # BLD AUTO: 100 K/UL (ref 130–450)
PMV BLD AUTO: 9.5 FL (ref 7–12)
POTASSIUM SERPL-SCNC: 4.5 MMOL/L (ref 3.5–5)
PROT SERPL-MCNC: 6.4 G/DL (ref 6.4–8.3)
PROT UR STRIP-MCNC: 30 MG/DL
RBC # BLD AUTO: 2.82 M/UL (ref 3.8–5.8)
RBC #/AREA URNS HPF: ABNORMAL /HPF
RSV RNA NPH QL NAA+NON-PROBE: NOT DETECTED
RV+EV RNA NPH QL NAA+NON-PROBE: NOT DETECTED
SARS-COV-2 RNA NPH QL NAA+NON-PROBE: NOT DETECTED
SODIUM SERPL-SCNC: 135 MMOL/L (ref 132–146)
SP GR UR STRIP: 1.02 (ref 1–1.03)
SPECIMEN DESCRIPTION: NORMAL
TROPONIN I SERPL HS-MCNC: 46 NG/L (ref 0–11)
TROPONIN I SERPL HS-MCNC: 49 NG/L (ref 0–11)
UROBILINOGEN UR STRIP-ACNC: 0.2 EU/DL (ref 0–1)
WBC #/AREA URNS HPF: ABNORMAL /HPF
WBC OTHER # BLD: 7.5 K/UL (ref 4.5–11.5)

## 2025-02-16 PROCEDURE — 93005 ELECTROCARDIOGRAM TRACING: CPT | Performed by: EMERGENCY MEDICINE

## 2025-02-16 PROCEDURE — 99285 EMERGENCY DEPT VISIT HI MDM: CPT

## 2025-02-16 PROCEDURE — 0202U NFCT DS 22 TRGT SARS-COV-2: CPT

## 2025-02-16 PROCEDURE — 96374 THER/PROPH/DIAG INJ IV PUSH: CPT

## 2025-02-16 PROCEDURE — 82962 GLUCOSE BLOOD TEST: CPT

## 2025-02-16 PROCEDURE — 72131 CT LUMBAR SPINE W/O DYE: CPT

## 2025-02-16 PROCEDURE — 86850 RBC ANTIBODY SCREEN: CPT

## 2025-02-16 PROCEDURE — 83605 ASSAY OF LACTIC ACID: CPT

## 2025-02-16 PROCEDURE — 6370000000 HC RX 637 (ALT 250 FOR IP): Performed by: INTERNAL MEDICINE

## 2025-02-16 PROCEDURE — 81001 URINALYSIS AUTO W/SCOPE: CPT

## 2025-02-16 PROCEDURE — 86901 BLOOD TYPING SEROLOGIC RH(D): CPT

## 2025-02-16 PROCEDURE — 83880 ASSAY OF NATRIURETIC PEPTIDE: CPT

## 2025-02-16 PROCEDURE — 6360000002 HC RX W HCPCS: Performed by: EMERGENCY MEDICINE

## 2025-02-16 PROCEDURE — 2500000003 HC RX 250 WO HCPCS: Performed by: EMERGENCY MEDICINE

## 2025-02-16 PROCEDURE — 74177 CT ABD & PELVIS W/CONTRAST: CPT

## 2025-02-16 PROCEDURE — 87086 URINE CULTURE/COLONY COUNT: CPT

## 2025-02-16 PROCEDURE — 82248 BILIRUBIN DIRECT: CPT

## 2025-02-16 PROCEDURE — 84484 ASSAY OF TROPONIN QUANT: CPT

## 2025-02-16 PROCEDURE — 85018 HEMOGLOBIN: CPT

## 2025-02-16 PROCEDURE — 71045 X-RAY EXAM CHEST 1 VIEW: CPT

## 2025-02-16 PROCEDURE — 2140000000 HC CCU INTERMEDIATE R&B

## 2025-02-16 PROCEDURE — 85025 COMPLETE CBC W/AUTO DIFF WBC: CPT

## 2025-02-16 PROCEDURE — 83690 ASSAY OF LIPASE: CPT

## 2025-02-16 PROCEDURE — 85014 HEMATOCRIT: CPT

## 2025-02-16 PROCEDURE — 2500000003 HC RX 250 WO HCPCS: Performed by: INTERNAL MEDICINE

## 2025-02-16 PROCEDURE — 80053 COMPREHEN METABOLIC PANEL: CPT

## 2025-02-16 PROCEDURE — 6360000004 HC RX CONTRAST MEDICATION: Performed by: RADIOLOGY

## 2025-02-16 PROCEDURE — 86900 BLOOD TYPING SEROLOGIC ABO: CPT

## 2025-02-16 RX ORDER — POTASSIUM CHLORIDE 7.45 MG/ML
10 INJECTION INTRAVENOUS PRN
Status: DISCONTINUED | OUTPATIENT
Start: 2025-02-16 | End: 2025-02-25 | Stop reason: HOSPADM

## 2025-02-16 RX ORDER — SODIUM CHLORIDE 9 MG/ML
INJECTION, SOLUTION INTRAVENOUS PRN
Status: DISCONTINUED | OUTPATIENT
Start: 2025-02-16 | End: 2025-02-25 | Stop reason: HOSPADM

## 2025-02-16 RX ORDER — METOPROLOL SUCCINATE 25 MG/1
12.5 TABLET, EXTENDED RELEASE ORAL DAILY
Status: DISCONTINUED | OUTPATIENT
Start: 2025-02-17 | End: 2025-02-25 | Stop reason: HOSPADM

## 2025-02-16 RX ORDER — METOPROLOL TARTRATE 1 MG/ML
5 INJECTION, SOLUTION INTRAVENOUS ONCE
Status: COMPLETED | OUTPATIENT
Start: 2025-02-16 | End: 2025-02-16

## 2025-02-16 RX ORDER — BACLOFEN 10 MG/1
10 TABLET ORAL 3 TIMES DAILY PRN
Status: DISCONTINUED | OUTPATIENT
Start: 2025-02-16 | End: 2025-02-25 | Stop reason: HOSPADM

## 2025-02-16 RX ORDER — SODIUM CHLORIDE 0.9 % (FLUSH) 0.9 %
5-40 SYRINGE (ML) INJECTION PRN
Status: DISCONTINUED | OUTPATIENT
Start: 2025-02-16 | End: 2025-02-25 | Stop reason: HOSPADM

## 2025-02-16 RX ORDER — BRIMONIDINE TARTRATE AND TIMOLOL MALEATE 2; 5 MG/ML; MG/ML
1 SOLUTION OPHTHALMIC EVERY 12 HOURS
Status: DISCONTINUED | OUTPATIENT
Start: 2025-02-16 | End: 2025-02-17 | Stop reason: SDUPTHER

## 2025-02-16 RX ORDER — DAPSONE 25 MG/1
25 TABLET ORAL DAILY
Status: DISCONTINUED | OUTPATIENT
Start: 2025-02-16 | End: 2025-02-25 | Stop reason: HOSPADM

## 2025-02-16 RX ORDER — ATORVASTATIN CALCIUM 40 MG/1
40 TABLET, FILM COATED ORAL DAILY
Status: DISCONTINUED | OUTPATIENT
Start: 2025-02-16 | End: 2025-02-25 | Stop reason: HOSPADM

## 2025-02-16 RX ORDER — FERROUS SULFATE 325(65) MG
325 TABLET ORAL DAILY
Status: DISCONTINUED | OUTPATIENT
Start: 2025-02-16 | End: 2025-02-25 | Stop reason: HOSPADM

## 2025-02-16 RX ORDER — BUPROPION HYDROCHLORIDE 100 MG/1
100 TABLET, EXTENDED RELEASE ORAL DAILY
Status: DISCONTINUED | OUTPATIENT
Start: 2025-02-16 | End: 2025-02-25 | Stop reason: HOSPADM

## 2025-02-16 RX ORDER — ACETAMINOPHEN 650 MG/1
650 SUPPOSITORY RECTAL EVERY 6 HOURS PRN
Status: DISCONTINUED | OUTPATIENT
Start: 2025-02-16 | End: 2025-02-25 | Stop reason: HOSPADM

## 2025-02-16 RX ORDER — DOFETILIDE 0.12 MG/1
125 CAPSULE ORAL 2 TIMES DAILY
Status: DISCONTINUED | OUTPATIENT
Start: 2025-02-16 | End: 2025-02-25 | Stop reason: HOSPADM

## 2025-02-16 RX ORDER — IOPAMIDOL 755 MG/ML
75 INJECTION, SOLUTION INTRAVASCULAR
Status: COMPLETED | OUTPATIENT
Start: 2025-02-16 | End: 2025-02-16

## 2025-02-16 RX ORDER — POLYETHYLENE GLYCOL 3350 17 G/17G
17 POWDER, FOR SOLUTION ORAL DAILY PRN
Status: DISCONTINUED | OUTPATIENT
Start: 2025-02-16 | End: 2025-02-25 | Stop reason: HOSPADM

## 2025-02-16 RX ORDER — LATANOPROST 50 UG/ML
1 SOLUTION/ DROPS OPHTHALMIC NIGHTLY
Status: DISCONTINUED | OUTPATIENT
Start: 2025-02-16 | End: 2025-02-25 | Stop reason: HOSPADM

## 2025-02-16 RX ORDER — POTASSIUM CHLORIDE 1500 MG/1
40 TABLET, EXTENDED RELEASE ORAL PRN
Status: DISCONTINUED | OUTPATIENT
Start: 2025-02-16 | End: 2025-02-25 | Stop reason: HOSPADM

## 2025-02-16 RX ORDER — PANTOPRAZOLE SODIUM 40 MG/10ML
40 INJECTION, POWDER, LYOPHILIZED, FOR SOLUTION INTRAVENOUS ONCE
Status: COMPLETED | OUTPATIENT
Start: 2025-02-16 | End: 2025-02-16

## 2025-02-16 RX ORDER — METOPROLOL TARTRATE 1 MG/ML
5 INJECTION, SOLUTION INTRAVENOUS EVERY 5 MIN PRN
Status: DISCONTINUED | OUTPATIENT
Start: 2025-02-16 | End: 2025-02-20

## 2025-02-16 RX ORDER — ACETAMINOPHEN 325 MG/1
650 TABLET ORAL EVERY 6 HOURS PRN
Status: DISCONTINUED | OUTPATIENT
Start: 2025-02-16 | End: 2025-02-25 | Stop reason: HOSPADM

## 2025-02-16 RX ORDER — PANTOPRAZOLE SODIUM 40 MG/1
40 TABLET, DELAYED RELEASE ORAL
Status: DISCONTINUED | OUTPATIENT
Start: 2025-02-17 | End: 2025-02-25 | Stop reason: HOSPADM

## 2025-02-16 RX ORDER — SODIUM CHLORIDE 0.9 % (FLUSH) 0.9 %
5-40 SYRINGE (ML) INJECTION EVERY 12 HOURS SCHEDULED
Status: DISCONTINUED | OUTPATIENT
Start: 2025-02-16 | End: 2025-02-25 | Stop reason: HOSPADM

## 2025-02-16 RX ORDER — TAMSULOSIN HYDROCHLORIDE 0.4 MG/1
0.8 CAPSULE ORAL NIGHTLY
Status: DISCONTINUED | OUTPATIENT
Start: 2025-02-16 | End: 2025-02-25 | Stop reason: HOSPADM

## 2025-02-16 RX ORDER — MAGNESIUM SULFATE IN WATER 40 MG/ML
2000 INJECTION, SOLUTION INTRAVENOUS PRN
Status: DISCONTINUED | OUTPATIENT
Start: 2025-02-16 | End: 2025-02-25 | Stop reason: HOSPADM

## 2025-02-16 RX ADMIN — BUPROPION HYDROCHLORIDE 100 MG: 100 TABLET, FILM COATED, EXTENDED RELEASE ORAL at 20:19

## 2025-02-16 RX ADMIN — PANTOPRAZOLE SODIUM 40 MG: 40 INJECTION, POWDER, FOR SOLUTION INTRAVENOUS at 13:47

## 2025-02-16 RX ADMIN — METOPROLOL TARTRATE 5 MG: 5 INJECTION INTRAVENOUS at 20:19

## 2025-02-16 RX ADMIN — IOPAMIDOL 75 ML: 755 INJECTION, SOLUTION INTRAVENOUS at 16:24

## 2025-02-16 RX ADMIN — FERROUS SULFATE TAB 325 MG (65 MG ELEMENTAL FE) 325 MG: 325 (65 FE) TAB at 18:40

## 2025-02-16 RX ADMIN — TAMSULOSIN HYDROCHLORIDE 0.8 MG: 0.4 CAPSULE ORAL at 18:40

## 2025-02-16 RX ADMIN — SODIUM CHLORIDE, PRESERVATIVE FREE 10 ML: 5 INJECTION INTRAVENOUS at 20:19

## 2025-02-16 RX ADMIN — ATORVASTATIN CALCIUM 40 MG: 40 TABLET, FILM COATED ORAL at 18:40

## 2025-02-16 RX ADMIN — DOFETILIDE 125 MCG: 0.12 CAPSULE ORAL at 18:40

## 2025-02-16 NOTE — ED PROVIDER NOTES
[facility] EMERGENCY DEPARTMENT  EMERGENCY DEPARTMENT ENCOUNTER        Pt Name: Montana Gerard  MRN: [id number]  Birthdate 1940  Date of evaluation: 2/16/2025  Provider: Kota Mehta DO  PCP: Haseeb Phan DO  Note Started: 1:12 PM EST 2/16/25    CHIEF COMPLAINT       Chief Complaint   Patient presents with    Back Pain     Sacral region back pain, recent xrays on back- incontinence feces and urine    Weight Loss     Lost 20 lbs recently    Rectal Bleeding     Bloody stool this AM bright red this AM       HISTORY OF PRESENT ILLNESS: 1 or more Elements            Montana Gerard is a 84 y.o. male history of chronic back pain, A-fib on Eliquis, CAD, prostate cancer, HLD, HTN, brought in by son for complaint of acute on chronic back pain, 1 episode of hematuria, states he visualized a small clot in his diaper this morning.  Patient complaining of worsening BLE edema, no shortness of breath, no chest pain, no abdominal pain, no nausea, vomiting, diarrhea.  Son was mostly concerned as he found patient today in bed with sacral decubitus ulcers and patient was laying in his own stool.  Concerned that he is minimally ambulatory around the house unable to perform ADLs.    Nursing Notes were all reviewed and agreed with or any disagreements were addressed in the HPI.      REVIEW OF EXTERNAL NOTE :       Records reviewed for medical hx, surgical, hx, and medication lists      Chart Review/External Note Review    Last Echo reviewed by Me:  Lab Results   Component Value Date    LVEF 60 04/01/2021             Controlled Substance Monitoring:    Acute and Chronic Pain Monitoring:        No data to display                    REVIEW OF SYSTEMS :      Positives and Pertinent negatives as per HPI.     SURGICAL HISTORY     Past Surgical History:   Procedure Laterality Date    CARDIAC SURGERY  03/31/2023    TAVR    CARDIOVASCULAR STRESS TEST  01/01/2004    CHOLECYSTECTOMY

## 2025-02-17 LAB
ANION GAP SERPL CALCULATED.3IONS-SCNC: 12 MMOL/L (ref 7–16)
BUN SERPL-MCNC: 32 MG/DL (ref 6–23)
CALCIUM SERPL-MCNC: 8.8 MG/DL (ref 8.6–10.2)
CHLORIDE SERPL-SCNC: 103 MMOL/L (ref 98–107)
CO2 SERPL-SCNC: 21 MMOL/L (ref 22–29)
CREAT SERPL-MCNC: 1 MG/DL (ref 0.7–1.2)
EKG ATRIAL RATE: 120 BPM
EKG Q-T INTERVAL: 332 MS
EKG QRS DURATION: 96 MS
EKG QTC CALCULATION (BAZETT): 447 MS
EKG R AXIS: 53 DEGREES
EKG T AXIS: 74 DEGREES
EKG VENTRICULAR RATE: 109 BPM
ERYTHROCYTE [DISTWIDTH] IN BLOOD BY AUTOMATED COUNT: 14.6 % (ref 11.5–15)
GFR, ESTIMATED: 78 ML/MIN/1.73M2
GLUCOSE SERPL-MCNC: 142 MG/DL (ref 74–99)
HCT VFR BLD AUTO: 28.6 % (ref 37–54)
HGB BLD-MCNC: 8.8 G/DL (ref 12.5–16.5)
MAGNESIUM SERPL-MCNC: 2 MG/DL (ref 1.6–2.6)
MCH RBC QN AUTO: 30.9 PG (ref 26–35)
MCHC RBC AUTO-ENTMCNC: 30.8 G/DL (ref 32–34.5)
MCV RBC AUTO: 100.4 FL (ref 80–99.9)
PLATELET # BLD AUTO: 119 K/UL (ref 130–450)
PMV BLD AUTO: 9.8 FL (ref 7–12)
POTASSIUM SERPL-SCNC: 4.2 MMOL/L (ref 3.5–5)
RBC # BLD AUTO: 2.85 M/UL (ref 3.8–5.8)
SODIUM SERPL-SCNC: 136 MMOL/L (ref 132–146)
TSH SERPL DL<=0.05 MIU/L-ACNC: 2.63 UIU/ML (ref 0.27–4.2)
WBC OTHER # BLD: 9.3 K/UL (ref 4.5–11.5)

## 2025-02-17 PROCEDURE — 2140000000 HC CCU INTERMEDIATE R&B

## 2025-02-17 PROCEDURE — 93005 ELECTROCARDIOGRAM TRACING: CPT | Performed by: NURSE PRACTITIONER

## 2025-02-17 PROCEDURE — 97161 PT EVAL LOW COMPLEX 20 MIN: CPT

## 2025-02-17 PROCEDURE — 6370000000 HC RX 637 (ALT 250 FOR IP): Performed by: INTERNAL MEDICINE

## 2025-02-17 PROCEDURE — 2500000003 HC RX 250 WO HCPCS: Performed by: INTERNAL MEDICINE

## 2025-02-17 PROCEDURE — 80048 BASIC METABOLIC PNL TOTAL CA: CPT

## 2025-02-17 PROCEDURE — 2700000000 HC OXYGEN THERAPY PER DAY

## 2025-02-17 PROCEDURE — 85027 COMPLETE CBC AUTOMATED: CPT

## 2025-02-17 PROCEDURE — 83735 ASSAY OF MAGNESIUM: CPT

## 2025-02-17 PROCEDURE — 99222 1ST HOSP IP/OBS MODERATE 55: CPT | Performed by: INTERNAL MEDICINE

## 2025-02-17 PROCEDURE — 84443 ASSAY THYROID STIM HORMONE: CPT

## 2025-02-17 PROCEDURE — 36415 COLL VENOUS BLD VENIPUNCTURE: CPT

## 2025-02-17 PROCEDURE — 97530 THERAPEUTIC ACTIVITIES: CPT

## 2025-02-17 RX ORDER — TIMOLOL MALEATE 5 MG/ML
1 SOLUTION/ DROPS OPHTHALMIC 2 TIMES DAILY
Status: DISCONTINUED | OUTPATIENT
Start: 2025-02-17 | End: 2025-02-25 | Stop reason: HOSPADM

## 2025-02-17 RX ORDER — BRIMONIDINE TARTRATE 2 MG/ML
1 SOLUTION/ DROPS OPHTHALMIC 2 TIMES DAILY
Status: DISCONTINUED | OUTPATIENT
Start: 2025-02-17 | End: 2025-02-25 | Stop reason: HOSPADM

## 2025-02-17 RX ADMIN — METOPROLOL SUCCINATE 12.5 MG: 25 TABLET, EXTENDED RELEASE ORAL at 18:24

## 2025-02-17 RX ADMIN — PANTOPRAZOLE SODIUM 40 MG: 40 TABLET, DELAYED RELEASE ORAL at 05:43

## 2025-02-17 RX ADMIN — SODIUM CHLORIDE, PRESERVATIVE FREE 10 ML: 5 INJECTION INTRAVENOUS at 20:01

## 2025-02-17 RX ADMIN — DAPSONE 25 MG: 25 TABLET ORAL at 12:42

## 2025-02-17 RX ADMIN — TAMSULOSIN HYDROCHLORIDE 0.8 MG: 0.4 CAPSULE ORAL at 18:23

## 2025-02-17 RX ADMIN — LATANOPROST 1 DROP: 50 SOLUTION OPHTHALMIC at 21:52

## 2025-02-17 RX ADMIN — BUPROPION HYDROCHLORIDE 100 MG: 100 TABLET, FILM COATED, EXTENDED RELEASE ORAL at 05:42

## 2025-02-17 RX ADMIN — BRIMONIDINE TARTRATE 1 DROP: 2 SOLUTION/ DROPS OPHTHALMIC at 21:52

## 2025-02-17 RX ADMIN — TIMOLOL MALEATE 1 DROP: 5 SOLUTION OPHTHALMIC at 21:52

## 2025-02-17 RX ADMIN — DOFETILIDE 125 MCG: 0.12 CAPSULE ORAL at 18:25

## 2025-02-17 RX ADMIN — TIMOLOL MALEATE 1 DROP: 5 SOLUTION OPHTHALMIC at 09:06

## 2025-02-17 RX ADMIN — DOFETILIDE 125 MCG: 0.12 CAPSULE ORAL at 05:43

## 2025-02-17 RX ADMIN — APIXABAN 5 MG: 5 TABLET, FILM COATED ORAL at 05:43

## 2025-02-17 RX ADMIN — BRIMONIDINE TARTRATE 1 DROP: 2 SOLUTION/ DROPS OPHTHALMIC at 09:06

## 2025-02-17 RX ADMIN — FERROUS SULFATE TAB 325 MG (65 MG ELEMENTAL FE) 325 MG: 325 (65 FE) TAB at 05:43

## 2025-02-17 RX ADMIN — ATORVASTATIN CALCIUM 40 MG: 40 TABLET, FILM COATED ORAL at 18:23
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
## 2025-02-17 NOTE — PLAN OF CARE
Problem: Safety - Adult  Goal: Free from fall injury  Outcome: Progressing  Flowsheets (Taken 2/17/2025 0107)  Free From Fall Injury: Instruct family/caregiver on patient safety     Problem: Chronic Conditions and Co-morbidities  Goal: Patient's chronic conditions and co-morbidity symptoms are monitored and maintained or improved  Outcome: Progressing     Problem: Discharge Planning  Goal: Discharge to home or other facility with appropriate resources  Outcome: Progressing     Problem: Skin/Tissue Integrity  Goal: Skin integrity remains intact  Description: 1.  Monitor for areas of redness and/or skin breakdown  2.  Assess vascular access sites hourly  3.  Every 4-6 hours minimum:  Change oxygen saturation probe site  4.  Every 4-6 hours:  If on nasal continuous positive airway pressure, respiratory therapy assess nares and determine need for appliance change or resting period  Outcome: Progressing     Problem: ABCDS Injury Assessment  Goal: Absence of physical injury  Outcome: Progressing

## 2025-02-17 NOTE — CARE COORDINATION
Patient presented to the ED from home due to back pain, weight loss and rectal bleeding; admitted for GI bleed, failure to thrive, atrial fibrillation with RVR and low back pain. Met with patient at bedside for transition of care planning. Patient reports he lives in a 2-story home with his wife, 7 steps to enter, ambulated with a rollator prior to admission and was driving; reports no other DME, reports increased weakness over the past month. Uses [facility]) and PCP is Dr. Gabe Phan. Discussed recent PT eval today and [facility] recommendation; patient agreeable, [facility] list provided, he will review list and SW to follow-up for choices. EP consulted, EKG, continues on Tikosyn, Eliquis and Toprol.    Case Management Assessment  Initial Evaluation    Date/Time of Evaluation: 2/17/2025 1:30 PM  Assessment Completed by: LANETTE Freed    If patient is discharged prior to next notation, then this note serves as note for discharge by case management.    Patient Name: Montana Gerard                   YOB: 1940  Diagnosis: GI bleed [K92.2]  Chronic anticoagulation [Z79.01]  Failure to thrive in adult [R62.7]  Atrial fibrillation with rapid ventricular response (HCC) [I48.91]  Gastrointestinal hemorrhage, unspecified gastrointestinal hemorrhage type [K92.2]  Low back pain, unspecified back pain laterality, unspecified chronicity, unspecified whether sciatica present [M54.50]                   Date / Time: 2/16/2025 12:44 PM    Patient Admission Status: Inpatient   Readmission Risk (Low < 19, Mod (19-27), High > 27): Readmission Risk Score: 14.6    Current PCP: Haseeb Phan, DO  PCP verified by CM? Yes    Chart Reviewed: Yes      History Provided by: Patient  Patient Orientation: Alert and Oriented    Patient Cognition: Alert    Hospitalization in the last 30 days (Readmission):  No    If yes, Readmission Assessment in CM Navigator will be completed.    Advance Directives:      Code

## 2025-02-18 PROBLEM — E44.0 MODERATE PROTEIN-CALORIE MALNUTRITION: Chronic | Status: ACTIVE | Noted: 2025-02-18

## 2025-02-18 LAB
ANION GAP SERPL CALCULATED.3IONS-SCNC: 9 MMOL/L (ref 7–16)
BUN SERPL-MCNC: 35 MG/DL (ref 6–23)
CALCIUM SERPL-MCNC: 8.6 MG/DL (ref 8.6–10.2)
CHLORIDE SERPL-SCNC: 105 MMOL/L (ref 98–107)
CO2 SERPL-SCNC: 23 MMOL/L (ref 22–29)
CREAT SERPL-MCNC: 0.9 MG/DL (ref 0.7–1.2)
EKG ATRIAL RATE: 69 BPM
EKG P AXIS: 42 DEGREES
EKG P-R INTERVAL: 156 MS
EKG Q-T INTERVAL: 450 MS
EKG QRS DURATION: 112 MS
EKG QTC CALCULATION (BAZETT): 482 MS
EKG R AXIS: 36 DEGREES
EKG T AXIS: 86 DEGREES
EKG VENTRICULAR RATE: 69 BPM
ERYTHROCYTE [DISTWIDTH] IN BLOOD BY AUTOMATED COUNT: 14.6 % (ref 11.5–15)
GFR, ESTIMATED: 81 ML/MIN/1.73M2
GLUCOSE SERPL-MCNC: 119 MG/DL (ref 74–99)
HCT VFR BLD AUTO: 24.9 % (ref 37–54)
HGB BLD-MCNC: 7.7 G/DL (ref 12.5–16.5)
MCH RBC QN AUTO: 30.8 PG (ref 26–35)
MCHC RBC AUTO-ENTMCNC: 30.9 G/DL (ref 32–34.5)
MCV RBC AUTO: 99.6 FL (ref 80–99.9)
MICROORGANISM SPEC CULT: ABNORMAL
PLATELET # BLD AUTO: 101 K/UL (ref 130–450)
PMV BLD AUTO: 9.9 FL (ref 7–12)
POTASSIUM SERPL-SCNC: 4 MMOL/L (ref 3.5–5)
PSA SERPL-MCNC: 0.08 NG/ML (ref 0–4)
RBC # BLD AUTO: 2.5 M/UL (ref 3.8–5.8)
SERVICE CMNT-IMP: ABNORMAL
SODIUM SERPL-SCNC: 137 MMOL/L (ref 132–146)
SPECIMEN DESCRIPTION: ABNORMAL
WBC OTHER # BLD: 8 K/UL (ref 4.5–11.5)

## 2025-02-18 PROCEDURE — 2500000003 HC RX 250 WO HCPCS: Performed by: INTERNAL MEDICINE

## 2025-02-18 PROCEDURE — 36415 COLL VENOUS BLD VENIPUNCTURE: CPT

## 2025-02-18 PROCEDURE — 97535 SELF CARE MNGMENT TRAINING: CPT

## 2025-02-18 PROCEDURE — 93010 ELECTROCARDIOGRAM REPORT: CPT | Performed by: INTERNAL MEDICINE

## 2025-02-18 PROCEDURE — 6370000000 HC RX 637 (ALT 250 FOR IP): Performed by: INTERNAL MEDICINE

## 2025-02-18 PROCEDURE — 97165 OT EVAL LOW COMPLEX 30 MIN: CPT

## 2025-02-18 PROCEDURE — 80048 BASIC METABOLIC PNL TOTAL CA: CPT

## 2025-02-18 PROCEDURE — 97530 THERAPEUTIC ACTIVITIES: CPT

## 2025-02-18 PROCEDURE — 87086 URINE CULTURE/COLONY COUNT: CPT

## 2025-02-18 PROCEDURE — 2140000000 HC CCU INTERMEDIATE R&B

## 2025-02-18 PROCEDURE — 85027 COMPLETE CBC AUTOMATED: CPT

## 2025-02-18 PROCEDURE — G0103 PSA SCREENING: HCPCS

## 2025-02-18 RX ADMIN — TIMOLOL MALEATE 1 DROP: 5 SOLUTION OPHTHALMIC at 07:49

## 2025-02-18 RX ADMIN — PANTOPRAZOLE SODIUM 40 MG: 40 TABLET, DELAYED RELEASE ORAL at 06:36

## 2025-02-18 RX ADMIN — METOPROLOL SUCCINATE 12.5 MG: 25 TABLET, EXTENDED RELEASE ORAL at 18:39

## 2025-02-18 RX ADMIN — BUPROPION HYDROCHLORIDE 100 MG: 100 TABLET, FILM COATED, EXTENDED RELEASE ORAL at 06:36

## 2025-02-18 RX ADMIN — ACETAMINOPHEN 650 MG: 325 TABLET ORAL at 20:30

## 2025-02-18 RX ADMIN — APIXABAN 5 MG: 5 TABLET, FILM COATED ORAL at 18:39

## 2025-02-18 RX ADMIN — BRIMONIDINE TARTRATE 1 DROP: 2 SOLUTION/ DROPS OPHTHALMIC at 19:46

## 2025-02-18 RX ADMIN — ACETAMINOPHEN 650 MG: 325 TABLET ORAL at 01:45

## 2025-02-18 RX ADMIN — DOFETILIDE 125 MCG: 0.12 CAPSULE ORAL at 19:46

## 2025-02-18 RX ADMIN — TAMSULOSIN HYDROCHLORIDE 0.8 MG: 0.4 CAPSULE ORAL at 18:40

## 2025-02-18 RX ADMIN — SODIUM CHLORIDE, PRESERVATIVE FREE 10 ML: 5 INJECTION INTRAVENOUS at 07:50

## 2025-02-18 RX ADMIN — SODIUM CHLORIDE, PRESERVATIVE FREE 10 ML: 5 INJECTION INTRAVENOUS at 19:46

## 2025-02-18 RX ADMIN — ATORVASTATIN CALCIUM 40 MG: 40 TABLET, FILM COATED ORAL at 18:39

## 2025-02-18 RX ADMIN — FERROUS SULFATE TAB 325 MG (65 MG ELEMENTAL FE) 325 MG: 325 (65 FE) TAB at 06:36

## 2025-02-18 RX ADMIN — DOFETILIDE 125 MCG: 0.12 CAPSULE ORAL at 06:49

## 2025-02-18 RX ADMIN — DAPSONE 25 MG: 25 TABLET ORAL at 07:50

## 2025-02-18 RX ADMIN — LATANOPROST 1 DROP: 50 SOLUTION OPHTHALMIC at 19:46

## 2025-02-18 RX ADMIN — BRIMONIDINE TARTRATE 1 DROP: 2 SOLUTION/ DROPS OPHTHALMIC at 07:49

## 2025-02-18 RX ADMIN — TIMOLOL MALEATE 1 DROP: 5 SOLUTION OPHTHALMIC at 19:46

## 2025-02-18 ASSESSMENT — PAIN DESCRIPTION - LOCATION
LOCATION: BACK
LOCATION: GENERALIZED

## 2025-02-18 ASSESSMENT — PAIN DESCRIPTION - PAIN TYPE: TYPE: CHRONIC PAIN

## 2025-02-18 ASSESSMENT — PAIN DESCRIPTION - DESCRIPTORS
DESCRIPTORS: ACHING;DULL;DISCOMFORT
DESCRIPTORS: ACHING

## 2025-02-18 ASSESSMENT — PAIN SCALES - GENERAL
PAINLEVEL_OUTOF10: 0
PAINLEVEL_OUTOF10: 5
PAINLEVEL_OUTOF10: 1

## 2025-02-18 ASSESSMENT — PAIN DESCRIPTION - ORIENTATION
ORIENTATION: LOWER;RIGHT
ORIENTATION: RIGHT;LEFT;ANTERIOR

## 2025-02-18 ASSESSMENT — PAIN - FUNCTIONAL ASSESSMENT: PAIN_FUNCTIONAL_ASSESSMENT: PREVENTS OR INTERFERES SOME ACTIVE ACTIVITIES AND ADLS

## 2025-02-18 ASSESSMENT — PAIN DESCRIPTION - ONSET: ONSET: GRADUAL

## 2025-02-18 ASSESSMENT — PAIN DESCRIPTION - FREQUENCY: FREQUENCY: INTERMITTENT

## 2025-02-18 NOTE — DISCHARGE INSTR - COC
Continuity of Care Form    Patient Name: Montana Gerard   :  1940  MRN:  [id number]    Admit date:  2025  Discharge date:  25    Code Status Order: Full Code   Advance Directives:   Advance Care Flowsheet Documentation             Admitting Physician:  Satinder Foster DO  PCP: Haseeb Phan DO    Discharging Nurse: Aric RN  Discharging Hospital Unit/Room#: 6409/6409-B  Discharging Unit Phone Number: [phone]    Emergency Contact:   Extended Emergency Contact Information  Primary Emergency Contact: Aminata Boyle  Home Phone: [phone]  Relation: Child  Secondary Emergency Contact: Ivan Boyle  Home Phone: [phone]  Relation: Child    Past Surgical History:  Past Surgical History:   Procedure Laterality Date    CARDIAC SURGERY  2023    TAVR    CARDIOVASCULAR STRESS TEST  2004    CHOLECYSTECTOMY         Immunization History:   Immunization History   Administered Date(s) Administered    COVID-19, MODERNA, (age 12y+), IM, 50mcg/0.5mL 2023    COVID-19, PFIZER PURPLE top, DILUTE for use, (age 12 y+), 30mcg/0.3mL 2021, 2021, 10/05/2021    COVID-19, PFIZER, (age 12y+), IM, 30mcg/0.3mL 2024    Influenza A (P6V8-64) Vaccine PF IM 12/15/2009    Influenza Virus Vaccine 10/31/2014, 10/01/2024    Influenza, FLUAD, (age 65 y+), IM, Quadv, 0.5mL 10/20/2021, 2022    Influenza, FLUAD, (age 65 y+), IM, Trivalent PF, 0.5mL 10/24/2018, 2019    Influenza, FLUARIX, FLULAVAL, FLUZONE (age 6 mo+) and AFLURIA, (age 3 y+), Quadv PF, 0.5mL 2015, 2020    Influenza, FLUZONE High Dose, (age 65 y+), IM, Trivalent PF, 0.5mL 10/04/2017    Pneumococcal, PPSV23, PNEUMOVAX 23, (age 2y+), SC/IM, 0.5mL 2019    RSV, AREXVY, (age 60y+), PF, IM, 0.5mL 2024    TDaP, ADACEL (age 10y-64y), BOOSTRIX (age 10y+), IM, 0.5mL 2024    Td vaccine (adult) 2009    Td, unspecified formulation 2009    Tetanus Toxoid, absorbed 2004    Zoster

## 2025-02-18 NOTE — PLAN OF CARE
Problem: Safety - Adult  Goal: Free from fall injury  2/18/2025 0242 by Jay Partida RN  Outcome: Progressing  Flowsheets (Taken 2/17/2025 2346)  Free From Fall Injury: Instruct family/caregiver on patient safety  2/17/2025 1954 by Russell Singleton RN  Outcome: Progressing     Problem: Chronic Conditions and Co-morbidities  Goal: Patient's chronic conditions and co-morbidity symptoms are monitored and maintained or improved  2/18/2025 0242 by Jay Partida RN  Outcome: Progressing  Flowsheets (Taken 2/17/2025 1955)  Care Plan - Patient's Chronic Conditions and Co-Morbidity Symptoms are Monitored and Maintained or Improved: Monitor and assess patient's chronic conditions and comorbid symptoms for stability, deterioration, or improvement  2/17/2025 1954 by Russell Singleton RN  Outcome: Progressing     Problem: Discharge Planning  Goal: Discharge to home or other facility with appropriate resources  2/18/2025 0242 by Jay Partida RN  Outcome: Progressing  Flowsheets (Taken 2/17/2025 1955)  Discharge to home or other facility with appropriate resources:   Identify barriers to discharge with patient and caregiver   Arrange for needed discharge resources and transportation as appropriate  2/17/2025 1954 by Russell Singleton RN  Outcome: Progressing     Problem: Skin/Tissue Integrity  Goal: Skin integrity remains intact  Description: 1.  Monitor for areas of redness and/or skin breakdown  2.  Assess vascular access sites hourly  3.  Every 4-6 hours minimum:  Change oxygen saturation probe site  4.  Every 4-6 hours:  If on nasal continuous positive airway pressure, respiratory therapy assess nares and determine need for appliance change or resting period  2/18/2025 0242 by Jay Partida RN  Outcome: Progressing  Flowsheets  Taken 2/17/2025 2346  Skin Integrity Remains Intact: Monitor for areas of redness and/or skin breakdown  Taken 2/17/2025 1955  Skin Integrity Remains Intact: Monitor for areas of redness

## 2025-02-18 NOTE — PLAN OF CARE
Problem: Safety - Adult  Goal: Free from fall injury  2/18/2025 0913 by Caro Mcwilliams RN  Outcome: Progressing  2/18/2025 0242 by Jay Partida RN  Outcome: Progressing  Flowsheets (Taken 2/17/2025 2346)  Free From Fall Injury: Instruct family/caregiver on patient safety  2/17/2025 1954 by Russell Singleton RN  Outcome: Progressing     Problem: Chronic Conditions and Co-morbidities  Goal: Patient's chronic conditions and co-morbidity symptoms are monitored and maintained or improved  2/18/2025 0913 by Caro Mcwilliams RN  Outcome: Progressing  Flowsheets (Taken 2/18/2025 0836)  Care Plan - Patient's Chronic Conditions and Co-Morbidity Symptoms are Monitored and Maintained or Improved: Monitor and assess patient's chronic conditions and comorbid symptoms for stability, deterioration, or improvement  2/18/2025 0242 by Jay Partida RN  Outcome: Progressing  Flowsheets (Taken 2/17/2025 1955)  Care Plan - Patient's Chronic Conditions and Co-Morbidity Symptoms are Monitored and Maintained or Improved: Monitor and assess patient's chronic conditions and comorbid symptoms for stability, deterioration, or improvement  2/17/2025 1954 by Russell Singleton RN  Outcome: Progressing     Problem: Discharge Planning  Goal: Discharge to home or other facility with appropriate resources  2/18/2025 0913 by Caro Mcwilliams RN  Outcome: Progressing  Flowsheets (Taken 2/18/2025 0836)  Discharge to home or other facility with appropriate resources: Identify barriers to discharge with patient and caregiver  2/18/2025 0242 by Jay Partida RN  Outcome: Progressing  Flowsheets (Taken 2/17/2025 1955)  Discharge to home or other facility with appropriate resources:   Identify barriers to discharge with patient and caregiver   Arrange for needed discharge resources and transportation as appropriate  2/17/2025 1954 by Russell Singleton RN  Outcome: Progressing     Problem: Skin/Tissue Integrity  Goal: Skin integrity remains

## 2025-02-18 NOTE — CARE COORDINATION
Called patient's wife regarding [facility] choices; choices in order of preference are [facility] and [facility]. Referral made to [facility] at [facility]; she will review and follow-up regarding acceptance. Patient on room air, urology consulted, OT eval and urine cultures pending, hgb 7.7; EP following. [facility] form completed, KRISTAN and PASRR will need done; envelope on the soft chart.    2:40P  Spoke with [facility], they are able to accept. CM assistant to initiate auth for [facility]. Ambulette form, KRISTAN and PASRR completed; envelope placed on the soft chart.    The Plan for Transition of Care is related to the following treatment goals: discharge planning    The Patient and/or patient representative Giuliano Gerard was provided with a choice of provider and agrees with the discharge plan. [x] Yes [] No    Freedom of choice list was provided with basic dialogue that supports the patient's individualized plan of care/goals, treatment preferences and shares the quality data associated with the providers. [x] Yes [] No     Electronically signed by LANETTE Freed on 2/18/2025 at 11:21 AM

## 2025-02-19 ENCOUNTER — TELEPHONE (OUTPATIENT)
Dept: PRIMARY CARE CLINIC | Age: 85
End: 2025-02-19

## 2025-02-19 LAB
ALBUMIN SERPL-MCNC: 3 G/DL (ref 3.5–5.2)
ALP SERPL-CCNC: 106 U/L (ref 40–129)
ALT SERPL-CCNC: 36 U/L (ref 0–40)
ANION GAP SERPL CALCULATED.3IONS-SCNC: 11 MMOL/L (ref 7–16)
AST SERPL-CCNC: 29 U/L (ref 0–39)
BASOPHILS # BLD: 0.01 K/UL (ref 0–0.2)
BASOPHILS NFR BLD: 0 % (ref 0–2)
BILIRUB SERPL-MCNC: 0.5 MG/DL (ref 0–1.2)
BUN SERPL-MCNC: 29 MG/DL (ref 6–23)
CALCIUM SERPL-MCNC: 8.5 MG/DL (ref 8.6–10.2)
CHLORIDE SERPL-SCNC: 104 MMOL/L (ref 98–107)
CO2 SERPL-SCNC: 20 MMOL/L (ref 22–29)
CREAT SERPL-MCNC: 0.8 MG/DL (ref 0.7–1.2)
EOSINOPHIL # BLD: 0.05 K/UL (ref 0.05–0.5)
EOSINOPHILS RELATIVE PERCENT: 1 % (ref 0–6)
ERYTHROCYTE [DISTWIDTH] IN BLOOD BY AUTOMATED COUNT: 14.7 % (ref 11.5–15)
GFR, ESTIMATED: 88 ML/MIN/1.73M2
GLUCOSE SERPL-MCNC: 117 MG/DL (ref 74–99)
HCT VFR BLD AUTO: 26 % (ref 37–54)
HGB BLD-MCNC: 8.1 G/DL (ref 12.5–16.5)
IMM GRANULOCYTES # BLD AUTO: 0.07 K/UL (ref 0–0.58)
IMM GRANULOCYTES NFR BLD: 1 % (ref 0–5)
LYMPHOCYTES NFR BLD: 0.6 K/UL (ref 1.5–4)
LYMPHOCYTES RELATIVE PERCENT: 7 % (ref 20–42)
MCH RBC QN AUTO: 30.6 PG (ref 26–35)
MCHC RBC AUTO-ENTMCNC: 31.2 G/DL (ref 32–34.5)
MCV RBC AUTO: 98.1 FL (ref 80–99.9)
MICROORGANISM SPEC CULT: ABNORMAL
MICROORGANISM SPEC CULT: ABNORMAL
MONOCYTES NFR BLD: 0.63 K/UL (ref 0.1–0.95)
MONOCYTES NFR BLD: 7 % (ref 2–12)
NEUTROPHILS NFR BLD: 85 % (ref 43–80)
NEUTS SEG NFR BLD: 7.53 K/UL (ref 1.8–7.3)
PLATELET # BLD AUTO: 117 K/UL (ref 130–450)
PMV BLD AUTO: 9.6 FL (ref 7–12)
POTASSIUM SERPL-SCNC: 4.1 MMOL/L (ref 3.5–5)
PROT SERPL-MCNC: 5.4 G/DL (ref 6.4–8.3)
RBC # BLD AUTO: 2.65 M/UL (ref 3.8–5.8)
SODIUM SERPL-SCNC: 135 MMOL/L (ref 132–146)
SPECIMEN DESCRIPTION: ABNORMAL
WBC OTHER # BLD: 8.9 K/UL (ref 4.5–11.5)

## 2025-02-19 PROCEDURE — 36415 COLL VENOUS BLD VENIPUNCTURE: CPT

## 2025-02-19 PROCEDURE — 2500000003 HC RX 250 WO HCPCS: Performed by: INTERNAL MEDICINE

## 2025-02-19 PROCEDURE — 2140000000 HC CCU INTERMEDIATE R&B

## 2025-02-19 PROCEDURE — 6370000000 HC RX 637 (ALT 250 FOR IP): Performed by: INTERNAL MEDICINE

## 2025-02-19 PROCEDURE — 85025 COMPLETE CBC W/AUTO DIFF WBC: CPT

## 2025-02-19 PROCEDURE — 80053 COMPREHEN METABOLIC PANEL: CPT

## 2025-02-19 RX ADMIN — DAPSONE 25 MG: 25 TABLET ORAL at 08:33

## 2025-02-19 RX ADMIN — BRIMONIDINE TARTRATE 1 DROP: 2 SOLUTION/ DROPS OPHTHALMIC at 20:15

## 2025-02-19 RX ADMIN — DOFETILIDE 125 MCG: 0.12 CAPSULE ORAL at 06:12

## 2025-02-19 RX ADMIN — METOPROLOL SUCCINATE 12.5 MG: 25 TABLET, EXTENDED RELEASE ORAL at 18:02

## 2025-02-19 RX ADMIN — LATANOPROST 1 DROP: 50 SOLUTION OPHTHALMIC at 20:15

## 2025-02-19 RX ADMIN — TIMOLOL MALEATE 1 DROP: 5 SOLUTION OPHTHALMIC at 08:34

## 2025-02-19 RX ADMIN — PANTOPRAZOLE SODIUM 40 MG: 40 TABLET, DELAYED RELEASE ORAL at 06:11

## 2025-02-19 RX ADMIN — SODIUM CHLORIDE, PRESERVATIVE FREE 10 ML: 5 INJECTION INTRAVENOUS at 20:15

## 2025-02-19 RX ADMIN — APIXABAN 5 MG: 5 TABLET, FILM COATED ORAL at 06:11

## 2025-02-19 RX ADMIN — BRIMONIDINE TARTRATE 1 DROP: 2 SOLUTION/ DROPS OPHTHALMIC at 08:33

## 2025-02-19 RX ADMIN — BUPROPION HYDROCHLORIDE 100 MG: 100 TABLET, FILM COATED, EXTENDED RELEASE ORAL at 06:11

## 2025-02-19 RX ADMIN — SODIUM CHLORIDE, PRESERVATIVE FREE 10 ML: 5 INJECTION INTRAVENOUS at 08:34

## 2025-02-19 RX ADMIN — TAMSULOSIN HYDROCHLORIDE 0.8 MG: 0.4 CAPSULE ORAL at 18:03

## 2025-02-19 RX ADMIN — FERROUS SULFATE TAB 325 MG (65 MG ELEMENTAL FE) 325 MG: 325 (65 FE) TAB at 06:11

## 2025-02-19 RX ADMIN — ATORVASTATIN CALCIUM 40 MG: 40 TABLET, FILM COATED ORAL at 18:03

## 2025-02-19 RX ADMIN — DOFETILIDE 125 MCG: 0.12 CAPSULE ORAL at 19:21

## 2025-02-19 RX ADMIN — APIXABAN 5 MG: 5 TABLET, FILM COATED ORAL at 18:03

## 2025-02-19 RX ADMIN — TIMOLOL MALEATE 1 DROP: 5 SOLUTION OPHTHALMIC at 20:15

## 2025-02-19 NOTE — PLAN OF CARE
Problem: Safety - Adult  Goal: Free from fall injury  2/18/2025 2136 by Whitney Lucero RN  Outcome: Progressing  2/18/2025 0913 by Caro Mcwilliams RN  Outcome: Progressing     Problem: Chronic Conditions and Co-morbidities  Goal: Patient's chronic conditions and co-morbidity symptoms are monitored and maintained or improved  2/18/2025 2136 by Whitney Lucero RN  Outcome: Progressing  2/18/2025 0913 by Caro Mcwilliams RN  Outcome: Progressing  Flowsheets (Taken 2/18/2025 0836)  Care Plan - Patient's Chronic Conditions and Co-Morbidity Symptoms are Monitored and Maintained or Improved: Monitor and assess patient's chronic conditions and comorbid symptoms for stability, deterioration, or improvement     Problem: Discharge Planning  Goal: Discharge to home or other facility with appropriate resources  2/18/2025 2136 by Whitney Lucero RN  Outcome: Progressing  2/18/2025 0913 by Caro Mcwilliams RN  Outcome: Progressing  Flowsheets (Taken 2/18/2025 0836)  Discharge to home or other facility with appropriate resources: Identify barriers to discharge with patient and caregiver     Problem: Skin/Tissue Integrity  Goal: Skin integrity remains intact  2/18/2025 2136 by Whitney Lucero RN  Outcome: Progressing  2/18/2025 0913 by Caro Mcwilliams RN  Outcome: Progressing  Flowsheets  Taken 2/18/2025 0849  Skin Integrity Remains Intact: Monitor for areas of redness and/or skin breakdown  Taken 2/18/2025 0836  Skin Integrity Remains Intact: Monitor for areas of redness and/or skin breakdown     Problem: ABCDS Injury Assessment  Goal: Absence of physical injury  2/18/2025 2136 by Whitney Lucero RN  Outcome: Progressing  2/18/2025 0913 by Caro Mcwilliams RN  Outcome: Progressing  Flowsheets (Taken 2/18/2025 0849)  Absence of Physical Injury: Implement safety measures based on patient assessment     Problem: Pain  Goal: Verbalizes/displays adequate comfort level or baseline comfort

## 2025-02-19 NOTE — TELEPHONE ENCOUNTER
Tell her she really needs to talk to the doctor who is taking care of her in the hospital.  She needs to leave a message with the nurses.  I will get involved in the hospital issues.  Can review them with her when he gets out

## 2025-02-19 NOTE — PLAN OF CARE
Problem: Safety - Adult  Goal: Free from fall injury  2/19/2025 0939 by Caro Mcwilliams RN  Outcome: Progressing  2/18/2025 2136 by Whitney Lucero RN  Outcome: Progressing     Problem: Chronic Conditions and Co-morbidities  Goal: Patient's chronic conditions and co-morbidity symptoms are monitored and maintained or improved  2/19/2025 0939 by Caro Mcwilliams RN  Outcome: Progressing  Flowsheets (Taken 2/19/2025 0808)  Care Plan - Patient's Chronic Conditions and Co-Morbidity Symptoms are Monitored and Maintained or Improved: Monitor and assess patient's chronic conditions and comorbid symptoms for stability, deterioration, or improvement  2/18/2025 2136 by Whitney Lucero RN  Outcome: Progressing     Problem: Discharge Planning  Goal: Discharge to home or other facility with appropriate resources  2/19/2025 0939 by Caro Mcwilliams RN  Outcome: Progressing  Flowsheets (Taken 2/19/2025 0808)  Discharge to home or other facility with appropriate resources: Identify barriers to discharge with patient and caregiver  2/18/2025 2136 by Whitney Lucero RN  Outcome: Progressing     Problem: Skin/Tissue Integrity  Goal: Skin integrity remains intact  Description: 1.  Monitor for areas of redness and/or skin breakdown  2.  Assess vascular access sites hourly  3.  Every 4-6 hours minimum:  Change oxygen saturation probe site  4.  Every 4-6 hours:  If on nasal continuous positive airway pressure, respiratory therapy assess nares and determine need for appliance change or resting period  2/19/2025 0939 by Caro Mcwilliams RN  Outcome: Progressing  Flowsheets (Taken 2/19/2025 0808)  Skin Integrity Remains Intact: Monitor for areas of redness and/or skin breakdown  2/18/2025 2136 by Whitney Lucero RN  Outcome: Progressing     Problem: ABCDS Injury Assessment  Goal: Absence of physical injury  2/19/2025 0939 by Caro Mcwilliams RN  Outcome: Progressing  Flowsheets (Taken 2/19/2025 0808)  Absence

## 2025-02-19 NOTE — CARE COORDINATION
Pre-cert for [facility] pending. Called patient's wife and provided update regarding acceptance; updated patient at bedside. Patient's wife requesting a meeting or phone call with attending and her children; she states they have questions regarding the patient's heart. Ambulance form, KRISTAN and PASRR completed; envelope placed on the soft chart.    Electronically signed by LANETTE Freed on 2/19/2025 at 2:53 PM

## 2025-02-20 LAB
ERYTHROCYTE [DISTWIDTH] IN BLOOD BY AUTOMATED COUNT: 14.6 % (ref 11.5–15)
GLUCOSE BLD-MCNC: 113 MG/DL (ref 74–99)
HCT VFR BLD AUTO: 28.1 % (ref 37–54)
HGB BLD-MCNC: 8.9 G/DL (ref 12.5–16.5)
MCH RBC QN AUTO: 30.9 PG (ref 26–35)
MCHC RBC AUTO-ENTMCNC: 31.7 G/DL (ref 32–34.5)
MCV RBC AUTO: 97.6 FL (ref 80–99.9)
PLATELET # BLD AUTO: 137 K/UL (ref 130–450)
PMV BLD AUTO: 9.3 FL (ref 7–12)
RBC # BLD AUTO: 2.88 M/UL (ref 3.8–5.8)
WBC OTHER # BLD: 10.4 K/UL (ref 4.5–11.5)

## 2025-02-20 PROCEDURE — 2500000003 HC RX 250 WO HCPCS: Performed by: INTERNAL MEDICINE

## 2025-02-20 PROCEDURE — 36415 COLL VENOUS BLD VENIPUNCTURE: CPT

## 2025-02-20 PROCEDURE — 82962 GLUCOSE BLOOD TEST: CPT

## 2025-02-20 PROCEDURE — 2140000000 HC CCU INTERMEDIATE R&B

## 2025-02-20 PROCEDURE — 6370000000 HC RX 637 (ALT 250 FOR IP): Performed by: INTERNAL MEDICINE

## 2025-02-20 PROCEDURE — 85027 COMPLETE CBC AUTOMATED: CPT

## 2025-02-20 RX ADMIN — METOPROLOL SUCCINATE 12.5 MG: 25 TABLET, EXTENDED RELEASE ORAL at 17:56

## 2025-02-20 RX ADMIN — BACLOFEN 10 MG: 10 TABLET ORAL at 17:06

## 2025-02-20 RX ADMIN — BRIMONIDINE TARTRATE 1 DROP: 2 SOLUTION/ DROPS OPHTHALMIC at 09:50

## 2025-02-20 RX ADMIN — LATANOPROST 1 DROP: 50 SOLUTION OPHTHALMIC at 20:10

## 2025-02-20 RX ADMIN — DAPSONE 25 MG: 25 TABLET ORAL at 09:50

## 2025-02-20 RX ADMIN — TAMSULOSIN HYDROCHLORIDE 0.8 MG: 0.4 CAPSULE ORAL at 17:56

## 2025-02-20 RX ADMIN — TIMOLOL MALEATE 1 DROP: 5 SOLUTION OPHTHALMIC at 20:10

## 2025-02-20 RX ADMIN — APIXABAN 5 MG: 5 TABLET, FILM COATED ORAL at 17:56

## 2025-02-20 RX ADMIN — FERROUS SULFATE TAB 325 MG (65 MG ELEMENTAL FE) 325 MG: 325 (65 FE) TAB at 05:26

## 2025-02-20 RX ADMIN — PANTOPRAZOLE SODIUM 40 MG: 40 TABLET, DELAYED RELEASE ORAL at 05:26

## 2025-02-20 RX ADMIN — DOFETILIDE 125 MCG: 0.12 CAPSULE ORAL at 05:25

## 2025-02-20 RX ADMIN — APIXABAN 5 MG: 5 TABLET, FILM COATED ORAL at 05:25

## 2025-02-20 RX ADMIN — ATORVASTATIN CALCIUM 40 MG: 40 TABLET, FILM COATED ORAL at 17:57

## 2025-02-20 RX ADMIN — DOFETILIDE 125 MCG: 0.12 CAPSULE ORAL at 17:56

## 2025-02-20 RX ADMIN — BUPROPION HYDROCHLORIDE 100 MG: 100 TABLET, FILM COATED, EXTENDED RELEASE ORAL at 05:25

## 2025-02-20 RX ADMIN — TIMOLOL MALEATE 1 DROP: 5 SOLUTION OPHTHALMIC at 09:50

## 2025-02-20 RX ADMIN — SODIUM CHLORIDE, PRESERVATIVE FREE 10 ML: 5 INJECTION INTRAVENOUS at 20:11

## 2025-02-20 RX ADMIN — BRIMONIDINE TARTRATE 1 DROP: 2 SOLUTION/ DROPS OPHTHALMIC at 20:11

## 2025-02-20 RX ADMIN — SODIUM CHLORIDE, PRESERVATIVE FREE 10 ML: 5 INJECTION INTRAVENOUS at 09:53

## 2025-02-20 RX ADMIN — ACETAMINOPHEN 650 MG: 325 TABLET ORAL at 17:06

## 2025-02-20 ASSESSMENT — PAIN DESCRIPTION - ORIENTATION: ORIENTATION: RIGHT

## 2025-02-20 ASSESSMENT — PAIN SCALES - GENERAL: PAINLEVEL_OUTOF10: 5

## 2025-02-20 ASSESSMENT — PAIN DESCRIPTION - DESCRIPTORS: DESCRIPTORS: ACHING;DISCOMFORT;SORE

## 2025-02-20 ASSESSMENT — PAIN DESCRIPTION - LOCATION: LOCATION: HIP

## 2025-02-20 NOTE — PLAN OF CARE
Problem: Safety - Adult  Goal: Free from fall injury  2/19/2025 2222 by Olivia Solis RN  Outcome: Progressing  2/19/2025 0939 by Caro Mcwilliams RN  Outcome: Progressing     Problem: Chronic Conditions and Co-morbidities  Goal: Patient's chronic conditions and co-morbidity symptoms are monitored and maintained or improved  2/19/2025 2222 by Olivia Solis RN  Outcome: Progressing  2/19/2025 0939 by Caro Mcwilliams RN  Outcome: Progressing  Flowsheets (Taken 2/19/2025 0808)  Care Plan - Patient's Chronic Conditions and Co-Morbidity Symptoms are Monitored and Maintained or Improved: Monitor and assess patient's chronic conditions and comorbid symptoms for stability, deterioration, or improvement     Problem: Discharge Planning  Goal: Discharge to home or other facility with appropriate resources  2/19/2025 2222 by Olivia Solis RN  Outcome: Progressing  2/19/2025 0939 by Caro Mcwilliams RN  Outcome: Progressing  Flowsheets (Taken 2/19/2025 0808)  Discharge to home or other facility with appropriate resources: Identify barriers to discharge with patient and caregiver     Problem: Skin/Tissue Integrity  Goal: Skin integrity remains intact  Description: 1.  Monitor for areas of redness and/or skin breakdown  2.  Assess vascular access sites hourly  3.  Every 4-6 hours minimum:  Change oxygen saturation probe site  4.  Every 4-6 hours:  If on nasal continuous positive airway pressure, respiratory therapy assess nares and determine need for appliance change or resting period  2/19/2025 2222 by Olivia Solis RN  Outcome: Progressing  2/19/2025 0939 by Caro Mcwilliams RN  Outcome: Progressing  Flowsheets (Taken 2/19/2025 0808)  Skin Integrity Remains Intact: Monitor for areas of redness and/or skin breakdown     Problem: ABCDS Injury Assessment  Goal: Absence of physical injury  2/19/2025 2222 by Olivia Solis RN  Outcome: Progressing  2/19/2025 0939 by Caro Mcwilliams RN  Outcome:

## 2025-02-20 NOTE — CARE COORDINATION
Discharge order noted. Pre-cert for [facility] still pending. Ambulette form, KRISTAN and PASRR completed; envelope placed on the soft chart.     Electronically signed by LANETTE Freed on 2/20/2025 at 9:49 AM

## 2025-02-20 NOTE — PLAN OF CARE
Problem: Safety - Adult  Goal: Free from fall injury  2/20/2025 1030 by Teri Sanchez RN  Outcome: Progressing  2/19/2025 2222 by Olivia Solis RN  Outcome: Progressing     Problem: Chronic Conditions and Co-morbidities  Goal: Patient's chronic conditions and co-morbidity symptoms are monitored and maintained or improved  2/20/2025 1030 by Teri Sanchez RN  Outcome: Progressing  2/19/2025 2222 by Olivia Solis RN  Outcome: Progressing     Problem: Discharge Planning  Goal: Discharge to home or other facility with appropriate resources  2/20/2025 1030 by Teri Sanchez RN  Outcome: Progressing  2/19/2025 2222 by Olivia Solis RN  Outcome: Progressing     Problem: Skin/Tissue Integrity  Goal: Skin integrity remains intact  Description: 1.  Monitor for areas of redness and/or skin breakdown  2.  Assess vascular access sites hourly  3.  Every 4-6 hours minimum:  Change oxygen saturation probe site  4.  Every 4-6 hours:  If on nasal continuous positive airway pressure, respiratory therapy assess nares and determine need for appliance change or resting period  2/20/2025 1030 by Teri Sanchez RN  Outcome: Progressing  2/19/2025 2222 by Olivia Solis RN  Outcome: Progressing     Problem: ABCDS Injury Assessment  Goal: Absence of physical injury  2/20/2025 1030 by Teri Sanchez RN  Outcome: Progressing  2/19/2025 2222 by Olivia Solis RN  Outcome: Progressing     Problem: Nutrition Deficit:  Goal: Optimize nutritional status  2/20/2025 1030 by Teri Sanchez RN  Outcome: Progressing  2/19/2025 2222 by Olivia Solis RN  Outcome: Progressing     Problem: Pain  Goal: Verbalizes/displays adequate comfort level or baseline comfort level  2/20/2025 1030 by Teri Sanchez RN  Outcome: Adequate for Discharge  2/19/2025 2222 by Olivia Solis RN  Outcome: Progressing

## 2025-02-20 NOTE — FLOWSHEET NOTE
Inpatient Wound Care(Initial consult) 6409    Admit Date: 2/16/2025 12:44 PM    Reason for consult:  Coccyx    Significant history:  Admitted with wounds    Findings:     02/20/25 1526   Skin Integumentary    Skin Integrity Ecchymosis   Location BUE   Skin Integrity Site 2   Skin Integrity Location 2   (dry flaky)   Location 2 bilateral feet   Wound 02/17/25 Buttocks Left   Date First Assessed/Time First Assessed: 02/17/25 0137   Present on Original Admission: Yes  Primary Wound Type: Pressure Injury  Location: Buttocks  Wound Location Orientation: Left   Wound Image    Wound Etiology Pressure Stage 2   Dressing/Treatment Pharmaceutical agent (see MAR)   Wound Length (cm) 0.2 cm   Wound Width (cm) 0.2 cm   Wound Depth (cm) 0.1 cm   Wound Surface Area (cm^2) 0.04 cm^2   Change in Wound Size % (l*w) 84   Wound Volume (cm^3) 0.004 cm^3   Wound Healing % 84   Wound Assessment Pink/red   Drainage Amount None (dry)   Odor None   Raya-wound Assessment Dry/flaky   Wound 02/17/25 Buttocks Right   Date First Assessed/Time First Assessed: 02/17/25 0137   Present on Original Admission: Yes  Primary Wound Type: Pressure Injury  Location: Buttocks  Wound Location Orientation: Right   Wound Image   (See photo for buttocks)   Wound Etiology Pressure Stage 2   Dressing/Treatment Pharmaceutical agent (see MAR)   Wound Length (cm) 0.8 cm   Wound Width (cm) 1.5 cm   Wound Depth (cm) 0.1 cm   Wound Surface Area (cm^2) 1.2 cm^2   Change in Wound Size % (l*w) 70   Wound Volume (cm^3) 0.12 cm^3   Wound Healing % 70   Wound Assessment Pink/red   Drainage Amount None (dry)   Odor None   Raya-wound Assessment Dry/flaky       **Informed Consent**    The patient has given verbal consent to have photos taken of wounds and inserted into their chart as part of their permanent medical record for purposes of documentation, treatment management and/or medical review.   All Images taken on 2/20/25 of patient name: Montana Gerard were transmitted and

## 2025-02-21 PROCEDURE — 2500000003 HC RX 250 WO HCPCS: Performed by: INTERNAL MEDICINE

## 2025-02-21 PROCEDURE — 97530 THERAPEUTIC ACTIVITIES: CPT

## 2025-02-21 PROCEDURE — 6370000000 HC RX 637 (ALT 250 FOR IP): Performed by: INTERNAL MEDICINE

## 2025-02-21 PROCEDURE — 2140000000 HC CCU INTERMEDIATE R&B

## 2025-02-21 PROCEDURE — 97535 SELF CARE MNGMENT TRAINING: CPT

## 2025-02-21 RX ADMIN — METOPROLOL SUCCINATE 12.5 MG: 25 TABLET, EXTENDED RELEASE ORAL at 18:22

## 2025-02-21 RX ADMIN — DOFETILIDE 125 MCG: 0.12 CAPSULE ORAL at 18:22

## 2025-02-21 RX ADMIN — DOFETILIDE 125 MCG: 0.12 CAPSULE ORAL at 06:11

## 2025-02-21 RX ADMIN — APIXABAN 5 MG: 5 TABLET, FILM COATED ORAL at 06:11

## 2025-02-21 RX ADMIN — TAMSULOSIN HYDROCHLORIDE 0.8 MG: 0.4 CAPSULE ORAL at 18:22

## 2025-02-21 RX ADMIN — LATANOPROST 1 DROP: 50 SOLUTION OPHTHALMIC at 20:31

## 2025-02-21 RX ADMIN — TIMOLOL MALEATE 1 DROP: 5 SOLUTION OPHTHALMIC at 09:42

## 2025-02-21 RX ADMIN — PETROLATUM: 420 OINTMENT TOPICAL at 09:43

## 2025-02-21 RX ADMIN — SODIUM CHLORIDE, PRESERVATIVE FREE 10 ML: 5 INJECTION INTRAVENOUS at 20:46

## 2025-02-21 RX ADMIN — ATORVASTATIN CALCIUM 40 MG: 40 TABLET, FILM COATED ORAL at 18:22

## 2025-02-21 RX ADMIN — BRIMONIDINE TARTRATE 1 DROP: 2 SOLUTION/ DROPS OPHTHALMIC at 09:42

## 2025-02-21 RX ADMIN — BRIMONIDINE TARTRATE 1 DROP: 2 SOLUTION/ DROPS OPHTHALMIC at 20:45

## 2025-02-21 RX ADMIN — FERROUS SULFATE TAB 325 MG (65 MG ELEMENTAL FE) 325 MG: 325 (65 FE) TAB at 06:11

## 2025-02-21 RX ADMIN — PANTOPRAZOLE SODIUM 40 MG: 40 TABLET, DELAYED RELEASE ORAL at 06:11

## 2025-02-21 RX ADMIN — ACETAMINOPHEN 650 MG: 325 TABLET ORAL at 15:30

## 2025-02-21 RX ADMIN — DAPSONE 25 MG: 25 TABLET ORAL at 09:42

## 2025-02-21 RX ADMIN — ACETAMINOPHEN 650 MG: 325 TABLET ORAL at 09:41

## 2025-02-21 RX ADMIN — BUPROPION HYDROCHLORIDE 100 MG: 100 TABLET, FILM COATED, EXTENDED RELEASE ORAL at 06:11

## 2025-02-21 RX ADMIN — TIMOLOL MALEATE 1 DROP: 5 SOLUTION OPHTHALMIC at 20:45

## 2025-02-21 RX ADMIN — SODIUM CHLORIDE, PRESERVATIVE FREE 10 ML: 5 INJECTION INTRAVENOUS at 09:43

## 2025-02-21 RX ADMIN — APIXABAN 5 MG: 5 TABLET, FILM COATED ORAL at 18:22

## 2025-02-21 RX ADMIN — PETROLATUM: 420 OINTMENT TOPICAL at 20:34

## 2025-02-21 ASSESSMENT — PAIN DESCRIPTION - LOCATION
LOCATION: BACK
LOCATION: BACK

## 2025-02-21 ASSESSMENT — PAIN - FUNCTIONAL ASSESSMENT
PAIN_FUNCTIONAL_ASSESSMENT: ACTIVITIES ARE NOT PREVENTED
PAIN_FUNCTIONAL_ASSESSMENT: ACTIVITIES ARE NOT PREVENTED

## 2025-02-21 ASSESSMENT — PAIN SCALES - GENERAL
PAINLEVEL_OUTOF10: 3
PAINLEVEL_OUTOF10: 3

## 2025-02-21 ASSESSMENT — PAIN DESCRIPTION - ORIENTATION
ORIENTATION: RIGHT;MID
ORIENTATION: MID;LOWER

## 2025-02-21 ASSESSMENT — PAIN DESCRIPTION - DESCRIPTORS
DESCRIPTORS: ACHING;CRAMPING;DULL
DESCRIPTORS: ACHING;TENDER;THROBBING
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
## 2025-02-21 NOTE — PLAN OF CARE
Problem: Safety - Adult  Goal: Free from fall injury  Outcome: Progressing     Problem: Chronic Conditions and Co-morbidities  Goal: Patient's chronic conditions and co-morbidity symptoms are monitored and maintained or improved  Outcome: Progressing     Problem: Discharge Planning  Goal: Discharge to home or other facility with appropriate resources  Outcome: Progressing     Problem: Skin/Tissue Integrity  Goal: Skin integrity remains intact  Description: 1.  Monitor for areas of redness and/or skin breakdown  2.  Assess vascular access sites hourly  3.  Every 4-6 hours minimum:  Change oxygen saturation probe site  4.  Every 4-6 hours:  If on nasal continuous positive airway pressure, respiratory therapy assess nares and determine need for appliance change or resting period  Outcome: Progressing     Problem: ABCDS Injury Assessment  Goal: Absence of physical injury  Outcome: Progressing     Problem: Pain  Goal: Verbalizes/displays adequate comfort level or baseline comfort level  Outcome: Progressing     Problem: Nutrition Deficit:  Goal: Optimize nutritional status  Outcome: Progressing     Problem: Neurosensory - Adult  Goal: Achieves maximal functionality and self care  Outcome: Progressing     Problem: Skin/Tissue Integrity - Adult  Goal: Incisions, wounds, or drain sites healing without S/S of infection  Outcome: Progressing     Problem: Musculoskeletal - Adult  Goal: Return mobility to safest level of function  Outcome: Progressing  Goal: Return ADL status to a safe level of function  Outcome: Progressing     Problem: Gastrointestinal - Adult  Goal: Maintains or returns to baseline bowel function  Outcome: Progressing  Goal: Maintains adequate nutritional intake  Outcome: Progressing     Problem: Genitourinary - Adult  Goal: Absence of urinary retention  Outcome: Progressing

## 2025-02-21 NOTE — CARE COORDINATION
Monitoring hemoglobin, labs pending; general surgery following. Pre-cert for [facility] still pending. Ambulance transport placed on will-call via [facility]. Ambulance form, KRISTAN and PASRR completed; envelope placed on the soft chart.    Electronically signed by LANETTE Freed on 2/21/2025 at 10:10 AM

## 2025-02-22 LAB
EKG ATRIAL RATE: 174 BPM
EKG Q-T INTERVAL: 304 MS
EKG QRS DURATION: 96 MS
EKG QTC CALCULATION (BAZETT): 483 MS
EKG R AXIS: 20 DEGREES
EKG T AXIS: 93 DEGREES
EKG VENTRICULAR RATE: 152 BPM

## 2025-02-22 PROCEDURE — 93010 ELECTROCARDIOGRAM REPORT: CPT | Performed by: INTERNAL MEDICINE

## 2025-02-22 PROCEDURE — 2500000003 HC RX 250 WO HCPCS: Performed by: INTERNAL MEDICINE

## 2025-02-22 PROCEDURE — 2140000000 HC CCU INTERMEDIATE R&B

## 2025-02-22 PROCEDURE — 6370000000 HC RX 637 (ALT 250 FOR IP): Performed by: INTERNAL MEDICINE

## 2025-02-22 RX ADMIN — DOFETILIDE 125 MCG: 0.12 CAPSULE ORAL at 05:20

## 2025-02-22 RX ADMIN — TAMSULOSIN HYDROCHLORIDE 0.8 MG: 0.4 CAPSULE ORAL at 18:13

## 2025-02-22 RX ADMIN — BRIMONIDINE TARTRATE 1 DROP: 2 SOLUTION/ DROPS OPHTHALMIC at 09:07

## 2025-02-22 RX ADMIN — APIXABAN 5 MG: 5 TABLET, FILM COATED ORAL at 05:19

## 2025-02-22 RX ADMIN — FERROUS SULFATE TAB 325 MG (65 MG ELEMENTAL FE) 325 MG: 325 (65 FE) TAB at 05:20

## 2025-02-22 RX ADMIN — LATANOPROST 1 DROP: 50 SOLUTION OPHTHALMIC at 20:56

## 2025-02-22 RX ADMIN — PETROLATUM: 420 OINTMENT TOPICAL at 12:00

## 2025-02-22 RX ADMIN — METOPROLOL SUCCINATE 12.5 MG: 25 TABLET, EXTENDED RELEASE ORAL at 18:13

## 2025-02-22 RX ADMIN — DOFETILIDE 125 MCG: 0.12 CAPSULE ORAL at 18:12

## 2025-02-22 RX ADMIN — PETROLATUM: 420 OINTMENT TOPICAL at 20:57

## 2025-02-22 RX ADMIN — APIXABAN 5 MG: 5 TABLET, FILM COATED ORAL at 18:13

## 2025-02-22 RX ADMIN — BRIMONIDINE TARTRATE 1 DROP: 2 SOLUTION/ DROPS OPHTHALMIC at 20:57

## 2025-02-22 RX ADMIN — TIMOLOL MALEATE 1 DROP: 5 SOLUTION OPHTHALMIC at 09:07

## 2025-02-22 RX ADMIN — SODIUM CHLORIDE, PRESERVATIVE FREE 10 ML: 5 INJECTION INTRAVENOUS at 09:07

## 2025-02-22 RX ADMIN — DAPSONE 25 MG: 25 TABLET ORAL at 09:07

## 2025-02-22 RX ADMIN — SODIUM CHLORIDE, PRESERVATIVE FREE 10 ML: 5 INJECTION INTRAVENOUS at 20:57

## 2025-02-22 RX ADMIN — ATORVASTATIN CALCIUM 40 MG: 40 TABLET, FILM COATED ORAL at 18:13

## 2025-02-22 RX ADMIN — BUPROPION HYDROCHLORIDE 100 MG: 100 TABLET, FILM COATED, EXTENDED RELEASE ORAL at 05:20

## 2025-02-22 RX ADMIN — PANTOPRAZOLE SODIUM 40 MG: 40 TABLET, DELAYED RELEASE ORAL at 05:20

## 2025-02-22 RX ADMIN — TIMOLOL MALEATE 1 DROP: 5 SOLUTION OPHTHALMIC at 20:56

## 2025-02-22 NOTE — CARE COORDINATION
CM update: Call placed to Nhung at [facility] regarding pending precert. The precert remains pending at this time. Ambulance transport placed on will-call via [facility]. Ambulance form, KRISTAN and PASRR completed; envelope placed on the soft chart. AF    Yesi GALLOWAYN, RN  Case Management   [phone]

## 2025-02-22 NOTE — PLAN OF CARE
Problem: Safety - Adult  Goal: Free from fall injury  2/21/2025 2336 by Gege Solitario RN  Outcome: Progressing  2/21/2025 1328 by Olga Dillard RN  Outcome: Progressing     Problem: Chronic Conditions and Co-morbidities  Goal: Patient's chronic conditions and co-morbidity symptoms are monitored and maintained or improved  2/21/2025 2336 by Gege Solitario RN  Outcome: Progressing  2/21/2025 1328 by Olga Dillard RN  Outcome: Progressing     Problem: Discharge Planning  Goal: Discharge to home or other facility with appropriate resources  2/21/2025 2336 by Gege Solitario RN  Outcome: Progressing  2/21/2025 1328 by Olga Dillard RN  Outcome: Progressing     Problem: Skin/Tissue Integrity  Goal: Skin integrity remains intact  Description: 1.  Monitor for areas of redness and/or skin breakdown  2.  Assess vascular access sites hourly  3.  Every 4-6 hours minimum:  Change oxygen saturation probe site  4.  Every 4-6 hours:  If on nasal continuous positive airway pressure, respiratory therapy assess nares and determine need for appliance change or resting period  2/21/2025 2336 by Gege Solitario RN  Outcome: Progressing  2/21/2025 1328 by Olga Dillard RN  Outcome: Progressing     Problem: ABCDS Injury Assessment  Goal: Absence of physical injury  2/21/2025 2336 by Gege Solitario RN  Outcome: Progressing  2/21/2025 1328 by Olga Dilalrd RN  Outcome: Progressing     Problem: Pain  Goal: Verbalizes/displays adequate comfort level or baseline comfort level  2/21/2025 2336 by Gege Solitario RN  Outcome: Progressing  2/21/2025 1328 by Olga Dillard RN  Outcome: Progressing     Problem: Nutrition Deficit:  Goal: Optimize nutritional status  2/21/2025 2336 by Gege Solitario RN  Outcome: Progressing  2/21/2025 1328 by Olga Dillard RN  Outcome: Progressing     Problem: Neurosensory - Adult  Goal: Achieves maximal functionality and self

## 2025-02-23 PROCEDURE — 6370000000 HC RX 637 (ALT 250 FOR IP): Performed by: INTERNAL MEDICINE

## 2025-02-23 PROCEDURE — 2500000003 HC RX 250 WO HCPCS: Performed by: INTERNAL MEDICINE

## 2025-02-23 PROCEDURE — 2140000000 HC CCU INTERMEDIATE R&B

## 2025-02-23 RX ADMIN — PANTOPRAZOLE SODIUM 40 MG: 40 TABLET, DELAYED RELEASE ORAL at 05:42

## 2025-02-23 RX ADMIN — BRIMONIDINE TARTRATE 1 DROP: 2 SOLUTION/ DROPS OPHTHALMIC at 20:32

## 2025-02-23 RX ADMIN — DAPSONE 25 MG: 25 TABLET ORAL at 09:42

## 2025-02-23 RX ADMIN — TIMOLOL MALEATE 1 DROP: 5 SOLUTION OPHTHALMIC at 20:32

## 2025-02-23 RX ADMIN — METOPROLOL SUCCINATE 12.5 MG: 25 TABLET, EXTENDED RELEASE ORAL at 17:25

## 2025-02-23 RX ADMIN — APIXABAN 5 MG: 5 TABLET, FILM COATED ORAL at 17:25

## 2025-02-23 RX ADMIN — ACETAMINOPHEN 650 MG: 325 TABLET ORAL at 23:58

## 2025-02-23 RX ADMIN — LATANOPROST 1 DROP: 50 SOLUTION OPHTHALMIC at 20:32

## 2025-02-23 RX ADMIN — DOFETILIDE 125 MCG: 0.12 CAPSULE ORAL at 05:41

## 2025-02-23 RX ADMIN — TIMOLOL MALEATE 1 DROP: 5 SOLUTION OPHTHALMIC at 09:43

## 2025-02-23 RX ADMIN — SODIUM CHLORIDE, PRESERVATIVE FREE 10 ML: 5 INJECTION INTRAVENOUS at 09:43

## 2025-02-23 RX ADMIN — PETROLATUM: 420 OINTMENT TOPICAL at 09:44

## 2025-02-23 RX ADMIN — BUPROPION HYDROCHLORIDE 100 MG: 100 TABLET, FILM COATED, EXTENDED RELEASE ORAL at 05:42

## 2025-02-23 RX ADMIN — ATORVASTATIN CALCIUM 40 MG: 40 TABLET, FILM COATED ORAL at 17:24

## 2025-02-23 RX ADMIN — BRIMONIDINE TARTRATE 1 DROP: 2 SOLUTION/ DROPS OPHTHALMIC at 09:43

## 2025-02-23 RX ADMIN — PETROLATUM: 420 OINTMENT TOPICAL at 20:32

## 2025-02-23 RX ADMIN — FERROUS SULFATE TAB 325 MG (65 MG ELEMENTAL FE) 325 MG: 325 (65 FE) TAB at 05:42

## 2025-02-23 RX ADMIN — APIXABAN 5 MG: 5 TABLET, FILM COATED ORAL at 05:42

## 2025-02-23 RX ADMIN — SODIUM CHLORIDE, PRESERVATIVE FREE 10 ML: 5 INJECTION INTRAVENOUS at 20:32

## 2025-02-23 RX ADMIN — DOFETILIDE 125 MCG: 0.12 CAPSULE ORAL at 17:24

## 2025-02-23 RX ADMIN — TAMSULOSIN HYDROCHLORIDE 0.8 MG: 0.4 CAPSULE ORAL at 17:24

## 2025-02-23 ASSESSMENT — PAIN DESCRIPTION - ORIENTATION: ORIENTATION: RIGHT;LEFT

## 2025-02-23 ASSESSMENT — PAIN SCALES - GENERAL: PAINLEVEL_OUTOF10: 7

## 2025-02-23 ASSESSMENT — PAIN DESCRIPTION - DESCRIPTORS: DESCRIPTORS: ACHING

## 2025-02-23 ASSESSMENT — PAIN - FUNCTIONAL ASSESSMENT: PAIN_FUNCTIONAL_ASSESSMENT: PREVENTS OR INTERFERES SOME ACTIVE ACTIVITIES AND ADLS

## 2025-02-23 ASSESSMENT — PAIN DESCRIPTION - LOCATION: LOCATION: COCCYX;BUTTOCKS;HIP

## 2025-02-23 NOTE — CARE COORDINATION
02/23/25 Update CM Note; precert remains pending with insurance for discharge to [facility]. Electronically signed by Oralia Junior RN CM on 2/23/2025 at 9:34 AM

## 2025-02-24 LAB
ANION GAP SERPL CALCULATED.3IONS-SCNC: 7 MMOL/L (ref 7–16)
BUN SERPL-MCNC: 29 MG/DL (ref 6–23)
CALCIUM SERPL-MCNC: 8.6 MG/DL (ref 8.6–10.2)
CHLORIDE SERPL-SCNC: 101 MMOL/L (ref 98–107)
CO2 SERPL-SCNC: 23 MMOL/L (ref 22–29)
CREAT SERPL-MCNC: 0.7 MG/DL (ref 0.7–1.2)
ERYTHROCYTE [DISTWIDTH] IN BLOOD BY AUTOMATED COUNT: 14.6 % (ref 11.5–15)
GFR, ESTIMATED: >90 ML/MIN/1.73M2
GLUCOSE SERPL-MCNC: 130 MG/DL (ref 74–99)
HCT VFR BLD AUTO: 27.2 % (ref 37–54)
HGB BLD-MCNC: 8.6 G/DL (ref 12.5–16.5)
MCH RBC QN AUTO: 30.1 PG (ref 26–35)
MCHC RBC AUTO-ENTMCNC: 31.6 G/DL (ref 32–34.5)
MCV RBC AUTO: 95.1 FL (ref 80–99.9)
PLATELET # BLD AUTO: 144 K/UL (ref 130–450)
PMV BLD AUTO: 9.1 FL (ref 7–12)
POTASSIUM SERPL-SCNC: 4.2 MMOL/L (ref 3.5–5)
RBC # BLD AUTO: 2.86 M/UL (ref 3.8–5.8)
SODIUM SERPL-SCNC: 131 MMOL/L (ref 132–146)
WBC OTHER # BLD: 11.1 K/UL (ref 4.5–11.5)

## 2025-02-24 PROCEDURE — 6370000000 HC RX 637 (ALT 250 FOR IP): Performed by: INTERNAL MEDICINE

## 2025-02-24 PROCEDURE — 97535 SELF CARE MNGMENT TRAINING: CPT

## 2025-02-24 PROCEDURE — 80048 BASIC METABOLIC PNL TOTAL CA: CPT

## 2025-02-24 PROCEDURE — 97530 THERAPEUTIC ACTIVITIES: CPT

## 2025-02-24 PROCEDURE — 2500000003 HC RX 250 WO HCPCS: Performed by: INTERNAL MEDICINE

## 2025-02-24 PROCEDURE — 36415 COLL VENOUS BLD VENIPUNCTURE: CPT

## 2025-02-24 PROCEDURE — 1200000000 HC SEMI PRIVATE

## 2025-02-24 PROCEDURE — 85027 COMPLETE CBC AUTOMATED: CPT

## 2025-02-24 RX ADMIN — SODIUM CHLORIDE, PRESERVATIVE FREE 10 ML: 5 INJECTION INTRAVENOUS at 09:55

## 2025-02-24 RX ADMIN — BENZOCAINE, BUTAMBEN, AND TETRACAINE HYDROCHLORIDE 1 SPRAY: .028; .004; .004 AEROSOL, SPRAY TOPICAL at 21:00

## 2025-02-24 RX ADMIN — TIMOLOL MALEATE 1 DROP: 5 SOLUTION OPHTHALMIC at 09:54

## 2025-02-24 RX ADMIN — TAMSULOSIN HYDROCHLORIDE 0.8 MG: 0.4 CAPSULE ORAL at 18:02

## 2025-02-24 RX ADMIN — METOPROLOL SUCCINATE 12.5 MG: 25 TABLET, EXTENDED RELEASE ORAL at 18:01

## 2025-02-24 RX ADMIN — BUPROPION HYDROCHLORIDE 100 MG: 100 TABLET, FILM COATED, EXTENDED RELEASE ORAL at 06:28

## 2025-02-24 RX ADMIN — APIXABAN 5 MG: 5 TABLET, FILM COATED ORAL at 06:28

## 2025-02-24 RX ADMIN — APIXABAN 5 MG: 5 TABLET, FILM COATED ORAL at 18:02

## 2025-02-24 RX ADMIN — PANTOPRAZOLE SODIUM 40 MG: 40 TABLET, DELAYED RELEASE ORAL at 06:28

## 2025-02-24 RX ADMIN — BENZOCAINE, BUTAMBEN, AND TETRACAINE HYDROCHLORIDE 1 SPRAY: .028; .004; .004 AEROSOL, SPRAY TOPICAL at 18:18

## 2025-02-24 RX ADMIN — PETROLATUM: 420 OINTMENT TOPICAL at 21:00

## 2025-02-24 RX ADMIN — LATANOPROST 1 DROP: 50 SOLUTION OPHTHALMIC at 20:59

## 2025-02-24 RX ADMIN — DAPSONE 25 MG: 25 TABLET ORAL at 09:55

## 2025-02-24 RX ADMIN — BRIMONIDINE TARTRATE 1 DROP: 2 SOLUTION/ DROPS OPHTHALMIC at 20:59

## 2025-02-24 RX ADMIN — DOFETILIDE 125 MCG: 0.12 CAPSULE ORAL at 06:28

## 2025-02-24 RX ADMIN — FERROUS SULFATE TAB 325 MG (65 MG ELEMENTAL FE) 325 MG: 325 (65 FE) TAB at 06:28

## 2025-02-24 RX ADMIN — PETROLATUM: 420 OINTMENT TOPICAL at 09:55

## 2025-02-24 RX ADMIN — BRIMONIDINE TARTRATE 1 DROP: 2 SOLUTION/ DROPS OPHTHALMIC at 09:54

## 2025-02-24 RX ADMIN — ATORVASTATIN CALCIUM 40 MG: 40 TABLET, FILM COATED ORAL at 18:02

## 2025-02-24 RX ADMIN — TIMOLOL MALEATE 1 DROP: 5 SOLUTION OPHTHALMIC at 21:00

## 2025-02-24 RX ADMIN — SODIUM CHLORIDE, PRESERVATIVE FREE 10 ML: 5 INJECTION INTRAVENOUS at 21:00

## 2025-02-24 RX ADMIN — DOFETILIDE 125 MCG: 0.12 CAPSULE ORAL at 18:02

## 2025-02-24 ASSESSMENT — PAIN SCALES - GENERAL
PAINLEVEL_OUTOF10: 4
PAINLEVEL_OUTOF10: 0

## 2025-02-24 NOTE — PLAN OF CARE
Problem: Safety - Adult  Goal: Free from fall injury  Outcome: Progressing     Problem: Chronic Conditions and Co-morbidities  Goal: Patient's chronic conditions and co-morbidity symptoms are monitored and maintained or improved  Outcome: Progressing     Problem: Discharge Planning  Goal: Discharge to home or other facility with appropriate resources  Outcome: Progressing     Problem: Skin/Tissue Integrity  Goal: Skin integrity remains intact  Description: 1.  Monitor for areas of redness and/or skin breakdown  2.  Assess vascular access sites hourly  3.  Every 4-6 hours minimum:  Change oxygen saturation probe site  4.  Every 4-6 hours:  If on nasal continuous positive airway pressure, respiratory therapy assess nares and determine need for appliance change or resting period  Outcome: Progressing     Problem: ABCDS Injury Assessment  Goal: Absence of physical injury  Outcome: Progressing     Problem: Pain  Goal: Verbalizes/displays adequate comfort level or baseline comfort level  Outcome: Progressing  Flowsheets (Taken 2/23/2025 1946)  Verbalizes/displays adequate comfort level or baseline comfort level:   Encourage patient to monitor pain and request assistance   Assess pain using appropriate pain scale     Problem: Nutrition Deficit:  Goal: Optimize nutritional status  Outcome: Progressing     Problem: Neurosensory - Adult  Goal: Achieves maximal functionality and self care  Outcome: Progressing     Problem: Skin/Tissue Integrity - Adult  Goal: Incisions, wounds, or drain sites healing without S/S of infection  Outcome: Progressing     Problem: Musculoskeletal - Adult  Goal: Return mobility to safest level of function  Outcome: Progressing  Goal: Return ADL status to a safe level of function  Outcome: Progressing     Problem: Gastrointestinal - Adult  Goal: Maintains or returns to baseline bowel function  Outcome: Progressing  Goal: Maintains adequate nutritional intake  Outcome: Progressing     Problem:

## 2025-02-24 NOTE — PLAN OF CARE
Problem: Safety - Adult  Goal: Free from fall injury  Outcome: Progressing  Flowsheets (Taken 2/24/2025 1230)  Free From Fall Injury:   Instruct family/caregiver on patient safety   Based on caregiver fall risk screen, instruct family/caregiver to ask for assistance with transferring infant if caregiver noted to have fall risk factors     Problem: Chronic Conditions and Co-morbidities  Goal: Patient's chronic conditions and co-morbidity symptoms are monitored and maintained or improved  Outcome: Progressing  Flowsheets (Taken 2/24/2025 0750)  Care Plan - Patient's Chronic Conditions and Co-Morbidity Symptoms are Monitored and Maintained or Improved:   Monitor and assess patient's chronic conditions and comorbid symptoms for stability, deterioration, or improvement   Collaborate with multidisciplinary team to address chronic and comorbid conditions and prevent exacerbation or deterioration   Update acute care plan with appropriate goals if chronic or comorbid symptoms are exacerbated and prevent overall improvement and discharge

## 2025-02-24 NOTE — CARE COORDINATION
Auth for [facility] still pending. Ambulance transport placed on will-call via [facility]. Ambulance form, KRISTAN and PASRR completed; envelope placed on the soft chart.     Electronically signed by LANETTE Freed on 2/24/2025 at 8:41 AM

## 2025-02-25 VITALS
HEIGHT: 68 IN | RESPIRATION RATE: 21 BRPM | SYSTOLIC BLOOD PRESSURE: 110 MMHG | BODY MASS INDEX: 25.06 KG/M2 | WEIGHT: 165.34 LBS | DIASTOLIC BLOOD PRESSURE: 66 MMHG | OXYGEN SATURATION: 94 % | HEART RATE: 71 BPM | TEMPERATURE: 97.4 F

## 2025-02-25 PROCEDURE — 6370000000 HC RX 637 (ALT 250 FOR IP): Performed by: INTERNAL MEDICINE

## 2025-02-25 PROCEDURE — 2500000003 HC RX 250 WO HCPCS: Performed by: INTERNAL MEDICINE

## 2025-02-25 RX ADMIN — SODIUM CHLORIDE, PRESERVATIVE FREE 10 ML: 5 INJECTION INTRAVENOUS at 08:20

## 2025-02-25 RX ADMIN — PANTOPRAZOLE SODIUM 40 MG: 40 TABLET, DELAYED RELEASE ORAL at 06:08

## 2025-02-25 RX ADMIN — APIXABAN 5 MG: 5 TABLET, FILM COATED ORAL at 06:08

## 2025-02-25 RX ADMIN — DOFETILIDE 125 MCG: 0.12 CAPSULE ORAL at 06:08

## 2025-02-25 RX ADMIN — BRIMONIDINE TARTRATE 1 DROP: 2 SOLUTION/ DROPS OPHTHALMIC at 08:19

## 2025-02-25 RX ADMIN — PETROLATUM: 420 OINTMENT TOPICAL at 11:09

## 2025-02-25 RX ADMIN — TIMOLOL MALEATE 1 DROP: 5 SOLUTION OPHTHALMIC at 08:20

## 2025-02-25 RX ADMIN — FERROUS SULFATE TAB 325 MG (65 MG ELEMENTAL FE) 325 MG: 325 (65 FE) TAB at 06:08

## 2025-02-25 RX ADMIN — DAPSONE 25 MG: 25 TABLET ORAL at 08:19

## 2025-02-25 RX ADMIN — BUPROPION HYDROCHLORIDE 100 MG: 100 TABLET, FILM COATED, EXTENDED RELEASE ORAL at 06:08

## 2025-02-25 ASSESSMENT — PAIN SCALES - GENERAL: PAINLEVEL_OUTOF10: 0

## 2025-02-25 NOTE — PLAN OF CARE
Problem: Safety - Adult  Goal: Free from fall injury  2/25/2025 0903 by Lyndsay Barraza, RN  Outcome: Progressing  Flowsheets (Taken 2/25/2025 0834)  Free From Fall Injury:   Instruct family/caregiver on patient safety   Based on caregiver fall risk screen, instruct family/caregiver to ask for assistance with transferring infant if caregiver noted to have fall risk factors     Problem: Chronic Conditions and Co-morbidities  Goal: Patient's chronic conditions and co-morbidity symptoms are monitored and maintained or improved  2/25/2025 0903 by Lyndsay Barraza, RN  Outcome: Progressing  Flowsheets (Taken 2/25/2025 0751)  Care Plan - Patient's Chronic Conditions and Co-Morbidity Symptoms are Monitored and Maintained or Improved:   Monitor and assess patient's chronic conditions and comorbid symptoms for stability, deterioration, or improvement   Collaborate with multidisciplinary team to address chronic and comorbid conditions and prevent exacerbation or deterioration   Update acute care plan with appropriate goals if chronic or comorbid symptoms are exacerbated and prevent overall improvement and discharge

## 2025-02-25 NOTE — CARE COORDINATION
Received message from Nhung at [facility]; auth obtained. Ambulance transport set up for 2:30p via [facility]. Patient, nurse and liaison informed. Call made to patient's wife; provided update regarding discharge.    Electronically signed by LANETTE Freed on 2/25/2025 at 12:27 PM

## 2025-02-25 NOTE — PROGRESS NOTES
2/19/2025 4:54 PM  Montana Gerard  [id number]    Subjective:    Constance urine   Denies difficulty voiding     Review of Systems  Constitutional: No fever or chills   Respiratory: negative for cough and hemoptysis  Cardiovascular: negative for chest pain and dyspnea  Gastrointestinal: negative for abdominal pain, diarrhea, nausea and vomiting   : See above  Derm: negative for rash and skin lesion(s)  Neurological: negative for seizures and tremors  Musculoskeletal: Negative    Psychiatric: Negative   All other reviews are negative      Scheduled Meds:   brimonidine  1 drop Both Eyes BID    timolol  1 drop Both Eyes BID    apixaban  5 mg Oral BID    atorvastatin  40 mg Oral Daily    buPROPion  100 mg Oral Daily    dapsone  25 mg Oral Daily    dofetilide  125 mcg Oral BID    ferrous sulfate  325 mg Oral Daily    latanoprost  1 drop Both Eyes Nightly    metoprolol succinate  12.5 mg Oral Daily    pantoprazole  40 mg Oral QAM AC    tamsulosin  0.8 mg Oral Nightly    sodium chloride flush  5-40 mL IntraVENous 2 times per day       Objective:  Vitals:    02/19/25 1544   BP: 125/76   Pulse: 68   Resp: 15   Temp: 98.6 °F (37 °C)   SpO2: 96%         Allergies: Ambrosia trifida (tall ragweed) allergy skin test and Gluten    General Appearance: alert and oriented to person, place and time and in no acute distress  Skin: no rash or erythema  Head: normocephalic and atraumatic  Pulmonary/Chest: normal air movement, no respiratory distress  Abdomen: soft, non-tender, non-distended  Genitourinary: no davis   Extremities: no cyanosis, clubbing or edema         Labs:     Recent Labs     02/19/25  0731      K 4.1      CO2 20*   BUN 29*   CREATININE 0.8   GLUCOSE 117*   CALCIUM 8.5*       Lab Results   Component Value Date/Time    HGB 8.1 02/19/2025 07:31 AM    HCT 26.0 02/19/2025 07:31 AM       Lab Results   Component Value Date    PSA 0.08 02/18/2025    PSA 0.12 02/07/2025    PSA 0.09 11/08/2024 
  [facility]    Subjective:  pt alert conversive      Current Facility-Administered Medications:     white petrolatum ointment, , Topical, BID **AND** white petrolatum ointment, , Topical, TID PRN, Satinder Foster DO    brimonidine (ALPHAGAN) 0.2 % ophthalmic solution 1 drop, 1 drop, Both Eyes, BID, Satinder Foster DO, 1 drop at 02/20/25 2011    timolol (TIMOPTIC) 0.5 % ophthalmic solution 1 drop, 1 drop, Both Eyes, BID, Satinder Foster DO, 1 drop at 02/20/25 2010    apixaban (ELIQUIS) tablet 5 mg, 5 mg, Oral, BID, Satinder Foster DO, 5 mg at 02/21/25 0611    atorvastatin (LIPITOR) tablet 40 mg, 40 mg, Oral, Daily, Satinder Foster DO, 40 mg at 02/20/25 1757    baclofen (LIORESAL) tablet 10 mg, 10 mg, Oral, TID PRN, Satinder Foster DO, 10 mg at 02/20/25 1706    buPROPion (WELLBUTRIN SR) extended release tablet 100 mg, 100 mg, Oral, Daily, Satinder Foster DO, 100 mg at 02/21/25 0611    dapsone tablet 25 mg  (Patient Supplied), 25 mg, Oral, Daily, Satinder Foster DO, 25 mg at 02/20/25 0950    dofetilide (TIKOSYN) capsule 125 mcg, 125 mcg, Oral, BID, Satinder Foster DO, 125 mcg at 02/21/25 0611    ferrous sulfate (IRON 325) tablet 325 mg, 325 mg, Oral, Daily, Satinder Foster DO, 325 mg at 02/21/25 0611    latanoprost (XALATAN) 0.005 % ophthalmic solution 1 drop, 1 drop, Both Eyes, Nightly, Satinder Foster DO, 1 drop at 02/20/25 2010    metoprolol succinate (TOPROL XL) extended release tablet 12.5 mg, 12.5 mg, Oral, Daily, Satinder Foster DO, 12.5 mg at 02/20/25 1756    pantoprazole (PROTONIX) tablet 40 mg, 40 mg, Oral, QAM AC, Satinder Foster, DO, 40 mg at 02/21/25 0611    tamsulosin (FLOMAX) capsule 0.8 mg, 0.8 mg, Oral, Nightly, Satinder Foster, , 0.8 mg at 02/20/25 1756    sodium chloride flush 0.9 % injection 5-40 mL, 5-40 mL, IntraVENous, 2 times per day, Satinder Foster, , 10 mL at 02/20/25 2011    sodium chloride flush 0.9 % injection 5-40 mL, 5-40 mL, 
  [facility]    Subjective:  pt seen this am alert conversive pt with hematuria urology consulted      Current Facility-Administered Medications:     brimonidine (ALPHAGAN) 0.2 % ophthalmic solution 1 drop, 1 drop, Both Eyes, BID, Satinder Foster DO, 1 drop at 02/18/25 0749    timolol (TIMOPTIC) 0.5 % ophthalmic solution 1 drop, 1 drop, Both Eyes, BID, Satinder Foster DO, 1 drop at 02/18/25 0749    apixaban (ELIQUIS) tablet 5 mg, 5 mg, Oral, BID, Satinder Foster DO, 5 mg at 02/17/25 0543    atorvastatin (LIPITOR) tablet 40 mg, 40 mg, Oral, Daily, Satinder Foster DO, 40 mg at 02/17/25 1823    baclofen (LIORESAL) tablet 10 mg, 10 mg, Oral, TID PRN, Satinder Foster DO    buPROPion (WELLBUTRIN SR) extended release tablet 100 mg, 100 mg, Oral, Daily, Satinder Foster DO, 100 mg at 02/18/25 0636    dapsone tablet 25 mg  (Patient Supplied), 25 mg, Oral, Daily, Satinder Foster DO, 25 mg at 02/18/25 0750    dofetilide (TIKOSYN) capsule 125 mcg, 125 mcg, Oral, BID, Satinder Foster DO, 125 mcg at 02/18/25 0649    ferrous sulfate (IRON 325) tablet 325 mg, 325 mg, Oral, Daily, Satinder Foster DO, 325 mg at 02/18/25 0636    latanoprost (XALATAN) 0.005 % ophthalmic solution 1 drop, 1 drop, Both Eyes, Nightly, Satinder Foster DO, 1 drop at 02/17/25 2152    metoprolol succinate (TOPROL XL) extended release tablet 12.5 mg, 12.5 mg, Oral, Daily, Satinder Foster DO, 12.5 mg at 02/17/25 1824    pantoprazole (PROTONIX) tablet 40 mg, 40 mg, Oral, QAM AC, Satinder Foster DO, 40 mg at 02/18/25 0636    tamsulosin (FLOMAX) capsule 0.8 mg, 0.8 mg, Oral, Nightly, Satinder Foster, , 0.8 mg at 02/17/25 1823    sodium chloride flush 0.9 % injection 5-40 mL, 5-40 mL, IntraVENous, 2 times per day, Satinder Foster DO, 10 mL at 02/18/25 0750    sodium chloride flush 0.9 % injection 5-40 mL, 5-40 mL, IntraVENous, PRN, Satinder Foster,     0.9 % sodium chloride infusion, , IntraVENous, PRN, Cristian, 
  [facility]    Subjective:  pt seen this am alert conversive mimi diet      Current Facility-Administered Medications:     brimonidine (ALPHAGAN) 0.2 % ophthalmic solution 1 drop, 1 drop, Both Eyes, BID, Satinder Foster DO, 1 drop at 02/19/25 0833    timolol (TIMOPTIC) 0.5 % ophthalmic solution 1 drop, 1 drop, Both Eyes, BID, Satinder Foster DO, 1 drop at 02/19/25 0834    apixaban (ELIQUIS) tablet 5 mg, 5 mg, Oral, BID, Satinder Foster DO, 5 mg at 02/19/25 0611    atorvastatin (LIPITOR) tablet 40 mg, 40 mg, Oral, Daily, Satinder Foster DO, 40 mg at 02/18/25 1839    baclofen (LIORESAL) tablet 10 mg, 10 mg, Oral, TID PRN, Satinder Foster DO    buPROPion (WELLBUTRIN SR) extended release tablet 100 mg, 100 mg, Oral, Daily, Satinder Foster DO, 100 mg at 02/19/25 0611    dapsone tablet 25 mg  (Patient Supplied), 25 mg, Oral, Daily, Satinder Foster DO, 25 mg at 02/19/25 0833    dofetilide (TIKOSYN) capsule 125 mcg, 125 mcg, Oral, BID, Satinder Foster DO, 125 mcg at 02/19/25 0612    ferrous sulfate (IRON 325) tablet 325 mg, 325 mg, Oral, Daily, Satinder Foster DO, 325 mg at 02/19/25 0611    latanoprost (XALATAN) 0.005 % ophthalmic solution 1 drop, 1 drop, Both Eyes, Nightly, Satinder Foster DO, 1 drop at 02/18/25 1946    metoprolol succinate (TOPROL XL) extended release tablet 12.5 mg, 12.5 mg, Oral, Daily, Satindre Foster DO, 12.5 mg at 02/18/25 1839    pantoprazole (PROTONIX) tablet 40 mg, 40 mg, Oral, QAM AC, Satinder Foster DO, 40 mg at 02/19/25 0611    tamsulosin (FLOMAX) capsule 0.8 mg, 0.8 mg, Oral, Nightly, Satinder Foster , 0.8 mg at 02/18/25 1840    sodium chloride flush 0.9 % injection 5-40 mL, 5-40 mL, IntraVENous, 2 times per day, Satinder Foster DO, 10 mL at 02/19/25 0834    sodium chloride flush 0.9 % injection 5-40 mL, 5-40 mL, IntraVENous, PRN, Satinder Foster,     0.9 % sodium chloride infusion, , IntraVENous, PRN, Satinder Foster, DO    potassium 
  [facility]    Subjective:  pt alert conversive      Current Facility-Administered Medications:     butamben-tetracaine-benzocaine (CETACAINE) spray 1 spray, 1 spray, Topical, PRN, Placido Montemayor MD, 1 spray at 02/24/25 2100    white petrolatum ointment, , Topical, BID, Given at 02/24/25 2100 **AND** white petrolatum ointment, , Topical, TID PRN, Satinder Foster DO    brimonidine (ALPHAGAN) 0.2 % ophthalmic solution 1 drop, 1 drop, Both Eyes, BID, Satinder Foster DO, 1 drop at 02/25/25 0819    timolol (TIMOPTIC) 0.5 % ophthalmic solution 1 drop, 1 drop, Both Eyes, BID, Satinder Foster DO, 1 drop at 02/25/25 0820    apixaban (ELIQUIS) tablet 5 mg, 5 mg, Oral, BID, Satinder Foster DO, 5 mg at 02/25/25 0608    atorvastatin (LIPITOR) tablet 40 mg, 40 mg, Oral, Daily, Satinder Foster DO, 40 mg at 02/24/25 1802    baclofen (LIORESAL) tablet 10 mg, 10 mg, Oral, TID PRN, Satinder Foster DO, 10 mg at 02/20/25 1706    buPROPion (WELLBUTRIN SR) extended release tablet 100 mg, 100 mg, Oral, Daily, Satinder Foster DO, 100 mg at 02/25/25 0608    dapsone tablet 25 mg  (Patient Supplied), 25 mg, Oral, Daily, Satinder Foster DO, 25 mg at 02/25/25 0819    dofetilide (TIKOSYN) capsule 125 mcg, 125 mcg, Oral, BID, Satinder Foster DO, 125 mcg at 02/25/25 0608    ferrous sulfate (IRON 325) tablet 325 mg, 325 mg, Oral, Daily, Satinder Foster DO, 325 mg at 02/25/25 0608    latanoprost (XALATAN) 0.005 % ophthalmic solution 1 drop, 1 drop, Both Eyes, Nightly, Satinder Foster DO, 1 drop at 02/24/25 2059    metoprolol succinate (TOPROL XL) extended release tablet 12.5 mg, 12.5 mg, Oral, Daily, Satinder Foster, DO, 12.5 mg at 02/24/25 1801    pantoprazole (PROTONIX) tablet 40 mg, 40 mg, Oral, QAM AC, Satinder Foster, DO, 40 mg at 02/25/25 0608    tamsulosin (FLOMAX) capsule 0.8 mg, 0.8 mg, Oral, Nightly, Satinder Foster, DO, 0.8 mg at 02/24/25 1802    sodium chloride flush 0.9 % injection 
  [facility]    Subjective:  pt alert conversive  Complaining of sore throat  Family members by the bedside        Current Facility-Administered Medications:     butamben-tetracaine-benzocaine (CETACAINE) spray 1 spray, 1 spray, Topical, PRN, Placido Montemayor MD    white petrolatum ointment, , Topical, BID, Given at 02/23/25 0944 **AND** white petrolatum ointment, , Topical, TID PRN, Satinder Foster DO    brimonidine (ALPHAGAN) 0.2 % ophthalmic solution 1 drop, 1 drop, Both Eyes, BID, Satinder Foster DO, 1 drop at 02/23/25 0943    timolol (TIMOPTIC) 0.5 % ophthalmic solution 1 drop, 1 drop, Both Eyes, BID, Satinder Foster DO, 1 drop at 02/23/25 0943    apixaban (ELIQUIS) tablet 5 mg, 5 mg, Oral, BID, Satinder Foster DO, 5 mg at 02/23/25 0542    atorvastatin (LIPITOR) tablet 40 mg, 40 mg, Oral, Daily, Satinder Foster DO, 40 mg at 02/22/25 1813    baclofen (LIORESAL) tablet 10 mg, 10 mg, Oral, TID PRN, Satinder Foster DO, 10 mg at 02/20/25 1706    buPROPion (WELLBUTRIN SR) extended release tablet 100 mg, 100 mg, Oral, Daily, Satinder Foster DO, 100 mg at 02/23/25 0542    dapsone tablet 25 mg  (Patient Supplied), 25 mg, Oral, Daily, Satinder Foster DO, 25 mg at 02/23/25 0942    dofetilide (TIKOSYN) capsule 125 mcg, 125 mcg, Oral, BID, Satindre Foster DO, 125 mcg at 02/23/25 0541    ferrous sulfate (IRON 325) tablet 325 mg, 325 mg, Oral, Daily, Satinder Foster DO, 325 mg at 02/23/25 0542    latanoprost (XALATAN) 0.005 % ophthalmic solution 1 drop, 1 drop, Both Eyes, Nightly, Satinder Foster DO, 1 drop at 02/22/25 2056    metoprolol succinate (TOPROL XL) extended release tablet 12.5 mg, 12.5 mg, Oral, Daily, Satinder Foster, , 12.5 mg at 02/22/25 1813    pantoprazole (PROTONIX) tablet 40 mg, 40 mg, Oral, QAM AC, Satinder Foster, , 40 mg at 02/23/25 0542    tamsulosin (FLOMAX) capsule 0.8 mg, 0.8 mg, Oral, Nightly, Satinder Foster, , 0.8 mg at 02/22/25 1813    
  [facility]    Subjective:  pt alert conversive mimi diet      Current Facility-Administered Medications:     brimonidine (ALPHAGAN) 0.2 % ophthalmic solution 1 drop, 1 drop, Both Eyes, BID, Satinder Foster DO, 1 drop at 02/19/25 2015    timolol (TIMOPTIC) 0.5 % ophthalmic solution 1 drop, 1 drop, Both Eyes, BID, Satinder Foster DO, 1 drop at 02/19/25 2015    apixaban (ELIQUIS) tablet 5 mg, 5 mg, Oral, BID, Satinder Foster DO, 5 mg at 02/20/25 0525    atorvastatin (LIPITOR) tablet 40 mg, 40 mg, Oral, Daily, Satinder Foster DO, 40 mg at 02/19/25 1803    baclofen (LIORESAL) tablet 10 mg, 10 mg, Oral, TID PRN, Satinder Foster DO    buPROPion (WELLBUTRIN SR) extended release tablet 100 mg, 100 mg, Oral, Daily, Satinder Foster DO, 100 mg at 02/20/25 0525    dapsone tablet 25 mg  (Patient Supplied), 25 mg, Oral, Daily, Satinder Foster DO, 25 mg at 02/19/25 0833    dofetilide (TIKOSYN) capsule 125 mcg, 125 mcg, Oral, BID, Satinder Foster DO, 125 mcg at 02/20/25 0525    ferrous sulfate (IRON 325) tablet 325 mg, 325 mg, Oral, Daily, Satinder Foster DO, 325 mg at 02/20/25 0526    latanoprost (XALATAN) 0.005 % ophthalmic solution 1 drop, 1 drop, Both Eyes, Nightly, Satinder Foster DO, 1 drop at 02/19/25 2015    metoprolol succinate (TOPROL XL) extended release tablet 12.5 mg, 12.5 mg, Oral, Daily, Satinder Foster DO, 12.5 mg at 02/19/25 1802    pantoprazole (PROTONIX) tablet 40 mg, 40 mg, Oral, QAM AC, Satinder Foster DO, 40 mg at 02/20/25 0526    tamsulosin (FLOMAX) capsule 0.8 mg, 0.8 mg, Oral, Nightly, Satinder Foster DO, 0.8 mg at 02/19/25 1803    sodium chloride flush 0.9 % injection 5-40 mL, 5-40 mL, IntraVENous, 2 times per day, Satinder Foster DO, 10 mL at 02/19/25 2015    sodium chloride flush 0.9 % injection 5-40 mL, 5-40 mL, IntraVENous, PRN, Satinder Foster,     0.9 % sodium chloride infusion, , IntraVENous, PRN, Satinder Foster,     potassium chloride 
  [facility]    Subjective:  pt alert conversive s/p tdc s/p dialysis # 1      Current Facility-Administered Medications:     brimonidine (ALPHAGAN) 0.2 % ophthalmic solution 1 drop, 1 drop, Both Eyes, BID, Satinder Foster DO, 1 drop at 02/18/25 0749    timolol (TIMOPTIC) 0.5 % ophthalmic solution 1 drop, 1 drop, Both Eyes, BID, Satinder Foster DO, 1 drop at 02/18/25 0749    apixaban (ELIQUIS) tablet 5 mg, 5 mg, Oral, BID, Satinder Foster DO, 5 mg at 02/17/25 0543    atorvastatin (LIPITOR) tablet 40 mg, 40 mg, Oral, Daily, Satinder Foster DO, 40 mg at 02/17/25 1823    baclofen (LIORESAL) tablet 10 mg, 10 mg, Oral, TID PRN, Satinder Foster DO    buPROPion (WELLBUTRIN SR) extended release tablet 100 mg, 100 mg, Oral, Daily, Satinder Foster DO, 100 mg at 02/18/25 0636    dapsone tablet 25 mg  (Patient Supplied), 25 mg, Oral, Daily, Satinder Foster DO, 25 mg at 02/18/25 0750    dofetilide (TIKOSYN) capsule 125 mcg, 125 mcg, Oral, BID, Satinder Foster DO, 125 mcg at 02/18/25 0649    ferrous sulfate (IRON 325) tablet 325 mg, 325 mg, Oral, Daily, Satinder Foster DO, 325 mg at 02/18/25 0636    latanoprost (XALATAN) 0.005 % ophthalmic solution 1 drop, 1 drop, Both Eyes, Nightly, Satinder Foster DO, 1 drop at 02/17/25 2152    metoprolol succinate (TOPROL XL) extended release tablet 12.5 mg, 12.5 mg, Oral, Daily, Satinder Foster DO, 12.5 mg at 02/17/25 1824    pantoprazole (PROTONIX) tablet 40 mg, 40 mg, Oral, QAM AC, Satinder Foster DO, 40 mg at 02/18/25 0636    tamsulosin (FLOMAX) capsule 0.8 mg, 0.8 mg, Oral, Nightly, Satinder Foster, , 0.8 mg at 02/17/25 1823    sodium chloride flush 0.9 % injection 5-40 mL, 5-40 mL, IntraVENous, 2 times per day, Satinder Foster DO, 10 mL at 02/18/25 0750    sodium chloride flush 0.9 % injection 5-40 mL, 5-40 mL, IntraVENous, PRN, Satinder Foster,     0.9 % sodium chloride infusion, , IntraVENous, PRN, Satinder Foster, DO    potassium 
4 Eyes Skin Assessment     NAME:  Montana Gerard  YOB: 1940  MEDICAL RECORD NUMBER:  [id number]    The patient is being assessed for  Admission    I agree that at least one RN has performed a thorough Head to Toe Skin Assessment on the patient. ALL assessment sites listed below have been assessed.      Areas assessed by both nurses:    Head, Face, Ears, Shoulders, Back, Chest, Arms, Elbows, Hands, Sacrum. Buttock, Coccyx, Ischium, Legs. Feet and Heels, Under Medical Devices , and Other wound on the buttocks and documented in the Avatar         Does the Patient have a Wound? Yes wound(s) were present on assessment. LDA wound assessment was Initiated and completed by RN       Kosta Prevention initiated by RN: Yes  Wound Care Orders initiated by RN: Yes    Pressure Injury (Stage 3,4, Unstageable, DTI, NWPT, and Complex wounds) if present, place Wound referral order by RN under : No    New Ostomies, if present place, Ostomy referral order under : No     Nurse 1 eSignature: Electronically signed by Jay Partida RN on 2/17/25 at 1:39 AM EST    **SHARE this note so that the co-signing nurse can place an eSignature**    Nurse 2 eSignature: Electronically signed by Bri Chavez RN on 2/17/25 at 2:02 AM EST  
Call placed to [facility] Louis regarding precert. Spoke with Lory who states she will call [employer] to try to obtain precert, as it still appears to be pending. States that she will call RN back if/when she's able to communicate with insurance company.  
Comprehensive Nutrition Assessment    Type and Reason for Visit:  Initial, Consult    Nutrition Recommendations/Plan:   Recommend and start Glucerna supplement TID and Brandon wound healing supplement BID to help meet increased nutritional needs from wound healing and d/t decreased po intake of meals.         Malnutrition Assessment:  Malnutrition Status:  Moderate malnutrition (02/18/25 1337)    Context:  Chronic Illness     Findings of the 6 clinical characteristics of malnutrition:  Energy Intake:  75% or less estimated energy requirements for 1 month or longer  Weight Loss:  Mild weight loss (6.8% in 3 months)     Body Fat Loss:  Mild body fat loss Orbital, Triceps   Muscle Mass Loss:  Mild muscle mass loss Temples (temporalis), Clavicles (pectoralis & deltoids)  Fluid Accumulation:  No fluid accumulation     Strength:  Not Performed    Nutrition Assessment:    Patients po intake has been sporadic and decreased since admission, averaging 25-50% of meals served ; adm w/ acute on chronic back pain ; also adm w/ possible rectal bleeding and hematuria ; noted decreased po intake/appetite PTA and subjective weight loss ; noted FTT ; pt does meet criteria for moderate malnutrition ; hx of DM/atrial fibrillation/CAD/depression/NSTEMI ; s/p TAVR (2023) ; hx of prostate CA s/p radiation seed implant at [facility] (hx of penile implant) ; hx of diverticulitis/gastritis/GERD/hiatal hernia ; wound noted ; will provide recommendations    Nutrition Related Findings:    -I&Os (-1.2 L), 2+ edema, missing teeth, A&O x 4, decreased appetite, muscle/fat wasting, hyperglycemia ; Wound Type: Multiple, Open Wounds, Wound Consult Pending (wounds x 2)       Current Nutrition Intake & Therapies:    Average Meal Intake: 26-50%     ADULT DIET; Regular; Gluten Free    Anthropometric Measures:  Height: 172.7 cm (5' 8\")  Ideal Body Weight (IBW): 154 lbs (70 kg)    Admission Body Weight: 73.5 kg (162 lb) (2/17, bedscale ; admission 
Dr. Foster paged via ans service regarding new onset hematuria with small clots.       Dr. Foster returned call and updated. Order obtained and placed at this time.   
EP consult sent via perfect serve  
Family in room requesting to speak to Edelmira, , in regards to precert pending and receiving a phone call from insurance company saying there was a delay on our end. Edelmira called back and looking into precert.    Family still requesting to talk to Edelmira. Voicemail left for Edelmira to notify.    TOM BUSCH RN    
I personally spoke with pts son and wife individually over the phone re case  
Large blood clot noted in patient's bowel movement. Dr. Foster paged to update.     1035-Spoke with Dr. Foster and was given verbal orders to consult general surgery.   
Message left for Dr Foster to contact patients wife concerning abnormal EKG  
Montana Gerard was ordered Dapsone 25mg which is a nonformulary medication.  This medication will need to be supplied by the patient as the pharmacy does not carry this non-formulary medication.    If the medication has not been administered by 1400 on the following day from the time the order was placed, a pharmacist will follow-up with the nurse of the patient to assess the capability of the patient to bring in the medication.    If it is determined that the patient cannot supply the medication and it is not available to be dispensed from the pharmacy, the provider will be notified.   
New consult noted for urology attempted to call answering service multiple time and each time phone rang busy and hung up will notify nurse to attempt to recall tmrw   
Occupational Therapy  OT BEDSIDE TREATMENT NOTE   [facility]  [address], [place]      Date:2025  Patient Name: Montana Gerard  MRN: [id number]  : 1940  Room: [facility]6409-     Evaluating OT: Tariq Carlin OTR/L #[id number]      Referring Provider: Satinder Foster DO   Specific Provider Orders/Date: OT eval and treat 25     Diagnosis: GI bleed [K92.2]  Chronic anticoagulation [Z79.01]  Failure to thrive in adult [R62.7]  Atrial fibrillation with rapid ventricular response (HCC) [I48.91]  Gastrointestinal hemorrhage, unspecified gastrointestinal hemorrhage type [K92.2]  Low back pain, unspecified back pain laterality, unspecified chronicity, unspecified whether sciatica present [M54.50]   Pt admitted to hospital with GI bleed      Pertinent Medical History:  has a past medical history of Acute myocardial infarction, unspecified, Anemia, Cholelithiasis, Depressive disorder, Dermatitis herpetiformis, Diet-controlled diabetes mellitus (HCC), Diverticulitis, Gastritis, GERD (gastroesophageal reflux disease), Hiatal hernia, Hyperlipidemia, Iron deficiency anemia, Non-ST elevation myocardial infarction (NSTEMI), Peptic reflux disease, and ST elevation (STEMI) myocardial infarction of unspecified site.         Precautions:  Fall Risk, , bed alarm     Assessment of current deficits    [x] Functional mobility          [x]ADLs           [x] Strength                  [x]Cognition    [x] Functional transfers        [x] IADLs         [x] Safety Awareness   [x]Endurance    [] Fine Coordination                        [x] Balance      [] Vision/perception   []Sensation      []Gross Motor Coordination            [] ROM           [] Delirium                   [] Motor Control      OT PLAN OF CARE   OT POC based on physician orders, patient diagnosis and results of clinical assessment     Frequency/Duration 1-5 days/wk for 2 weeks PRN   Specific OT 
Patient's wife would like for patient to be discharged to [facility] or [facility]  
Patient's wife, Bernadette will bring patient's dapsone when she comes in  
Physical Therapy  Physical Therapy Treatment     Name: Montana Gerard  : 1940  MRN: [id number]      Date of Service: 2025    Evaluating PT:  Liliane Senior PT, DPT [id number]    Room #:  6409/6409-B  Diagnosis:  GI bleed [K92.2]  Chronic anticoagulation [Z79.01]  Failure to thrive in adult [R62.7]  Atrial fibrillation with rapid ventricular response (HCC) [I48.91]  Gastrointestinal hemorrhage, unspecified gastrointestinal hemorrhage type [K92.2]  Low back pain, unspecified back pain laterality, unspecified chronicity, unspecified whether sciatica present [M54.50]  PMHx/PSHx:    Past Medical History:   Diagnosis Date    Acute myocardial infarction, unspecified 2023    Anemia     Cholelithiasis     Depressive disorder     Dermatitis herpetiformis     GLUTEN FREE DIET    Diet-controlled diabetes mellitus (HCC) 2022    Diverticulitis     Gastritis     GERD (gastroesophageal reflux disease)     Hiatal hernia     Hyperlipidemia     Iron deficiency anemia     Non-ST elevation myocardial infarction (NSTEMI) 2023    Peptic reflux disease     ST elevation (STEMI) myocardial infarction of unspecified site 2023      Procedure/Surgery:  none this admission  Precautions:  Falls, O2  Equipment Needs:  TBD, pt has rollator    SUBJECTIVE:    Pt lives with wife in a 2 story home with 7 stairs to enter and 1 rail.  Bed is on 2nd floor and bath is on 2nd floor.  Pt ambulated with rollator PTA. Pt has been staying on main level in lift chair with 1/2 bath available.     OBJECTIVE:   Initial Evaluation  Date: 25 Treatment  25 Short Term/ Long Term   Goals   AM-PAC 6 Clicks 10/24 11/24    Was pt agreeable to Eval/treatment? yes yes    Does pt have pain? No c/o pain R hip pain with mobility    Bed Mobility  Rolling: MaxA  Supine to sit: MaxA  Sit to supine: NT  Scooting: MaxA Rolling: ModA  Supine to sit: ModA  Sit to supine: NT  Scooting: ModA Rolling: Martha  Supine to sit: Martha  Sit to 
Urology consult given to Dr. Tobin.   Order to bladder scan for now. Bladder scanned patient for 234 ml  
IntraVENous, 2 times per day, Satinder Foster DO, 10 mL at 02/23/25 2032    sodium chloride flush 0.9 % injection 5-40 mL, 5-40 mL, IntraVENous, PRN, Satinder Foster,     0.9 % sodium chloride infusion, , IntraVENous, PRN, Satinder Foster,     potassium chloride (KLOR-CON M) extended release tablet 40 mEq, 40 mEq, Oral, PRN **OR** potassium bicarb-citric acid (EFFER-K) effervescent tablet 40 mEq, 40 mEq, Oral, PRN **OR** potassium chloride 10 mEq/100 mL IVPB (Peripheral Line), 10 mEq, IntraVENous, PRN, Satinder Foster,     magnesium sulfate 2000 mg in 50 mL IVPB premix, 2,000 mg, IntraVENous, PRN, Satinder Foster,     polyethylene glycol (GLYCOLAX) packet 17 g, 17 g, Oral, Daily PRN, Satinder Foster DO    acetaminophen (TYLENOL) tablet 650 mg, 650 mg, Oral, Q6H PRN, 650 mg at 02/23/25 2358 **OR** acetaminophen (TYLENOL) suppository 650 mg, 650 mg, Rectal, Q6H PRN, Satinder Foster,     Objective:    BP (!) 129/57   Pulse 66   Temp 97.5 °F (36.4 °C) (Temporal)   Resp 18   Ht 1.727 m (5' 8\")   Wt 75 kg (165 lb 5.5 oz)   SpO2 96%   BMI 25.14 kg/m²     Heart:  reg  Lungs:  ctab  Abd: + bs soft nontender  Extrem:  w/o edema    CBC with Differential:    Lab Results   Component Value Date/Time    WBC 10.4 02/20/2025 04:31 AM    RBC 2.88 02/20/2025 04:31 AM    HGB 8.9 02/20/2025 04:31 AM    HCT 28.1 02/20/2025 04:31 AM     02/20/2025 04:31 AM    MCV 97.6 02/20/2025 04:31 AM    MCH 30.9 02/20/2025 04:31 AM    MCHC 31.7 02/20/2025 04:31 AM    RDW 14.6 02/20/2025 04:31 AM    LYMPHOPCT 7 02/19/2025 07:31 AM    MONOPCT 7 02/19/2025 07:31 AM    EOSPCT 1 02/19/2025 07:31 AM    BASOPCT 0 02/19/2025 07:31 AM    MONOSABS 0.63 02/19/2025 07:31 AM    LYMPHSABS 0.60 02/19/2025 07:31 AM    EOSABS 0.05 02/19/2025 07:31 AM    BASOSABS 0.01 02/19/2025 07:31 AM     CMP:    Lab Results   Component Value Date/Time     02/19/2025 07:31 AM    K 4.1 02/19/2025 07:31 AM     02/19/2025 07:31 AM    
on admission  -- Other - I will add my own diagnosis  -- Disagree - Not applicable / Not valid  -- Disagree - Clinically unable to determine / Unknown  -- Refer to Clinical Documentation Reviewer    PROVIDER RESPONSE TEXT:    This patient has a Stage 2 pressure ulcer of the left and right buttocks which   was present on admission.    Query created by: Patrice Bernard on 2/21/2025 3:19 PM      Electronically signed by:  Satinder Foster DO 2/23/2025 11:56 AM          
supine: Martha  Scooting: Martha   Transfers Sit to stand: ModA  Stand to sit: ModA  Stand pivot: ModA with WW Sit to stand: ModA  Stand to sit: ModA  Stand pivot: ModA with WW Sit to stand: SBA  Stand to sit: SBA  Stand pivot: SBA with WW   Ambulation    Few steps to chair with WW ModA 8 feet with WW ModA + chair follow >100 feet with WW SBA   Stair negotiation: ascended and descended  NT NT 7 steps with 1 rail SBA   ROM BUE:  Defer to OT note  BLE:  WFL     Strength BUE:  Defer to OT note  BLE:  3+/5  WFL   Balance Sitting EOB:  SBA  Dynamic Standing:  ModA with WW Sitting EOB:  SBA  Dynamic Standing:  ModA with WW + chair follow Sitting EOB:  Independent   Dynamic Standing:  SBA with WW     Pt is A & O x 4, easily distracted  Sensation:  NT  Edema:  none noted    Vitals:  SPO2 on room air: 97%      Patient education  Pt educated on role of PT, safety during mobility    Patient response to education:   Pt verbalized understanding Pt demonstrated skill Pt requires further education in this area   yes yes yes     ASSESSMENT:    Comments:  pt semi-supine in bed upon entry and agreeable to PT treatment. Pt required extra time for all mobility this date d/t back pain and some noted fearfulness of falling. Significant assist to trunk for supine to sit. Pt sat EOB for approximately 8 minutes during session with no assist for sitting balance. Pt able to stand and pivot to chair with WW and balance assist. Posterior lean in static standing that improved with movement. After seated rest, pt able to stand and ambulate short distance with WW, assist for WW advancement and balance. Chair follow required for safety. Distance limited by weakness and pain. Pt left in chair with chair alarm at end of session.   All needs met and call light in reach. All lines remained intact.     Treatment:  Patient practiced and was instructed in the following treatment:    Bed Mobility: VCs provided for sequencing and safety during mobility. Manual 
GFRAA >60 10/11/2022 01:10 PM    LABGLOM 88 02/19/2025 07:31 AM    LABGLOM >60 03/04/2024 12:17 PM    GLUCOSE 117 02/19/2025 07:31 AM    CALCIUM 8.5 02/19/2025 07:31 AM    BILITOT 0.5 02/19/2025 07:31 AM    ALKPHOS 106 02/19/2025 07:31 AM    AST 29 02/19/2025 07:31 AM    ALT 36 02/19/2025 07:31 AM     Warfarin PT/INR:  No results found for: \"INR\", \"PROTIME\"    Assessment:    Active Problems:    Atrial fibrillation with RVR (HCC)    Moderate protein-calorie malnutrition  Resolved Problems:    * No resolved hospital problems. *      Plan:  Medically stable for discharge  Await pre-CERT        Placido Montemayor MD  1:17 PM  2/22/2025    
provided for completion of task.  Transfer Training: Verbal and tactile cueing provided for sequencing and safety during mobility. Manual assist provided for completion of task  Therapeutic Activities: pt sat EOB for approximately 10 minutes during session with no assist for static and dynamic sitting balance.   Skilled Positioning: Positioning for improved posture, joint and skin integrity     PLAN:    Patient is making fair progress towards established goals.  Will continue with current POC.      Time in  1101  Time out  1140    Total Treatment Time  39 minutes     CPT codes:  [] Gait training 52468 - minutes  [] Manual therapy 64051 - minutes  [x] Therapeutic activities 77847 39 minutes  [] Therapeutic exercises 78545 - minutes  [] Neuromuscular reeducation 74061 - minutes    Liliane Senior PT, DPT  [id number]   
community   [x] Positioning to prevent skin breakdown and contractures  [x] Safety and Education Training   [x] Patient/Caregiver Education   [] HEP  [x] Other     PT long term treatment goals are located in above grid    Frequency of treatments: 2-5x/week x 1-2 weeks.    Time in  0820  Time out  0845    Total Treatment Time  10 minutes     Evaluation Time includes thorough review of current medical information, gathering information on past medical history/social history and prior level of function, completion of standardized testing/informal observation of tasks, assessment of data and education on plan of care and goals.    CPT codes:  [x] Low Complexity PT evaluation 71670  [] Moderate Complexity PT evaluation 73477  [] High Complexity PT evaluation 93537  [] PT Re-evaluation 81384  [] Gait training 20960 - minutes  [] Manual therapy 15957 - minutes  [x] Therapeutic activities 00494 10 minutes  [] Therapeutic exercises 07890 - minutes  [] Neuromuscular reeducation 63097 - minutes     Liliane Senior PT, DPT  [id number]     
shifting, dynamic reaching), functional transfers (various surfaces), standing tolerance tasks at w/w (addressing posture, balance and activity tolerance while incorporating light functional reaching; impacting ADLs and functional activity) and functional ambulation tasks short distance with w/w -  skilled cuing on hand placement, posture, body mechanics, energy conservation techniques and safety and sequencing.  Therapist facilitated self-care retraining: UB/LB self-care tasks (robe and socks), toileting hygiene task (+ incontinence; dependent for hygiene) and seated grooming tasks while cuing on modified techniques, posture, safety and energy conservation techniques and sequencing. Skilled monitoring of HR, O2 sats and pts response to treatment.        Rehab Potential:  Good for established goals     Patient / Family Goal: Regain Strunk     Patient and/or family were instructed on functional diagnosis, prognosis/goals and OT plan of care. Demonstrated fair understanding.     Eval Complexity: Low    Time In: 835  Time Out: 900  Total Treatment Time: 10 minutes    Min Units   OT Eval Low 97165 x  1   OT Eval Medium 13466      OT Eval High 39156      OT Re-Eval 92400       Therapeutic Ex 83520       Therapeutic Activities 34048 2     ADL/Self Care 88306  8  1   Orthotic Management 48647       Manual 32432     Neuro Re-Ed 23058       Non-Billable Time          Evaluation Time additionally includes thorough review of current medical information, gathering information on past medical history/social history and prior level of function, interpretation of standardized testing/informal observation of tasks, assessment of data and development of plan of care and goals.          Tariq Carlin OTR/L #[id number]   
tasks      LUE 3+/5 Grossly wfl good  and wfl FMC/dexterity noted during ADL tasks      Hearing: wfl  Sensation:wfl  Tone: wfl  Edema:none noted     Comments: Upon arrival pt supine in bed. ADL retraining to increase independence in dressing and grooming tasks, balance and trf training to increase participation in functional mobility and standing aspects of ADLs with increased safety. Pt educated on modified techniques to increase independence and safety during ADLs, bed mobility, and functional transfers. Pt would benefit from continued skilled OT to increase safety and independence with completion of ADL/IADL tasks for functional independence and quality of life.    At end of session pt left seated in bedside chair, call light within reach, chair alarm on.     Pt has made slow progress towards set goals.     Continue with current plan of care    Treatment Time In:0901            Treatment Time Out: 0939             Treatment Charges: Mins Units   Ther Ex  53420     Manual Therapy 89276     Thera Activities 90770 23 2   ADL/Home Mgt 27060 15 1   Neuro Re-ed 57968     Group Therapy      Orthotic manage/training  69461     Non-Billable Time     Total Timed Treatment 38 3     Lily Rulf SAXENA/L 73947   
normal. EF = 52 ±   5% (2D 4-ch.) Left ventricular diastolic function was not evaluated due to an   arrhythmia. Beat to beat variation due to arrhythmia.   - The right ventricle is normal in size. Right ventricular systolic function is   mildly decreased.   - S/P transcatheter aortic valve replacement. Castillo Ultra prosthetic aortic   valve (size #26). There is no aortic valve regurgitation. The peak gradient is 19   mmHg, the mean gradient is 10 mmHg and the dimensionless valve index is 0.72. AVR   gradients are averaged due to arrhythmia.   - Exam was compared with the prior [facility] echocardiographic exam performed on   3/30/2023. No significant change on direct comparison.       Echocardiogram 02/07/2023:  - The left ventricle is normal in size. Left ventricular systolic function is   normal. EF = 58 ± 5% (2D biplane)   - The right ventricle is normal in size. Right ventricular systolic function is   normal.   - The left atrial cavity is severely dilated.   - Tricuspid aortic valve. There is moderately severe aortic valve stenosis caused   by calcified valve and restricted opening. AV area is 0.89 cm² (0.46 cm²/m²) by   continuity, VTI. The peak gradient is 58 mmHg, the mean gradient is 31 mmHg and   the dimensionless valve index is 0.23. Today's AV gradients (averaged over normal   beats).   - Exam was compared with the prior [facility] echocardiographic exam performed on   11/01/2022. Today's AV gradients (averaged over normal beats). Prior AV gradients   (measured on PVC's): 75/27mmHg.       Cardiac Catheterization 03/06/2023:   LMT: _ Engaged with JL3.5.   _ The distal LMT is narrowed 30 % - focal disease.    Additional Comment: The LMCA is a normal calibre vessel which bifurcates into the    LAD and LCX. There is mild (30%) stenosis in the distal LMCA.     LAD:   _ The proximal LAD is narrowed 30 % - focal disease. The mid LAD at the D1 has a     hazy 40% lesion involving the D1 origin   _ The 1st diagonal is narrowed

## 2025-03-02 PROCEDURE — 99285 EMERGENCY DEPT VISIT HI MDM: CPT

## 2025-03-03 ENCOUNTER — HOSPITAL ENCOUNTER (INPATIENT)
Age: 85
LOS: 11 days | Discharge: NURSING, REHAB OR LONG-TERM CARE | End: 2025-03-14
Attending: EMERGENCY MEDICINE | Admitting: STUDENT IN AN ORGANIZED HEALTH CARE EDUCATION/TRAINING PROGRAM
Payer: MEDICARE

## 2025-03-03 ENCOUNTER — APPOINTMENT (OUTPATIENT)
Dept: CT IMAGING | Age: 85
End: 2025-03-03
Payer: MEDICARE

## 2025-03-03 ENCOUNTER — APPOINTMENT (OUTPATIENT)
Dept: MRI IMAGING | Age: 85
End: 2025-03-03
Payer: MEDICARE

## 2025-03-03 DIAGNOSIS — M46.26 ACUTE OSTEOMYELITIS OF LUMBAR SPINE (HCC): ICD-10-CM

## 2025-03-03 DIAGNOSIS — K92.2 GASTROINTESTINAL HEMORRHAGE, UNSPECIFIED GASTROINTESTINAL HEMORRHAGE TYPE: Primary | ICD-10-CM

## 2025-03-03 DIAGNOSIS — R78.81 BACTEREMIA: ICD-10-CM

## 2025-03-03 DIAGNOSIS — K92.1 MELENA: ICD-10-CM

## 2025-03-03 PROBLEM — Z51.5 PALLIATIVE CARE ENCOUNTER: Status: ACTIVE | Noted: 2025-03-03

## 2025-03-03 LAB
ABO + RH BLD: NORMAL
ALBUMIN SERPL-MCNC: 3.2 G/DL (ref 3.5–5.2)
ALP SERPL-CCNC: 161 U/L (ref 40–129)
ALT SERPL-CCNC: 102 U/L (ref 0–40)
ANION GAP SERPL CALCULATED.3IONS-SCNC: 11 MMOL/L (ref 7–16)
ARM BAND NUMBER: NORMAL
AST SERPL-CCNC: 64 U/L (ref 0–39)
BACTERIA URNS QL MICRO: ABNORMAL
BASOPHILS # BLD: 0.03 K/UL (ref 0–0.2)
BASOPHILS NFR BLD: 0 % (ref 0–2)
BILIRUB SERPL-MCNC: 0.7 MG/DL (ref 0–1.2)
BILIRUB UR QL STRIP: NEGATIVE
BLOOD BANK SAMPLE EXPIRATION: NORMAL
BLOOD GROUP ANTIBODIES SERPL: NEGATIVE
BUN SERPL-MCNC: 47 MG/DL (ref 6–23)
CALCIUM SERPL-MCNC: 9.5 MG/DL (ref 8.6–10.2)
CEA SERPL-MCNC: 4.3 NG/ML (ref 0–5.2)
CHLORIDE SERPL-SCNC: 101 MMOL/L (ref 98–107)
CLARITY UR: CLEAR
CO2 SERPL-SCNC: 26 MMOL/L (ref 22–29)
COLOR UR: YELLOW
CREAT SERPL-MCNC: 0.9 MG/DL (ref 0.7–1.2)
EKG ATRIAL RATE: 153 BPM
EKG ATRIAL RATE: 77 BPM
EKG P AXIS: 95 DEGREES
EKG P-R INTERVAL: 146 MS
EKG Q-T INTERVAL: 340 MS
EKG Q-T INTERVAL: 352 MS
EKG QRS DURATION: 94 MS
EKG QRS DURATION: 96 MS
EKG QTC CALCULATION (BAZETT): 398 MS
EKG QTC CALCULATION (BAZETT): 507 MS
EKG R AXIS: 44 DEGREES
EKG R AXIS: 52 DEGREES
EKG T AXIS: 107 DEGREES
EKG T AXIS: 154 DEGREES
EKG VENTRICULAR RATE: 134 BPM
EKG VENTRICULAR RATE: 77 BPM
EOSINOPHIL # BLD: 0.03 K/UL (ref 0.05–0.5)
EOSINOPHILS RELATIVE PERCENT: 0 % (ref 0–6)
ERYTHROCYTE [DISTWIDTH] IN BLOOD BY AUTOMATED COUNT: 15 % (ref 11.5–15)
GFR, ESTIMATED: 83 ML/MIN/1.73M2
GLUCOSE SERPL-MCNC: 120 MG/DL (ref 74–99)
GLUCOSE UR STRIP-MCNC: NEGATIVE MG/DL
HCT VFR BLD AUTO: 28.6 % (ref 37–54)
HCT VFR BLD AUTO: 28.8 % (ref 37–54)
HCT VFR BLD AUTO: 28.8 % (ref 37–54)
HCT VFR BLD AUTO: 31 % (ref 37–54)
HGB BLD-MCNC: 8.6 G/DL (ref 12.5–16.5)
HGB BLD-MCNC: 8.6 G/DL (ref 12.5–16.5)
HGB BLD-MCNC: 8.8 G/DL (ref 12.5–16.5)
HGB BLD-MCNC: 9.3 G/DL (ref 12.5–16.5)
HGB UR QL STRIP.AUTO: ABNORMAL
IMM GRANULOCYTES # BLD AUTO: 0.16 K/UL (ref 0–0.58)
IMM GRANULOCYTES NFR BLD: 1 % (ref 0–5)
INR PPP: 1.5
KETONES UR STRIP-MCNC: NEGATIVE MG/DL
LACTATE BLDV-SCNC: 2.1 MMOL/L (ref 0.5–2.2)
LEUKOCYTE ESTERASE UR QL STRIP: NEGATIVE
LIPASE SERPL-CCNC: 31 U/L (ref 13–60)
LYMPHOCYTES NFR BLD: 0.94 K/UL (ref 1.5–4)
LYMPHOCYTES RELATIVE PERCENT: 7 % (ref 20–42)
MAGNESIUM SERPL-MCNC: 2.3 MG/DL (ref 1.6–2.6)
MCH RBC QN AUTO: 29.1 PG (ref 26–35)
MCHC RBC AUTO-ENTMCNC: 30 G/DL (ref 32–34.5)
MCV RBC AUTO: 96.9 FL (ref 80–99.9)
MONOCYTES NFR BLD: 0.88 K/UL (ref 0.1–0.95)
MONOCYTES NFR BLD: 6 % (ref 2–12)
NEUTROPHILS NFR BLD: 85 % (ref 43–80)
NEUTS SEG NFR BLD: 11.77 K/UL (ref 1.8–7.3)
NITRITE UR QL STRIP: NEGATIVE
PARTIAL THROMBOPLASTIN TIME: 39.6 SEC (ref 24.5–35.1)
PH UR STRIP: 5.5 [PH] (ref 5–8)
PLATELET # BLD AUTO: 145 K/UL (ref 130–450)
PMV BLD AUTO: 9.7 FL (ref 7–12)
POTASSIUM SERPL-SCNC: 4.7 MMOL/L (ref 3.5–5)
PROT SERPL-MCNC: 6.4 G/DL (ref 6.4–8.3)
PROT UR STRIP-MCNC: NEGATIVE MG/DL
PROTHROMBIN TIME: 15.9 SEC (ref 9.3–12.4)
PSA SERPL-MCNC: 0.06 NG/ML (ref 0–4)
RBC # BLD AUTO: 3.2 M/UL (ref 3.8–5.8)
RBC #/AREA URNS HPF: ABNORMAL /HPF
SODIUM SERPL-SCNC: 138 MMOL/L (ref 132–146)
SP GR UR STRIP: 1.02 (ref 1–1.03)
UROBILINOGEN UR STRIP-ACNC: 0.2 EU/DL (ref 0–1)
WBC #/AREA URNS HPF: ABNORMAL /HPF
WBC OTHER # BLD: 13.8 K/UL (ref 4.5–11.5)

## 2025-03-03 PROCEDURE — 85014 HEMATOCRIT: CPT

## 2025-03-03 PROCEDURE — 85018 HEMOGLOBIN: CPT

## 2025-03-03 PROCEDURE — 99222 1ST HOSP IP/OBS MODERATE 55: CPT | Performed by: PSYCHIATRY & NEUROLOGY

## 2025-03-03 PROCEDURE — 81001 URINALYSIS AUTO W/SCOPE: CPT

## 2025-03-03 PROCEDURE — G0103 PSA SCREENING: HCPCS

## 2025-03-03 PROCEDURE — 6360000002 HC RX W HCPCS: Performed by: NURSE PRACTITIONER

## 2025-03-03 PROCEDURE — 93010 ELECTROCARDIOGRAM REPORT: CPT | Performed by: INTERNAL MEDICINE

## 2025-03-03 PROCEDURE — 93005 ELECTROCARDIOGRAM TRACING: CPT

## 2025-03-03 PROCEDURE — 85730 THROMBOPLASTIN TIME PARTIAL: CPT

## 2025-03-03 PROCEDURE — 2500000003 HC RX 250 WO HCPCS: Performed by: NURSE PRACTITIONER

## 2025-03-03 PROCEDURE — 6370000000 HC RX 637 (ALT 250 FOR IP): Performed by: FAMILY MEDICINE

## 2025-03-03 PROCEDURE — 86901 BLOOD TYPING SEROLOGIC RH(D): CPT

## 2025-03-03 PROCEDURE — 6370000000 HC RX 637 (ALT 250 FOR IP): Performed by: PSYCHIATRY & NEUROLOGY

## 2025-03-03 PROCEDURE — 85652 RBC SED RATE AUTOMATED: CPT

## 2025-03-03 PROCEDURE — A9579 GAD-BASE MR CONTRAST NOS,1ML: HCPCS | Performed by: RADIOLOGY

## 2025-03-03 PROCEDURE — 83690 ASSAY OF LIPASE: CPT

## 2025-03-03 PROCEDURE — 2580000003 HC RX 258: Performed by: FAMILY MEDICINE

## 2025-03-03 PROCEDURE — 6370000000 HC RX 637 (ALT 250 FOR IP): Performed by: INTERNAL MEDICINE

## 2025-03-03 PROCEDURE — 83605 ASSAY OF LACTIC ACID: CPT

## 2025-03-03 PROCEDURE — 99222 1ST HOSP IP/OBS MODERATE 55: CPT | Performed by: STUDENT IN AN ORGANIZED HEALTH CARE EDUCATION/TRAINING PROGRAM

## 2025-03-03 PROCEDURE — 6370000000 HC RX 637 (ALT 250 FOR IP): Performed by: STUDENT IN AN ORGANIZED HEALTH CARE EDUCATION/TRAINING PROGRAM

## 2025-03-03 PROCEDURE — 86850 RBC ANTIBODY SCREEN: CPT

## 2025-03-03 PROCEDURE — 36415 COLL VENOUS BLD VENIPUNCTURE: CPT

## 2025-03-03 PROCEDURE — 84155 ASSAY OF PROTEIN SERUM: CPT

## 2025-03-03 PROCEDURE — 74177 CT ABD & PELVIS W/CONTRAST: CPT

## 2025-03-03 PROCEDURE — 82378 CARCINOEMBRYONIC ANTIGEN: CPT

## 2025-03-03 PROCEDURE — 86334 IMMUNOFIX E-PHORESIS SERUM: CPT

## 2025-03-03 PROCEDURE — 84165 PROTEIN E-PHORESIS SERUM: CPT

## 2025-03-03 PROCEDURE — 87086 URINE CULTURE/COLONY COUNT: CPT

## 2025-03-03 PROCEDURE — 83735 ASSAY OF MAGNESIUM: CPT

## 2025-03-03 PROCEDURE — 6370000000 HC RX 637 (ALT 250 FOR IP): Performed by: NURSE PRACTITIONER

## 2025-03-03 PROCEDURE — 6360000004 HC RX CONTRAST MEDICATION: Performed by: RADIOLOGY

## 2025-03-03 PROCEDURE — 99223 1ST HOSP IP/OBS HIGH 75: CPT | Performed by: NURSE PRACTITIONER

## 2025-03-03 PROCEDURE — 2060000000 HC ICU INTERMEDIATE R&B

## 2025-03-03 PROCEDURE — 6360000002 HC RX W HCPCS: Performed by: FAMILY MEDICINE

## 2025-03-03 PROCEDURE — 70450 CT HEAD/BRAIN W/O DYE: CPT

## 2025-03-03 PROCEDURE — 72156 MRI NECK SPINE W/O & W/DYE: CPT

## 2025-03-03 PROCEDURE — 85610 PROTHROMBIN TIME: CPT

## 2025-03-03 PROCEDURE — 2580000003 HC RX 258: Performed by: NURSE PRACTITIONER

## 2025-03-03 PROCEDURE — 6370000000 HC RX 637 (ALT 250 FOR IP)

## 2025-03-03 PROCEDURE — 86900 BLOOD TYPING SEROLOGIC ABO: CPT

## 2025-03-03 PROCEDURE — 85025 COMPLETE CBC W/AUTO DIFF WBC: CPT

## 2025-03-03 PROCEDURE — 99223 1ST HOSP IP/OBS HIGH 75: CPT | Performed by: FAMILY MEDICINE

## 2025-03-03 PROCEDURE — 93005 ELECTROCARDIOGRAM TRACING: CPT | Performed by: STUDENT IN AN ORGANIZED HEALTH CARE EDUCATION/TRAINING PROGRAM

## 2025-03-03 PROCEDURE — 99291 CRITICAL CARE FIRST HOUR: CPT | Performed by: INTERNAL MEDICINE

## 2025-03-03 PROCEDURE — 72157 MRI CHEST SPINE W/O & W/DYE: CPT

## 2025-03-03 PROCEDURE — 80053 COMPREHEN METABOLIC PANEL: CPT

## 2025-03-03 PROCEDURE — 70551 MRI BRAIN STEM W/O DYE: CPT

## 2025-03-03 RX ORDER — DOFETILIDE 0.12 MG/1
125 CAPSULE ORAL 2 TIMES DAILY
Status: DISCONTINUED | OUTPATIENT
Start: 2025-03-03 | End: 2025-03-14 | Stop reason: HOSPADM

## 2025-03-03 RX ORDER — LATANOPROST 50 UG/ML
1 SOLUTION/ DROPS OPHTHALMIC NIGHTLY
Status: DISCONTINUED | OUTPATIENT
Start: 2025-03-03 | End: 2025-03-14 | Stop reason: HOSPADM

## 2025-03-03 RX ORDER — SODIUM CHLORIDE 9 MG/ML
INJECTION, SOLUTION INTRAVENOUS CONTINUOUS
Status: DISCONTINUED | OUTPATIENT
Start: 2025-03-03 | End: 2025-03-05

## 2025-03-03 RX ORDER — BRIMONIDINE TARTRATE AND TIMOLOL MALEATE 2; 5 MG/ML; MG/ML
1 SOLUTION OPHTHALMIC EVERY 12 HOURS
Status: DISCONTINUED | OUTPATIENT
Start: 2025-03-03 | End: 2025-03-03 | Stop reason: SDUPTHER

## 2025-03-03 RX ORDER — POLYETHYLENE GLYCOL 3350 17 G/17G
238 POWDER, FOR SOLUTION ORAL ONCE
Status: COMPLETED | OUTPATIENT
Start: 2025-03-03 | End: 2025-03-03

## 2025-03-03 RX ORDER — SODIUM CHLORIDE 9 MG/ML
INJECTION, SOLUTION INTRAVENOUS PRN
Status: DISCONTINUED | OUTPATIENT
Start: 2025-03-03 | End: 2025-03-10 | Stop reason: SDUPTHER

## 2025-03-03 RX ORDER — ATORVASTATIN CALCIUM 10 MG/1
10 TABLET, FILM COATED ORAL DAILY
Status: DISCONTINUED | OUTPATIENT
Start: 2025-03-04 | End: 2025-03-14 | Stop reason: HOSPADM

## 2025-03-03 RX ORDER — UBIDECARENONE 75 MG
100 CAPSULE ORAL 2 TIMES DAILY
Status: DISCONTINUED | OUTPATIENT
Start: 2025-03-03 | End: 2025-03-14 | Stop reason: HOSPADM

## 2025-03-03 RX ORDER — BRIMONIDINE TARTRATE 2 MG/ML
1 SOLUTION/ DROPS OPHTHALMIC 2 TIMES DAILY
Status: DISCONTINUED | OUTPATIENT
Start: 2025-03-03 | End: 2025-03-14 | Stop reason: HOSPADM

## 2025-03-03 RX ORDER — IOPAMIDOL 755 MG/ML
75 INJECTION, SOLUTION INTRAVASCULAR
Status: COMPLETED | OUTPATIENT
Start: 2025-03-03 | End: 2025-03-03

## 2025-03-03 RX ORDER — ONDANSETRON 4 MG/1
4 TABLET, ORALLY DISINTEGRATING ORAL EVERY 8 HOURS PRN
Status: DISCONTINUED | OUTPATIENT
Start: 2025-03-03 | End: 2025-03-05

## 2025-03-03 RX ORDER — GABAPENTIN 100 MG/1
100 CAPSULE ORAL 2 TIMES DAILY
Status: DISCONTINUED | OUTPATIENT
Start: 2025-03-03 | End: 2025-03-14 | Stop reason: HOSPADM

## 2025-03-03 RX ORDER — SODIUM CHLORIDE 0.9 % (FLUSH) 0.9 %
5-40 SYRINGE (ML) INJECTION PRN
Status: DISCONTINUED | OUTPATIENT
Start: 2025-03-03 | End: 2025-03-14 | Stop reason: HOSPADM

## 2025-03-03 RX ORDER — BUPROPION HYDROCHLORIDE 100 MG/1
100 TABLET, EXTENDED RELEASE ORAL DAILY
Status: DISCONTINUED | OUTPATIENT
Start: 2025-03-03 | End: 2025-03-03

## 2025-03-03 RX ORDER — METOPROLOL SUCCINATE 25 MG/1
12.5 TABLET, EXTENDED RELEASE ORAL DAILY
Status: DISCONTINUED | OUTPATIENT
Start: 2025-03-03 | End: 2025-03-14 | Stop reason: HOSPADM

## 2025-03-03 RX ORDER — POLYETHYLENE GLYCOL 3350 17 G/17G
238 POWDER, FOR SOLUTION ORAL ONCE
Status: DISCONTINUED | OUTPATIENT
Start: 2025-03-03 | End: 2025-03-03 | Stop reason: SDUPTHER

## 2025-03-03 RX ORDER — LORAZEPAM 2 MG/ML
1 INJECTION INTRAMUSCULAR ONCE
Status: COMPLETED | OUTPATIENT
Start: 2025-03-03 | End: 2025-03-03

## 2025-03-03 RX ORDER — SODIUM CHLORIDE 0.9 % (FLUSH) 0.9 %
5-40 SYRINGE (ML) INJECTION EVERY 12 HOURS SCHEDULED
Status: DISCONTINUED | OUTPATIENT
Start: 2025-03-03 | End: 2025-03-14 | Stop reason: HOSPADM

## 2025-03-03 RX ORDER — ACETAMINOPHEN 650 MG/1
650 SUPPOSITORY RECTAL EVERY 6 HOURS PRN
Status: DISCONTINUED | OUTPATIENT
Start: 2025-03-03 | End: 2025-03-14 | Stop reason: HOSPADM

## 2025-03-03 RX ORDER — CYCLOBENZAPRINE HCL 10 MG
5 TABLET ORAL 3 TIMES DAILY PRN
Status: DISCONTINUED | OUTPATIENT
Start: 2025-03-03 | End: 2025-03-03

## 2025-03-03 RX ORDER — TIMOLOL MALEATE 5 MG/ML
1 SOLUTION/ DROPS OPHTHALMIC 2 TIMES DAILY
Status: DISCONTINUED | OUTPATIENT
Start: 2025-03-03 | End: 2025-03-14 | Stop reason: HOSPADM

## 2025-03-03 RX ORDER — MORPHINE SULFATE 2 MG/ML
2 INJECTION, SOLUTION INTRAMUSCULAR; INTRAVENOUS EVERY 4 HOURS PRN
Status: DISCONTINUED | OUTPATIENT
Start: 2025-03-03 | End: 2025-03-14 | Stop reason: HOSPADM

## 2025-03-03 RX ORDER — ATORVASTATIN CALCIUM 40 MG/1
40 TABLET, FILM COATED ORAL DAILY
Status: DISCONTINUED | OUTPATIENT
Start: 2025-03-03 | End: 2025-03-03

## 2025-03-03 RX ORDER — BACLOFEN 10 MG/1
10 TABLET ORAL 3 TIMES DAILY PRN
Status: DISCONTINUED | OUTPATIENT
Start: 2025-03-03 | End: 2025-03-03

## 2025-03-03 RX ORDER — ONDANSETRON 2 MG/ML
4 INJECTION INTRAMUSCULAR; INTRAVENOUS EVERY 6 HOURS PRN
Status: DISCONTINUED | OUTPATIENT
Start: 2025-03-03 | End: 2025-03-05

## 2025-03-03 RX ORDER — MIRTAZAPINE 15 MG/1
15 TABLET, FILM COATED ORAL NIGHTLY
Status: DISCONTINUED | OUTPATIENT
Start: 2025-03-03 | End: 2025-03-14 | Stop reason: HOSPADM

## 2025-03-03 RX ORDER — VITAMIN B COMPLEX
1000 TABLET ORAL DAILY
Status: DISCONTINUED | OUTPATIENT
Start: 2025-03-03 | End: 2025-03-14 | Stop reason: HOSPADM

## 2025-03-03 RX ORDER — ACETAMINOPHEN 325 MG/1
650 TABLET ORAL EVERY 6 HOURS PRN
Status: DISCONTINUED | OUTPATIENT
Start: 2025-03-03 | End: 2025-03-14 | Stop reason: HOSPADM

## 2025-03-03 RX ADMIN — DOFETILIDE 125 MCG: 0.12 CAPSULE ORAL at 09:03

## 2025-03-03 RX ADMIN — BRIMONIDINE TARTRATE 1 DROP: 2 SOLUTION/ DROPS OPHTHALMIC at 10:07

## 2025-03-03 RX ADMIN — SODIUM CHLORIDE: 9 INJECTION, SOLUTION INTRAVENOUS at 22:36

## 2025-03-03 RX ADMIN — PANTOPRAZOLE SODIUM 40 MG: 40 INJECTION, POWDER, FOR SOLUTION INTRAVENOUS at 14:59

## 2025-03-03 RX ADMIN — ACETAMINOPHEN 650 MG: 325 TABLET ORAL at 22:40

## 2025-03-03 RX ADMIN — MIRTAZAPINE 15 MG: 15 TABLET, FILM COATED ORAL at 22:40

## 2025-03-03 RX ADMIN — LORAZEPAM 1 MG: 2 INJECTION INTRAMUSCULAR; INTRAVENOUS at 21:07

## 2025-03-03 RX ADMIN — Medication 1000 UNITS: at 09:04

## 2025-03-03 RX ADMIN — BRIMONIDINE TARTRATE 1 DROP: 2 SOLUTION/ DROPS OPHTHALMIC at 23:43

## 2025-03-03 RX ADMIN — METOPROLOL SUCCINATE 12.5 MG: 25 TABLET, EXTENDED RELEASE ORAL at 09:04

## 2025-03-03 RX ADMIN — GABAPENTIN 100 MG: 100 CAPSULE ORAL at 14:09

## 2025-03-03 RX ADMIN — SODIUM CHLORIDE 2.5 MG/HR: 900 INJECTION, SOLUTION INTRAVENOUS at 14:58

## 2025-03-03 RX ADMIN — SODIUM CHLORIDE 15 MG/HR: 900 INJECTION, SOLUTION INTRAVENOUS at 22:39

## 2025-03-03 RX ADMIN — LATANOPROST 1 DROP: 50 SOLUTION OPHTHALMIC at 23:43

## 2025-03-03 RX ADMIN — POLYETHYLENE GLYCOL 3350 238 G: 17 POWDER, FOR SOLUTION ORAL at 22:41

## 2025-03-03 RX ADMIN — VITAM B12 100 MCG: 100 TAB at 10:07

## 2025-03-03 RX ADMIN — MORPHINE SULFATE 2 MG: 2 INJECTION, SOLUTION INTRAMUSCULAR; INTRAVENOUS at 14:10

## 2025-03-03 RX ADMIN — SODIUM CHLORIDE 15 MG/HR: 900 INJECTION, SOLUTION INTRAVENOUS at 17:47

## 2025-03-03 RX ADMIN — BUPROPION HYDROCHLORIDE 100 MG: 100 TABLET, FILM COATED, EXTENDED RELEASE ORAL at 10:07

## 2025-03-03 RX ADMIN — SODIUM CHLORIDE: 9 INJECTION, SOLUTION INTRAVENOUS at 05:18

## 2025-03-03 RX ADMIN — GABAPENTIN 100 MG: 100 CAPSULE ORAL at 22:40

## 2025-03-03 RX ADMIN — IOPAMIDOL 75 ML: 755 INJECTION, SOLUTION INTRAVENOUS at 04:54

## 2025-03-03 RX ADMIN — GADOTERIDOL 13 ML: 279.3 INJECTION, SOLUTION INTRAVENOUS at 21:36

## 2025-03-03 RX ADMIN — ATORVASTATIN CALCIUM 40 MG: 40 TABLET, FILM COATED ORAL at 09:04

## 2025-03-03 RX ADMIN — PANTOPRAZOLE SODIUM 40 MG: 40 INJECTION, POWDER, FOR SOLUTION INTRAVENOUS at 04:15

## 2025-03-03 ASSESSMENT — PAIN DESCRIPTION - DESCRIPTORS: DESCRIPTORS: ACHING;DULL;GNAWING

## 2025-03-03 ASSESSMENT — PAIN SCALES - GENERAL
PAINLEVEL_OUTOF10: 0
PAINLEVEL_OUTOF10: 8

## 2025-03-03 ASSESSMENT — PAIN DESCRIPTION - LOCATION: LOCATION: BACK

## 2025-03-03 NOTE — PROGRESS NOTES
1730 when pt arrived to the unit from the ED diltiazem was infusing at 12.5 mg. last charted at 5 mg at 1528. HR was 132. Increased diltiazem infusion to 15 mg as ordered.

## 2025-03-03 NOTE — H&P
Hospitalist History & Physical      PCP: Haseeb Phan DO    Date of Service: Pt seen/examined on 3/3/2025     Chief Complaint:  had concerns including Rectal Bleeding ((One episode this AM and has progressively gotten worse, \"dark gel color\" in stool tonight, +THINNERS)).    History Of Present Illness:    Mr. Montana Gerard, a 84 y.o. year old male  who  has a past medical history of Acute myocardial infarction, unspecified, Anemia, Cholelithiasis, Depressive disorder, Dermatitis herpetiformis, Diet-controlled diabetes mellitus (HCC), Diverticulitis, Gastritis, GERD (gastroesophageal reflux disease), Hiatal hernia, Hyperlipidemia, Iron deficiency anemia, Non-ST elevation myocardial infarction (NSTEMI), Peptic reflux disease, and ST elevation (STEMI) myocardial infarction of unspecified site.     Patient presented to the emergency department with concerns about rectal bleeding.  Patient has a history of atrial fibrillation and was admitted last week for atrial fibrillation with RVR.  Patient is on Eliquis.  In the emergency department patient noted to have maroon-colored stools.  Vital signs within normal limits and stable.  Laboratory studies demonstrate BUN 47, glucose 120, alk phos 161, , AST 64.  Hemoglobin is 9.3 which is actually slightly up from February 24, 2025 when it was 8.6.  CT abdomen pelvis is pending.  General surgery was contacted by emergency department physician medicine was consulted for admission.    Patient seen and examined in the emergency department.  Wife and daughter at the bedside.  They tell me that he was recently admitted for GI bleed.  This was during the atrial fibrillation episode last week.  He was restarted on his Tikosyn and Eliquis.  He also reports that in the last 10 days has had very significant cognitive decline and is eating very little.  He is also not ambulating anymore.  They state that just last month he was down in the basement shoveling water out during
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
## 2025-03-03 NOTE — PLAN OF CARE
Cardiology consulted for A-fib with recurrent GI bleed.  Patient is known to EP and last saw Dr. Swan 2/17/2025.  Will defer consult to electrophysiology at this time.  Please call general cardiology with any further questions.

## 2025-03-03 NOTE — ED NOTES
Report given to MELVI Lewis. Pt [facility] by RN [facility]

## 2025-03-03 NOTE — ED PROVIDER NOTES
HPI:  3/2/25, Time: 11:57 PM KHOI Gerard is a 84 y.o. male history of myocardial infarction anemia history of acid reflux hiatal hernia hyperlipidemia NSTEMI presenting to the ED for rectal bleeding, beginning 1 day ago.  The complaint has been persistent, moderate in severity, and worsened by nothing.  Patient presenting her because of rectal bleeding.  Reported rectal bleeding started today.  Patient was reportedly having lower abdominal pain.  There is no history of vomiting or hematemesis there is no fever chills or cough.  Patient reporting no chest pain no difficulty breathing he reports no active abdominal pain at present time.    ROS:   Pertinent positives and negatives are stated within HPI, all other systems reviewed and are negative.  --------------------------------------------- PAST HISTORY ---------------------------------------------  Past Medical History:  has a past medical history of Acute myocardial infarction, unspecified, Anemia, Cholelithiasis, Depressive disorder, Dermatitis herpetiformis, Diet-controlled diabetes mellitus (HCC), Diverticulitis, Gastritis, GERD (gastroesophageal reflux disease), Hiatal hernia, Hyperlipidemia, Iron deficiency anemia, Non-ST elevation myocardial infarction (NSTEMI), Peptic reflux disease, and ST elevation (STEMI) myocardial infarction of unspecified site.    Past Surgical History:  has a past surgical history that includes Cholecystectomy; cardiovascular stress test (01/01/2004); and Cardiac surgery (03/31/2023).    Social History:  reports that he quit smoking about 48 years ago. His smoking use included cigarettes and pipe. He started smoking about 65 years ago. He has a 1.7 pack-year smoking history. He has never used smokeless tobacco. He reports current alcohol use. He reports that he does not use drugs.    Family History: family history includes Stroke in his father.     The patient’s home medications have been reviewed.    Allergies:  with the plan.       --------------------------------- IMPRESSION AND DISPOSITION ---------------------------------    IMPRESSION  1. Gastrointestinal hemorrhage, unspecified gastrointestinal hemorrhage type        DISPOSITION  Disposition: Admit to telemetry  Patient condition is stable        NOTE: This report was transcribed using voice recognition software. Every effort was made to ensure accuracy; however, inadvertent computerized transcription errors may be present          Quintin Freedman MD  03/03/25 0233       Quintin Freedman MD  03/03/25 0529       Quintin Freedman MD  03/03/25 0531

## 2025-03-03 NOTE — PLAN OF CARE
This is a 84-year-old man with past medical history of A-fib, aortic stenosis s/p TAVR, HFpEF, prostate cancer, malleable penile implant, sinus node dysfunction, coronary artery disease, gastritis, GERD, hyperlipidemia, hypertension, diabetes mellitus, who presented to [facility] ED with concerns of rectal bleeding.  Patient was recently admitted last week for A-fib RVR, patient is on Eliquis.  Patient reported to have maroon-colored stool.  Hemoglobin at 9.3.  CT abdomen pelvis.  1. No evidence of active GI hemorrhage.  2. Resolution of linear hypodense lesion in the mid portion of the spleen.  3. Sigmoid diverticulosis without evidence of diverticulitis.  4. Multiple small bladder diverticuli likely as a result of chronic bladder  outlet obstruction.  5. Considerable fecal material within the rectum raising the possibility of  rectal fecal impaction.  CT head was ordered for altered mental status.  No acute finding.  Neurology service has also been consulted.  EP also consulted, appreciate recommendations  MRI brain is pending.  General surgery consulted for GI bleed.  Plan for tentative EGD/C-scope tomorrow.  Monitor H&H.  Transfuse as indicated.  Keep hemoglobin more than 7 g/dL.  PPI twice daily  Keep n.p.o. midnight  Resume home medications as appropriate  Optimize electrolytes    Kevin Stone MD   [facility] hospitalist
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
## 2025-03-03 NOTE — PROGRESS NOTES
Pharmacy Note    Montana Gerard was ordered biotin.  As per the [facility] Formulary Committee Policy, herbals and certain dietary supplements will be discontinued.  The herbal or dietary supplement may be continued after discharge from the hospital.  Luis Armando Boles, PharmD, BCPS 3/3/2025 8:37 AM
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
## 2025-03-04 ENCOUNTER — APPOINTMENT (OUTPATIENT)
Dept: NUCLEAR MEDICINE | Age: 85
End: 2025-03-04
Payer: MEDICARE

## 2025-03-04 PROBLEM — E43 SEVERE PROTEIN-CALORIE MALNUTRITION: Chronic | Status: ACTIVE | Noted: 2025-03-04

## 2025-03-04 PROBLEM — I63.9 CEREBROVASCULAR ACCIDENT (HCC): Status: ACTIVE | Noted: 2025-03-04

## 2025-03-04 PROBLEM — G96.198 SPINAL ARACHNOID CYST: Status: ACTIVE | Noted: 2025-03-04

## 2025-03-04 LAB
ALBUMIN SERPL-MCNC: 2.6 G/DL (ref 3.5–5.2)
ALP SERPL-CCNC: 116 U/L (ref 40–129)
ALT SERPL-CCNC: 63 U/L (ref 0–40)
ANION GAP SERPL CALCULATED.3IONS-SCNC: 13 MMOL/L (ref 7–16)
AST SERPL-CCNC: 36 U/L (ref 0–39)
BILIRUB SERPL-MCNC: 0.7 MG/DL (ref 0–1.2)
BUN SERPL-MCNC: 34 MG/DL (ref 6–23)
CALCIUM SERPL-MCNC: 8.5 MG/DL (ref 8.6–10.2)
CHLORIDE SERPL-SCNC: 105 MMOL/L (ref 98–107)
CHOLEST SERPL-MCNC: 69 MG/DL
CO2 SERPL-SCNC: 20 MMOL/L (ref 22–29)
CREAT SERPL-MCNC: 1 MG/DL (ref 0.7–1.2)
ERYTHROCYTE [SEDIMENTATION RATE] IN BLOOD BY WESTERGREN METHOD: 65 MM/HR (ref 0–15)
FOLATE SERPL-MCNC: 13.2 NG/ML (ref 4.8–24.2)
GFR, ESTIMATED: 78 ML/MIN/1.73M2
GLUCOSE SERPL-MCNC: 107 MG/DL (ref 74–99)
HBA1C MFR BLD: 4.2 % (ref 4–5.6)
HCT VFR BLD AUTO: 25.3 % (ref 37–54)
HCT VFR BLD AUTO: 25.4 % (ref 37–54)
HCT VFR BLD AUTO: 27.4 % (ref 37–54)
HCT VFR BLD AUTO: 28.5 % (ref 37–54)
HDLC SERPL-MCNC: 20 MG/DL
HGB BLD-MCNC: 7.6 G/DL (ref 12.5–16.5)
HGB BLD-MCNC: 7.8 G/DL (ref 12.5–16.5)
HGB BLD-MCNC: 8.1 G/DL (ref 12.5–16.5)
HGB BLD-MCNC: 8.6 G/DL (ref 12.5–16.5)
INR PPP: 1.4
LDLC SERPL CALC-MCNC: 30 MG/DL
MICROORGANISM SPEC CULT: ABNORMAL
PARTIAL THROMBOPLASTIN TIME: 39.6 SEC (ref 24.5–35.1)
POTASSIUM SERPL-SCNC: 4.1 MMOL/L (ref 3.5–5)
PROT SERPL-MCNC: 5.2 G/DL (ref 6.4–8.3)
PROTHROMBIN TIME: 15.7 SEC (ref 9.3–12.4)
SERVICE CMNT-IMP: ABNORMAL
SODIUM SERPL-SCNC: 138 MMOL/L (ref 132–146)
SPECIMEN DESCRIPTION: ABNORMAL
TRIGL SERPL-MCNC: 93 MG/DL
VIT B12 SERPL-MCNC: 887 PG/ML (ref 211–946)
VLDLC SERPL CALC-MCNC: 19 MG/DL

## 2025-03-04 PROCEDURE — 2500000003 HC RX 250 WO HCPCS: Performed by: FAMILY MEDICINE

## 2025-03-04 PROCEDURE — 99233 SBSQ HOSP IP/OBS HIGH 50: CPT | Performed by: NURSE PRACTITIONER

## 2025-03-04 PROCEDURE — 85018 HEMOGLOBIN: CPT

## 2025-03-04 PROCEDURE — 82746 ASSAY OF FOLIC ACID SERUM: CPT

## 2025-03-04 PROCEDURE — 36415 COLL VENOUS BLD VENIPUNCTURE: CPT

## 2025-03-04 PROCEDURE — 6370000000 HC RX 637 (ALT 250 FOR IP): Performed by: NURSE PRACTITIONER

## 2025-03-04 PROCEDURE — 85730 THROMBOPLASTIN TIME PARTIAL: CPT

## 2025-03-04 PROCEDURE — 6360000002 HC RX W HCPCS: Performed by: FAMILY MEDICINE

## 2025-03-04 PROCEDURE — 99233 SBSQ HOSP IP/OBS HIGH 50: CPT | Performed by: INTERNAL MEDICINE

## 2025-03-04 PROCEDURE — 6370000000 HC RX 637 (ALT 250 FOR IP)

## 2025-03-04 PROCEDURE — 80053 COMPREHEN METABOLIC PANEL: CPT

## 2025-03-04 PROCEDURE — 80061 LIPID PANEL: CPT

## 2025-03-04 PROCEDURE — 78306 BONE IMAGING WHOLE BODY: CPT | Performed by: PSYCHIATRY & NEUROLOGY

## 2025-03-04 PROCEDURE — 2500000003 HC RX 250 WO HCPCS: Performed by: NURSE PRACTITIONER

## 2025-03-04 PROCEDURE — 6370000000 HC RX 637 (ALT 250 FOR IP): Performed by: STUDENT IN AN ORGANIZED HEALTH CARE EDUCATION/TRAINING PROGRAM

## 2025-03-04 PROCEDURE — 93005 ELECTROCARDIOGRAM TRACING: CPT | Performed by: NURSE PRACTITIONER

## 2025-03-04 PROCEDURE — 2580000003 HC RX 258: Performed by: NURSE PRACTITIONER

## 2025-03-04 PROCEDURE — 85014 HEMATOCRIT: CPT

## 2025-03-04 PROCEDURE — 6360000002 HC RX W HCPCS: Performed by: NURSE PRACTITIONER

## 2025-03-04 PROCEDURE — 83036 HEMOGLOBIN GLYCOSYLATED A1C: CPT

## 2025-03-04 PROCEDURE — 99222 1ST HOSP IP/OBS MODERATE 55: CPT | Performed by: NEUROLOGICAL SURGERY

## 2025-03-04 PROCEDURE — 85610 PROTHROMBIN TIME: CPT

## 2025-03-04 PROCEDURE — 3430000000 HC RX DIAGNOSTIC RADIOPHARMACEUTICAL: Performed by: RADIOLOGY

## 2025-03-04 PROCEDURE — 2580000003 HC RX 258: Performed by: FAMILY MEDICINE

## 2025-03-04 PROCEDURE — 82607 VITAMIN B-12: CPT

## 2025-03-04 PROCEDURE — 6370000000 HC RX 637 (ALT 250 FOR IP): Performed by: INTERNAL MEDICINE

## 2025-03-04 PROCEDURE — A9503 TC99M MEDRONATE: HCPCS | Performed by: RADIOLOGY

## 2025-03-04 PROCEDURE — 6370000000 HC RX 637 (ALT 250 FOR IP): Performed by: PSYCHIATRY & NEUROLOGY

## 2025-03-04 PROCEDURE — 99232 SBSQ HOSP IP/OBS MODERATE 35: CPT | Performed by: STUDENT IN AN ORGANIZED HEALTH CARE EDUCATION/TRAINING PROGRAM

## 2025-03-04 PROCEDURE — 2060000000 HC ICU INTERMEDIATE R&B

## 2025-03-04 RX ORDER — POLYETHYLENE GLYCOL 3350 17 G/17G
238 POWDER, FOR SOLUTION ORAL ONCE
Status: COMPLETED | OUTPATIENT
Start: 2025-03-04 | End: 2025-03-04

## 2025-03-04 RX ORDER — LORAZEPAM 2 MG/ML
0.5 INJECTION INTRAMUSCULAR
Status: DISCONTINUED | OUTPATIENT
Start: 2025-03-04 | End: 2025-03-04

## 2025-03-04 RX ORDER — TC 99M MEDRONATE 20 MG/10ML
27 INJECTION, POWDER, LYOPHILIZED, FOR SOLUTION INTRAVENOUS
Status: COMPLETED | OUTPATIENT
Start: 2025-03-04 | End: 2025-03-04

## 2025-03-04 RX ADMIN — Medication 1000 UNITS: at 09:04

## 2025-03-04 RX ADMIN — POLYETHYLENE GLYCOL 3350 238 G: 17 POWDER, FOR SOLUTION ORAL at 08:53

## 2025-03-04 RX ADMIN — SODIUM CHLORIDE: 9 INJECTION, SOLUTION INTRAVENOUS at 05:34

## 2025-03-04 RX ADMIN — BRIMONIDINE TARTRATE 1 DROP: 2 SOLUTION/ DROPS OPHTHALMIC at 09:07

## 2025-03-04 RX ADMIN — TC 99M MEDRONATE 27 MILLICURIE: 20 INJECTION, POWDER, LYOPHILIZED, FOR SOLUTION INTRAVENOUS at 09:50

## 2025-03-04 RX ADMIN — TIMOLOL MALEATE 1 DROP: 5 SOLUTION/ DROPS OPHTHALMIC at 10:02

## 2025-03-04 RX ADMIN — SODIUM CHLORIDE, PRESERVATIVE FREE 10 ML: 5 INJECTION INTRAVENOUS at 21:01

## 2025-03-04 RX ADMIN — MIRTAZAPINE 15 MG: 15 TABLET, FILM COATED ORAL at 21:01

## 2025-03-04 RX ADMIN — GABAPENTIN 100 MG: 100 CAPSULE ORAL at 21:01

## 2025-03-04 RX ADMIN — VITAM B12 100 MCG: 100 TAB at 09:06

## 2025-03-04 RX ADMIN — ATORVASTATIN CALCIUM 10 MG: 10 TABLET, FILM COATED ORAL at 09:04

## 2025-03-04 RX ADMIN — PANTOPRAZOLE SODIUM 40 MG: 40 INJECTION, POWDER, FOR SOLUTION INTRAVENOUS at 03:47

## 2025-03-04 RX ADMIN — DOFETILIDE 125 MCG: 0.12 CAPSULE ORAL at 21:01

## 2025-03-04 RX ADMIN — BRIMONIDINE TARTRATE 1 DROP: 2 SOLUTION/ DROPS OPHTHALMIC at 21:00

## 2025-03-04 RX ADMIN — METOPROLOL SUCCINATE 12.5 MG: 25 TABLET, EXTENDED RELEASE ORAL at 09:04

## 2025-03-04 RX ADMIN — DOFETILIDE 125 MCG: 0.12 CAPSULE ORAL at 09:06

## 2025-03-04 RX ADMIN — PANTOPRAZOLE SODIUM 40 MG: 40 INJECTION, POWDER, FOR SOLUTION INTRAVENOUS at 17:33

## 2025-03-04 RX ADMIN — MORPHINE SULFATE 2 MG: 2 INJECTION, SOLUTION INTRAMUSCULAR; INTRAVENOUS at 17:33

## 2025-03-04 RX ADMIN — SODIUM CHLORIDE 15 MG/HR: 900 INJECTION, SOLUTION INTRAVENOUS at 01:49

## 2025-03-04 RX ADMIN — VITAM B12 100 MCG: 100 TAB at 17:32

## 2025-03-04 RX ADMIN — LATANOPROST 1 DROP: 50 SOLUTION OPHTHALMIC at 21:01

## 2025-03-04 ASSESSMENT — PAIN SCALES - GENERAL
PAINLEVEL_OUTOF10: 9
PAINLEVEL_OUTOF10: 2

## 2025-03-04 ASSESSMENT — PAIN DESCRIPTION - ORIENTATION
ORIENTATION: LOWER
ORIENTATION: LOWER

## 2025-03-04 ASSESSMENT — PAIN DESCRIPTION - LOCATION
LOCATION: BACK
LOCATION: BACK

## 2025-03-04 NOTE — ACP (ADVANCE CARE PLANNING)
Advance Care Planning     Palliative Team Advance Care Planning (ACP) Conversation    Date of Conversation: 03/04/25    Individuals present for the conversation: Patient, Spouse Noel, and Son Ivan     ACP documents on file prior to discussion:  -Power of  for Healthcare  -Living Will    Previously completed document/s not on file: Not discussed.    Healthcare Decision Maker:    Primary Decision Maker: Bernadette Gerard - Spouse - [phone]    Secondary Decision Maker: Ivan Boyle - Child - [phone]     Conversation Summary:  Verified HCPOA and Living Will in soft chart.     Resuscitation Status:   Code Status: Limited DNI    Documentation Completed:  -No new documents completed.    I spent 16 minutes with the patient and/or surrogate decision maker discussing the patient's wishes and goals.      LUCINA Martin

## 2025-03-04 NOTE — PROGRESS NOTES
Occupational Therapy  Attempted OT eval at b/s, however, pt off unit for Bone Scan.  Currently prepping for tentative Scope to r/o GIB, FMS in place.  Will attempt assessment when pt more medically appropriate to participate.  Thank you for this referral. Shawnee Conroy, MOT, OTR/L  # [phone]

## 2025-03-04 NOTE — ACP (ADVANCE CARE PLANNING)
Advance Care Planning   Healthcare Decision Maker:    Primary Decision Maker: GerardBernadette - Spouse - [phone]    Secondary Decision Maker: Ivan Boyle - Child - [phone]    Click here to complete Healthcare Decision Makers including selection of the Healthcare Decision Maker Relationship (ie \"Primary\").           HCPOA paperwork on soft chart.

## 2025-03-04 NOTE — CARE COORDINATION
Spoke with daughter, son, and spouse on speaker phone. Patient from [facility], was there getting skilled care. He is not a bed hold. Spouse has an appointment to visit [facility] for herself. She would like patient to go to [facility] at discharge if possible, wants patient to be closer to her when released. CANDELARIA list left with alternate options. Reviewed various options with family. Everyone is well aware of patient's advanced directives, states he would not want a feeding tube if needed.  Son emailed me HCPOA and advanced directives and added to soft chart. We discussed ARU, vs. [facility]. Vs. Hospice at facility. Family does not feel he can return home at this point with current needs. Referral to [facility].     Case Management Assessment  Initial Evaluation    Date/Time of Evaluation: 3/4/2025 12:53 PM  Assessment Completed by: LUCINA Ravi    If patient is discharged prior to next notation, then this note serves as note for discharge by case management.    Patient Name: Montana Gerard                   YOB: 1940  Diagnosis: GI bleed [K92.2]  Gastrointestinal hemorrhage, unspecified gastrointestinal hemorrhage type [K92.2]                   Date / Time: 3/3/2025  1:15 AM    Patient Admission Status: Inpatient   Readmission Risk (Low < 19, Mod (19-27), High > 27): Readmission Risk Score: 24    Current PCP: Haseeb Phan, DO  PCP verified by SHANDRA? Yes    Chart Reviewed: Yes      History Provided by: Child/Family, Spouse  Patient Orientation: Self    Patient Cognition: Severely Impaired    Hospitalization in the last 30 days (Readmission):  Yes    If yes, Readmission Assessment in  Navigator will be completed.    Advance Directives:      Code Status: Limited   Patient's Primary Decision Maker is: Named in Scanned ACP Document    Primary Decision Maker: Bernadette Gerard - Spouse - [phone]    Discharge Planning:    Patient lives with: Spouse/Significant Other Type of Home:

## 2025-03-04 NOTE — PROGRESS NOTES
Visit made to the patient's room.  He is down for a test.  Spoke with charge RN.  No urgent palliative care needs at time.  We will follow-up again tomorrow.
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
## 2025-03-04 NOTE — PROGRESS NOTES
GENERAL SURGERY  DAILY PROGRESS NOTE  3/4/2025  Chief Complaint   Patient presents with    Rectal Bleeding     (One episode this AM and has progressively gotten worse, \"dark gel color\" in stool tonight, +THINNERS)         Subjective:  Patient unfortunately did not receive bowel prep.  Spoke with nursing who states that he only had about 1 hour of prep because he also had his MRI ordered.  Patient has brown stool within FMS bag.    No intake/output data recorded.  No intake/output data recorded.      Objective:  BP 98/60   Pulse 75   Temp 98.1 °F (36.7 °C)   Resp 29   Ht 1.727 m (5' 8\")   Wt 66.8 kg (147 lb 4.3 oz)   SpO2 94%   BMI 22.39 kg/m²     GENERAL:  Laying in bed, awake, alert,   LUNGS:  No increased work of breathing  CARDIOVASCULAR:  regular rate  ABDOMEN:  Soft, non-tender, non-distended  EXTREMITIES: No edema or swelling  SKIN: Warm and dry, no rashes or lesions  FMS in place with brown stool    Lab Results   Component Value Date    WBC 13.8 (H) 03/03/2025    HGB 7.8 (L) 03/04/2025    HCT 25.4 (L) 03/04/2025    MCV 96.9 03/03/2025     03/03/2025     Lab Results   Component Value Date     03/04/2025    K 4.1 03/04/2025     03/04/2025    CO2 20 (L) 03/04/2025    BUN 34 (H) 03/04/2025    CREATININE 1.0 03/04/2025    GLUCOSE 107 (H) 03/04/2025    CALCIUM 8.5 (L) 03/04/2025    BILITOT 0.7 03/04/2025    ALKPHOS 116 03/04/2025    AST 36 03/04/2025    ALT 63 (H) 03/04/2025    LABGLOM 78 03/04/2025    GFRAA >60 10/11/2022         Assessment/Plan:  84 y.o. male with melena and anemia    -Patient unfortunately did not receive bowel prep while in the ED therefore he is not fully prepped for colonoscopy today.  -Patient did have a drop in hemoglobin to 7.6, continue to monitor and transfuse as indicated  -Will move EGD and colonoscopy for tomorrow 3/5  -Continue FMS while bowel prepping  -Continue to hold Eliquis  -Okay for clear liquid diet today, n.p.o. at midnight    Electronically signed  No

## 2025-03-04 NOTE — PROGRESS NOTES
Pt's bowel prep was not fully completed d/t late start in the evening. ER RN marked bowel prep as not given per family refusal. Upon asking family why they refused the bowel prep even after family was aware of scheduled lower scope, family said that was incorrect. Day shift RN, Lara, messaged Marcy Retana regarding the bowel prep, Dr. Retana reordered 238mg of Glycolax. This RN started bowel prep around 2200, as patient had scheduled MRI scan at 2030. Pt's bowel prep was not fully completed as pt is now NPO due to upper scope procedure.     Plan of care ongoing   Electronically signed by Katelyn Sierra RN on 3/4/2025 at 12:31 AM

## 2025-03-04 NOTE — PROGRESS NOTES
Comprehensive Nutrition Assessment    Type and Reason for Visit:  Initial, Positive nutrition screen, Consult (poor robert/intake)    Nutrition Recommendations/Plan:   Continue Diet and advance as tolerated once EGD/C-scope completed tomorrow.    Will Start Clear Liquid ONS and monitor.       Malnutrition Assessment:  Malnutrition Status:  Severe malnutrition (03/04/25 1032)    Context:  Chronic Illness     Findings of the 6 clinical characteristics of malnutrition:  Energy Intake:  75% or less estimated energy requirements for 1 month or longer  Weight Loss:  Greater than 7.5% over 3 months (~16% loss x ~3 months)     Body Fat Loss:   (moderate) Orbital, Triceps   Muscle Mass Loss:   (moderate) Temples (temporalis), Clavicles (pectoralis & deltoids)  Fluid Accumulation:  No fluid accumulation     Strength:  Not Performed    Nutrition Assessment:    Pt adm from SNF w/ abd pain/rectal bleed x ~1d pta w/ progressive weakness/AMS/poor appetite/intake x ~10d, s/p recent adm for AF w/ RVR/BRBPR.  PMHx HTN, HLD, CAD/NSTEMI, DM, VERN, GERD, hx prostate CA s/p XRT seeding, mod malnutrition (2/18).  Adm w/ GIB, sigmoid diverticulosis, embolic infarcts, FTT as well as suspected spinal mets vs paraneoplastic syndrome.  Pend plan for EGD/C-scope 3/5.  At risk d/t currently meets criteria for Severe Malnutrition w/ subjective ~30# wt loss/poor intake pta.  Will Start Clear Liquid ONS and monitor.    Nutrition Related Findings:    A&Ox3, confused at times, poor dentition, tender/non-distended Abd, +BS, +FMS, no edema, UTO I/O's currently, hypocalcemia, elevated BUN/BGL/ALT Wound Type: None       Current Nutrition Intake & Therapies:    Average Meal Intake: Unable to assess (2/2 poor doc flow intake data as well as on clears only at this time)  Average Supplements Intake: None Ordered  ADULT DIET; Clear Liquid  Diet NPO Exceptions are: Sips of Water with Meds    Anthropometric Measures:  Height: 172.7 cm (5' 8\")  Ideal Body Weight

## 2025-03-04 NOTE — PROGRESS NOTES
[facility] Hospitalist Progress Note    SYNOPSIS: Patient admitted on 3/3/2025 for GI bleed    This is a 84-year-old man with past medical history of A-fib, aortic stenosis s/p TAVR, HFpEF, prostate cancer, malleable penile implant, sinus node dysfunction, coronary artery disease, gastritis, GERD, hyperlipidemia, hypertension, diabetes mellitus, who presented to [facility] ED with concerns of rectal bleeding.  Patient was recently admitted last week for A-fib RVR, patient is on Eliquis.  Patient reported to have maroon-colored stool.  Hemoglobin at 9.3.  CT abdomen pelvis.  1. No evidence of active GI hemorrhage.  2. Resolution of linear hypodense lesion in the mid portion of the spleen.  3. Sigmoid diverticulosis without evidence of diverticulitis.  4. Multiple small bladder diverticuli likely as a result of chronic bladder  outlet obstruction.  5. Considerable fecal material within the rectum raising the possibility of  rectal fecal impaction.  CT head was ordered for altered mental status.  No acute finding.  Neurology, EP, general surgery has been consulted.  MRI brain reviewed  1. 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  No sign of hemorrhage or mass effect.  2. New mild age-appropriate atrophy and new mild small vessel ischemia.  MRI thoracic and cervical spine  1. No acute fracture or subluxation of the thoracic spine.  2. 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 This results in anterior displacement of the thoracic cord and thinning of the thoracic cord at the T3 and superior T4 levels. No abnormal cord signal is seen.  3. No additional spinal canal stenosis or neural foraminal narrowing of the thoracic spine.  4. Old mild compression fractures of T3, T5-T7, and L1.    3/4 patient slightly drowsy this morning however easily wakeful and is able to answer questions.  Updated family  computerized transcription errors may be present.

## 2025-03-04 NOTE — PROGRESS NOTES
MRI screen completed. Information provided by pt's daughter. Wrist watch and wedding ring removed and given to daughter at bedside.     Electronically signed by Katelyn Sierra RN on 3/3/2025 at 8:11 PM

## 2025-03-04 NOTE — PROGRESS NOTES
Physical Therapy    Date: 3/4/2025       Patient Name: Montana Gerard  : 1940      MRN: [id number]    Physical therapy order received and chart reviewed. PT evaluation held this date per RN 2/2 current medical status.  Will follow up as appropriate.     Aminata Angela PT, DPT  [id number]
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
## 2025-03-04 NOTE — PROGRESS NOTES
Dr. White  at bedside. Cardizem gtt titrated down to 10 at this time. Electronically signed by Monserrat Vidal RN on 3/4/2025 at 9:03 AM
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
## 2025-03-04 NOTE — PROGRESS NOTES
MRI called unit to inform this RN that patient was becoming very agitated and noncompliant. Verbal redirection not successful. This RN Messaged Denisse Humphreys for something to help patient remain calm during MRI scan. Messaged read. Ativan 1mg ordered by Denisse Humphreys. This RN administered    Plan of care ongoing  Electronically signed by Katelyn Sierra RN on 3/3/2025 at 9:43 PM

## 2025-03-04 NOTE — PLAN OF CARE
Problem: Safety - Adult  Goal: Free from fall injury  3/4/2025 0020 by Katelyn Sierra RN  Outcome: Progressing  3/3/2025 1801 by Lara Chavez RN  Outcome: Progressing     Problem: Chronic Conditions and Co-morbidities  Goal: Patient's chronic conditions and co-morbidity symptoms are monitored and maintained or improved  3/4/2025 0020 by Katelyn Sierra RN  Outcome: Progressing  3/3/2025 1801 by Lara Chavez RN  Outcome: Progressing     Problem: Discharge Planning  Goal: Discharge to home or other facility with appropriate resources  3/4/2025 0020 by Katelyn Sierra RN  Outcome: Progressing  3/3/2025 1801 by Lara Chavez RN  Outcome: Progressing     Problem: Skin/Tissue Integrity  Goal: Skin integrity remains intact  Description: 1.  Monitor for areas of redness and/or skin breakdown  2.  Assess vascular access sites hourly  3.  Every 4-6 hours minimum:  Change oxygen saturation probe site  4.  Every 4-6 hours:  If on nasal continuous positive airway pressure, respiratory therapy assess nares and determine need for appliance change or resting period  3/4/2025 0020 by Katelyn Sierra RN  Outcome: Progressing  3/3/2025 1801 by Lara Chavez RN  Outcome: Progressing     Problem: ABCDS Injury Assessment  Goal: Absence of physical injury  3/4/2025 0020 by Katelyn Sierra RN  Outcome: Progressing  3/3/2025 1801 by Lara Chavez RN  Outcome: Progressing

## 2025-03-05 ENCOUNTER — APPOINTMENT (OUTPATIENT)
Dept: GENERAL RADIOLOGY | Age: 85
End: 2025-03-05
Payer: MEDICARE

## 2025-03-05 PROBLEM — R50.9 FEVER: Status: ACTIVE | Noted: 2025-03-05

## 2025-03-05 PROBLEM — I63.89 ACUTE ISCHEMIC MULTIFOCAL MULTIPLE VASCULAR TERRITORIES STROKE (HCC): Status: ACTIVE | Noted: 2025-03-04

## 2025-03-05 LAB
ALBUMIN SERPL-MCNC: 2.1 G/DL (ref 3.5–4.7)
ALPHA1 GLOB SERPL ELPH-MCNC: 0.4 G/DL (ref 0.2–0.4)
ALPHA2 GLOB SERPL ELPH-MCNC: 0.8 G/DL (ref 0.5–1)
ANION GAP SERPL CALCULATED.3IONS-SCNC: 14 MMOL/L (ref 7–16)
B-GLOBULIN SERPL ELPH-MCNC: 0.7 G/DL (ref 0.8–1.3)
BACTERIA URNS QL MICRO: ABNORMAL
BASOPHILS # BLD: 0 K/UL (ref 0–0.2)
BASOPHILS NFR BLD: 0 % (ref 0–2)
BILIRUB UR QL STRIP: NEGATIVE
BUN SERPL-MCNC: 24 MG/DL (ref 6–23)
CALCIUM SERPL-MCNC: 8.2 MG/DL (ref 8.6–10.2)
CHLORIDE SERPL-SCNC: 108 MMOL/L (ref 98–107)
CLARITY UR: ABNORMAL
CO2 SERPL-SCNC: 16 MMOL/L (ref 22–29)
COLOR UR: YELLOW
CREAT SERPL-MCNC: 0.8 MG/DL (ref 0.7–1.2)
EKG ATRIAL RATE: 72 BPM
EKG P AXIS: 49 DEGREES
EKG P-R INTERVAL: 170 MS
EKG Q-T INTERVAL: 374 MS
EKG QRS DURATION: 98 MS
EKG QTC CALCULATION (BAZETT): 409 MS
EKG R AXIS: 22 DEGREES
EKG T AXIS: 102 DEGREES
EKG VENTRICULAR RATE: 72 BPM
EOSINOPHIL # BLD: 0 K/UL (ref 0.05–0.5)
EOSINOPHILS RELATIVE PERCENT: 0 % (ref 0–6)
ERYTHROCYTE [DISTWIDTH] IN BLOOD BY AUTOMATED COUNT: 14.9 % (ref 11.5–15)
GAMMA GLOB SERPL ELPH-MCNC: 0.9 G/DL (ref 0.7–1.6)
GFR, ESTIMATED: 90 ML/MIN/1.73M2
GLUCOSE SERPL-MCNC: 138 MG/DL (ref 74–99)
GLUCOSE UR STRIP-MCNC: NEGATIVE MG/DL
HCT VFR BLD AUTO: 27.9 % (ref 37–54)
HCT VFR BLD AUTO: 28.6 % (ref 37–54)
HGB BLD-MCNC: 8.4 G/DL (ref 12.5–16.5)
HGB BLD-MCNC: 8.4 G/DL (ref 12.5–16.5)
HGB UR QL STRIP.AUTO: ABNORMAL
INTERPRETATION SERPL IFE-IMP: NORMAL
KETONES UR STRIP-MCNC: ABNORMAL MG/DL
LEUKOCYTE ESTERASE UR QL STRIP: NEGATIVE
LYMPHOCYTES NFR BLD: 0.13 K/UL (ref 1.5–4)
LYMPHOCYTES RELATIVE PERCENT: 1 % (ref 20–42)
MCH RBC QN AUTO: 29.2 PG (ref 26–35)
MCHC RBC AUTO-ENTMCNC: 30.1 G/DL (ref 32–34.5)
MCV RBC AUTO: 96.9 FL (ref 80–99.9)
MONOCYTES NFR BLD: 0.26 K/UL (ref 0.1–0.95)
MONOCYTES NFR BLD: 2 % (ref 2–12)
NEUTROPHILS NFR BLD: 97 % (ref 43–80)
NEUTS SEG NFR BLD: 14.8 K/UL (ref 1.8–7.3)
NITRITE UR QL STRIP: POSITIVE
PATH REV: NORMAL
PATHOLOGIST: ABNORMAL
PH UR STRIP: 6 [PH] (ref 5–8)
PLATELET # BLD AUTO: 120 K/UL (ref 130–450)
PMV BLD AUTO: 9.9 FL (ref 7–12)
POTASSIUM SERPL-SCNC: 3.4 MMOL/L (ref 3.5–5)
PROCALCITONIN SERPL-MCNC: 0.24 NG/ML (ref 0–0.08)
PROT PATTERN SERPL ELPH-IMP: ABNORMAL
PROT SERPL-MCNC: 4.9 G/DL (ref 6.4–8.3)
PROT UR STRIP-MCNC: 30 MG/DL
RBC # BLD AUTO: 2.88 M/UL (ref 3.8–5.8)
RBC # BLD: ABNORMAL 10*6/UL
RBC #/AREA URNS HPF: ABNORMAL /HPF
SODIUM SERPL-SCNC: 138 MMOL/L (ref 132–146)
SP GR UR STRIP: >1.03 (ref 1–1.03)
UROBILINOGEN UR STRIP-ACNC: 0.2 EU/DL (ref 0–1)
WBC #/AREA URNS HPF: ABNORMAL /HPF
WBC OTHER # BLD: 15.2 K/UL (ref 4.5–11.5)

## 2025-03-05 PROCEDURE — 99233 SBSQ HOSP IP/OBS HIGH 50: CPT | Performed by: INTERNAL MEDICINE

## 2025-03-05 PROCEDURE — 6370000000 HC RX 637 (ALT 250 FOR IP): Performed by: STUDENT IN AN ORGANIZED HEALTH CARE EDUCATION/TRAINING PROGRAM

## 2025-03-05 PROCEDURE — 6360000002 HC RX W HCPCS: Performed by: NURSE PRACTITIONER

## 2025-03-05 PROCEDURE — 2580000003 HC RX 258: Performed by: INTERNAL MEDICINE

## 2025-03-05 PROCEDURE — 51701 INSERT BLADDER CATHETER: CPT

## 2025-03-05 PROCEDURE — 6370000000 HC RX 637 (ALT 250 FOR IP)

## 2025-03-05 PROCEDURE — 87077 CULTURE AEROBIC IDENTIFY: CPT

## 2025-03-05 PROCEDURE — 2580000003 HC RX 258: Performed by: FAMILY MEDICINE

## 2025-03-05 PROCEDURE — 87040 BLOOD CULTURE FOR BACTERIA: CPT

## 2025-03-05 PROCEDURE — 93010 ELECTROCARDIOGRAM REPORT: CPT | Performed by: INTERNAL MEDICINE

## 2025-03-05 PROCEDURE — 84145 PROCALCITONIN (PCT): CPT

## 2025-03-05 PROCEDURE — 6370000000 HC RX 637 (ALT 250 FOR IP): Performed by: INTERNAL MEDICINE

## 2025-03-05 PROCEDURE — 2060000000 HC ICU INTERMEDIATE R&B

## 2025-03-05 PROCEDURE — 6370000000 HC RX 637 (ALT 250 FOR IP): Performed by: NURSE PRACTITIONER

## 2025-03-05 PROCEDURE — 81001 URINALYSIS AUTO W/SCOPE: CPT

## 2025-03-05 PROCEDURE — 99232 SBSQ HOSP IP/OBS MODERATE 35: CPT | Performed by: NURSE PRACTITIONER

## 2025-03-05 PROCEDURE — 36415 COLL VENOUS BLD VENIPUNCTURE: CPT

## 2025-03-05 PROCEDURE — 87150 DNA/RNA AMPLIFIED PROBE: CPT

## 2025-03-05 PROCEDURE — 6360000002 HC RX W HCPCS: Performed by: FAMILY MEDICINE

## 2025-03-05 PROCEDURE — 2500000003 HC RX 250 WO HCPCS: Performed by: FAMILY MEDICINE

## 2025-03-05 PROCEDURE — 85018 HEMOGLOBIN: CPT

## 2025-03-05 PROCEDURE — 80048 BASIC METABOLIC PNL TOTAL CA: CPT

## 2025-03-05 PROCEDURE — 71045 X-RAY EXAM CHEST 1 VIEW: CPT

## 2025-03-05 PROCEDURE — 85014 HEMATOCRIT: CPT

## 2025-03-05 PROCEDURE — 85025 COMPLETE CBC W/AUTO DIFF WBC: CPT

## 2025-03-05 PROCEDURE — 6370000000 HC RX 637 (ALT 250 FOR IP): Performed by: PSYCHIATRY & NEUROLOGY

## 2025-03-05 PROCEDURE — 6370000000 HC RX 637 (ALT 250 FOR IP): Performed by: FAMILY MEDICINE

## 2025-03-05 PROCEDURE — 51798 US URINE CAPACITY MEASURE: CPT

## 2025-03-05 RX ORDER — SODIUM CHLORIDE, SODIUM LACTATE, POTASSIUM CHLORIDE, CALCIUM CHLORIDE 600; 310; 30; 20 MG/100ML; MG/100ML; MG/100ML; MG/100ML
INJECTION, SOLUTION INTRAVENOUS CONTINUOUS
Status: DISCONTINUED | OUTPATIENT
Start: 2025-03-05 | End: 2025-03-14 | Stop reason: HOSPADM

## 2025-03-05 RX ORDER — BISACODYL 10 MG
10 SUPPOSITORY, RECTAL RECTAL DAILY
Status: DISCONTINUED | OUTPATIENT
Start: 2025-03-05 | End: 2025-03-14 | Stop reason: HOSPADM

## 2025-03-05 RX ORDER — POTASSIUM CHLORIDE 7.45 MG/ML
10 INJECTION INTRAVENOUS
Status: COMPLETED | OUTPATIENT
Start: 2025-03-05 | End: 2025-03-05

## 2025-03-05 RX ORDER — POTASSIUM CHLORIDE 1500 MG/1
20 TABLET, EXTENDED RELEASE ORAL ONCE
Status: COMPLETED | OUTPATIENT
Start: 2025-03-05 | End: 2025-03-05

## 2025-03-05 RX ADMIN — BRIMONIDINE TARTRATE 1 DROP: 2 SOLUTION/ DROPS OPHTHALMIC at 21:23

## 2025-03-05 RX ADMIN — MIRTAZAPINE 15 MG: 15 TABLET, FILM COATED ORAL at 21:23

## 2025-03-05 RX ADMIN — ANORECTAL OINTMENT: 15.7; .44; 24; 20.6 OINTMENT TOPICAL at 23:43

## 2025-03-05 RX ADMIN — Medication 1000 UNITS: at 09:23

## 2025-03-05 RX ADMIN — POTASSIUM CHLORIDE 10 MEQ: 7.46 INJECTION, SOLUTION INTRAVENOUS at 14:19

## 2025-03-05 RX ADMIN — SODIUM CHLORIDE: 9 INJECTION, SOLUTION INTRAVENOUS at 10:30

## 2025-03-05 RX ADMIN — ATORVASTATIN CALCIUM 10 MG: 10 TABLET, FILM COATED ORAL at 09:23

## 2025-03-05 RX ADMIN — METOPROLOL SUCCINATE 12.5 MG: 25 TABLET, EXTENDED RELEASE ORAL at 09:23

## 2025-03-05 RX ADMIN — ONDANSETRON 4 MG: 2 INJECTION, SOLUTION INTRAMUSCULAR; INTRAVENOUS at 07:50

## 2025-03-05 RX ADMIN — POTASSIUM CHLORIDE 20 MEQ: 1500 TABLET, EXTENDED RELEASE ORAL at 16:16

## 2025-03-05 RX ADMIN — BISACODYL 10 MG: 10 SUPPOSITORY RECTAL at 10:30

## 2025-03-05 RX ADMIN — MAJOR MAGNESIUM CITRATE ORAL SOLUTION - LEMON 296 ML: 1.75 LIQUID ORAL at 23:43

## 2025-03-05 RX ADMIN — POTASSIUM CHLORIDE 10 MEQ: 7.46 INJECTION, SOLUTION INTRAVENOUS at 15:22

## 2025-03-05 RX ADMIN — GABAPENTIN 100 MG: 100 CAPSULE ORAL at 21:23

## 2025-03-05 RX ADMIN — SODIUM CHLORIDE, PRESERVATIVE FREE 10 ML: 5 INJECTION INTRAVENOUS at 08:30

## 2025-03-05 RX ADMIN — MORPHINE SULFATE 2 MG: 2 INJECTION, SOLUTION INTRAMUSCULAR; INTRAVENOUS at 10:59

## 2025-03-05 RX ADMIN — PANTOPRAZOLE SODIUM 40 MG: 40 INJECTION, POWDER, FOR SOLUTION INTRAVENOUS at 16:16

## 2025-03-05 RX ADMIN — SODIUM CHLORIDE: 9 INJECTION, SOLUTION INTRAVENOUS at 01:41

## 2025-03-05 RX ADMIN — DOFETILIDE 125 MCG: 0.12 CAPSULE ORAL at 21:23

## 2025-03-05 RX ADMIN — GABAPENTIN 100 MG: 100 CAPSULE ORAL at 09:22

## 2025-03-05 RX ADMIN — SODIUM CHLORIDE, SODIUM LACTATE, POTASSIUM CHLORIDE, AND CALCIUM CHLORIDE: .6; .31; .03; .02 INJECTION, SOLUTION INTRAVENOUS at 18:23

## 2025-03-05 RX ADMIN — SODIUM CHLORIDE: 9 INJECTION, SOLUTION INTRAVENOUS at 16:20

## 2025-03-05 RX ADMIN — VITAM B12 100 MCG: 100 TAB at 21:23

## 2025-03-05 RX ADMIN — DOFETILIDE 125 MCG: 0.12 CAPSULE ORAL at 09:24

## 2025-03-05 RX ADMIN — VITAM B12 100 MCG: 100 TAB at 09:22

## 2025-03-05 RX ADMIN — ACETAMINOPHEN 650 MG: 325 TABLET ORAL at 13:32

## 2025-03-05 RX ADMIN — TIMOLOL MALEATE 1 DROP: 5 SOLUTION/ DROPS OPHTHALMIC at 21:23

## 2025-03-05 RX ADMIN — PANTOPRAZOLE SODIUM 40 MG: 40 INJECTION, POWDER, FOR SOLUTION INTRAVENOUS at 04:03

## 2025-03-05 RX ADMIN — LATANOPROST 1 DROP: 50 SOLUTION OPHTHALMIC at 21:23

## 2025-03-05 ASSESSMENT — PAIN SCALES - GENERAL
PAINLEVEL_OUTOF10: 7
PAINLEVEL_OUTOF10: 0
PAINLEVEL_OUTOF10: 0
PAINLEVEL_OUTOF10: 3

## 2025-03-05 ASSESSMENT — PAIN DESCRIPTION - LOCATION: LOCATION: FLANK

## 2025-03-05 ASSESSMENT — PAIN DESCRIPTION - ORIENTATION: ORIENTATION: RIGHT;LEFT

## 2025-03-05 ASSESSMENT — PAIN SCALES - WONG BAKER: WONGBAKER_NUMERICALRESPONSE: NO HURT

## 2025-03-05 ASSESSMENT — PAIN DESCRIPTION - DESCRIPTORS: DESCRIPTORS: ACHING;DISCOMFORT

## 2025-03-05 NOTE — PROGRESS NOTES
Occupational Therapy  Reviewed Chart this AM.  Noted pt continues to prep for colonoscopy tentatively scheduled for this date.  Will hold therapy this date and attempt again when pt medically appropriate to participate in ax.  Thank you for this referral. MEAGAN Lion, OTR/L  # [phone]

## 2025-03-05 NOTE — PROGRESS NOTES
I evaluated patient this morning.  Patient did complete his bowel prep but is still having some light brown-colored thin stool.  Will order suppository and tapwater enema.  FMS is to be removed.  Will proceed with EGD and colonoscopy today.  Patient is to remain n.p.o., IV fluids    Electronically signed by Aminata Lala DO on 3/5/2025 at 8:00 AM

## 2025-03-05 NOTE — PROGRESS NOTES
Pt's bowel prep completed     Electronically signed by Katelyn Sierra RN on 3/5/2025 at 12:17 AM

## 2025-03-05 NOTE — DISCHARGE INSTRUCTIONS
be removed, documentation of length of inserted PICC. PICC or VAD length is to correlate with inserted length and sent to physician at the time of removal.  Give the patient a list of antibiotics being administered with:  Drug name  Route  Frequency  Start/Stop date      ROUTINE LABS TO BE DRAWN/ORDERED:  Twice weekly (preferably every Monday & Thursday):  CMP  Complete Blood Count with differential (CBC with dif)  Once weekly:  C-Reactive Protein (not high sensitivity CRP)  Erythrocyte/Westergren Sedimentation Rate (WSR or ESR)  Total CPK for patients on Daptomycin (Cubicin®). Obtain CPK more often if the patient is experiencing muscle weakness or myalgias.  Clinical Pharmacist is to adjust Vancomycin and Aminoglycosides.  Clinical pharmacist is  encouraged to follow: \"Therapeutic Monitoring of Vancomycin for Serious Methicillin-resistant Staphylococcus aureus Infections: A Revised Consensus Guideline and Review by the American Society of Health-system Pharmacists, the Infectious Diseases Society of [place], the Pediatric Infectious Diseases Society, and the Society of Infectious Diseases Pharmacists\" (https://doi.org/10.1093/anahy/arvz564).  If a clinical calculator is not available, clinical pharmacist is to follow the orders below:  Draw Vancomycin trough 30 minutes before the third dose  After starting drug, or   After the dosing or interval is changed.  If the trough level is between 5 and 20 continue dose as ordered.  Draw troughs twice weekly thereafter until troughs are stable (i.e. until trough is between 10 and 20 mcg/ml for two consecutive laboratory values).  Once stable check troughs once weekly or every third dose.  Please do not call physician unless the trough is < 5 or >20.  If the trough is <20 continue dosing as ordered.  If the trough is >20 call the office for further orders.  Do not hold the dose while waiting for the trough result.  Amingoglycosides (e.g. Gentamicin, Tobramycin and Amikacin)  technique. If there is a BioPatch®, SilverSite® or equivalent, change once weekly only or as needed.    FOLLOW-UP VISITS  Nursing staff should call the office during business hours to schedule a follow-up appointment once the patient is admitted to the service or facility. Every effort should be made to have patient follow-up within 2 weeks of discharge. Exception is made for ventilator-dependent patients.  Continue IV antibiotic therapy until patient is seen in the office or unless specific stop date is noted on the original order or unless otherwise ordered by physician.  Call office to ensure stop date is correct before stopping antibiotics.       Hugo Wolf MD

## 2025-03-05 NOTE — PROGRESS NOTES
[facility] Hospitalist Progress Note    SYNOPSIS: Patient admitted on 3/3/2025 for GI bleed    This is a 84-year-old man with past medical history of A-fib, aortic stenosis s/p TAVR, HFpEF, prostate cancer, malleable penile implant, sinus node dysfunction, coronary artery disease, gastritis, GERD, hyperlipidemia, hypertension, diabetes mellitus, who presented to [facility] ED with concerns of rectal bleeding.  Patient was recently admitted last week for A-fib RVR, patient is on Eliquis.  Patient reported to have maroon-colored stool.  Hemoglobin at 9.3.  CT abdomen pelvis.  1. No evidence of active GI hemorrhage.  2. Resolution of linear hypodense lesion in the mid portion of the spleen.  3. Sigmoid diverticulosis without evidence of diverticulitis.  4. Multiple small bladder diverticuli likely as a result of chronic bladder  outlet obstruction.  5. Considerable fecal material within the rectum raising the possibility of  rectal fecal impaction.  CT head was ordered for altered mental status.  No acute finding.  Neurology, EP, general surgery has been consulted.  MRI brain reviewed  1. 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  No sign of hemorrhage or mass effect.  2. New mild age-appropriate atrophy and new mild small vessel ischemia.  MRI thoracic and cervical spine  1. No acute fracture or subluxation of the thoracic spine.  2. 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 This results in anterior displacement of the thoracic cord and thinning of the thoracic cord at the T3 and superior T4 levels. No abnormal cord signal is seen.  3. No additional spinal canal stenosis or neural foraminal narrowing of the thoracic spine.  4. Old mild compression fractures of T3, T5-T7, and L1.    3/4 patient slightly drowsy this morning however easily wakeful and is able to answer questions.  Updated family  sodium chloride flush, sodium chloride, ondansetron **OR** ondansetron, acetaminophen **OR** acetaminophen, morphine    Labs:     Recent Labs     03/03/25  0035 03/03/25  1142 03/04/25  2131 03/05/25  0409 03/05/25  0856   WBC 13.8*  --   --   --  15.2*   HGB 9.3*   < > 8.6* 8.4* 8.4*   HCT 31.0*   < > 28.5* 28.6* 27.9*     --   --   --  120*    < > = values in this interval not displayed.       Recent Labs     03/03/25  0035 03/04/25  0350 03/05/25  0856    138 138   K 4.7 4.1 3.4*    105 108*   CO2 26 20* 16*   BUN 47* 34* 24*   CREATININE 0.9 1.0 0.8   CALCIUM 9.5 8.5* 8.2*       Recent Labs     03/03/25  0035 03/04/25  0350   ALKPHOS 161* 116   * 63*   AST 64* 36   BILITOT 0.7 0.7   LIPASE 31  --        Recent Labs     03/03/25  0037 03/04/25  0350   INR 1.5 1.4       No results for input(s): \"CKTOTAL\", \"TROPONINI\" in the last 72 hours.    Chronic labs:    Lab Results   Component Value Date    CHOL 110 11/08/2024    TRIG 68 11/08/2024    HDL 20 (L) 03/04/2025    TSH 2.63 02/17/2025    PSA 0.06 03/03/2025    INR 1.4 03/04/2025    LABA1C 4.2 03/04/2025       Radiology: REVIEWED DAILY    +++++++++++++++++++++++++++++++++++++++++++++++++  Edgardo Anaya MD  [facility] Hospitalist  [facility], [place]  +++++++++++++++++++++++++++++++++++++++++++++++++  NOTE: This report was transcribed using voice recognition software. Every effort was made to ensure accuracy; however, inadvertent computerized transcription errors may be present.

## 2025-03-05 NOTE — PROGRESS NOTES
Bladder scanned patient as he was expressing some discomfort. Bladder scanner calculated 175ml. No intervention due at this time per protocol.     Plan of care ongoing   Electronically signed by Katelyn Sierra RN on 3/5/2025 at 4:37 AM

## 2025-03-05 NOTE — PROGRESS NOTES
Palliative Care Department  [phone]  Palliative Care Progress Note  Provider Jennifer Montero, APRN - CNP    Montana Gerard  [id number]  Hospital Day: 3  Date of Initial Consult: 3/3/2025  Referring Provider: Jorge Funk DO  Palliative Medicine was consulted for assistance with: Goals clarification, symptom management    HPI:   Montana Gerard is a 84 y.o. with a past medical history of atrial fibrillation on Eliquis, valvular heart disease s/p TAVR, prostate cancer s/p radioactive seed placement and brachytherapy at [facility], CAD, iron deficiency anemia, hyperlipidemia, hiatal hernia, GERD, diverticulitis, diabetes mellitus, dermatitis herpetiformis, depression, cholelithiasis, former smoker who was admitted on 3/3/2025 from [facility] with a CHIEF COMPLAINT of rectal bleeding.  Patient was recently hospitalized 2/16/2025 - 2/25/2025 with weight loss, back pain, atrial fibrillation, and rectal bleeding.  He did not require any transfusions or endoscopy evaluation at that time.  He was discharged to [facility].  Family reports he has been very weak and has had poor oral intake.  He has also had a decline in his cognition.  Labs on ED evaluation showed an albumin of 3.2, alk phos 161, , AST 64, WBC 13.8, hemoglobin 9.3, hematocrit 31.0.  CT of the abdomen pelvis showed no evidence of active GI hemorrhage, sigmoid diverticulosis without evidence of diverticulitis, multiple small bladder diverticuli likely as a result of.  Bladder outlet obstruction, considerable fecal material within the rectum.  General surgery has been consulted with plans for endoscopy evaluation.  Palliative care was consulted for goals of care discussion and symptom management.    ASSESSMENT/PLAN:     Pertinent Hospital Diagnoses     GI bleed  New acute ischemic stroke in various vascular distributions   Atrial fibrillation on apixaban  Uncontrolled back pain/spasms       Palliative Care Encounter / Counseling Regarding Goals of Care  Please see detailed

## 2025-03-05 NOTE — PROGRESS NOTES
Message sent to Dr. Anaya for update on pt condition, T, emesis, message read     Message sent to Dr. Lala regarding pt T and NPO status, message read

## 2025-03-05 NOTE — PROGRESS NOTES
HTN)  - OAC: Eliquis 5mg BID (Wt 73.6 Kh, Creat 0.9, age 84)   - [facility] 06/02/2023 with early recurrence of Atrial fibrillation.   - [facility] EP Dr. Boyle, Tikosyn loading 07/31/2023 and intolerant to 500mcg due to QTc prolongation as well as 250 MCG due to QTc prolongation and Tolerating 125mcg BID without QTc prolongation.   - CrCl 53mL/min based on IBW creat 1.0   - Presented in AF on 02/16/2025 spontaneously converting and was seen in consult by Dr. Swan with ongoing Tikosyn at that time.   - Presenting in sinus with acceptable QTc and stable renal function.   - Developed AF with RVR 3/3/25 with rates controled by IV Cardizem and spontaneously converting back to sinus on 03/04/2025.   - OAC remains on hold due to ongoing Anemia with pending EGD/Coloscopy.      2. PVCs   - 38% PVC burden seen in 11/2022 on Cardiac monitor.   - rare PVC at this time.      3.  Severe aortic stenosis S/p TAVR 03/30/2023 at [facility]   - # 26 mm Castillo Goldie S3 valve.      4. Sinus Node dysfunction.   - Tolerating Toprol 12.5mg QD     5. Presenting with concern for GI Bleed   - Possible EGD/Colonoscopy in 24 hours.      6. Moderate Coronary artery disease   - [facility] 03/06/2023 with one-vessel moderate coronary artery disease of D1, moderate proximal to mid disease of the LAD.      7.  Prostate cancer  - Malleable penile implant.     8. Gastritis/GERD.      9. Failure to thrive   - PT/OT at nursing home.      10. Sacral decubitus Ulcers     11. Acute stroke   - Seen on MRI from 03/03/2025    Recommendations.   2. Continue Tikosyn 125mcg every 12 hours for now.   3.  Continue Toprol XL 12.5 mg daily  4.  Hold Eliquis due to active GI bleed and resume ASAP when he is cleared by surgery.  5. Consider Watchman LAAO procedure in future if deems not a good candidate for OAC.  6. Replace K.   7. Discontinue Zofran due to drug-drug interaction with Tikosyn.   8. EP to sign off he is to follow with his established EP at [facility] post discharge.     I have

## 2025-03-05 NOTE — PLAN OF CARE
Problem: Safety - Adult  Goal: Free from fall injury  Outcome: Progressing     Problem: Chronic Conditions and Co-morbidities  Goal: Patient's chronic conditions and co-morbidity symptoms are monitored and maintained or improved  Outcome: Progressing  Flowsheets (Taken 3/4/2025 1055 by Monserrat Vidal, RN)  Care Plan - Patient's Chronic Conditions and Co-Morbidity Symptoms are Monitored and Maintained or Improved:   Collaborate with multidisciplinary team to address chronic and comorbid conditions and prevent exacerbation or deterioration   Monitor and assess patient's chronic conditions and comorbid symptoms for stability, deterioration, or improvement   Update acute care plan with appropriate goals if chronic or comorbid symptoms are exacerbated and prevent overall improvement and discharge     Problem: Discharge Planning  Goal: Discharge to home or other facility with appropriate resources  Outcome: Progressing  Flowsheets (Taken 3/4/2025 1055 by Monserrat Vidal, RN)  Discharge to home or other facility with appropriate resources:   Identify barriers to discharge with patient and caregiver   Arrange for needed discharge resources and transportation as appropriate   Identify discharge learning needs (meds, wound care, etc)   Refer to discharge planning if patient needs post-hospital services based on physician order or complex needs related to functional status, cognitive ability or social support system     Problem: Skin/Tissue Integrity  Goal: Skin integrity remains intact  Description: 1.  Monitor for areas of redness and/or skin breakdown  2.  Assess vascular access sites hourly  3.  Every 4-6 hours minimum:  Change oxygen saturation probe site  4.  Every 4-6 hours:  If on nasal continuous positive airway pressure, respiratory therapy assess nares and determine need for appliance change or resting period  Outcome: Progressing     Problem: ABCDS Injury Assessment  Goal: Absence of physical injury  Outcome:

## 2025-03-05 NOTE — PROGRESS NOTES
Messaged Denisse Humphreys  regarding patient completing bowel prep, but BM are not clear. Message unread at this time.     Plan of care ongoing   Electronically signed by Katelyn Sierra RN on 3/5/2025 at 12:28 AM    2:01AM: received a reply back from Lyndsay, told me to reach out to general surg. Marcy Maier ordered tap water enema.     Electronically signed by Katelyn Sierra RN on 3/5/2025 at 2:02 AM

## 2025-03-05 NOTE — PROGRESS NOTES
Spoke with Dr. Maier at pt's bedside regarding if enema is still needed prior to procedure since pt is still having brown liquid BM. Dr. Maier said she will order some magnesium citrate for pt to drink before procedure, and enema will probably not be needed.     Plan of care ongoing   Electronically signed by Katelyn Sierra RN on 3/5/2025 at 5:31 AM

## 2025-03-05 NOTE — CONSULTS
[facility]              [address] [place] [address]                              CONSULTATION      PATIENT NAME: REJI DE LEON                : 1940  MED REC NO: [id number]                        ROOM: 4501  ACCOUNT NO: [id number]                       ADMIT DATE: 2025  PROVIDER: Nir Shin MD    NEUROSURGERY CONSULT    CONSULT DATE: 2025      REASON FOR CONSULT:  Thoracic arachnoid cyst.    HISTORY OF PRESENT ILLNESS:  The patient is an 84-year-old gentleman who has had progressive alteration in his mental status over the last 4 weeks.  His wife states that over the last couple of days it has gotten progressively worse and was subsequently brought to the emergency room for his altered mental status and rectal bleeding and he has also been complaining of lower back pain.  Due to his altered mental status, he was evaluated by Neurology Service and ultimately had an MRI of his spine that did show a thoracic arachnoid cyst for which Neurosurgery Service was consulted.  The interview and examination are difficult to complete because of the patient's altered mental status and history is obtained from the medical record.    PAST MEDICAL HISTORY:  Positive for acute myocardial infarction, anemia, cholelithiasis, depression, diabetes, gastroesophageal reflux, hyperlipidemia.    PAST SURGICAL HISTORY:  Positive for cholecystectomy and TAVR.    FAMILY HISTORY:  Positive for stroke in his father.    SOCIAL HISTORY:  Negative for tobacco use.  He does consume alcoholic beverages socially.    ALLERGIES:  INCLUDE GLUTEN.      HOME MEDICATIONS:  Include Eliquis, Lipitor, baclofen.    REVIEW OF SYSTEMS:  I am unable to complete a 14-point review of systems because of the patient's current medical condition as he starts to answer questions initially appropriately and then progresses into nonsensical speech.    PHYSICAL EXAMINATION:  VITAL SIGNS:  He is

## 2025-03-05 NOTE — PROGRESS NOTES
Pt was complaining of lower abdominal pain. Upon inspection, pt's lower abdomin was distended and ridged. Pt was also complaining of \"having trouble urinating\". This RN bladder scanned pt. Bladder scanner calculated >670ml. This RN straight cath patient, 700ml of urine was expelled. Pt expressed comfort and relief.     Plan of care ongoing   Electronically signed by Katelyn Sierra RN on 3/5/2025 at 12:19 AM

## 2025-03-05 NOTE — PROGRESS NOTES
[facility]  Neuro Inpatient Follow Up       Montana Gerard is a 84 y.o.  male     Neuro is following for acute embolic strokes    PMH: Prostate cancer, A-fib, hyperlipidemia, diet-controlled DM 2, depression, anemia, MI, TAVR, former smoking    HPI:  The patient presented to ED on 3/3 for complaint of abdominal pain, low back pain and progressive bilateral lower extremity weakness, impaired ambulation, tremor.  He started having hematuria followed followed by rectal bleed. Had to stop Eliquis which he takes for his A-fib.  He had a couple of falls with did not hit his head.    For the past 5 weeks he has been progressive lower extremity weakness and was not able to ambulate. Started having tremor in upper and lower extremities that is worse when eating or holding objects.    Family does endorse missing doses of Eliquis over the last few weeks due to bleeding but otherwise typically is compliant.    MRI showed multifocal embolic strokes in various vascular distributions.     Spinal imaging showed spinal arachnoid cysts [id number]--neurosurgery saw the patient and considers the finding incidental and no surgery is recommended    On Cardizem and EP following    3/4: H&H 7.8/25.4  3/5: For EGD and C-scope    Subjective  He is lying in bed and generally feels fatigued and weak.  He has been intermittently confused and will sometimes speak nonsensically but he follow all commands.  Several family members at the bedside including both of his children.    No chest pain or palpitations  No SOB  No vertigo, lightheadedness or loss of consciousness  No itching or bruising appreciated    ROS otherwise negative     Current Facility-Administered Medications   Medication Dose Route Frequency Provider Last Rate Last Admin    bisacodyl (DULCOLAX) suppository 10 mg  10 mg Rectal Daily Aminata Lala DO        sodium chloride flush 0.9 % injection 5-40 mL  5-40 mL IntraVENous 2 times per day Blessing  Jennifer Mendoza, APRN - CNP   2 mg at 03/04/25 1733    [Held by provider] dilTIAZem 100 mg in sodium chloride 0.9 % 100 mL infusion ([facility])  2.5-15 mg/hr IntraVENous Continuous Ricco Ld KEYANA Rajan - CNP   Stopped at 03/04/25 1636    mirtazapine (REMERON) tablet 15 mg  15 mg Oral Nightly Placido Walters MD   15 mg at 03/04/25 2101    atorvastatin (LIPITOR) tablet 10 mg  10 mg Oral Daily Placido Walters MD   10 mg at 03/04/25 0904        Objective:     BP (!) 120/58   Pulse 84   Temp 98.1 °F (36.7 °C) (Axillary)   Resp 20   Ht 1.727 m (5' 8\")   Wt 66.8 kg (147 lb 4.3 oz)   SpO2 92%   BMI 22.39 kg/m²     General appearance: Drowsy, appears stated age, cooperative and no distress; appears frail  Head: normocephalic, without obvious abnormality, atraumatic  Eyes: conjunctivae/corneas clear. .  Lungs: Unlabored breaths on room air  Heart: A-fib on the monitor  Extremities: normal, atraumatic, no cyanosis or edema  Pulses: 2+ and symmetric  Skin: Pale      Mental Status: Drowsy, arouses to verbal stimulation.  Oriented to person, family members    Poor attention/concentration  Confused but follows all simple commands with some prompting    Speech: Disorder  Language: Laconic; no clear aphasias but will sometimes speak nonsensically    Cranial Nerves:  I: smell NA   II: visual acuity  NA   II: visual fields    II: pupils CRUZ   III,VII: ptosis Will not keep eyes open during exam   III,IV,VI: extraocular muscles  Gaze appears midline with passive eye opening   V: mastication    V: facial light touch sensation  Normal   V,VII: corneal reflex     VII: facial muscle function - upper  Normal   VII: facial muscle function - lower Normal   VIII: hearing Normal   IX: soft palate elevation     IX,X: gag reflex    XI: trapezius strength  4/5   XI: sternocleidomastoid strength 4/5   XI: neck extension strength  4/5   XII: tongue strength  Normal     Motor:  Generalized weakness but can lift both arms

## 2025-03-05 NOTE — PROGRESS NOTES
New FMS placed. Paperwork signed by Leodan, clinical supervisor.     Plan of care ongoing   Electronically signed by Katelyn Sierra RN on 3/5/2025 at 4:36 AM

## 2025-03-05 NOTE — PROGRESS NOTES
Spoke with Dr Aminata Lala. She would like tap-water enemas in place of the magnesium citrate since patient is still not clear. Dr. Lala said she will also order a suppository to be placed along with the tap-water enema. Per order, only 3 tap-water enemas to be given today prior to procedure.     This RN completed 1/3 tap- water enemas. Awaiting order on suppository.     Plan of care ongoing   Electronically signed by Katelyn Sierra RN on 3/5/2025 at 6:59 AM

## 2025-03-05 NOTE — CARE COORDINATION
[facility] no beds. Spoke with son and daughter, spouse took list home yesterday. They will review together and let me know next choice.     1611 Spoke with son, family decided they would like to return to [facility]. Facility notified family would like return.     For questions I can be reached at [phone]. LUCINA Ravi

## 2025-03-05 NOTE — PROGRESS NOTES
When this RN went into pt's room to begin the enema, Patient was covered in head to toe fecal matter. This RN removed the FMS as it was covered in pt's feces and could not be properly cleaned. This RN messaged Marcy Retana (general surgical resident) and asked if enema was still necessary. She replied with \"skip the enema but reinsert the FMS... we can give enemas In the morning if he still isn't clear\" FMS request form printed out. Nurse Supervisor Leodan called to come sign form. Patient is cleaned, in bed, bundled, bed alarm on.     Plan of care ongoing   Electronically signed by Katelyn Sierra RN on 3/5/2025 at 3:10 AM

## 2025-03-06 ENCOUNTER — ANESTHESIA (OUTPATIENT)
Dept: ENDOSCOPY | Age: 85
End: 2025-03-06
Payer: MEDICARE

## 2025-03-06 ENCOUNTER — ANESTHESIA EVENT (OUTPATIENT)
Dept: ENDOSCOPY | Age: 85
End: 2025-03-06
Payer: MEDICARE

## 2025-03-06 PROBLEM — R78.81 BACTEREMIA: Status: ACTIVE | Noted: 2025-03-06

## 2025-03-06 LAB
ANION GAP SERPL CALCULATED.3IONS-SCNC: 13 MMOL/L (ref 7–16)
BASOPHILS # BLD: 0.01 K/UL (ref 0–0.2)
BASOPHILS NFR BLD: 0 % (ref 0–2)
BUN SERPL-MCNC: 21 MG/DL (ref 6–23)
CALCIUM SERPL-MCNC: 8.1 MG/DL (ref 8.6–10.2)
CHLORIDE SERPL-SCNC: 111 MMOL/L (ref 98–107)
CO2 SERPL-SCNC: 15 MMOL/L (ref 22–29)
CREAT SERPL-MCNC: 0.8 MG/DL (ref 0.7–1.2)
EOSINOPHIL # BLD: 0.06 K/UL (ref 0.05–0.5)
EOSINOPHILS RELATIVE PERCENT: 1 % (ref 0–6)
ERYTHROCYTE [DISTWIDTH] IN BLOOD BY AUTOMATED COUNT: 15.1 % (ref 11.5–15)
GFR, ESTIMATED: 90 ML/MIN/1.73M2
GLUCOSE SERPL-MCNC: 93 MG/DL (ref 74–99)
HCT VFR BLD AUTO: 27.7 % (ref 37–54)
HGB BLD-MCNC: 8.2 G/DL (ref 12.5–16.5)
IMM GRANULOCYTES # BLD AUTO: 0.11 K/UL (ref 0–0.58)
IMM GRANULOCYTES NFR BLD: 1 % (ref 0–5)
LYMPHOCYTES NFR BLD: 0.63 K/UL (ref 1.5–4)
LYMPHOCYTES RELATIVE PERCENT: 7 % (ref 20–42)
MAGNESIUM SERPL-MCNC: 2 MG/DL (ref 1.6–2.6)
MCH RBC QN AUTO: 29 PG (ref 26–35)
MCHC RBC AUTO-ENTMCNC: 29.6 G/DL (ref 32–34.5)
MCV RBC AUTO: 97.9 FL (ref 80–99.9)
MONOCYTES NFR BLD: 0.54 K/UL (ref 0.1–0.95)
MONOCYTES NFR BLD: 6 % (ref 2–12)
NEUTROPHILS NFR BLD: 85 % (ref 43–80)
NEUTS SEG NFR BLD: 7.75 K/UL (ref 1.8–7.3)
PLATELET # BLD AUTO: 119 K/UL (ref 130–450)
PMV BLD AUTO: 9.8 FL (ref 7–12)
POTASSIUM SERPL-SCNC: 3.3 MMOL/L (ref 3.5–5)
PROCALCITONIN SERPL-MCNC: 0.16 NG/ML (ref 0–0.08)
RBC # BLD AUTO: 2.83 M/UL (ref 3.8–5.8)
RBC # BLD: ABNORMAL 10*6/UL
SODIUM SERPL-SCNC: 139 MMOL/L (ref 132–146)
WBC OTHER # BLD: 9.1 K/UL (ref 4.5–11.5)

## 2025-03-06 PROCEDURE — 43235 EGD DIAGNOSTIC BRUSH WASH: CPT | Performed by: STUDENT IN AN ORGANIZED HEALTH CARE EDUCATION/TRAINING PROGRAM

## 2025-03-06 PROCEDURE — 6360000002 HC RX W HCPCS: Performed by: NURSE ANESTHETIST, CERTIFIED REGISTERED

## 2025-03-06 PROCEDURE — 6370000000 HC RX 637 (ALT 250 FOR IP)

## 2025-03-06 PROCEDURE — 3609017100 HC EGD: Performed by: STUDENT IN AN ORGANIZED HEALTH CARE EDUCATION/TRAINING PROGRAM

## 2025-03-06 PROCEDURE — 7100000001 HC PACU RECOVERY - ADDTL 15 MIN: Performed by: STUDENT IN AN ORGANIZED HEALTH CARE EDUCATION/TRAINING PROGRAM

## 2025-03-06 PROCEDURE — 0DBH8ZZ EXCISION OF CECUM, VIA NATURAL OR ARTIFICIAL OPENING ENDOSCOPIC: ICD-10-PCS | Performed by: STUDENT IN AN ORGANIZED HEALTH CARE EDUCATION/TRAINING PROGRAM

## 2025-03-06 PROCEDURE — 99232 SBSQ HOSP IP/OBS MODERATE 35: CPT | Performed by: NURSE PRACTITIONER

## 2025-03-06 PROCEDURE — 36415 COLL VENOUS BLD VENIPUNCTURE: CPT

## 2025-03-06 PROCEDURE — 92523 SPEECH SOUND LANG COMPREHEN: CPT

## 2025-03-06 PROCEDURE — 99239 HOSP IP/OBS DSCHRG MGMT >30: CPT | Performed by: INTERNAL MEDICINE

## 2025-03-06 PROCEDURE — 45380 COLONOSCOPY AND BIOPSY: CPT | Performed by: STUDENT IN AN ORGANIZED HEALTH CARE EDUCATION/TRAINING PROGRAM

## 2025-03-06 PROCEDURE — 6370000000 HC RX 637 (ALT 250 FOR IP): Performed by: PSYCHIATRY & NEUROLOGY

## 2025-03-06 PROCEDURE — 3700000001 HC ADD 15 MINUTES (ANESTHESIA): Performed by: STUDENT IN AN ORGANIZED HEALTH CARE EDUCATION/TRAINING PROGRAM

## 2025-03-06 PROCEDURE — 0DJ08ZZ INSPECTION OF UPPER INTESTINAL TRACT, VIA NATURAL OR ARTIFICIAL OPENING ENDOSCOPIC: ICD-10-PCS | Performed by: STUDENT IN AN ORGANIZED HEALTH CARE EDUCATION/TRAINING PROGRAM

## 2025-03-06 PROCEDURE — 85025 COMPLETE CBC W/AUTO DIFF WBC: CPT

## 2025-03-06 PROCEDURE — 3609010600 HC COLONOSCOPY POLYPECTOMY SNARE/COLD BIOPSY: Performed by: STUDENT IN AN ORGANIZED HEALTH CARE EDUCATION/TRAINING PROGRAM

## 2025-03-06 PROCEDURE — 6360000002 HC RX W HCPCS: Performed by: FAMILY MEDICINE

## 2025-03-06 PROCEDURE — 97161 PT EVAL LOW COMPLEX 20 MIN: CPT

## 2025-03-06 PROCEDURE — 2060000000 HC ICU INTERMEDIATE R&B

## 2025-03-06 PROCEDURE — 2500000003 HC RX 250 WO HCPCS: Performed by: FAMILY MEDICINE

## 2025-03-06 PROCEDURE — 87040 BLOOD CULTURE FOR BACTERIA: CPT

## 2025-03-06 PROCEDURE — 51798 US URINE CAPACITY MEASURE: CPT

## 2025-03-06 PROCEDURE — 6360000002 HC RX W HCPCS: Performed by: INTERNAL MEDICINE

## 2025-03-06 PROCEDURE — 6370000000 HC RX 637 (ALT 250 FOR IP): Performed by: INTERNAL MEDICINE

## 2025-03-06 PROCEDURE — 80048 BASIC METABOLIC PNL TOTAL CA: CPT

## 2025-03-06 PROCEDURE — 6370000000 HC RX 637 (ALT 250 FOR IP): Performed by: NURSE PRACTITIONER

## 2025-03-06 PROCEDURE — 87077 CULTURE AEROBIC IDENTIFY: CPT

## 2025-03-06 PROCEDURE — 6370000000 HC RX 637 (ALT 250 FOR IP): Performed by: STUDENT IN AN ORGANIZED HEALTH CARE EDUCATION/TRAINING PROGRAM

## 2025-03-06 PROCEDURE — 2709999900 HC NON-CHARGEABLE SUPPLY: Performed by: STUDENT IN AN ORGANIZED HEALTH CARE EDUCATION/TRAINING PROGRAM

## 2025-03-06 PROCEDURE — 84145 PROCALCITONIN (PCT): CPT

## 2025-03-06 PROCEDURE — 2580000003 HC RX 258: Performed by: INTERNAL MEDICINE

## 2025-03-06 PROCEDURE — 02HV33Z INSERTION OF INFUSION DEVICE INTO SUPERIOR VENA CAVA, PERCUTANEOUS APPROACH: ICD-10-PCS | Performed by: STUDENT IN AN ORGANIZED HEALTH CARE EDUCATION/TRAINING PROGRAM

## 2025-03-06 PROCEDURE — 97530 THERAPEUTIC ACTIVITIES: CPT

## 2025-03-06 PROCEDURE — 87150 DNA/RNA AMPLIFIED PROBE: CPT

## 2025-03-06 PROCEDURE — 6360000002 HC RX W HCPCS: Performed by: NURSE PRACTITIONER

## 2025-03-06 PROCEDURE — 3700000000 HC ANESTHESIA ATTENDED CARE: Performed by: STUDENT IN AN ORGANIZED HEALTH CARE EDUCATION/TRAINING PROGRAM

## 2025-03-06 PROCEDURE — 83735 ASSAY OF MAGNESIUM: CPT

## 2025-03-06 PROCEDURE — 2580000003 HC RX 258: Performed by: FAMILY MEDICINE

## 2025-03-06 PROCEDURE — 7100000000 HC PACU RECOVERY - FIRST 15 MIN: Performed by: STUDENT IN AN ORGANIZED HEALTH CARE EDUCATION/TRAINING PROGRAM

## 2025-03-06 RX ORDER — PHENYLEPHRINE HCL IN 0.9% NACL 1 MG/10 ML
SYRINGE (ML) INTRAVENOUS
Status: DISCONTINUED | OUTPATIENT
Start: 2025-03-06 | End: 2025-03-06 | Stop reason: SDUPTHER

## 2025-03-06 RX ORDER — PROPOFOL 10 MG/ML
INJECTION, EMULSION INTRAVENOUS
Status: DISCONTINUED | OUTPATIENT
Start: 2025-03-06 | End: 2025-03-06 | Stop reason: SDUPTHER

## 2025-03-06 RX ADMIN — Medication 200 MCG: at 16:03

## 2025-03-06 RX ADMIN — DOFETILIDE 125 MCG: 0.12 CAPSULE ORAL at 09:08

## 2025-03-06 RX ADMIN — SODIUM CHLORIDE, PRESERVATIVE FREE 10 ML: 5 INJECTION INTRAVENOUS at 09:09

## 2025-03-06 RX ADMIN — SODIUM CHLORIDE 1500 MG: 0.9 INJECTION, SOLUTION INTRAVENOUS at 19:22

## 2025-03-06 RX ADMIN — Medication 200 MCG: at 15:59

## 2025-03-06 RX ADMIN — GABAPENTIN 100 MG: 100 CAPSULE ORAL at 09:07

## 2025-03-06 RX ADMIN — PANTOPRAZOLE SODIUM 40 MG: 40 INJECTION, POWDER, FOR SOLUTION INTRAVENOUS at 21:06

## 2025-03-06 RX ADMIN — VITAM B12 100 MCG: 100 TAB at 21:17

## 2025-03-06 RX ADMIN — METOPROLOL SUCCINATE 12.5 MG: 25 TABLET, EXTENDED RELEASE ORAL at 09:08

## 2025-03-06 RX ADMIN — VITAM B12 100 MCG: 100 TAB at 09:08

## 2025-03-06 RX ADMIN — SODIUM CHLORIDE, PRESERVATIVE FREE 10 ML: 5 INJECTION INTRAVENOUS at 21:17

## 2025-03-06 RX ADMIN — SODIUM CHLORIDE, SODIUM LACTATE, POTASSIUM CHLORIDE, AND CALCIUM CHLORIDE: .6; .31; .03; .02 INJECTION, SOLUTION INTRAVENOUS at 04:47

## 2025-03-06 RX ADMIN — TIMOLOL MALEATE 1 DROP: 5 SOLUTION/ DROPS OPHTHALMIC at 09:07

## 2025-03-06 RX ADMIN — SODIUM CHLORIDE, SODIUM LACTATE, POTASSIUM CHLORIDE, AND CALCIUM CHLORIDE: .6; .31; .03; .02 INJECTION, SOLUTION INTRAVENOUS at 14:45

## 2025-03-06 RX ADMIN — PANTOPRAZOLE SODIUM 40 MG: 40 INJECTION, POWDER, FOR SOLUTION INTRAVENOUS at 09:09

## 2025-03-06 RX ADMIN — Medication 1000 UNITS: at 09:08

## 2025-03-06 RX ADMIN — ATORVASTATIN CALCIUM 10 MG: 10 TABLET, FILM COATED ORAL at 09:08

## 2025-03-06 RX ADMIN — BISACODYL 10 MG: 10 SUPPOSITORY RECTAL at 09:09

## 2025-03-06 RX ADMIN — LATANOPROST 1 DROP: 50 SOLUTION OPHTHALMIC at 21:17

## 2025-03-06 RX ADMIN — BRIMONIDINE TARTRATE 1 DROP: 2 SOLUTION/ DROPS OPHTHALMIC at 21:18

## 2025-03-06 RX ADMIN — MORPHINE SULFATE 2 MG: 2 INJECTION, SOLUTION INTRAMUSCULAR; INTRAVENOUS at 15:17

## 2025-03-06 RX ADMIN — PETROLATUM: 420 OINTMENT TOPICAL at 00:08

## 2025-03-06 RX ADMIN — ANORECTAL OINTMENT: 15.7; .44; 24; 20.6 OINTMENT TOPICAL at 21:19

## 2025-03-06 RX ADMIN — Medication 200 MCG: at 15:55

## 2025-03-06 RX ADMIN — PIPERACILLIN AND TAZOBACTAM 4500 MG: 4; .5 INJECTION, POWDER, FOR SOLUTION INTRAVENOUS at 18:26

## 2025-03-06 RX ADMIN — GABAPENTIN 100 MG: 100 CAPSULE ORAL at 21:17

## 2025-03-06 RX ADMIN — Medication 200 MCG: at 16:10

## 2025-03-06 RX ADMIN — PETROLATUM: 420 OINTMENT TOPICAL at 21:18

## 2025-03-06 RX ADMIN — TIMOLOL MALEATE 1 DROP: 5 SOLUTION/ DROPS OPHTHALMIC at 21:18

## 2025-03-06 RX ADMIN — Medication 200 MCG: at 16:06

## 2025-03-06 RX ADMIN — POTASSIUM BICARBONATE 40 MEQ: 782 TABLET, EFFERVESCENT ORAL at 12:30

## 2025-03-06 RX ADMIN — PROPOFOL 180 MG: 10 INJECTION, EMULSION INTRAVENOUS at 15:55

## 2025-03-06 RX ADMIN — BRIMONIDINE TARTRATE 1 DROP: 2 SOLUTION/ DROPS OPHTHALMIC at 09:07

## 2025-03-06 RX ADMIN — MIRTAZAPINE 15 MG: 15 TABLET, FILM COATED ORAL at 21:17

## 2025-03-06 RX ADMIN — PIPERACILLIN AND TAZOBACTAM 4500 MG: 4; .5 INJECTION, POWDER, FOR SOLUTION INTRAVENOUS at 21:05

## 2025-03-06 RX ADMIN — DOFETILIDE 125 MCG: 0.12 CAPSULE ORAL at 21:17

## 2025-03-06 ASSESSMENT — PAIN SCALES - GENERAL
PAINLEVEL_OUTOF10: 0
PAINLEVEL_OUTOF10: 7

## 2025-03-06 ASSESSMENT — LIFESTYLE VARIABLES: SMOKING_STATUS: 0

## 2025-03-06 ASSESSMENT — PAIN DESCRIPTION - LOCATION: LOCATION: BACK

## 2025-03-06 ASSESSMENT — PAIN DESCRIPTION - DESCRIPTORS: DESCRIPTORS: ACHING

## 2025-03-06 ASSESSMENT — PAIN DESCRIPTION - ORIENTATION: ORIENTATION: LOWER

## 2025-03-06 NOTE — PLAN OF CARE
Problem: Safety - Adult  Goal: Free from fall injury  3/6/2025 0048 by Aislinn Smiley RN  Outcome: Progressing  3/5/2025 1957 by Maddie Brewster RN  Outcome: Progressing     Problem: Chronic Conditions and Co-morbidities  Goal: Patient's chronic conditions and co-morbidity symptoms are monitored and maintained or improved  Outcome: Progressing     Problem: Discharge Planning  Goal: Discharge to home or other facility with appropriate resources  Outcome: Progressing     Problem: Skin/Tissue Integrity  Goal: Skin integrity remains intact  3/6/2025 0048 by Aislinn Smiley RN  Outcome: Progressing  3/5/2025 1957 by Maddie Brewster RN  Outcome: Progressing     Problem: ABCDS Injury Assessment  Goal: Absence of physical injury  Outcome: Progressing     Problem: Gastrointestinal - Adult  Goal: Minimal or absence of nausea and vomiting  3/6/2025 0048 by Aislinn Smiley RN  Outcome: Progressing  3/5/2025 1957 by Maddie Brewster RN  Outcome: Progressing  Goal: Maintains or returns to baseline bowel function  3/6/2025 0048 by Aislinn Smiley RN  Outcome: Progressing  3/5/2025 1957 by Maddie Brewster RN  Outcome: Progressing  Goal: Maintains adequate nutritional intake  Outcome: Progressing  Goal: Establish and maintain optimal ostomy function  Outcome: Progressing     Problem: Hematologic - Adult  Goal: Maintains hematologic stability  Outcome: Progressing     Problem: Nutrition Deficit:  Goal: Optimize nutritional status  Outcome: Progressing

## 2025-03-06 NOTE — PROGRESS NOTES
Antibiotic Extended Infusion Policy     This patient is on medication that requires renal, weight, and/or indication dose adjustment.      Date Body Weight IBW  Adjusted BW SCr  CrCl Dialysis status BMI   3/6/2025 66.8 kg (147 lb 4.3 oz) Ideal body weight: 68.4 kg (150 lb 12.7 oz) Serum creatinine: 0.8 mg/dL 03/06/25 0405  Estimated creatinine clearance: 67 mL/min N/a Body mass index is 22.39 kg/m².       Pharmacy has dose-adjusted the following medication(s):    Ordered Medication: Zosyn 3375mg q8H     Order Changed/converted to: Zosyn 4500mg q8h    These changes were made per protocol according to the [facility]   Automatic Extended Infusion Dose Adjustment Policy.     *Please note this dose may need readjusted if patient's condition changes.    Please contact pharmacy with any questions regarding these changes.    Moustapha Mccarthy RPH  3/6/2025  1:53 PM

## 2025-03-06 NOTE — PLAN OF CARE
Problem: Safety - Adult  Goal: Free from fall injury  3/6/2025 0048 by Aislinn Smiley RN  Outcome: Progressing     Problem: Chronic Conditions and Co-morbidities  Goal: Patient's chronic conditions and co-morbidity symptoms are monitored and maintained or improved  3/6/2025 1259 by Maddie Brewster RN  Outcome: Progressing  3/6/2025 0048 by Aislinn Smiley RN  Outcome: Progressing     Problem: Discharge Planning  Goal: Discharge to home or other facility with appropriate resources  3/6/2025 0048 by Aislinn Smiley RN  Outcome: Progressing     Problem: Skin/Tissue Integrity  Goal: Skin integrity remains intact  Description: 1.  Monitor for areas of redness and/or skin breakdown  2.  Assess vascular access sites hourly  3.  Every 4-6 hours minimum:  Change oxygen saturation probe site  4.  Every 4-6 hours:  If on nasal continuous positive airway pressure, respiratory therapy assess nares and determine need for appliance change or resting period  3/6/2025 1259 by Maddie Brewster RN  Outcome: Progressing  3/6/2025 0048 by Aislinn Smiley RN  Outcome: Progressing     Problem: ABCDS Injury Assessment  Goal: Absence of physical injury  3/6/2025 0048 by Aislinn Smiley RN  Outcome: Progressing     Problem: Gastrointestinal - Adult  Goal: Minimal or absence of nausea and vomiting  3/6/2025 1259 by Maddie Brewster RN  Outcome: Progressing  3/6/2025 0048 by Aislinn Smiley RN  Outcome: Progressing  Goal: Maintains or returns to baseline bowel function  3/6/2025 1259 by Maddie Brewster RN  Outcome: Progressing  3/6/2025 0048 by Aislinn Smiley RN  Outcome: Progressing  Goal: Maintains adequate nutritional intake  3/6/2025 0048 by Aislinn Smiley RN  Outcome: Progressing  Goal: Establish and maintain optimal ostomy function  3/6/2025 0048 by Aislinn Smiley RN  Outcome: Progressing     Problem: Hematologic - Adult  Goal: Maintains hematologic stability  3/6/2025 1259 by Maddie Brewster RN  Outcome:  Progressing  3/6/2025 0048 by Aislinn Smiley, RN  Outcome: Progressing     Problem: Nutrition Deficit:  Goal: Optimize nutritional status  3/6/2025 0048 by Aislinn Smiley, RN  Outcome: Progressing

## 2025-03-06 NOTE — PROGRESS NOTES
SPEECH/LANGUAGE PATHOLOGY  SPEECH/LANGUAGE/COGNITIVE EVALUATION   and PLAN OF CARE      PATIENT NAME:  Montana Gerrad  (male)     MRN:  [id number]    :  1940  (84 y.o.)  STATUS:  Inpatient: Room 4501/4501-A    TODAY'S DATE:  3/6/2025  ORDER DATE, DESCRIPTION AND REFERRING PROVIDER :    SLP cognitive language evaluation  Start:  25 1230,   End:  25 1230,   ONE TIME,   Standing Count:  1 Occurrences,   R       Kevin Stone MD  REASON FOR REFERRAL:  Acute Stroke   EVALUATING THERAPIST: WHITLEY Luna    ADMITTING DIAGNOSIS: GI bleed [K92.2]  Gastrointestinal hemorrhage, unspecified gastrointestinal hemorrhage type [K92.2]    VISIT DIAGNOSIS:        SPEECH THERAPY  PLAN OF CARE   The speech therapy  POC is established based on physician order, speech pathology diagnosis and results of clinical assessment     SPEECH PATHOLOGY DIAGNOSIS:    Patient presents with mild cognitive linguistic deficits regarding word-finding, immediate/recent memory, organization, and executive functions.     Speech Pathology intervention is recommended 1-3 times per week for LOS or when goals are met with emphasis on the following:      Conditions Requiring Skilled Therapeutic Intervention for speech, language and/or cognition    Anomia  Cognitive linguistic impairment  Decreased short term memory  Decreased thought organization    Specific Speech Therapy Interventions to Include:   Expressive language training   Therapeutic tasks for Cognition    Specific instructions for next treatment:    To initiate POC  To initiate language tasks  To initiate memory tasks  To initiate thought organization tasks  To initiate verbal fluency tasks    SHORT/LONG TERM GOALS  Pt will improve immediate, short term, recent memory during structured and unstructured tasks with 80% accuracy   Pt will improve problem solving/thought organization during structured and unstructured tasks with 80% accuracy   Pt will improve word finding and  testing/informal observation of tasks, assessment of data and education on plan of care and goals.      CPT code:    92937  eval speech sound lang comprehension      The admitting diagnosis and active problem list, as listed below have been reviewed prior to initiation of this evaluation.        ACTIVE PROBLEM LIST:   Patient Active Problem List   Diagnosis    Hyperlipidemia    Essential hypertension    Absolute anemia    PVC (premature ventricular contraction)    Primary osteoarthritis involving multiple joints    Other male erectile dysfunction    Cancer of prostate (HCC)    Exposure to COVID-19 virus    Bradycardia    Acquired hypothyroidism    Status post repair of supravalvar aortic stenosis    History of acute myocardial infarction    Coronary artery disease involving native coronary artery of native heart with angina pectoris    Left inguinal hernia    Thrombocytopenia, unspecified    Persistent atrial fibrillation (HCC)    GI bleed    Atrial fibrillation with RVR (HCC)    Severe protein-calorie malnutrition    Palliative care encounter    Acute ischemic multifocal multiple vascular territories stroke (HCC)    Spinal arachnoid cyst    Fever       Cristiano Goss M.S., CCC-SLP/L   Speech-Language Pathologist  [phone]

## 2025-03-06 NOTE — PROGRESS NOTES
Physical Therapy  Physical Therapy Initial Assessment     Name: Montana Gerard  : 1940  MRN: [id number]      Date of Service: 3/6/2025    Evaluating PT:  Aminata Angela, PT, DPT, [id number]    Room #:  ENDO POOL ROOM/NONE  Diagnosis:  GI bleed [K92.2]  Gastrointestinal hemorrhage, unspecified gastrointestinal hemorrhage type [K92.2]  PMHx/PSHx:    Past Medical History:   Diagnosis Date    Acute myocardial infarction, unspecified 2023    Anemia     Cholelithiasis     Depressive disorder     Dermatitis herpetiformis     GLUTEN FREE DIET    Diet-controlled diabetes mellitus (HCC) 2022    Diverticulitis     Gastritis     GERD (gastroesophageal reflux disease)     Hiatal hernia     Hyperlipidemia     Iron deficiency anemia     Non-ST elevation myocardial infarction (NSTEMI) 2023    Peptic reflux disease     ST elevation (STEMI) myocardial infarction of unspecified site 2023      Past Surgical History:   Procedure Laterality Date    CARDIAC SURGERY  2023    TAVR    CARDIOVASCULAR STRESS TEST  2004    CHOLECYSTECTOMY        Procedure/Surgery:  none this admission   Precautions:  Fall Risk, Severe Chronic LBP (T3, T5-T7, and L1 chronic compression fx), Spinal for comfort, Incontinence, + Alarms  Equipment Needs:  TBD    SUBJECTIVE:    Pt is a questionable historian, reported living at home with his wife before admission. Per pt's daughter, Pt was admitted from [facility]. Per pt's daughter pt was working on standing with MaxA in parallel bars with PT. Pt was a favian lift transfer <> w/c with staff and MaxAx2 for stand pivot <>w/c with PT/OT.    OBJECTIVE:   Initial Evaluation  Date: 3/6/25 Treatment Short Term/ Long Term   Goals   AM-PAC 6 Clicks      Was pt agreeable to Eval/treatment? yes     Does pt have pain? No c/o pain rest  Severe back pain with mobility      Bed Mobility  Rolling: MaxA  Supine to sit: MaxA  Sit to supine: MaxA  Scooting: MaxA  Rolling: Independent  return to supine position. Returned to supine and positioned in semi cross's position for comfort. All needs were met and call light in reach. Care transferred to nursing staff at conclusion of session. RN notified.      Treatment:  Patient practiced and was instructed in the following treatment:    Bed mobility training - pt given verbal and tactile cues to facilitate proper sequencing and safety during rolling and supine>sit as well as provided with physical assistance to complete task   Sitting EOB for >3 minutes for upright tolerance, postural awareness and BLE ROM  Skilled positioning - Pt placed in the semi cross's position with pillows utilized to facilitate upright posture, joint and skin integrity, and interaction with environment.     Skilled monitoring of vitals throughout session.     Pt's/ family goals   1.to return to [place]    Prognosis is good for reaching above PT goals.    Patient and or family understand(s) diagnosis, prognosis, and plan of care.  yes    PHYSICAL THERAPY PLAN OF CARE:    PT POC is established based on physician order and patient diagnosis     Referring provider/PT Order:    03/03/25 0430  PT eval and treat  Start:  03/03/25 0430,   End:  03/03/25 0430,   ONE TIME,   Standing Count:  1 Occurrences,   R         Jorge Funk,      Diagnosis:  GI bleed [K92.2]  Gastrointestinal hemorrhage, unspecified gastrointestinal hemorrhage type [K92.2]  Specific instructions for next treatment:  Maximize safe and independent functional mobility     Current Treatment Recommendations:     [x] Strengthening to improve independence with functional mobility   [] ROM to improve independence with functional mobility   [x] Balance Training to improve static/dynamic balance and to reduce fall risk  [x] Endurance Training to improve activity tolerance during functional mobility   [x] Transfer Training to improve safety and independence with all functional transfers   [x] Gait Training to improve gait

## 2025-03-06 NOTE — PROGRESS NOTES
[facility] Hospitalist Progress Note    SYNOPSIS: Patient admitted on 3/3/2025 for GI bleed    This is a 84-year-old man with past medical history of A-fib, aortic stenosis s/p TAVR, HFpEF, prostate cancer, malleable penile implant, sinus node dysfunction, coronary artery disease, gastritis, GERD, hyperlipidemia, hypertension, diabetes mellitus, who presented to [facility] ED with concerns of rectal bleeding.  Patient was recently admitted last week for A-fib RVR, patient is on Eliquis.  Patient reported to have maroon-colored stool.  Hemoglobin at 9.3.  CT abdomen pelvis.  1. No evidence of active GI hemorrhage.  2. Resolution of linear hypodense lesion in the mid portion of the spleen.  3. Sigmoid diverticulosis without evidence of diverticulitis.  4. Multiple small bladder diverticuli likely as a result of chronic bladder  outlet obstruction.  5. Considerable fecal material within the rectum raising the possibility of  rectal fecal impaction.  CT head was ordered for altered mental status.  No acute finding.  Neurology, EP, general surgery has been consulted.  MRI brain reviewed  1. 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  No sign of hemorrhage or mass effect.  2. New mild age-appropriate atrophy and new mild small vessel ischemia.  MRI thoracic and cervical spine  1. No acute fracture or subluxation of the thoracic spine.  2. 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 This results in anterior displacement of the thoracic cord and thinning of the thoracic cord at the T3 and superior T4 levels. No abnormal cord signal is seen.  3. No additional spinal canal stenosis or neural foraminal narrowing of the thoracic spine.  4. Old mild compression fractures of T3, T5-T7, and L1.    3/4 patient slightly drowsy this morning however easily wakeful and is able to answer questions.  Updated family

## 2025-03-06 NOTE — PLAN OF CARE
Problem: Safety - Adult  Goal: Free from fall injury  Outcome: Progressing     Problem: Skin/Tissue Integrity  Goal: Skin integrity remains intact  Description: 1.  Monitor for areas of redness and/or skin breakdown  2.  Assess vascular access sites hourly  3.  Every 4-6 hours minimum:  Change oxygen saturation probe site  4.  Every 4-6 hours:  If on nasal continuous positive airway pressure, respiratory therapy assess nares and determine need for appliance change or resting period  Outcome: Progressing     Problem: Gastrointestinal - Adult  Goal: Minimal or absence of nausea and vomiting  Outcome: Progressing  Goal: Maintains or returns to baseline bowel function  Outcome: Progressing

## 2025-03-06 NOTE — PROGRESS NOTES
Pharmacy Consultation Note  (Antibiotic Dosing and Monitoring)    Initial consult date: 3/6/25  Consulting physician/provider: Edgardo Anaya MD  Drug: Vancomycin  Indication: Bloodstream infection    Age/  Gender Height Weight IBW  Allergy Information   84 y.o./male 172.7 cm (5' 8\") 72.6 kg (160 lb)     Ideal body weight: 68.4 kg (150 lb 12.7 oz)   Ambrosia trifida (tall ragweed) allergy skin test and Gluten      Renal Function:  Recent Labs     03/04/25  0350 03/05/25  0856 03/06/25  0405   BUN 34* 24* 21   CREATININE 1.0 0.8 0.8       Intake/Output Summary (Last 24 hours) at 3/6/2025 1605  Last data filed at 3/6/2025 0610  Gross per 24 hour   Intake 1100 ml   Output 600 ml   Net 500 ml       Vancomycin Monitoring:  Trough:  No results for input(s): \"VANCOTROUGH\" in the last 72 hours.  Random:  No results for input(s): \"VANCORANDOM\" in the last 72 hours.    Vancomycin Administration Times:  Recent vancomycin administrations        No vancomycin IV orders with administrations found.            Orders not given:            vancomycin (VANCOCIN) 1,500 mg in sodium chloride 0.9 % 250 mL IVPB ([id number])    vancomycin (VANCOCIN) 1,000 mg in sodium chloride 0.9 % 250 mL IVPB ([id number])                    Assessment:  Patient is a 84 y.o. male who has been initiated on vancomycin  Estimated Creatinine Clearance: 67 mL/min (based on SCr of 0.8 mg/dL).  3/6: Scr 0.8. Afebrile, and WBC 9.1. Yesterday: Tmax = 102.2 F and WBC 15.2  Blood cultures pending. 2/2 bottles grew positive for streptococcus species on 3/5/25.     Plan:  Will load with vancomycin 1500 mg IV x1 today  Will continue with vancomycin 1000 mg IV q18h tomorrow morning  Will check vancomycin levels when appropriate  Will continue to monitor renal function   Pharmacy to follow    Kali Ding, PharmD  PGY-1 Pharmacy Practice Resident   3/6/2025 4:08 PM

## 2025-03-06 NOTE — PROGRESS NOTES
Message sent to Dr. Anaya for K 3.3, message read     [id number]  message sent to Dr. Anaya regarding positive blood cultures

## 2025-03-06 NOTE — CARE COORDINATION
Patient was not clear yesterday, plan is for upper and lower scope today due to this. Referral pending to Anders, lionel from their facility. Patient will require therapy prior to return. Ambulance on soft chart.       For questions I can be reached at [phone]. LUCINA Ravi

## 2025-03-06 NOTE — PROGRESS NOTES
Occupational Therapy  Attempted OT eval at b/s, however, pt off unit for Procedure.  Will attempt assessment at a later time.  Thank you for this referral Shawnee Conroy, MEAGAN, OTR/L  # [phone]

## 2025-03-06 NOTE — PROGRESS NOTES
[facility]  Neuro Inpatient Follow Up       Montana Gerard is a 84 y.o.  male     Neuro is following for acute embolic strokes    PMH: Prostate cancer, A-fib, hyperlipidemia, diet-controlled DM 2, depression, anemia, MI, TAVR, former smoking    HPI:  The patient presented to ED on 3/3 for complaint of abdominal pain, low back pain and progressive bilateral lower extremity weakness, impaired ambulation, tremor.  He started having hematuria followed followed by rectal bleed. Had to stop Eliquis which he takes for his A-fib.  He had a couple of falls with did not hit his head.    For the past 5 weeks he has been progressive lower extremity weakness and was not able to ambulate. Started having tremor in upper and lower extremities that is worse when eating or holding objects.    Family does endorse missing doses of Eliquis over the last few weeks due to bleeding but otherwise typically is compliant.    MRI showed multifocal embolic strokes in various vascular distributions.     Spinal imaging showed spinal arachnoid cysts B3-Z6-7--neurosurgery saw the patient and considers the finding incidental and no surgery is recommended    On Cardizem and EP following    3/4: H&H 7.8/25.4  3/5: For EGD and C-scope  3/6: scopes still pending    Subjective  He is lying in bed and a bit more clear mentation wise today.  Still generally weak but he follows all commands well.  Daughter is at the bedside and says now he has a UTI.    No chest pain or palpitations  No SOB  No vertigo, lightheadedness or loss of consciousness  No itching or bruising appreciated    ROS otherwise negative     Current Facility-Administered Medications   Medication Dose Route Frequency Provider Last Rate Last Admin    bisacodyl (DULCOLAX) suppository 10 mg  10 mg Rectal Daily Aminata Lala DO   10 mg at 03/05/25 1030    lactated ringers infusion   IntraVENous Continuous Edgardo Anaya  mL/hr at 03/06/25 5715 New  Jennifer Mendoza, APRN - CNP   2 mg at 03/05/25 1059    [Held by provider] dilTIAZem 100 mg in sodium chloride 0.9 % 100 mL infusion (ADD-Downs)  2.5-15 mg/hr IntraVENous Continuous Ld Chacko, APRN - CNP   Stopped at 03/04/25 1636    mirtazapine (REMERON) tablet 15 mg  15 mg Oral Nightly Placido Walters MD   15 mg at 03/05/25 2123    atorvastatin (LIPITOR) tablet 10 mg  10 mg Oral Daily Placido Walters MD   10 mg at 03/05/25 0923        Objective:     /63   Pulse (!) 103   Temp 97.8 °F (36.6 °C) (Temporal)   Resp 18   Ht 1.727 m (5' 8\")   Wt 66.8 kg (147 lb 4.3 oz)   SpO2 95%   BMI 22.39 kg/m²     General appearance: Drowsy, appears stated age, cooperative and no distress; appears frail  Head: normocephalic, without obvious abnormality, atraumatic  Eyes: conjunctivae/corneas clear. .  Lungs: Unlabored breaths on room air  Heart: A-fib with RVR (100s-110s)on the monitor  Extremities: normal, atraumatic, no cyanosis or edema  Pulses: 2+ and symmetric  Skin: Pale      Mental Status: Drowsy, arouses to verbal stimulation.  Oriented to person, place, year, daughter     Good attention/concentration  Clearer mentation today but responses delayed    Speech: mild dysarthria  Language: Laconic; no clear aphasias    Cranial Nerves:  I: smell NA   II: visual acuity  NA   II: visual fields    II: pupils CRUZ   III,VII: ptosis none   III,IV,VI: extraocular muscles  EOMI without nystagmus   V: mastication normal   V: facial light touch sensation  Normal   V,VII: corneal reflex     VII: facial muscle function - upper  Normal   VII: facial muscle function - lower Normal   VIII: hearing Normal   IX: soft palate elevation     IX,X: gag reflex    XI: trapezius strength  4+/5   XI: sternocleidomastoid strength 4+/5   XI: neck extension strength  4+/5   XII: tongue strength  Normal     Motor:  Generalized weakness but can lift both arms antigravity--seems weaker on the right  Can lift both legs very minimally

## 2025-03-06 NOTE — ANESTHESIA POSTPROCEDURE EVALUATION
Department of Anesthesiology  Postprocedure Note    Patient: Montana Gerard  MRN: [id number]  YOB: 1940  Date of evaluation: 3/6/2025    Procedure Summary       Date: 03/06/25 Room / Location: [place] [address] / [facility]    Anesthesia Start: 1550 Anesthesia Stop: 1631    Procedures:       ESOPHAGOGASTRODUODENOSCOPY      COLONOSCOPY POLYPECTOMY SNARE/BIOPSY Diagnosis:       Melena      (Melena [K92.1])    Surgeons: Satinder Parekh MD Responsible Provider: Nathalia Sommers MD    Anesthesia Type: MAC ASA Status: 4            Anesthesia Type: No value filed.    Jacky Phase I: Jacky Score: 9    Jacky Phase II:      Anesthesia Post Evaluation    Patient location during evaluation: PACU  Patient participation: complete - patient participated  Level of consciousness: awake  Airway patency: patent  Nausea & Vomiting: no nausea and no vomiting  Cardiovascular status: hemodynamically stable  Respiratory status: acceptable  Hydration status: euvolemic  Pain management: adequate        No notable events documented.

## 2025-03-06 NOTE — PROGRESS NOTES
No issues overnight. Patient has completed all bowel prep given however nursing states patient is still not clear from below.  Will plan to proceed with EGD and colonoscopy today.  Keep n.p.o. for procedure.    Marcy Retana MD  Surgery Resident PGY1

## 2025-03-07 ENCOUNTER — APPOINTMENT (OUTPATIENT)
Dept: MRI IMAGING | Age: 85
End: 2025-03-07
Payer: MEDICARE

## 2025-03-07 LAB
ANION GAP SERPL CALCULATED.3IONS-SCNC: 13 MMOL/L (ref 7–16)
BASOPHILS # BLD: 0 K/UL (ref 0–0.2)
BASOPHILS NFR BLD: 0 % (ref 0–2)
BUN SERPL-MCNC: 25 MG/DL (ref 6–23)
CALCIUM SERPL-MCNC: 8 MG/DL (ref 8.6–10.2)
CHLORIDE SERPL-SCNC: 111 MMOL/L (ref 98–107)
CO2 SERPL-SCNC: 19 MMOL/L (ref 22–29)
CREAT SERPL-MCNC: 0.9 MG/DL (ref 0.7–1.2)
EOSINOPHIL # BLD: 0 K/UL (ref 0.05–0.5)
EOSINOPHILS RELATIVE PERCENT: 0 % (ref 0–6)
ERYTHROCYTE [DISTWIDTH] IN BLOOD BY AUTOMATED COUNT: 15.2 % (ref 11.5–15)
FERRITIN SERPL-MCNC: 864 NG/ML
GFR, ESTIMATED: 81 ML/MIN/1.73M2
GLUCOSE SERPL-MCNC: 85 MG/DL (ref 74–99)
HCT VFR BLD AUTO: 27.7 % (ref 37–54)
HGB BLD-MCNC: 8 G/DL (ref 12.5–16.5)
IRON SATN MFR SERPL: 30 % (ref 20–55)
IRON SERPL-MCNC: 29 UG/DL (ref 59–158)
LYMPHOCYTES NFR BLD: 0.31 K/UL (ref 1.5–4)
LYMPHOCYTES RELATIVE PERCENT: 4 % (ref 20–42)
MCH RBC QN AUTO: 28.8 PG (ref 26–35)
MCHC RBC AUTO-ENTMCNC: 28.9 G/DL (ref 32–34.5)
MCV RBC AUTO: 99.6 FL (ref 80–99.9)
MONOCYTES NFR BLD: 0.39 K/UL (ref 0.1–0.95)
MONOCYTES NFR BLD: 4 % (ref 2–12)
NEUTROPHILS NFR BLD: 92 % (ref 43–80)
NEUTS SEG NFR BLD: 8.2 K/UL (ref 1.8–7.3)
PLATELET # BLD AUTO: 108 K/UL (ref 130–450)
PMV BLD AUTO: 9.9 FL (ref 7–12)
POTASSIUM SERPL-SCNC: 3.5 MMOL/L (ref 3.5–5)
RBC # BLD AUTO: 2.78 M/UL (ref 3.8–5.8)
RBC # BLD: ABNORMAL 10*6/UL
SODIUM SERPL-SCNC: 143 MMOL/L (ref 132–146)
TIBC SERPL-MCNC: 96 UG/DL (ref 250–450)
WBC OTHER # BLD: 8.9 K/UL (ref 4.5–11.5)

## 2025-03-07 PROCEDURE — 99231 SBSQ HOSP IP/OBS SF/LOW 25: CPT

## 2025-03-07 PROCEDURE — 97535 SELF CARE MNGMENT TRAINING: CPT

## 2025-03-07 PROCEDURE — 6360000002 HC RX W HCPCS: Performed by: FAMILY MEDICINE

## 2025-03-07 PROCEDURE — 92507 TX SP LANG VOICE COMM INDIV: CPT

## 2025-03-07 PROCEDURE — 6360000002 HC RX W HCPCS: Performed by: INTERNAL MEDICINE

## 2025-03-07 PROCEDURE — 6370000000 HC RX 637 (ALT 250 FOR IP): Performed by: FAMILY MEDICINE

## 2025-03-07 PROCEDURE — 82728 ASSAY OF FERRITIN: CPT

## 2025-03-07 PROCEDURE — 2060000000 HC ICU INTERMEDIATE R&B

## 2025-03-07 PROCEDURE — 2500000003 HC RX 250 WO HCPCS: Performed by: FAMILY MEDICINE

## 2025-03-07 PROCEDURE — 83540 ASSAY OF IRON: CPT

## 2025-03-07 PROCEDURE — 80048 BASIC METABOLIC PNL TOTAL CA: CPT

## 2025-03-07 PROCEDURE — 6360000004 HC RX CONTRAST MEDICATION: Performed by: RADIOLOGY

## 2025-03-07 PROCEDURE — 2580000003 HC RX 258: Performed by: FAMILY MEDICINE

## 2025-03-07 PROCEDURE — 6370000000 HC RX 637 (ALT 250 FOR IP): Performed by: PSYCHIATRY & NEUROLOGY

## 2025-03-07 PROCEDURE — 6370000000 HC RX 637 (ALT 250 FOR IP): Performed by: NURSE PRACTITIONER

## 2025-03-07 PROCEDURE — A9579 GAD-BASE MR CONTRAST NOS,1ML: HCPCS | Performed by: RADIOLOGY

## 2025-03-07 PROCEDURE — 36415 COLL VENOUS BLD VENIPUNCTURE: CPT

## 2025-03-07 PROCEDURE — 2580000003 HC RX 258: Performed by: INTERNAL MEDICINE

## 2025-03-07 PROCEDURE — 88305 TISSUE EXAM BY PATHOLOGIST: CPT

## 2025-03-07 PROCEDURE — 99232 SBSQ HOSP IP/OBS MODERATE 35: CPT | Performed by: NURSE PRACTITIONER

## 2025-03-07 PROCEDURE — 87040 BLOOD CULTURE FOR BACTERIA: CPT

## 2025-03-07 PROCEDURE — 6370000000 HC RX 637 (ALT 250 FOR IP): Performed by: INTERNAL MEDICINE

## 2025-03-07 PROCEDURE — 99024 POSTOP FOLLOW-UP VISIT: CPT | Performed by: STUDENT IN AN ORGANIZED HEALTH CARE EDUCATION/TRAINING PROGRAM

## 2025-03-07 PROCEDURE — 85025 COMPLETE CBC W/AUTO DIFF WBC: CPT

## 2025-03-07 PROCEDURE — 72158 MRI LUMBAR SPINE W/O & W/DYE: CPT

## 2025-03-07 PROCEDURE — 6370000000 HC RX 637 (ALT 250 FOR IP): Performed by: STUDENT IN AN ORGANIZED HEALTH CARE EDUCATION/TRAINING PROGRAM

## 2025-03-07 PROCEDURE — 99232 SBSQ HOSP IP/OBS MODERATE 35: CPT | Performed by: INTERNAL MEDICINE

## 2025-03-07 PROCEDURE — 83550 IRON BINDING TEST: CPT

## 2025-03-07 PROCEDURE — 97166 OT EVAL MOD COMPLEX 45 MIN: CPT

## 2025-03-07 RX ORDER — GABAPENTIN 100 MG/1
100 CAPSULE ORAL 2 TIMES DAILY
Status: CANCELLED | DISCHARGE
Start: 2025-03-07 | End: 2025-04-07

## 2025-03-07 RX ORDER — TRAMADOL HYDROCHLORIDE 50 MG/1
50 TABLET ORAL EVERY 6 HOURS PRN
Qty: 12 TABLET | Refills: 0 | Status: CANCELLED | OUTPATIENT
Start: 2025-03-07 | End: 2025-03-10

## 2025-03-07 RX ADMIN — GABAPENTIN 100 MG: 100 CAPSULE ORAL at 08:43

## 2025-03-07 RX ADMIN — VANCOMYCIN HYDROCHLORIDE 1000 MG: 1 INJECTION, POWDER, LYOPHILIZED, FOR SOLUTION INTRAVENOUS at 11:13

## 2025-03-07 RX ADMIN — DOFETILIDE 125 MCG: 0.12 CAPSULE ORAL at 21:14

## 2025-03-07 RX ADMIN — PANTOPRAZOLE SODIUM 40 MG: 40 INJECTION, POWDER, FOR SOLUTION INTRAVENOUS at 21:13

## 2025-03-07 RX ADMIN — PIPERACILLIN AND TAZOBACTAM 4500 MG: 4; .5 INJECTION, POWDER, FOR SOLUTION INTRAVENOUS at 21:12

## 2025-03-07 RX ADMIN — VITAM B12 100 MCG: 100 TAB at 08:44

## 2025-03-07 RX ADMIN — GADOTERIDOL 13 ML: 279.3 INJECTION, SOLUTION INTRAVENOUS at 19:27

## 2025-03-07 RX ADMIN — ACETAMINOPHEN 650 MG: 325 TABLET ORAL at 18:03

## 2025-03-07 RX ADMIN — VITAM B12 100 MCG: 100 TAB at 18:03

## 2025-03-07 RX ADMIN — PANTOPRAZOLE SODIUM 40 MG: 40 INJECTION, POWDER, FOR SOLUTION INTRAVENOUS at 08:44

## 2025-03-07 RX ADMIN — BRIMONIDINE TARTRATE 1 DROP: 2 SOLUTION/ DROPS OPHTHALMIC at 21:14

## 2025-03-07 RX ADMIN — ATORVASTATIN CALCIUM 10 MG: 10 TABLET, FILM COATED ORAL at 08:43

## 2025-03-07 RX ADMIN — ANORECTAL OINTMENT: 15.7; .44; 24; 20.6 OINTMENT TOPICAL at 21:17

## 2025-03-07 RX ADMIN — BRIMONIDINE TARTRATE 1 DROP: 2 SOLUTION/ DROPS OPHTHALMIC at 08:46

## 2025-03-07 RX ADMIN — SODIUM CHLORIDE, PRESERVATIVE FREE 10 ML: 5 INJECTION INTRAVENOUS at 21:15

## 2025-03-07 RX ADMIN — MIRTAZAPINE 15 MG: 15 TABLET, FILM COATED ORAL at 21:14

## 2025-03-07 RX ADMIN — SODIUM CHLORIDE, PRESERVATIVE FREE 10 ML: 5 INJECTION INTRAVENOUS at 08:45

## 2025-03-07 RX ADMIN — TIMOLOL MALEATE 1 DROP: 5 SOLUTION/ DROPS OPHTHALMIC at 08:46

## 2025-03-07 RX ADMIN — METOPROLOL SUCCINATE 12.5 MG: 25 TABLET, EXTENDED RELEASE ORAL at 08:43

## 2025-03-07 RX ADMIN — PETROLATUM: 420 OINTMENT TOPICAL at 21:16

## 2025-03-07 RX ADMIN — DOFETILIDE 125 MCG: 0.12 CAPSULE ORAL at 08:44

## 2025-03-07 RX ADMIN — Medication 1000 UNITS: at 08:44

## 2025-03-07 RX ADMIN — LATANOPROST 1 DROP: 50 SOLUTION OPHTHALMIC at 21:14

## 2025-03-07 RX ADMIN — TIMOLOL MALEATE 1 DROP: 5 SOLUTION/ DROPS OPHTHALMIC at 21:14

## 2025-03-07 RX ADMIN — PIPERACILLIN AND TAZOBACTAM 4500 MG: 4; .5 INJECTION, POWDER, FOR SOLUTION INTRAVENOUS at 12:16

## 2025-03-07 RX ADMIN — GABAPENTIN 100 MG: 100 CAPSULE ORAL at 21:14

## 2025-03-07 RX ADMIN — PIPERACILLIN AND TAZOBACTAM 4500 MG: 4; .5 INJECTION, POWDER, FOR SOLUTION INTRAVENOUS at 03:39

## 2025-03-07 ASSESSMENT — PAIN SCALES - GENERAL: PAINLEVEL_OUTOF10: 3

## 2025-03-07 ASSESSMENT — PAIN DESCRIPTION - ORIENTATION: ORIENTATION: LOWER

## 2025-03-07 ASSESSMENT — PAIN DESCRIPTION - DESCRIPTORS: DESCRIPTORS: DISCOMFORT;SHARP;TENDER

## 2025-03-07 ASSESSMENT — PAIN DESCRIPTION - LOCATION: LOCATION: BACK

## 2025-03-07 NOTE — PROGRESS NOTES
Tried calling the floor to speak with the RN but no answer. Order needs fixed. It needs to be a LSP with and with out contrast or just LSP without contrast.  Thank you!!

## 2025-03-07 NOTE — PROGRESS NOTES
GENERAL SURGERY  DAILY PROGRESS NOTE    Patient's Name/Date of Birth: Montana Gerard / 1940    Date: 2025     Chief Complaint   Patient presents with    Rectal Bleeding     (One episode this AM and has progressively gotten worse, \"dark gel color\" in stool tonight, +THINNERS)      Subjective:  No issues overnight. No episodes of bleeding. No abdominal pain. Tolerating diet.    Objective:  Last 24Hrs  Temp  Av.2 °F (36.8 °C)  Min: 97.7 °F (36.5 °C)  Max: 98.7 °F (37.1 °C)  Resp  Av.6  Min: 16  Max: 30  Pulse  Av.8  Min: 64  Max: 147  Systolic (24hrs), Av , Min:90 , Max:125     Diastolic (24hrs), Av, Min:42, Max:64    SpO2  Av %  Min: 95 %  Max: 100 %    I/O last 3 completed shifts:  In: 1100 [I.V.:1100]  Out: 1100 [Urine:1100]    General: No acute distress, alert and oriented x4  Cardiovascular: Warm throughout, no edema  Respiratory: No increased work of breathing  Abdomen: Soft, nontender, nondistended. No rebound or guarding.  Skin: No obvious rashes or lesions appreciated, no jaundice  Extremities: Atraumatic, no focal motor deficits, no open wounds    CBC  Recent Labs     25  0409 25  0856 25  0405   WBC  --  15.2* 9.1   RBC  --  2.88* 2.83*   HGB 8.4* 8.4* 8.2*   HCT 28.6* 27.9* 27.7*   MCV  --  96.9 97.9   MCH  --  29.2 29.0   MCHC  --  30.1* 29.6*   RDW  --  14.9 15.1*   PLT  --  120* 119*   MPV  --  9.9 9.8     CMP  Recent Labs     25  0856 25  0405    139   K 3.4* 3.3*   * 111*   CO2 16* 15*   BUN 24* 21   CREATININE 0.8 0.8   GLUCOSE 138* 93   CALCIUM 8.2* 8.1*     Assessment/Plan:    Patient Active Problem List   Diagnosis    Hyperlipidemia    Essential hypertension    Absolute anemia    PVC (premature ventricular contraction)    Primary osteoarthritis involving multiple joints    Other male erectile dysfunction    Cancer of prostate (HCC)    Exposure to COVID-19 virus    Bradycardia    Acquired hypothyroidism    Status     HCT 27.7 03/07/2025 04:12 AM    MCV 99.6 03/07/2025 04:12 AM    MCH 28.8 03/07/2025 04:12 AM    MCHC 28.9 03/07/2025 04:12 AM    RDW 15.2 03/07/2025 04:12 AM     03/07/2025 04:12 AM    MPV 9.9 03/07/2025 04:12 AM     CMP:    Lab Results   Component Value Date/Time     03/07/2025 04:12 AM    K 3.5 03/07/2025 04:12 AM     03/07/2025 04:12 AM    CO2 19 03/07/2025 04:12 AM    BUN 25 03/07/2025 04:12 AM    CREATININE 0.9 03/07/2025 04:12 AM    GFRAA >60 10/11/2022 01:10 PM    LABGLOM 81 03/07/2025 04:12 AM    LABGLOM >60 03/04/2024 12:17 PM    GLUCOSE 85 03/07/2025 04:12 AM    CALCIUM 8.0 03/07/2025 04:12 AM    BILITOT 0.7 03/04/2025 03:50 AM    ALKPHOS 116 03/04/2025 03:50 AM    AST 36 03/04/2025 03:50 AM    ALT 63 03/04/2025 03:50 AM       Imaging: No new imaging to review this morning     Satinder Parekh MD   Trauma/Critical care

## 2025-03-07 NOTE — PROGRESS NOTES
Dr. Neves consulted via answering service. Electronically signed by Bette Diego RN on 3/7/2025 at 4:49 PM

## 2025-03-07 NOTE — DISCHARGE INSTR - COC
Continuity of Care Form    Patient Name: Montana Gerard   :  1940  MRN:  [id number]    Admit date:  3/3/2025  Discharge date:  3/14    Code Status Order: Limited   Advance Directives:   Advance Care Flowsheet Documentation             Admitting Physician:  Edgardo Anaya MD  PCP: Haseeb Phan DO    Discharging Nurse: MARTHA  Discharging Hospital Unit/Room#: 4501/4501-A  Discharging Unit Phone Number: [phone]    Emergency Contact:   Extended Emergency Contact Information  Primary Emergency Contact: Aminata Boyle  Home Phone: [phone]  Relation: Child  Secondary Emergency Contact: Ivan Boyle  Home Phone: [phone]  Relation: Child    Past Surgical History:  Past Surgical History:   Procedure Laterality Date    CARDIAC SURGERY  2023    TAVR    CARDIOVASCULAR STRESS TEST  2004    CHOLECYSTECTOMY      COLONOSCOPY N/A 3/6/2025    COLONOSCOPY POLYPECTOMY SNARE/BIOPSY performed by Satinder Parekh MD at [facility] ENDOSCOPY    UPPER GASTROINTESTINAL ENDOSCOPY N/A 3/6/2025    ESOPHAGOGASTRODUODENOSCOPY performed by Satinder Parekh MD at [facility] ENDOSCOPY       Immunization History:   Immunization History   Administered Date(s) Administered    COVID-19, MODERNA, (age 12y+), IM, 50mcg/0.5mL 2023    COVID-19, PFIZER PURPLE top, DILUTE for use, (age 12 y+), 30mcg/0.3mL 2021, 2021, 10/05/2021    COVID-19, PFIZER, (age 12y+), IM, 30mcg/0.3mL 2024    Influenza A (V6C2-23) Vaccine PF IM 12/15/2009    Influenza Virus Vaccine 10/31/2014, 10/01/2024    Influenza, FLUAD, (age 65 y+), IM, Quadv, 0.5mL 10/20/2021, 2022    Influenza, FLUAD, (age 65 y+), IM, Trivalent PF, 0.5mL 10/24/2018, 2019    Influenza, FLUARIX, FLULAVAL, FLUZONE (age 6 mo+) and AFLURIA, (age 3 y+), Quadv PF, 0.5mL 2015, 2020    Influenza, FLUZONE High Dose, (age 65 y+), IM, Trivalent PF, 0.5mL 10/04/2017    Pneumococcal, PPSV23, PNEUMOVAX 23, (age 2y+), SC/IM, 0.5mL 2019    RSV, AREXVY,  Assisted  Dressing  Assisted  Toileting  Assisted  Feeding  Independent  Med Admin  Independent  Med Delivery   whole    Wound Care Documentation and Therapy:  Wound 25 Buttocks Right (Active)   Wound Etiology Skin Tear 25 0810   Wound Cleansed Cleansed with saline 25 0810   Dressing/Treatment Pharmaceutical agent (see MAR) 25 075   Wound Length (cm) 2.5 cm 25 09   Wound Width (cm) 1.5 cm 25 09   Wound Depth (cm) 0.2 cm 25 09   Wound Surface Area (cm^2) 3.75 cm^2 25 09   Wound Volume (cm^3) 0.75 cm^3 25 09   Wound Assessment Erythema;Pink/red 25 075   Raya-wound Assessment Blanchable erythema 25 075   Number of days: 2        Elimination:  Continence:   Bowel: No  Bladder: Yes  Urinary Catheter: None   Colostomy/Ileostomy/Ileal Conduit: No  [REMOVED] Rectal Tube-Stool Appearance: Loose  [REMOVED] Rectal Tube-Stool Color: Other (Comment) (tarry)  [REMOVED] Rectal Tube-Stool Amount: Small    Date of Last BM: 3/14    Intake/Output Summary (Last 24 hours) at 3/7/2025 1434  Last data filed at 3/7/2025 1305  Gross per 24 hour   Intake 4643.64 ml   Output 700 ml   Net 3943.64 ml     I/O last 3 completed shifts:  In: 1100 [I.V.:1100]  Out: 1100 [Urine:1100]    Safety Concerns:     None    Impairments/Disabilities:      None    Nutrition Therapy:  Current Nutrition Therapy:   - Oral Diet:  General    Routes of Feeding: Oral  Liquids: No Restrictions  Daily Fluid Restriction: no  Last Modified Barium Swallow with Video (Video Swallowing Test): not done    Treatments at the Time of Hospital Discharge:   Respiratory Treatments:     Oxygen Therapy:  {Therapy; copd oxygen:76707}  Ventilator:    {[facility] Vent List:600375337}    Rehab Therapies: {THERAPEUTIC INTERVENTION:5934868709}  Weight Bearing Status/Restrictions: {[facility] Weight Bearin}  Other Medical Equipment (for information only, NOT a DME order):  {EQUIPMENT:708091741}  Other

## 2025-03-07 NOTE — PROGRESS NOTES
[facility] Hospitalist Progress Note    SYNOPSIS: Patient admitted on 3/3/2025 for GI bleed    This is a 84-year-old man with past medical history of A-fib, aortic stenosis s/p TAVR, HFpEF, prostate cancer, malleable penile implant, sinus node dysfunction, coronary artery disease, gastritis, GERD, hyperlipidemia, hypertension, diabetes mellitus, who presented to [facility] ED with concerns of rectal bleeding.  Patient was recently admitted last week for A-fib RVR, patient is on Eliquis.  Patient reported to have maroon-colored stool.  Hemoglobin at 9.3.  CT abdomen pelvis.  1. No evidence of active GI hemorrhage.  2. Resolution of linear hypodense lesion in the mid portion of the spleen.  3. Sigmoid diverticulosis without evidence of diverticulitis.  4. Multiple small bladder diverticuli likely as a result of chronic bladder  outlet obstruction.  5. Considerable fecal material within the rectum raising the possibility of  rectal fecal impaction.  CT head was ordered for altered mental status.  No acute finding.  Neurology, EP, general surgery has been consulted.  MRI brain reviewed  1. 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  No sign of hemorrhage or mass effect.  2. New mild age-appropriate atrophy and new mild small vessel ischemia.  MRI thoracic and cervical spine  1. No acute fracture or subluxation of the thoracic spine.  2. 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 This results in anterior displacement of the thoracic cord and thinning of the thoracic cord at the T3 and superior T4 levels. No abnormal cord signal is seen.  3. No additional spinal canal stenosis or neural foraminal narrowing of the thoracic spine.  4. Old mild compression fractures of T3, T5-T7, and L1.    3/4 patient slightly drowsy this morning however easily wakeful and is able to answer questions.  Updated family  at bedside. Undergoing bowel prep for possible colonoscopy tomorrow.  Patient received Ativan for MRI yesterday.  Neurosurgery has been consulted by neurology team for dorsal spinal arachnoid cyst.    3/5 continue bowel prep had fever to 102.2, first fever since 3/3    3/6 blood culture positive for GPC streptococcal species by PCR.  Underwent C-scope and EGD    3/7 repeat blood culture positive, check L MRI spine, consult ID    SUBJECTIVE:  Feeling okay does note he has worsening back pain and is having severe back pain every time he tries to get up with therapy Family at bedside all questions answered   No CP or SOB  No fever or chills   No uncontrolled pain  No vomiting or diarrhea   No events reported overnight  Chart reviewed multiple discussions with staff      Temp (24hrs), Av.3 °F (36.8 °C), Min:98 °F (36.7 °C), Max:98.7 °F (37.1 °C)    DIET: ADULT DIET; Regular  ADULT ORAL NUTRITION SUPPLEMENT; Breakfast, Dinner; Wound Healing Oral Supplement  ADULT ORAL NUTRITION SUPPLEMENT; Breakfast, Dinner; Diabetic Oral Supplement  CODE: Limited    Intake/Output Summary (Last 24 hours) at 3/7/2025 1451  Last data filed at 3/7/2025 1305  Gross per 24 hour   Intake 4643.64 ml   Output 700 ml   Net 3943.64 ml         OBJECTIVE:    /60   Pulse 65   Temp 98 °F (36.7 °C)   Resp 18   Ht 1.727 m (5' 8\")   Wt 66.8 kg (147 lb 4.3 oz)   SpO2 97%   BMI 22.39 kg/m²     General appearance: Awake , alert, and oriented to person, place, time, and purpose; appears stated age and cooperative; no apparent distress no labored breathing  HEENT:  Conjunctivae/corneas clear.   Neck: Supple. No jugular venous distention.   Respiratory: symmetrical; clear to auscultation bilaterally; no wheezes; no rhonchi; no rales  Cardiovascular: rhythm regular; rate controlled; no murmurs  Abdomen: Soft, some tenderness to palpation, nondistended, FMS in place   Extremities:  peripheral pulses present; no peripheral edema; no

## 2025-03-07 NOTE — PROGRESS NOTES
OCCUPATIONAL THERAPY INITIAL EVALUATION    [facility]  [address], [place]      Date:3/7/2025                                                  Patient Name: Montana Gerard  MRN: [id number]  : 1940  Room: [address] [place] [address]    Evaluating OT: Shawnee Conroy, MEAGAN, OTR/L  # [id number]    Referring Provider:  Jorge Funk DO   Specific Provider Orders:  \"OT Eval and Treat\"  3-3-25    Diagnosis: GI bleed [K92.2]  Gastrointestinal hemorrhage, unspecified gastrointestinal hemorrhage type [K92.2]    Pt was admitted w/ rectal bleeding, lower abdominal pain, urinary retention  MRI of the brain revealed Embolic infarct    Pertinent Medical History:  Pt has a past medical history of Acute myocardial infarction, unspecified, Anemia, Cholelithiasis, Depressive disorder, Dermatitis herpetiformis, Diet-controlled diabetes mellitus (HCC), Diverticulitis, Gastritis, GERD (gastroesophageal reflux disease), Hiatal hernia, Hyperlipidemia, Iron deficiency anemia, Non-ST elevation myocardial infarction (NSTEMI), Peptic reflux disease, and ST elevation (STEMI) myocardial infarction of unspecified site.,  has a past surgical history that includes Cholecystectomy; cardiovascular stress test (2004); Cardiac surgery (2023); Upper gastrointestinal endoscopy (N/A, 3/6/2025); and Colonoscopy (N/A, 3/6/2025).    Surgeries this admission: 3-6-25: ESOPHAGOGASTRODUODENOSCOPY      COLONOSCOPY POLYPECTOMY      Precautions:  Fall Risk  Regular Diet  External Catheter  TAPS, PROFO boots    Assessment of current deficits   [x] Functional mobility  [x]ADLs  [x] Strength               []Cognition   [x] Functional transfers   [x] IADLs         [x] Safety Awareness   [x]Endurance   [] Fine Coordination              [x] Balance     [] Vision/perception   []Sensation    []Gross Motor Coordination  [x] ROM  [] Delirium                  [] Motor Control       OT PLAN OF CARE   OT

## 2025-03-07 NOTE — PROGRESS NOTES
[facility]  Neuro Inpatient Follow Up       Montana Gerard is a 84 y.o.  male     Neuro is following for acute embolic strokes    PMH: Prostate cancer, A-fib, hyperlipidemia, diet-controlled DM 2, depression, anemia, MI, TAVR, former smoking    HPI:  The patient presented to ED on 3/3 for complaint of abdominal pain, low back pain and progressive bilateral lower extremity weakness, impaired ambulation, tremor.  He started having hematuria followed followed by rectal bleed. Had to stop Eliquis which he takes for his A-fib.  He had a couple of falls with did not hit his head.    For the past 5 weeks he has been progressive lower extremity weakness and was not able to ambulate. Started having tremor in upper and lower extremities that is worse when eating or holding objects.    Family does endorse missing doses of Eliquis over the last few weeks due to bleeding but otherwise typically is compliant.    MRI showed multifocal embolic strokes in various vascular distributions.     Spinal imaging showed spinal arachnoid cysts O1-C1-1--neurosurgery saw the patient and considers the finding incidental and no surgery is recommended    On Cardizem and EP following    3/4: H&H 7.8/25.4  3/5: For EGD and C-scope showed no active bleeding    Subjective  He is very awake and interactive today. A friend is at the bedside and his wife is present as well. No new neuro complaints today. Surgery has ok'd restarting OAC on 3/9    No chest pain or palpitations  No SOB  No vertigo, lightheadedness or loss of consciousness  No itching or bruising appreciated    ROS otherwise negative     Current Facility-Administered Medications   Medication Dose Route Frequency Provider Last Rate Last Admin    piperacillin-tazobactam (ZOSYN) 4,500 mg in sodium chloride 0.9 % 100 mL IVPB ([id number])  4,500 mg IntraVENous Q8H Edgardo Anaya MD   Stopped at 03/07/25 0745    vancomycin (VANCOCIN) 1,000 mg in sodium  trapezius strength  5/5   XI: sternocleidomastoid strength 5/5   XI: neck extension strength  4+/5   XII: tongue strength  Normal     Motor:  4/5 BUE--no drift today  3+/5 BLE--slightly worse on R  No abnormal movements today    Sensory:  Appreciates LT in all limbs    Coordination:   FN slow but not ataxic b/l    DTR:   B/l Babinskis--with triple flexion  No Stuart's    No pathological reflexes    Laboratory/Radiology:     CBC with Differential:    Lab Results   Component Value Date/Time    WBC 8.9 03/07/2025 04:12 AM    RBC 2.78 03/07/2025 04:12 AM    HGB 8.0 03/07/2025 04:12 AM    HCT 27.7 03/07/2025 04:12 AM     03/07/2025 04:12 AM    MCV 99.6 03/07/2025 04:12 AM    MCH 28.8 03/07/2025 04:12 AM    MCHC 28.9 03/07/2025 04:12 AM    RDW 15.2 03/07/2025 04:12 AM    NRBC PENDING 03/07/2025 04:12 AM    METASPCT PENDING 03/07/2025 04:12 AM    LYMPHOPCT PENDING 03/07/2025 04:12 AM    PROMYELOPCT PENDING 03/07/2025 04:12 AM    MONOPCT PENDING 03/07/2025 04:12 AM    MYELOPCT PENDING 03/07/2025 04:12 AM    EOSPCT PENDING 03/07/2025 04:12 AM    BASOPCT PENDING 03/07/2025 04:12 AM    MONOSABS PENDING 03/07/2025 04:12 AM    LYMPHSABS PENDING 03/07/2025 04:12 AM    EOSABS PENDING 03/07/2025 04:12 AM    BASOSABS PENDING 03/07/2025 04:12 AM     CMP:    Lab Results   Component Value Date/Time     03/07/2025 04:12 AM    K 3.5 03/07/2025 04:12 AM     03/07/2025 04:12 AM    CO2 19 03/07/2025 04:12 AM    BUN 25 03/07/2025 04:12 AM    CREATININE 0.9 03/07/2025 04:12 AM    GFRAA >60 10/11/2022 01:10 PM    LABGLOM 81 03/07/2025 04:12 AM    LABGLOM >60 03/04/2024 12:17 PM    GLUCOSE 85 03/07/2025 04:12 AM    CALCIUM 8.0 03/07/2025 04:12 AM    BILITOT 0.7 03/04/2025 03:50 AM    ALKPHOS 116 03/04/2025 03:50 AM    AST 36 03/04/2025 03:50 AM    ALT 63 03/04/2025 03:50 AM     Hemoglobin A1C   Date Value Ref Range Status   03/04/2025 4.2 4.0 - 5.6 % Final      Latest Reference Range & Units Most Recent   Cholesterol,

## 2025-03-07 NOTE — PROGRESS NOTES
Nutrition Assessment     Type and Reason for Visit: Consult (ONS)    Nutrition Assessment:  Consulted for ONS recommendations.  Chart reviewed.  Pt most recently assessed by this RD on 3/4 (please see note for full eval PRN).  Will Re-Start ONS to aid in PO intake/nutritional status and will follow-up as planned.  Please reconsult if RD needed prior to next reassessment.  Irvin Vazquez, YOU, LD  Contact: ext [phone]

## 2025-03-07 NOTE — PLAN OF CARE
Problem: Safety - Adult  Goal: Free from fall injury  Outcome: Progressing     Problem: Chronic Conditions and Co-morbidities  Goal: Patient's chronic conditions and co-morbidity symptoms are monitored and maintained or improved  3/7/2025 0229 by Aislinn Smiley RN  Outcome: Progressing  3/6/2025 1259 by Maddie Brewster RN  Outcome: Progressing     Problem: Discharge Planning  Goal: Discharge to home or other facility with appropriate resources  Outcome: Progressing     Problem: Skin/Tissue Integrity  Goal: Skin integrity remains intact  3/7/2025 0229 by Aislinn Smiley RN  Outcome: Progressing  3/6/2025 1259 by Maddie Brewster RN  Outcome: Progressing     Problem: ABCDS Injury Assessment  Goal: Absence of physical injury  Outcome: Progressing     Problem: Gastrointestinal - Adult  Goal: Minimal or absence of nausea and vomiting  3/7/2025 0229 by Aislinn Smiley RN  Outcome: Progressing  3/6/2025 1259 by Maddie Brewster RN  Outcome: Progressing  Goal: Maintains or returns to baseline bowel function  3/7/2025 0229 by Aislinn Smiley RN  Outcome: Progressing  3/6/2025 1259 by Maddie Brewster RN  Outcome: Progressing  Goal: Maintains adequate nutritional intake  Outcome: Progressing  Goal: Establish and maintain optimal ostomy function  Outcome: Progressing     Problem: Hematologic - Adult  Goal: Maintains hematologic stability  3/7/2025 0229 by Aislinn Smiley RN  Outcome: Progressing  3/6/2025 1259 by Maddie Brewster RN  Outcome: Progressing     Problem: Nutrition Deficit:  Goal: Optimize nutritional status  Outcome: Progressing

## 2025-03-07 NOTE — PROGRESS NOTES
Pharmacy Consultation Note  (Antibiotic Dosing and Monitoring)    Initial consult date: 3/6/25  Consulting physician/provider: Edgardo Anaya MD  Drug: Vancomycin  Indication: Bloodstream infection    Age/  Gender Height Weight IBW  Allergy Information   84 y.o./male 172.7 cm (5' 8\") 72.6 kg (160 lb)     Ideal body weight: 68.4 kg (150 lb 12.7 oz)   Ambrosia trifida (tall ragweed) allergy skin test and Gluten      Renal Function:  Recent Labs     03/05/25  0856 03/06/25  0405 03/07/25  0412   BUN 24* 21 25*   CREATININE 0.8 0.8 0.9       Intake/Output Summary (Last 24 hours) at 3/7/2025 0826  Last data filed at 3/7/2025 0607  Gross per 24 hour   Intake --   Output 700 ml   Net -700 ml       Vancomycin Monitoring:  Trough:  No results for input(s): \"VANCOTROUGH\" in the last 72 hours.  Random:  No results for input(s): \"VANCORANDOM\" in the last 72 hours.    Vancomycin Administration Times:  Recent vancomycin administrations                     vancomycin (VANCOCIN) 1,500 mg in sodium chloride 0.9 % 250 mL IVPB ([id number]) (mg) 1,500 mg New Bag 03/06/25 1922                   Assessment:  Patient is a 84 y.o. male who has been initiated on vancomycin  Estimated Creatinine Clearance: 60 mL/min (based on SCr of 0.9 mg/dL).  3/6: Scr 0.8. Afebrile, and WBC 9.1. Yesterday: Tmax = 102.2 F and WBC 15.2  Blood cultures pending. 2/2 bottles grew positive for streptococcus species on 3/5/25.   3/7: Scr 0.9. UOP unreported.   Repeat blood cx on 3/6 growing GPC in chains.     Plan:  Will continue with vancomycin 1000 mg IV q18h   Will check vancomycin levels when appropriate  Will continue to monitor renal function   Pharmacy to follow    Kali Ding, PharmD  PGY-1 Pharmacy Practice Resident   3/7/2025 8:26 AM

## 2025-03-07 NOTE — PROGRESS NOTES
Palliative Care Department  [phone]  Palliative Care Progress Note  Provider Jana Rose, APRN - CNP    Montana Gerard  [id number]  Hospital Day: 5  Date of Initial Consult: 3/3/2025  Referring Provider: Jorge Funk DO  Palliative Medicine was consulted for assistance with: Goals clarification, symptom management    HPI:   Montana Gerard is a 84 y.o. with a past medical history of atrial fibrillation on Eliquis, valvular heart disease s/p TAVR, prostate cancer s/p radioactive seed placement and brachytherapy at [facility], CAD, iron deficiency anemia, hyperlipidemia, hiatal hernia, GERD, diverticulitis, diabetes mellitus, dermatitis herpetiformis, depression, cholelithiasis, former smoker who was admitted on 3/3/2025 from [facility] with a CHIEF COMPLAINT of rectal bleeding.  Patient was recently hospitalized 2/16/2025 - 2/25/2025 with weight loss, back pain, atrial fibrillation, and rectal bleeding.  He did not require any transfusions or endoscopy evaluation at that time.  He was discharged to [facility].  Family reports he has been very weak and has had poor oral intake.  He has also had a decline in his cognition.  Labs on ED evaluation showed an albumin of 3.2, alk phos 161, , AST 64, WBC 13.8, hemoglobin 9.3, hematocrit 31.0.  CT of the abdomen pelvis showed no evidence of active GI hemorrhage, sigmoid diverticulosis without evidence of diverticulitis, multiple small bladder diverticuli likely as a result of.  Bladder outlet obstruction, considerable fecal material within the rectum.  General surgery has been consulted with plans for endoscopy evaluation.  Palliative care was consulted for goals of care discussion and symptom management.    ASSESSMENT/PLAN:     Pertinent Hospital Diagnoses     GI bleed  New acute ischemic stroke in various vascular distributions   Atrial fibrillation on apixaban  Uncontrolled back pain/spasms       Palliative Care Encounter / Counseling Regarding Goals of Care  Please see  All questions answered. There are no further PM needs at this time.  PM will now sign off.  If new PM needs arise, please re-consult.  Thank you.     Review of Systems   Constitutional:  Negative for chills and fever.   HENT:  Negative for congestion.    Respiratory:  Negative for shortness of breath.    Cardiovascular:  Negative for chest pain.   Gastrointestinal:  Positive for abdominal pain, diarrhea and nausea. Negative for vomiting.   Genitourinary:  Positive for difficulty urinating.   Musculoskeletal:  Positive for back pain.   Neurological:  Negative for dizziness and headaches.   Psychiatric/Behavioral:  Positive for confusion, hallucinations and sleep disturbance. The patient is not nervous/anxious.            OBJECTIVE:   Prognosis: Guarded    Physical Exam:  /60   Pulse 65   Temp 98 °F (36.7 °C)   Resp 18   Ht 1.727 m (5' 8\")   Wt 66.8 kg (147 lb 4.3 oz)   SpO2 97%   BMI 22.39 kg/m²   Physical Exam  Vitals reviewed.   Constitutional:       General: He is awake. He is not in acute distress.     Appearance: He is ill-appearing.      Comments: Elderly male lying in bed   HENT:      Head: Normocephalic and atraumatic.      Mouth/Throat:      Lips: Pink.      Mouth: Mucous membranes are dry.   Cardiovascular:      Rate and Rhythm: Tachycardia present. Rhythm irregular.      Pulses: Normal pulses.   Pulmonary:      Effort: Pulmonary effort is normal.      Breath sounds: Normal breath sounds.   Abdominal:      General: Bowel sounds are normal. There is no distension.      Palpations: Abdomen is soft.      Tenderness: There is no abdominal tenderness. There is no guarding or rebound.   Skin:     General: Skin is warm and dry.      Coloration: Skin is pale.   Neurological:      General: No focal deficit present.      Mental Status: He is lethargic and confused.   Psychiatric:         Speech: Speech is delayed.         Behavior: Behavior is slowed. Behavior is not agitated. Behavior is cooperative.

## 2025-03-07 NOTE — CARE COORDINATION
[facility] following, can accept when stable with therapy within 48 hours. [employer] continues to waive precert for return. Scopes completed yesterday. Will need OT prior to return. Ambulance on soft chart. Updated spouse at bedside.     1220 Updated Anders on plan for discharge later today. OT to see today. Will need this prior to discharge. [facility] on will call.     For questions I can be reached at [phone]. LUCINA Ravi

## 2025-03-07 NOTE — PROGRESS NOTES
Attempted to call ID consult to office but was on hold for 8 minutes with no answer. Will try again later. Electronically signed by Bette Diego RN on 3/7/2025 at 3:48 PM

## 2025-03-07 NOTE — PROGRESS NOTES
SPEECH LANGUAGE PATHOLOGY  DAILY PROGRESS NOTE        PATIENT NAME:  Montana Gerard      :  1940          TODAY'S DATE:  3/7/2025 ROOM:  [facility]    RN cleared patient for speech therapy. Patient's wife was present at bedside.     During session, patient appeared extremely lethargic. His wife reported that occupational therapy was working with patient prior to the therapist's arrival. However, patient agreed to participate in the session. Therapist reviewed external/internal memory strategies with him and his wife. He utilized an association and rehearsal strategy to recall information from picture scenes after a 5 minute delay. Patient recalled the functional information with 80% accuracy given minimal assistance. He was educated on the benefits of carrying-over the strategies into his daily life to promote his recent memory. Furthermore, patient presents with mild anomia. Therapist reviewed word-finding strategies with him. He completed a divergent naming task to improve his word fluency. Patient named at least 4 items in a category with 80% accuracy given minimal assistance. He requires increased processing time and guided questions to improve his word retrieval skills. Overall, he is making good progress toward his goals.       CPT code(s) 08163  speech/language tx  Total minutes :  15 minutes    Cristiano Goss M.S., CCC-SLP/L   Speech-Language Pathologist  [id number]

## 2025-03-07 NOTE — PROGRESS NOTES
Physician Progress Note      PATIENT:               REJI DE LEON  CSN #:                  [id number]  :                       1940  ADMIT DATE:       3/3/2025 1:15 AM  DISCH DATE:  RESPONDING  PROVIDER #:        YINKA Lucia CNP          QUERY TEXT:    Pt admitted with rectal bleeding and has encephalopathy documented. If   possible, please document in progress notes and discharge summary further   specificity regarding the type of encephalopathy:    The medical record reflects the following:  Risk Factors: CVA, Bacteremia,  Clinical Indicators: Per Neuro 3/5-He has diffuse weakness as well as acute   encephalopathy complicated by his anemia. Per H&P-  He is now up bed ridden   and confused. Per PN 3/4-Lethargic, oriented to self, place and situation;   confused to time, able to state his age; delayed responses, follows commands.   Per 3/5-  He has been intermittently confused and will sometimes speak   nonsensically but he follow all commands. Per PN 3/6- Bacteremia blood culture   positive strep species by PCR. Arachnoid cyst neurosurgery consult   appreciated for thoracic arachnoid cyst -chronic and incide  Treatment: MRI, Neuro consult, EGD/Colonoscopy,  antibiotics  Options provided:  -- Metabolic encephalopathy  -- Encephalopathy due to CVA  -- Other - I will add my own diagnosis  -- Disagree - Not applicable / Not valid  -- Disagree - Clinically unable to determine / Unknown  -- Refer to Clinical Documentation Reviewer    PROVIDER RESPONSE TEXT:    This patient has encephalopathy due to CVA    Query created by: Hugo Garrett on 3/7/2025 10:36 AM      Electronically signed by:  YINKA Lucia CNP 3/7/2025 11:10 AM

## 2025-03-08 LAB
ACB COMPLEX DNA BLD POS QL NAA+NON-PROBE: NOT DETECTED
B FRAGILIS DNA BLD POS QL NAA+NON-PROBE: NOT DETECTED
BIOFIRE TEST COMMENT: ABNORMAL
C ALBICANS DNA BLD POS QL NAA+NON-PROBE: NOT DETECTED
C AURIS DNA BLD POS QL NAA+NON-PROBE: NOT DETECTED
C GATTII+NEOFOR DNA BLD POS QL NAA+N-PRB: NOT DETECTED
C GLABRATA DNA BLD POS QL NAA+NON-PROBE: NOT DETECTED
C KRUSEI DNA BLD POS QL NAA+NON-PROBE: NOT DETECTED
C PARAP DNA BLD POS QL NAA+NON-PROBE: NOT DETECTED
C TROPICLS DNA BLD POS QL NAA+NON-PROBE: NOT DETECTED
CRP SERPL HS-MCNC: 55 MG/L (ref 0–5)
DATE LAST DOSE: NORMAL
E CLOAC COMP DNA BLD POS NAA+NON-PROBE: NOT DETECTED
E COLI DNA BLD POS QL NAA+NON-PROBE: NOT DETECTED
E FAECALIS DNA BLD POS QL NAA+NON-PROBE: NOT DETECTED
E FAECIUM DNA BLD POS QL NAA+NON-PROBE: NOT DETECTED
ENTEROBACTERALES DNA BLD POS NAA+N-PRB: NOT DETECTED
ERYTHROCYTE [SEDIMENTATION RATE] IN BLOOD BY WESTERGREN METHOD: 34 MM/HR (ref 0–15)
GP B STREP DNA BLD POS QL NAA+NON-PROBE: NOT DETECTED
HAEM INFLU DNA BLD POS QL NAA+NON-PROBE: NOT DETECTED
K OXYTOCA DNA BLD POS QL NAA+NON-PROBE: NOT DETECTED
KLEBSIELLA SP DNA BLD POS QL NAA+NON-PRB: NOT DETECTED
KLEBSIELLA SP DNA BLD POS QL NAA+NON-PRB: NOT DETECTED
L MONOCYTOG DNA BLD POS QL NAA+NON-PROBE: NOT DETECTED
MICROORGANISM SPEC CULT: ABNORMAL
MICROORGANISM/AGENT SPEC: ABNORMAL
N MEN DNA BLD POS QL NAA+NON-PROBE: NOT DETECTED
P AERUGINOSA DNA BLD POS NAA+NON-PROBE: NOT DETECTED
PROCALCITONIN SERPL-MCNC: 0.13 NG/ML (ref 0–0.08)
PROTEUS SP DNA BLD POS QL NAA+NON-PROBE: NOT DETECTED
S AUREUS DNA BLD POS QL NAA+NON-PROBE: NOT DETECTED
S AUREUS+CONS DNA BLD POS NAA+NON-PROBE: NOT DETECTED
S EPIDERMIDIS DNA BLD POS QL NAA+NON-PRB: NOT DETECTED
S LUGDUNENSIS DNA BLD POS QL NAA+NON-PRB: NOT DETECTED
S MALTOPHILIA DNA BLD POS QL NAA+NON-PRB: NOT DETECTED
S MARCESCENS DNA BLD POS NAA+NON-PROBE: NOT DETECTED
S PNEUM DNA BLD POS QL NAA+NON-PROBE: NOT DETECTED
S PYO DNA BLD POS QL NAA+NON-PROBE: NOT DETECTED
SALMONELLA DNA BLD POS QL NAA+NON-PROBE: NOT DETECTED
SERVICE CMNT-IMP: ABNORMAL
SPECIMEN DESCRIPTION: ABNORMAL
STREPTOCOCCUS DNA BLD POS NAA+NON-PROBE: DETECTED
TME LAST DOSE: NORMAL H
VANCOMYCIN DOSE: NORMAL MG
VANCOMYCIN TROUGH SERPL-MCNC: 8.2 UG/ML (ref 5–16)

## 2025-03-08 PROCEDURE — 6370000000 HC RX 637 (ALT 250 FOR IP): Performed by: NURSE PRACTITIONER

## 2025-03-08 PROCEDURE — 6360000002 HC RX W HCPCS: Performed by: INTERNAL MEDICINE

## 2025-03-08 PROCEDURE — 2500000003 HC RX 250 WO HCPCS: Performed by: INTERNAL MEDICINE

## 2025-03-08 PROCEDURE — 6370000000 HC RX 637 (ALT 250 FOR IP): Performed by: PSYCHIATRY & NEUROLOGY

## 2025-03-08 PROCEDURE — 80202 ASSAY OF VANCOMYCIN: CPT

## 2025-03-08 PROCEDURE — 87040 BLOOD CULTURE FOR BACTERIA: CPT

## 2025-03-08 PROCEDURE — 99232 SBSQ HOSP IP/OBS MODERATE 35: CPT | Performed by: INTERNAL MEDICINE

## 2025-03-08 PROCEDURE — 6370000000 HC RX 637 (ALT 250 FOR IP): Performed by: INTERNAL MEDICINE

## 2025-03-08 PROCEDURE — 2580000003 HC RX 258: Performed by: FAMILY MEDICINE

## 2025-03-08 PROCEDURE — 6360000002 HC RX W HCPCS: Performed by: FAMILY MEDICINE

## 2025-03-08 PROCEDURE — 86140 C-REACTIVE PROTEIN: CPT

## 2025-03-08 PROCEDURE — 36415 COLL VENOUS BLD VENIPUNCTURE: CPT

## 2025-03-08 PROCEDURE — 2060000000 HC ICU INTERMEDIATE R&B

## 2025-03-08 PROCEDURE — 2500000003 HC RX 250 WO HCPCS: Performed by: FAMILY MEDICINE

## 2025-03-08 PROCEDURE — 6370000000 HC RX 637 (ALT 250 FOR IP): Performed by: STUDENT IN AN ORGANIZED HEALTH CARE EDUCATION/TRAINING PROGRAM

## 2025-03-08 PROCEDURE — 2580000003 HC RX 258: Performed by: INTERNAL MEDICINE

## 2025-03-08 PROCEDURE — 84145 PROCALCITONIN (PCT): CPT

## 2025-03-08 PROCEDURE — 85652 RBC SED RATE AUTOMATED: CPT

## 2025-03-08 RX ADMIN — PANTOPRAZOLE SODIUM 40 MG: 40 INJECTION, POWDER, FOR SOLUTION INTRAVENOUS at 09:23

## 2025-03-08 RX ADMIN — ATORVASTATIN CALCIUM 10 MG: 10 TABLET, FILM COATED ORAL at 09:24

## 2025-03-08 RX ADMIN — PETROLATUM: 420 OINTMENT TOPICAL at 21:48

## 2025-03-08 RX ADMIN — PANTOPRAZOLE SODIUM 40 MG: 40 INJECTION, POWDER, FOR SOLUTION INTRAVENOUS at 21:58

## 2025-03-08 RX ADMIN — LATANOPROST 1 DROP: 50 SOLUTION OPHTHALMIC at 21:58

## 2025-03-08 RX ADMIN — METOPROLOL SUCCINATE 12.5 MG: 25 TABLET, EXTENDED RELEASE ORAL at 09:24

## 2025-03-08 RX ADMIN — PIPERACILLIN AND TAZOBACTAM 4500 MG: 4; .5 INJECTION, POWDER, FOR SOLUTION INTRAVENOUS at 12:02

## 2025-03-08 RX ADMIN — BRIMONIDINE TARTRATE 1 DROP: 2 SOLUTION/ DROPS OPHTHALMIC at 21:58

## 2025-03-08 RX ADMIN — SODIUM CHLORIDE, PRESERVATIVE FREE 10 ML: 5 INJECTION INTRAVENOUS at 09:25

## 2025-03-08 RX ADMIN — GABAPENTIN 100 MG: 100 CAPSULE ORAL at 21:58

## 2025-03-08 RX ADMIN — ANORECTAL OINTMENT: 15.7; .44; 24; 20.6 OINTMENT TOPICAL at 21:49

## 2025-03-08 RX ADMIN — VITAM B12 100 MCG: 100 TAB at 21:58

## 2025-03-08 RX ADMIN — TIMOLOL MALEATE 1 DROP: 5 SOLUTION/ DROPS OPHTHALMIC at 09:24

## 2025-03-08 RX ADMIN — WATER 2000 MG: 1 INJECTION INTRAMUSCULAR; INTRAVENOUS; SUBCUTANEOUS at 14:42

## 2025-03-08 RX ADMIN — GABAPENTIN 100 MG: 100 CAPSULE ORAL at 09:25

## 2025-03-08 RX ADMIN — DOFETILIDE 125 MCG: 0.12 CAPSULE ORAL at 09:23

## 2025-03-08 RX ADMIN — TIMOLOL MALEATE 1 DROP: 5 SOLUTION/ DROPS OPHTHALMIC at 21:58

## 2025-03-08 RX ADMIN — MIRTAZAPINE 15 MG: 15 TABLET, FILM COATED ORAL at 21:58

## 2025-03-08 RX ADMIN — SODIUM CHLORIDE, PRESERVATIVE FREE 10 ML: 5 INJECTION INTRAVENOUS at 22:05

## 2025-03-08 RX ADMIN — PIPERACILLIN AND TAZOBACTAM 4500 MG: 4; .5 INJECTION, POWDER, FOR SOLUTION INTRAVENOUS at 04:32

## 2025-03-08 RX ADMIN — SODIUM CHLORIDE, SODIUM LACTATE, POTASSIUM CHLORIDE, AND CALCIUM CHLORIDE: .6; .31; .03; .02 INJECTION, SOLUTION INTRAVENOUS at 21:48

## 2025-03-08 RX ADMIN — BRIMONIDINE TARTRATE 1 DROP: 2 SOLUTION/ DROPS OPHTHALMIC at 09:24

## 2025-03-08 RX ADMIN — Medication 1000 UNITS: at 09:25

## 2025-03-08 RX ADMIN — VANCOMYCIN HYDROCHLORIDE 1000 MG: 1 INJECTION, POWDER, LYOPHILIZED, FOR SOLUTION INTRAVENOUS at 04:35

## 2025-03-08 RX ADMIN — DOFETILIDE 125 MCG: 0.12 CAPSULE ORAL at 21:58

## 2025-03-08 RX ADMIN — VITAM B12 100 MCG: 100 TAB at 09:23

## 2025-03-08 NOTE — CONSULTS
HERPETIFORMIS - RX WITH GLUTEN FREE DIET---DR REESE GRANADO STANISLAV MOVED TO [facility]--HER EX HUS OUT OF nursing home AND LIVES IN    [place]----elaine ==grand elaine moved here with pt----hs--preg ab---dui---    YOUMGEST ELAINE LIVES IN [place]  NO KIDS-------------------      SON LIVES IN [place]--    EVAL WITH DR KAMARA 10-14 MILD AORTIC STENOSIS    re eval with dr kamara -----dr galarza--------------------------------------plan chg to new cardio spring 2022    ELEV PSA ------ca prostate--starts seed  ----[facility]----dr [place]  and  rad onc    Eval dr smiley    echo and TMT done  4-21    Dec memory per wife   10-21    Eval  memory with apez     very mild cognitive deficit    Early hypothyroid  3-22    Refused colonosopy        Pre op clearance with cardio [facility] and started beta blocker---for  penile implant for 22    Holter [facility] with lots of  pvc's    Mild AS  3-21  then   mod  severe AS     at [facility]    Cardiac catheterization 3-23 [facility] moderate disease treating medically    Aortic valve repair through femoral artery with transcatheter aortic valve replacement  [facility]    Cardioversion for AF  23  ---  worked---in cardiac rehab    23 went back into AF-------------------------------------------------------cardio [facility]    Was set for ED  implant and cancelled due to valve surgery at [facility]        Start Tikosyn  [facility]----23    Dr robledo back on aricept   ---------------------pt  dc  due to night goznales------------------dr robledo    48 year old dulisaer         was healthy--    Penile implant   24  [facility]   semi rigid    Son  carly   getting    summer 2024     Social Drivers of Health     Financial Resource Strain: Low Risk  (2024)    Overall Financial Resource Strain (CARDIA)     Difficulty of Paying Living Expenses: Not hard at all   Food Insecurity: No Food Insecurity (3/3/2025)    Hunger  Vital Sign     Worried About Running Out of Food in the Last Year: Never true     Ran Out of Food in the Last Year: Never true   Transportation Needs: No Transportation Needs (3/3/2025)    PRAPARE - Transportation     Lack of Transportation (Medical): No     Lack of Transportation (Non-Medical): No   Physical Activity: Inactive (2/7/2025)    Exercise Vital Sign     Days of Exercise per Week: 0 days     Minutes of Exercise per Session: 0 min   Housing Stability: Low Risk  (3/3/2025)    Housing Stability Vital Sign     Unable to Pay for Housing in the Last Year: No     Number of Times Moved in the Last Year: 1     Homeless in the Last Year: No     Tobacco: No  Alcohol: No  Pets: No  Travel: No    Family History:       Problem Relation Age of Onset    Stroke Father    . Otherwise non-pertinent to the chief complaint.    REVIEW OF SYSTEMS:    CONSTITUTIONAL:  No chills, fevers or night sweats. No loss of weight.  EYES:  No double vision or drainage from eyes, ears or throat.  HEENT:  No neck stiffness. No dysphagia. No drainage from eyes, ears or throat  RESPIRATORY:  No cough, productive sputum or hemoptysis.   CARDIOVASCULAR:  No chest pain, palpitations, orthopnea or dyspnea on exertion.  GASTROINTESTINAL:  No nausea, vomiting, diarrhea or constipation or hematochezia   GENITOURINARY:  No frequency burning dysuria or hematuria.  INTEGUMENT/BREAST:  No rash or breast masses.  HEMATOLOGIC/LYMPHATIC:  No lymphadenopathy or blood dyscrasics.   ALLERGIC/IMMUNOLOGIC:  No anaphylaxis.   ENDOCRINE:  No polyuria or polydipsia or temperature intolerance.    MUSCULOSKELETAL:  No myalgia or arthralgia.  Full ROM.  NEUROLOGICAL:  No focal motor sensory deficit.  BEHAVIOR/PSYCH:  No psychosis.     PHYSICAL EXAM:    Vitals:    /61   Pulse 64   Temp 98 °F (36.7 °C) (Temporal)   Resp 19   Ht 1.727 m (5' 8\")   Wt 66.8 kg (147 lb 4.3 oz)   SpO2 97%   BMI 22.39 kg/m²   Constitutional: The patient is awake, alert, and

## 2025-03-08 NOTE — PLAN OF CARE
Problem: Safety - Adult  Goal: Free from fall injury  3/8/2025 0848 by Lara Chavez RN  Outcome: Progressing  3/8/2025 0305 by Aislinn Smiley RN  Outcome: Progressing     Problem: Chronic Conditions and Co-morbidities  Goal: Patient's chronic conditions and co-morbidity symptoms are monitored and maintained or improved  3/8/2025 0848 by Laar Chavez RN  Outcome: Progressing  3/8/2025 0305 by Aislinn Smiley RN  Outcome: Progressing     Problem: Discharge Planning  Goal: Discharge to home or other facility with appropriate resources  3/8/2025 0848 by Lara Chavez RN  Outcome: Progressing  3/8/2025 0305 by Aislinn Smiley RN  Outcome: Progressing     Problem: Skin/Tissue Integrity  Goal: Skin integrity remains intact  Description: 1.  Monitor for areas of redness and/or skin breakdown  2.  Assess vascular access sites hourly  3.  Every 4-6 hours minimum:  Change oxygen saturation probe site  4.  Every 4-6 hours:  If on nasal continuous positive airway pressure, respiratory therapy assess nares and determine need for appliance change or resting period  3/8/2025 0848 by Lara Chavez RN  Outcome: Progressing  3/8/2025 0305 by Aislinn Smiley RN  Outcome: Progressing     Problem: ABCDS Injury Assessment  Goal: Absence of physical injury  3/8/2025 0848 by Lara Chavez RN  Outcome: Progressing  3/8/2025 0305 by Aislinn Smiley RN  Outcome: Progressing     Problem: Gastrointestinal - Adult  Goal: Minimal or absence of nausea and vomiting  3/8/2025 0848 by Lara Chavez RN  Outcome: Progressing  3/8/2025 0305 by Aislinn Smiley RN  Outcome: Progressing  Goal: Maintains or returns to baseline bowel function  3/8/2025 0848 by Lara Chavez RN  Outcome: Progressing  3/8/2025 0305 by Aislinn Smiley RN  Outcome: Progressing  Goal: Maintains adequate nutritional intake  3/8/2025 0848 by Lara Chavez RN  Outcome: Progressing  3/8/2025 0305 by Aislinn Smiley RN  Outcome: Progressing  Goal:  Establish and maintain optimal ostomy function  3/8/2025 0848 by Lara Chavez RN  Outcome: Progressing  3/8/2025 0305 by Aislinn Smiley RN  Outcome: Progressing     Problem: Hematologic - Adult  Goal: Maintains hematologic stability  3/8/2025 0848 by Lara Chavez RN  Outcome: Progressing  3/8/2025 0305 by Aislinn Smiley RN  Outcome: Progressing     Problem: Nutrition Deficit:  Goal: Optimize nutritional status  3/8/2025 0848 by Lara Chavez RN  Outcome: Progressing  3/8/2025 0305 by Aislinn Smiley RN  Outcome: Progressing

## 2025-03-08 NOTE — PROGRESS NOTES
Pharmacy Consultation Note  (Antibiotic Dosing and Monitoring)    Initial consult date: 3/6/25  Consulting physician/provider: Edgardo Anaya MD  Drug: Vancomycin  Indication: Bloodstream infection    Age/  Gender Height Weight IBW  Allergy Information   84 y.o./male 172.7 cm (5' 8\") 72.6 kg (160 lb)     Ideal body weight: 68.4 kg (150 lb 12.7 oz)   Ambrosia trifida (tall ragweed) allergy skin test and Gluten      Renal Function:  Recent Labs     03/05/25  0856 03/06/25  0405 03/07/25  0412   BUN 24* 21 25*   CREATININE 0.8 0.8 0.9       Intake/Output Summary (Last 24 hours) at 3/8/2025 0744  Last data filed at 3/8/2025 0617  Gross per 24 hour   Intake 4943.64 ml   Output 1050 ml   Net 3893.64 ml       Vancomycin Monitoring:  Trough:  No results for input(s): \"VANCOTROUGH\" in the last 72 hours.  Random:  No results for input(s): \"VANCORANDOM\" in the last 72 hours.    Vancomycin Administration Times:  Recent vancomycin administrations                     vancomycin (VANCOCIN) 1,000 mg in sodium chloride 0.9 % 250 mL IVPB ([id number]) (mg) 1,000 mg New Bag 03/08/25 0435     1,000 mg New Bag 03/07/25 1113    vancomycin (VANCOCIN) 1,500 mg in sodium chloride 0.9 % 250 mL IVPB ([id number]) (mg) 1,500 mg New Bag 03/06/25 1922                    Assessment:  Patient is a 84 y.o. male who has been initiated on vancomycin  Estimated Creatinine Clearance: 60 mL/min (based on SCr of 0.9 mg/dL).  3/8: scr stable, no new am labs, afebrile, blood culture growing strep infantarius    Plan:  Will continue with vancomycin 1000 mg IV every 18 hours  Trough @ 2200 - hold next dose if > 20 mcg/mL  Will check vancomycin levels when appropriate  Will continue to monitor renal function   Pharmacy to follow    Oralia Reddy PharmD  PGY1 Pharmacy Practice Resident [phone]  3/8/2025 7:46 AM

## 2025-03-08 NOTE — PLAN OF CARE
Problem: Safety - Adult  Goal: Free from fall injury  Outcome: Progressing     Problem: Chronic Conditions and Co-morbidities  Goal: Patient's chronic conditions and co-morbidity symptoms are monitored and maintained or improved  Outcome: Progressing     Problem: Discharge Planning  Goal: Discharge to home or other facility with appropriate resources  Outcome: Progressing     Problem: Skin/Tissue Integrity  Goal: Skin integrity remains intact  Outcome: Progressing     Problem: ABCDS Injury Assessment  Goal: Absence of physical injury  Outcome: Progressing     Problem: Gastrointestinal - Adult  Goal: Minimal or absence of nausea and vomiting  Outcome: Progressing  Goal: Maintains or returns to baseline bowel function  Outcome: Progressing  Goal: Maintains adequate nutritional intake  Outcome: Progressing  Goal: Establish and maintain optimal ostomy function  Outcome: Progressing     Problem: Hematologic - Adult  Goal: Maintains hematologic stability  Outcome: Progressing     Problem: Nutrition Deficit:  Goal: Optimize nutritional status  Outcome: Progressing

## 2025-03-08 NOTE — PROGRESS NOTES
[facility] Hospitalist Progress Note    SYNOPSIS: Patient admitted on 3/3/2025 for GI bleed    This is a 84-year-old man with past medical history of A-fib, aortic stenosis s/p TAVR, HFpEF, prostate cancer, malleable penile implant, sinus node dysfunction, coronary artery disease, gastritis, GERD, hyperlipidemia, hypertension, diabetes mellitus, who presented to [facility] ED with concerns of rectal bleeding.  Patient was recently admitted last week for A-fib RVR, patient is on Eliquis.  Patient reported to have maroon-colored stool.  Hemoglobin at 9.3.  CT abdomen pelvis.  1. No evidence of active GI hemorrhage.  2. Resolution of linear hypodense lesion in the mid portion of the spleen.  3. Sigmoid diverticulosis without evidence of diverticulitis.  4. Multiple small bladder diverticuli likely as a result of chronic bladder  outlet obstruction.  5. Considerable fecal material within the rectum raising the possibility of  rectal fecal impaction.  CT head was ordered for altered mental status.  No acute finding.  Neurology, EP, general surgery has been consulted.  MRI brain reviewed  1. 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  No sign of hemorrhage or mass effect.  2. New mild age-appropriate atrophy and new mild small vessel ischemia.  MRI thoracic and cervical spine  1. No acute fracture or subluxation of the thoracic spine.  2. 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 This results in anterior displacement of the thoracic cord and thinning of the thoracic cord at the T3 and superior T4 levels. No abnormal cord signal is seen.  3. No additional spinal canal stenosis or neural foraminal narrowing of the thoracic spine.  4. Old mild compression fractures of T3, T5-T7, and L1.    3/4 patient slightly drowsy this morning however easily wakeful and is able to answer questions.  Updated family  \"LIPASE\" in the last 72 hours.    Invalid input(s): \"PROT\", \"ALB\"      No results for input(s): \"INR\" in the last 72 hours.      No results for input(s): \"CKTOTAL\", \"TROPONINI\" in the last 72 hours.    Chronic labs:    Lab Results   Component Value Date    CHOL 110 11/08/2024    TRIG 68 11/08/2024    HDL 20 (L) 03/04/2025    TSH 2.63 02/17/2025    PSA 0.06 03/03/2025    INR 1.4 03/04/2025    LABA1C 4.2 03/04/2025       Radiology: REVIEWED DAILY    +++++++++++++++++++++++++++++++++++++++++++++++++  Edgardo Anaya MD  [facility]- [place], [place]  +++++++++++++++++++++++++++++++++++++++++++++++++  NOTE: This report was transcribed using voice recognition software. Every effort was made to ensure accuracy; however, inadvertent computerized transcription errors may be present.

## 2025-03-09 PROCEDURE — 2060000000 HC ICU INTERMEDIATE R&B

## 2025-03-09 PROCEDURE — 6370000000 HC RX 637 (ALT 250 FOR IP): Performed by: STUDENT IN AN ORGANIZED HEALTH CARE EDUCATION/TRAINING PROGRAM

## 2025-03-09 PROCEDURE — 2580000003 HC RX 258: Performed by: FAMILY MEDICINE

## 2025-03-09 PROCEDURE — 6360000002 HC RX W HCPCS: Performed by: NURSE PRACTITIONER

## 2025-03-09 PROCEDURE — 6360000002 HC RX W HCPCS: Performed by: INTERNAL MEDICINE

## 2025-03-09 PROCEDURE — 6360000002 HC RX W HCPCS: Performed by: FAMILY MEDICINE

## 2025-03-09 PROCEDURE — 6370000000 HC RX 637 (ALT 250 FOR IP): Performed by: NURSE PRACTITIONER

## 2025-03-09 PROCEDURE — 6370000000 HC RX 637 (ALT 250 FOR IP): Performed by: FAMILY MEDICINE

## 2025-03-09 PROCEDURE — 6370000000 HC RX 637 (ALT 250 FOR IP): Performed by: PSYCHIATRY & NEUROLOGY

## 2025-03-09 PROCEDURE — 99232 SBSQ HOSP IP/OBS MODERATE 35: CPT | Performed by: INTERNAL MEDICINE

## 2025-03-09 PROCEDURE — 6370000000 HC RX 637 (ALT 250 FOR IP): Performed by: INTERNAL MEDICINE

## 2025-03-09 PROCEDURE — 2500000003 HC RX 250 WO HCPCS: Performed by: INTERNAL MEDICINE

## 2025-03-09 RX ADMIN — ACETAMINOPHEN 650 MG: 325 TABLET ORAL at 23:21

## 2025-03-09 RX ADMIN — MORPHINE SULFATE 2 MG: 2 INJECTION, SOLUTION INTRAMUSCULAR; INTRAVENOUS at 10:16

## 2025-03-09 RX ADMIN — VITAM B12 100 MCG: 100 TAB at 23:23

## 2025-03-09 RX ADMIN — ATORVASTATIN CALCIUM 10 MG: 10 TABLET, FILM COATED ORAL at 10:00

## 2025-03-09 RX ADMIN — DOFETILIDE 125 MCG: 0.12 CAPSULE ORAL at 23:22

## 2025-03-09 RX ADMIN — LATANOPROST 1 DROP: 50 SOLUTION OPHTHALMIC at 23:26

## 2025-03-09 RX ADMIN — METOPROLOL SUCCINATE 12.5 MG: 25 TABLET, EXTENDED RELEASE ORAL at 10:00

## 2025-03-09 RX ADMIN — BRIMONIDINE TARTRATE 1 DROP: 2 SOLUTION/ DROPS OPHTHALMIC at 23:22

## 2025-03-09 RX ADMIN — TIMOLOL MALEATE 1 DROP: 5 SOLUTION/ DROPS OPHTHALMIC at 10:01

## 2025-03-09 RX ADMIN — GABAPENTIN 100 MG: 100 CAPSULE ORAL at 10:16

## 2025-03-09 RX ADMIN — GABAPENTIN 100 MG: 100 CAPSULE ORAL at 21:56

## 2025-03-09 RX ADMIN — VITAM B12 100 MCG: 100 TAB at 09:59

## 2025-03-09 RX ADMIN — TIMOLOL MALEATE 1 DROP: 5 SOLUTION/ DROPS OPHTHALMIC at 23:22

## 2025-03-09 RX ADMIN — MIRTAZAPINE 15 MG: 15 TABLET, FILM COATED ORAL at 21:56

## 2025-03-09 RX ADMIN — BRIMONIDINE TARTRATE 1 DROP: 2 SOLUTION/ DROPS OPHTHALMIC at 10:02

## 2025-03-09 RX ADMIN — PANTOPRAZOLE SODIUM 40 MG: 40 INJECTION, POWDER, FOR SOLUTION INTRAVENOUS at 21:56

## 2025-03-09 RX ADMIN — PANTOPRAZOLE SODIUM 40 MG: 40 INJECTION, POWDER, FOR SOLUTION INTRAVENOUS at 10:00

## 2025-03-09 RX ADMIN — DOFETILIDE 125 MCG: 0.12 CAPSULE ORAL at 09:59

## 2025-03-09 RX ADMIN — Medication 1000 UNITS: at 10:01

## 2025-03-09 RX ADMIN — WATER 2000 MG: 1 INJECTION INTRAMUSCULAR; INTRAVENOUS; SUBCUTANEOUS at 14:12

## 2025-03-09 ASSESSMENT — PAIN SCALES - WONG BAKER
WONGBAKER_NUMERICALRESPONSE: HURTS A LITTLE BIT

## 2025-03-09 ASSESSMENT — PAIN DESCRIPTION - LOCATION: LOCATION: BUTTOCKS

## 2025-03-09 ASSESSMENT — PAIN DESCRIPTION - ORIENTATION: ORIENTATION: LOWER

## 2025-03-09 ASSESSMENT — PAIN SCALES - GENERAL
PAINLEVEL_OUTOF10: 8
PAINLEVEL_OUTOF10: 2
PAINLEVEL_OUTOF10: 0
PAINLEVEL_OUTOF10: 2
PAINLEVEL_OUTOF10: 2

## 2025-03-09 ASSESSMENT — PAIN DESCRIPTION - DESCRIPTORS: DESCRIPTORS: ACHING;DISCOMFORT;SORE

## 2025-03-09 ASSESSMENT — PAIN - FUNCTIONAL ASSESSMENT: PAIN_FUNCTIONAL_ASSESSMENT: PREVENTS OR INTERFERES SOME ACTIVE ACTIVITIES AND ADLS

## 2025-03-09 NOTE — PROGRESS NOTES
Pt pulled out NG tube. NG tube replaced. Stat xray for NG placement obtained. Electronically signed by Debbie Darling RN on 3/9/2025 at 4:00 PM

## 2025-03-09 NOTE — PLAN OF CARE
Problem: Safety - Adult  Goal: Free from fall injury  3/9/2025 1251 by Nica Renteria RN  Outcome: Progressing  3/9/2025 0048 by Aislinn Smiley RN  Outcome: Progressing     Problem: Chronic Conditions and Co-morbidities  Goal: Patient's chronic conditions and co-morbidity symptoms are monitored and maintained or improved  Recent Flowsheet Documentation  Taken 3/9/2025 0742 by Nica Renteria RN  Care Plan - Patient's Chronic Conditions and Co-Morbidity Symptoms are Monitored and Maintained or Improved: Monitor and assess patient's chronic conditions and comorbid symptoms for stability, deterioration, or improvement  3/9/2025 0048 by Aislinn Smiley RN  Outcome: Progressing     Problem: Discharge Planning  Goal: Discharge to home or other facility with appropriate resources  Recent Flowsheet Documentation  Taken 3/9/2025 0742 by Nica Renteria RN  Discharge to home or other facility with appropriate resources: Identify barriers to discharge with patient and caregiver  3/9/2025 0048 by Aislinn Smiley RN  Outcome: Progressing     Problem: Skin/Tissue Integrity  Goal: Skin integrity remains intact  Description: 1.  Monitor for areas of redness and/or skin breakdown  2.  Assess vascular access sites hourly  3.  Every 4-6 hours minimum:  Change oxygen saturation probe site  4.  Every 4-6 hours:  If on nasal continuous positive airway pressure, respiratory therapy assess nares and determine need for appliance change or resting period  3/9/2025 0048 by Aislinn Smiley RN  Outcome: Progressing     Problem: ABCDS Injury Assessment  Goal: Absence of physical injury  3/9/2025 0048 by Aislinn Smiley RN  Outcome: Progressing     Problem: Gastrointestinal - Adult  Goal: Minimal or absence of nausea and vomiting  Recent Flowsheet Documentation  Taken 3/9/2025 0742 by Nica Renteria RN  Minimal or absence of nausea and vomiting: Administer IV fluids as ordered to ensure adequate hydration  3/9/2025 0048 by

## 2025-03-09 NOTE — PROGRESS NOTES
NEUROSURGERY CONSULTATION     Montana Gerard     Chief Complaint: low back pain    HPI:   Montana Gerard is a 84 y.o.  male who has history of severe low back pain for the past 4-5 weeks since doing some manual labor in his basement.  The pain is improved with rest and worsens with activity.  He denies any radicular pain or numbness.  Lumbar MRI reveals possible discitis, he is bacteremic.      Past Medical History:   Diagnosis Date    Acute myocardial infarction, unspecified 2023    Anemia     Cholelithiasis     Depressive disorder     Dermatitis herpetiformis     GLUTEN FREE DIET    Diet-controlled diabetes mellitus (HCC) 2022    Diverticulitis     Gastritis     GERD (gastroesophageal reflux disease)     Hiatal hernia     Hyperlipidemia     Iron deficiency anemia     Non-ST elevation myocardial infarction (NSTEMI) 2023    Peptic reflux disease     ST elevation (STEMI) myocardial infarction of unspecified site 2023     Past Surgical History:   Procedure Laterality Date    CARDIAC SURGERY  2023    TAVR    CARDIOVASCULAR STRESS TEST  2004    CHOLECYSTECTOMY      COLONOSCOPY N/A 3/6/2025    COLONOSCOPY POLYPECTOMY SNARE/BIOPSY performed by Satinder Parekh MD at [facility] ENDOSCOPY    UPPER GASTROINTESTINAL ENDOSCOPY N/A 3/6/2025    ESOPHAGOGASTRODUODENOSCOPY performed by Satinder Parekh MD at [facility] ENDOSCOPY      Family History   Problem Relation Age of Onset    Stroke Father       Social History     Socioeconomic History    Marital status:      Spouse name: Not on file    Number of children: Not on file    Years of education: Not on file    Highest education level: Not on file   Occupational History    Not on file   Tobacco Use    Smoking status: Former     Current packs/day: 0.00     Average packs/day: 0.1 packs/day for 17.0 years (1.7 ttl pk-yrs)     Types: Cigarettes, Pipe     Start date: 1960     Quit date: 1977     Years since quittin.2    Smokeless tobacco:  - CNP   Given at 03/08/25 2148    menthol-zinc oxide (CALMOSEPTINE) 0.44-20.6 % ointment   Topical BID PRN Lyndsay Humphreys APRN - CNP   Given at 03/08/25 2149    sodium chloride flush 0.9 % injection 5-40 mL  5-40 mL IntraVENous 2 times per day Jorge Funk, DO   10 mL at 03/08/25 2205    sodium chloride flush 0.9 % injection 5-40 mL  5-40 mL IntraVENous PRN Jorge Funk DO        0.9 % sodium chloride infusion   IntraVENous PRN Jorge Funk DO        acetaminophen (TYLENOL) tablet 650 mg  650 mg Oral Q6H PRN Jorge Funk DO   650 mg at 03/07/25 1803    Or    acetaminophen (TYLENOL) suppository 650 mg  650 mg Rectal Q6H PRN Jorge Funk DO        pantoprazole (PROTONIX) 40 mg in sodium chloride (PF) 0.9 % 10 mL injection  40 mg IntraVENous Q12H Jorge Funk DO   40 mg at 03/08/25 2158    dofetilide (TIKOSYN) capsule 125 mcg  125 mcg Oral BID Sofia Arellano MD   125 mcg at 03/08/25 2158    latanoprost (XALATAN) 0.005 % ophthalmic solution 1 drop  1 drop Both Eyes Nightly Kevin Stone MD   1 drop at 03/08/25 2158    metoprolol succinate (TOPROL XL) extended release tablet 12.5 mg  12.5 mg Oral Daily Kevin Stone MD   12.5 mg at 03/08/25 0924    vitamin B-12 (CYANOCOBALAMIN) tablet 100 mcg  100 mcg Oral BID Kevin Stone MD   100 mcg at 03/08/25 2158    Vitamin D (CHOLECALCIFEROL) tablet 1,000 Units  1,000 Units Oral Daily Kevin Stone MD   1,000 Units at 03/08/25 0925    brimonidine (ALPHAGAN) 0.2 % ophthalmic solution 1 drop  1 drop Both Eyes BID Kevin Stone MD   1 drop at 03/08/25 2158    And    timolol (TIMOPTIC) 0.5 % ophthalmic solution 1 drop  1 drop Both Eyes BID Kevin Stone MD   1 drop at 03/08/25 2158    gabapentin (NEURONTIN) capsule 100 mg  100 mg Oral BID Jennifer Montero APRN - CNP   100 mg at 03/08/25 2158    morphine (PF) injection 2 mg  2 mg IntraVENous Q4H PRN Jennifer Montero APRN - CNP   2 mg at 03/06/25 1517    [Held by provider] dilTIAZem 100

## 2025-03-09 NOTE — PROGRESS NOTES
[facility]  [facility]  Progress Note      Chief Complaint   Patient presents with    Rectal Bleeding     (One episode this AM and has progressively gotten worse, \"dark gel color\" in stool tonight, +THINNERS)       SUBJECTIVE:    Patient is tolerating medications. No reported adverse drug reactions.  No nausea, vomiting, diarrhea.    Review of systems:  As stated above in the chief complaint, otherwise negative.    Medications:  Scheduled Meds:   cefTRIAXone (ROCEPHIN) IV  2,000 mg IntraVENous Q24H    bisacodyl  10 mg Rectal Daily    sodium chloride flush  5-40 mL IntraVENous 2 times per day    pantoprazole (PROTONIX) 40 mg in sodium chloride (PF) 0.9 % 10 mL injection  40 mg IntraVENous Q12H    dofetilide  125 mcg Oral BID    latanoprost  1 drop Both Eyes Nightly    metoprolol succinate  12.5 mg Oral Daily    vitamin B-12  100 mcg Oral BID    Vitamin D  1,000 Units Oral Daily    brimonidine  1 drop Both Eyes BID    And    timolol  1 drop Both Eyes BID    gabapentin  100 mg Oral BID    mirtazapine  15 mg Oral Nightly    atorvastatin  10 mg Oral Daily     Continuous Infusions:   lactated ringers 100 mL/hr at 258    sodium chloride      [Held by provider] dilTIAZem 100 mg in sodium chloride 0.9 % 100 mL infusion ([facility]) Stopped (25 1619)     PRN Meds:white petrolatum, menthol-zinc oxide, sodium chloride flush, sodium chloride, acetaminophen **OR** acetaminophen, morphine    OBJECTIVE:  /68   Pulse 64   Temp 98.3 °F (36.8 °C) (Axillary)   Resp 14   Ht 1.727 m (5' 8\")   Wt 66.8 kg (147 lb 4.3 oz)   SpO2 94%   BMI 22.39 kg/m²   Temp  Av.8 °F (37.1 °C)  Min: 98.2 °F (36.8 °C)  Max: 99.3 °F (37.4 °C)  Constitutional: The patient is awake, alert, and oriented.   Skin: Warm and dry. No rashes were noted. No jaundice.  HEENT: Eyes show round, and reactive pupils. Moist mucous membranes, no ulcerations, no thrush.   Neck: Supple to movements. No  \"ORG\"  No results found for: \"BLOODCULT2\", \"ORG\"  No results found for: \"WNDABS\"  No results found for: \"RESPSMEAR\"      Component Value Date/Time    MPNEUMO Not Detected 02/16/2025 1328     No results found for: \"CULTRESP\"  No results found for: \"CXCATHTIP\"  No results found for: \"BFCS\"  No results found for: \"CXSURG\"  No results found for: \"LABURIN\"  No results found for: \"MRSAC\"    Assessment:  Streptococcus Infantarius (group D streptococcus) bacteremia  Group B streptococcus bacteremia associated with GI tract  Bacteremia likely due to GI translocation  Colonoscopy showed sigmoid diverticulosis without any neoplastic growth  EGD showed small hiatal hernia  Patient presented with rectal bleeding  MRI of lumbar spine suggestive of L1-L2, L2-L3 and L5-S1 discitis, no abscess or stenosis     Plan:    DC Zosyn and vancomycin  Ceftriaxone 2 g IV daily to continue  CRP 55, ESR 34  TTE ordered, if TTE is negative for vegetations will need RINKU  Repeat blood cultures  ID will continue to follow    Hugo Wolf MD  12:31 PM  3/9/2025

## 2025-03-09 NOTE — PROGRESS NOTES
Tobias from [facility] notified of consult. Information faxed per request. Electronically signed by Debbie Darling RN on 3/9/2025 at 8:34 AM

## 2025-03-09 NOTE — PROGRESS NOTES
[facility] Hospitalist Progress Note    SYNOPSIS: Patient admitted on 3/3/2025 for GI bleed    This is a 84-year-old man with past medical history of A-fib, aortic stenosis s/p TAVR, HFpEF, prostate cancer, malleable penile implant, sinus node dysfunction, coronary artery disease, gastritis, GERD, hyperlipidemia, hypertension, diabetes mellitus, who presented to [facility] ED with concerns of rectal bleeding.  Patient was recently admitted last week for A-fib RVR, patient is on Eliquis.  Patient reported to have maroon-colored stool.  Hemoglobin at 9.3.  CT abdomen pelvis.  1. No evidence of active GI hemorrhage.  2. Resolution of linear hypodense lesion in the mid portion of the spleen.  3. Sigmoid diverticulosis without evidence of diverticulitis.  4. Multiple small bladder diverticuli likely as a result of chronic bladder  outlet obstruction.  5. Considerable fecal material within the rectum raising the possibility of  rectal fecal impaction.  CT head was ordered for altered mental status.  No acute finding.  Neurology, EP, general surgery has been consulted.  MRI brain reviewed  1. 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  No sign of hemorrhage or mass effect.  2. New mild age-appropriate atrophy and new mild small vessel ischemia.  MRI thoracic and cervical spine  1. No acute fracture or subluxation of the thoracic spine.  2. 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 This results in anterior displacement of the thoracic cord and thinning of the thoracic cord at the T3 and superior T4 levels. No abnormal cord signal is seen.  3. No additional spinal canal stenosis or neural foraminal narrowing of the thoracic spine.  4. Old mild compression fractures of T3, T5-T7, and L1.    3/4 patient slightly drowsy this morning however easily wakeful and is able to answer questions.  Updated family

## 2025-03-09 NOTE — PROGRESS NOTES
Pharmacy Consultation Note  (Antibiotic Dosing and Monitoring)    Initial consult date: 3/6/25  Consulting physician/provider: Edgardo Anaya MD  Drug: Vancomycin  Indication: Bloodstream infection    Vancomycin has been discontinued   Clinical Pharmacy to sign-off  Physician to re-consult pharmacy if future dosing is needed    Thank you for the consult,    Oralia Reddy, PharmD  PGY1 Pharmacy Practice Resident [phone]  3/9/2025 7:53 AM

## 2025-03-10 ENCOUNTER — APPOINTMENT (OUTPATIENT)
Age: 85
End: 2025-03-10
Attending: INTERNAL MEDICINE
Payer: MEDICARE

## 2025-03-10 LAB
ACB COMPLEX DNA BLD POS QL NAA+NON-PROBE: NOT DETECTED
ALBUMIN SERPL-MCNC: 2.4 G/DL (ref 3.5–5.2)
ALP SERPL-CCNC: 103 U/L (ref 40–129)
ALT SERPL-CCNC: 39 U/L (ref 0–40)
ANION GAP SERPL CALCULATED.3IONS-SCNC: 11 MMOL/L (ref 7–16)
AST SERPL-CCNC: 24 U/L (ref 0–39)
B FRAGILIS DNA BLD POS QL NAA+NON-PROBE: NOT DETECTED
BASOPHILS # BLD: 0.02 K/UL (ref 0–0.2)
BASOPHILS NFR BLD: 0 % (ref 0–2)
BILIRUB SERPL-MCNC: 0.3 MG/DL (ref 0–1.2)
BIOFIRE TEST COMMENT: ABNORMAL
BUN SERPL-MCNC: 16 MG/DL (ref 6–23)
C ALBICANS DNA BLD POS QL NAA+NON-PROBE: NOT DETECTED
C AURIS DNA BLD POS QL NAA+NON-PROBE: NOT DETECTED
C GATTII+NEOFOR DNA BLD POS QL NAA+N-PRB: NOT DETECTED
C GLABRATA DNA BLD POS QL NAA+NON-PROBE: NOT DETECTED
C KRUSEI DNA BLD POS QL NAA+NON-PROBE: NOT DETECTED
C PARAP DNA BLD POS QL NAA+NON-PROBE: NOT DETECTED
C TROPICLS DNA BLD POS QL NAA+NON-PROBE: NOT DETECTED
CALCIUM SERPL-MCNC: 8 MG/DL (ref 8.6–10.2)
CHLORIDE SERPL-SCNC: 105 MMOL/L (ref 98–107)
CO2 SERPL-SCNC: 21 MMOL/L (ref 22–29)
CREAT SERPL-MCNC: 0.7 MG/DL (ref 0.7–1.2)
E CLOAC COMP DNA BLD POS NAA+NON-PROBE: NOT DETECTED
E COLI DNA BLD POS QL NAA+NON-PROBE: NOT DETECTED
E FAECALIS DNA BLD POS QL NAA+NON-PROBE: NOT DETECTED
E FAECIUM DNA BLD POS QL NAA+NON-PROBE: NOT DETECTED
ECHO AO ASC DIAM: 2.8 CM
ECHO AO ASCENDING AORTA INDEX: 1.56 CM/M2
ECHO AV AREA PEAK VELOCITY: 1.4 CM2
ECHO AV AREA VTI: 1.7 CM2
ECHO AV AREA/BSA PEAK VELOCITY: 0.8 CM2/M2
ECHO AV AREA/BSA VTI: 0.9 CM2/M2
ECHO AV CUSP MM: 1.7 CM
ECHO AV MEAN GRADIENT: 19 MMHG
ECHO AV MEAN VELOCITY: 2 M/S
ECHO AV PEAK GRADIENT: 37 MMHG
ECHO AV PEAK VELOCITY: 3 M/S
ECHO AV VELOCITY RATIO: 0.47
ECHO AV VTI: 52.5 CM
ECHO BSA: 1.79 M2
ECHO EST RA PRESSURE: 3 MMHG
ECHO LA DIAMETER INDEX: 2.57 CM/M2
ECHO LA DIAMETER: 4.6 CM
ECHO LA VOL A-L A2C: 54 ML (ref 18–58)
ECHO LA VOL A-L A4C: 35 ML (ref 18–58)
ECHO LA VOL BP: 42 ML (ref 18–58)
ECHO LA VOL MOD A2C: 52 ML (ref 18–58)
ECHO LA VOL MOD A4C: 32 ML (ref 18–58)
ECHO LA VOL/BSA BIPLANE: 23 ML/M2 (ref 16–34)
ECHO LA VOLUME AREA LENGTH: 45 ML
ECHO LA VOLUME INDEX A-L A2C: 30 ML/M2 (ref 16–34)
ECHO LA VOLUME INDEX A-L A4C: 20 ML/M2 (ref 16–34)
ECHO LA VOLUME INDEX AREA LENGTH: 25 ML/M2 (ref 16–34)
ECHO LA VOLUME INDEX MOD A2C: 29 ML/M2 (ref 16–34)
ECHO LA VOLUME INDEX MOD A4C: 18 ML/M2 (ref 16–34)
ECHO LV EF PHYSICIAN: 60 %
ECHO LV FRACTIONAL SHORTENING: 33 % (ref 28–44)
ECHO LV INTERNAL DIMENSION DIASTOLE INDEX: 2.57 CM/M2
ECHO LV INTERNAL DIMENSION DIASTOLIC: 4.6 CM (ref 4.2–5.9)
ECHO LV INTERNAL DIMENSION SYSTOLIC INDEX: 1.73 CM/M2
ECHO LV INTERNAL DIMENSION SYSTOLIC: 3.1 CM
ECHO LV IVSD: 1.5 CM (ref 0.6–1)
ECHO LV MASS 2D: 230.2 G (ref 88–224)
ECHO LV MASS INDEX 2D: 128.6 G/M2 (ref 49–115)
ECHO LV POSTERIOR WALL DIASTOLIC: 1.1 CM (ref 0.6–1)
ECHO LV RELATIVE WALL THICKNESS RATIO: 0.48
ECHO LVOT AREA: 3.1 CM2
ECHO LVOT AV VTI INDEX: 0.57
ECHO LVOT DIAM: 2 CM
ECHO LVOT MEAN GRADIENT: 4 MMHG
ECHO LVOT PEAK GRADIENT: 8 MMHG
ECHO LVOT PEAK VELOCITY: 1.4 M/S
ECHO LVOT STROKE VOLUME INDEX: 52.5 ML/M2
ECHO LVOT SV: 93.9 ML
ECHO LVOT VTI: 29.9 CM
ECHO MV "A" WAVE DURATION: 128.5 MSEC
ECHO MV A VELOCITY: 0.87 M/S
ECHO MV AREA PHT: 4.2 CM2
ECHO MV AREA VTI: 3.5 CM2
ECHO MV E DECELERATION TIME (DT): 397.4 MS
ECHO MV E VELOCITY: 0.59 M/S
ECHO MV E/A RATIO: 0.68
ECHO MV LVOT VTI INDEX: 0.89
ECHO MV MAX VELOCITY: 0.9 M/S
ECHO MV MEAN GRADIENT: 1 MMHG
ECHO MV MEAN VELOCITY: 0.5 M/S
ECHO MV PEAK GRADIENT: 4 MMHG
ECHO MV PRESSURE HALF TIME (PHT): 52.3 MS
ECHO MV VTI: 26.5 CM
ECHO PV MAX VELOCITY: 1 M/S
ECHO PV MEAN GRADIENT: 2 MMHG
ECHO PV MEAN VELOCITY: 0.6 M/S
ECHO PV PEAK GRADIENT: 4 MMHG
ECHO PV VTI: 20.5 CM
ECHO RIGHT VENTRICULAR SYSTOLIC PRESSURE (RVSP): 20 MMHG
ECHO RV INTERNAL DIMENSION: 3.3 CM
ECHO TV REGURGITANT MAX VELOCITY: 2.09 M/S
ECHO TV REGURGITANT PEAK GRADIENT: 18 MMHG
ENTEROBACTERALES DNA BLD POS NAA+N-PRB: NOT DETECTED
EOSINOPHIL # BLD: 0.14 K/UL (ref 0.05–0.5)
EOSINOPHILS RELATIVE PERCENT: 2 % (ref 0–6)
ERYTHROCYTE [DISTWIDTH] IN BLOOD BY AUTOMATED COUNT: 15.2 % (ref 11.5–15)
GFR, ESTIMATED: >90 ML/MIN/1.73M2
GLUCOSE SERPL-MCNC: 104 MG/DL (ref 74–99)
GP B STREP DNA BLD POS QL NAA+NON-PROBE: NOT DETECTED
HAEM INFLU DNA BLD POS QL NAA+NON-PROBE: NOT DETECTED
HCT VFR BLD AUTO: 23.9 % (ref 37–54)
HGB BLD-MCNC: 7.4 G/DL (ref 12.5–16.5)
IMM GRANULOCYTES # BLD AUTO: 0.08 K/UL (ref 0–0.58)
IMM GRANULOCYTES NFR BLD: 1 % (ref 0–5)
INR PPP: 1.2
K OXYTOCA DNA BLD POS QL NAA+NON-PROBE: NOT DETECTED
KLEBSIELLA SP DNA BLD POS QL NAA+NON-PRB: NOT DETECTED
KLEBSIELLA SP DNA BLD POS QL NAA+NON-PRB: NOT DETECTED
L MONOCYTOG DNA BLD POS QL NAA+NON-PROBE: NOT DETECTED
LYMPHOCYTES NFR BLD: 1.09 K/UL (ref 1.5–4)
LYMPHOCYTES RELATIVE PERCENT: 12 % (ref 20–42)
MCH RBC QN AUTO: 29.1 PG (ref 26–35)
MCHC RBC AUTO-ENTMCNC: 31 G/DL (ref 32–34.5)
MCV RBC AUTO: 94.1 FL (ref 80–99.9)
MICROORGANISM SPEC CULT: ABNORMAL
MICROORGANISM/AGENT SPEC: ABNORMAL
MONOCYTES NFR BLD: 0.41 K/UL (ref 0.1–0.95)
MONOCYTES NFR BLD: 5 % (ref 2–12)
N MEN DNA BLD POS QL NAA+NON-PROBE: NOT DETECTED
NEUTROPHILS NFR BLD: 81 % (ref 43–80)
NEUTS SEG NFR BLD: 7.32 K/UL (ref 1.8–7.3)
P AERUGINOSA DNA BLD POS NAA+NON-PROBE: NOT DETECTED
PLATELET # BLD AUTO: 116 K/UL (ref 130–450)
PMV BLD AUTO: 9.5 FL (ref 7–12)
POTASSIUM SERPL-SCNC: 3.5 MMOL/L (ref 3.5–5)
PROT SERPL-MCNC: 4.7 G/DL (ref 6.4–8.3)
PROTEUS SP DNA BLD POS QL NAA+NON-PROBE: NOT DETECTED
PROTHROMBIN TIME: 12.9 SEC (ref 9.3–12.4)
RBC # BLD AUTO: 2.54 M/UL (ref 3.8–5.8)
S AUREUS DNA BLD POS QL NAA+NON-PROBE: NOT DETECTED
S AUREUS+CONS DNA BLD POS NAA+NON-PROBE: NOT DETECTED
S EPIDERMIDIS DNA BLD POS QL NAA+NON-PRB: NOT DETECTED
S LUGDUNENSIS DNA BLD POS QL NAA+NON-PRB: NOT DETECTED
S MALTOPHILIA DNA BLD POS QL NAA+NON-PRB: NOT DETECTED
S MARCESCENS DNA BLD POS NAA+NON-PROBE: NOT DETECTED
S PNEUM DNA BLD POS QL NAA+NON-PROBE: NOT DETECTED
S PYO DNA BLD POS QL NAA+NON-PROBE: NOT DETECTED
SALMONELLA DNA BLD POS QL NAA+NON-PROBE: NOT DETECTED
SEND OUT REPORT: NORMAL
SERVICE CMNT-IMP: ABNORMAL
SODIUM SERPL-SCNC: 137 MMOL/L (ref 132–146)
SPECIMEN DESCRIPTION: ABNORMAL
STREPTOCOCCUS DNA BLD POS NAA+NON-PROBE: DETECTED
TEST NAME: NORMAL
WBC OTHER # BLD: 9.1 K/UL (ref 4.5–11.5)

## 2025-03-10 PROCEDURE — 2500000003 HC RX 250 WO HCPCS: Performed by: INTERNAL MEDICINE

## 2025-03-10 PROCEDURE — 99232 SBSQ HOSP IP/OBS MODERATE 35: CPT | Performed by: INTERNAL MEDICINE

## 2025-03-10 PROCEDURE — 80053 COMPREHEN METABOLIC PANEL: CPT

## 2025-03-10 PROCEDURE — 93306 TTE W/DOPPLER COMPLETE: CPT | Performed by: INTERNAL MEDICINE

## 2025-03-10 PROCEDURE — 85025 COMPLETE CBC W/AUTO DIFF WBC: CPT

## 2025-03-10 PROCEDURE — 6370000000 HC RX 637 (ALT 250 FOR IP): Performed by: NURSE PRACTITIONER

## 2025-03-10 PROCEDURE — 2580000003 HC RX 258: Performed by: INTERNAL MEDICINE

## 2025-03-10 PROCEDURE — 36415 COLL VENOUS BLD VENIPUNCTURE: CPT

## 2025-03-10 PROCEDURE — 6360000002 HC RX W HCPCS: Performed by: INTERNAL MEDICINE

## 2025-03-10 PROCEDURE — 6370000000 HC RX 637 (ALT 250 FOR IP): Performed by: STUDENT IN AN ORGANIZED HEALTH CARE EDUCATION/TRAINING PROGRAM

## 2025-03-10 PROCEDURE — 6360000002 HC RX W HCPCS: Performed by: FAMILY MEDICINE

## 2025-03-10 PROCEDURE — 2500000003 HC RX 250 WO HCPCS: Performed by: FAMILY MEDICINE

## 2025-03-10 PROCEDURE — 6370000000 HC RX 637 (ALT 250 FOR IP): Performed by: PSYCHIATRY & NEUROLOGY

## 2025-03-10 PROCEDURE — 85610 PROTHROMBIN TIME: CPT

## 2025-03-10 PROCEDURE — 93306 TTE W/DOPPLER COMPLETE: CPT

## 2025-03-10 PROCEDURE — 6370000000 HC RX 637 (ALT 250 FOR IP): Performed by: INTERNAL MEDICINE

## 2025-03-10 PROCEDURE — L0464 TLSO 4MOD SACRO-SCAP PRE: HCPCS

## 2025-03-10 PROCEDURE — 2060000000 HC ICU INTERMEDIATE R&B

## 2025-03-10 PROCEDURE — 2580000003 HC RX 258: Performed by: FAMILY MEDICINE

## 2025-03-10 PROCEDURE — 6370000000 HC RX 637 (ALT 250 FOR IP)

## 2025-03-10 RX ORDER — SODIUM CHLORIDE 0.9 % (FLUSH) 0.9 %
5-40 SYRINGE (ML) INJECTION PRN
Status: DISCONTINUED | OUTPATIENT
Start: 2025-03-10 | End: 2025-03-14 | Stop reason: HOSPADM

## 2025-03-10 RX ORDER — SODIUM CHLORIDE 0.9 % (FLUSH) 0.9 %
5-40 SYRINGE (ML) INJECTION EVERY 12 HOURS SCHEDULED
Status: DISCONTINUED | OUTPATIENT
Start: 2025-03-10 | End: 2025-03-14 | Stop reason: HOSPADM

## 2025-03-10 RX ORDER — LIDOCAINE HYDROCHLORIDE 10 MG/ML
50 INJECTION, SOLUTION EPIDURAL; INFILTRATION; INTRACAUDAL; PERINEURAL ONCE
Status: DISCONTINUED | OUTPATIENT
Start: 2025-03-10 | End: 2025-03-14 | Stop reason: HOSPADM

## 2025-03-10 RX ORDER — SODIUM CHLORIDE 9 MG/ML
INJECTION, SOLUTION INTRAVENOUS PRN
Status: DISCONTINUED | OUTPATIENT
Start: 2025-03-10 | End: 2025-03-14 | Stop reason: HOSPADM

## 2025-03-10 RX ADMIN — SODIUM CHLORIDE, SODIUM LACTATE, POTASSIUM CHLORIDE, AND CALCIUM CHLORIDE: .6; .31; .03; .02 INJECTION, SOLUTION INTRAVENOUS at 17:39

## 2025-03-10 RX ADMIN — SODIUM CHLORIDE, PRESERVATIVE FREE 10 ML: 5 INJECTION INTRAVENOUS at 20:32

## 2025-03-10 RX ADMIN — GABAPENTIN 100 MG: 100 CAPSULE ORAL at 20:31

## 2025-03-10 RX ADMIN — BRIMONIDINE TARTRATE 1 DROP: 2 SOLUTION/ DROPS OPHTHALMIC at 08:30

## 2025-03-10 RX ADMIN — DOFETILIDE 125 MCG: 0.12 CAPSULE ORAL at 08:29

## 2025-03-10 RX ADMIN — PANTOPRAZOLE SODIUM 40 MG: 40 INJECTION, POWDER, FOR SOLUTION INTRAVENOUS at 20:32

## 2025-03-10 RX ADMIN — VITAM B12 100 MCG: 100 TAB at 08:30

## 2025-03-10 RX ADMIN — PETROLATUM: 420 OINTMENT TOPICAL at 20:33

## 2025-03-10 RX ADMIN — Medication 1000 UNITS: at 08:29

## 2025-03-10 RX ADMIN — TIMOLOL MALEATE 1 DROP: 5 SOLUTION/ DROPS OPHTHALMIC at 08:30

## 2025-03-10 RX ADMIN — VITAM B12 100 MCG: 100 TAB at 17:39

## 2025-03-10 RX ADMIN — TIMOLOL MALEATE 1 DROP: 5 SOLUTION/ DROPS OPHTHALMIC at 20:31

## 2025-03-10 RX ADMIN — BRIMONIDINE TARTRATE 1 DROP: 2 SOLUTION/ DROPS OPHTHALMIC at 20:32

## 2025-03-10 RX ADMIN — METOPROLOL SUCCINATE 12.5 MG: 25 TABLET, EXTENDED RELEASE ORAL at 08:29

## 2025-03-10 RX ADMIN — DOFETILIDE 125 MCG: 0.12 CAPSULE ORAL at 20:31

## 2025-03-10 RX ADMIN — MIRTAZAPINE 15 MG: 15 TABLET, FILM COATED ORAL at 20:31

## 2025-03-10 RX ADMIN — WATER 2000 MG: 1 INJECTION INTRAMUSCULAR; INTRAVENOUS; SUBCUTANEOUS at 14:23

## 2025-03-10 RX ADMIN — LATANOPROST 1 DROP: 50 SOLUTION OPHTHALMIC at 20:32

## 2025-03-10 RX ADMIN — PANTOPRAZOLE SODIUM 40 MG: 40 INJECTION, POWDER, FOR SOLUTION INTRAVENOUS at 08:29

## 2025-03-10 RX ADMIN — ATORVASTATIN CALCIUM 10 MG: 10 TABLET, FILM COATED ORAL at 08:30

## 2025-03-10 RX ADMIN — SODIUM CHLORIDE, PRESERVATIVE FREE 10 ML: 5 INJECTION INTRAVENOUS at 08:30

## 2025-03-10 RX ADMIN — ANORECTAL OINTMENT: 15.7; .44; 24; 20.6 OINTMENT TOPICAL at 20:33

## 2025-03-10 RX ADMIN — GABAPENTIN 100 MG: 100 CAPSULE ORAL at 08:30

## 2025-03-10 ASSESSMENT — PAIN SCALES - GENERAL: PAINLEVEL_OUTOF10: 0

## 2025-03-10 NOTE — PROGRESS NOTES
Department of Neurosurgery  Progress Note    CHIEF COMPLAINT: lumbar discitis    SUBJECTIVE:  mimi LBP ok    REVIEW OF SYSTEMS :  Constitutional: Negative for chills and fever.    Neurological: Negative for dizziness, tremors and speech change.     OBJECTIVE:   VITALS:  BP (!) 141/67   Pulse 64   Temp 97.5 °F (36.4 °C) (Infrared)   Resp 21   Ht 1.727 m (5' 8\")   Wt 66.8 kg (147 lb 4.3 oz)   SpO2 94%   BMI 22.39 kg/m²   PHYSICAL:  CONSTITUTIONAL:  awake, alert, cooperative, no apparent distress, and appears stated age    DATA:  CBC:   Lab Results   Component Value Date/Time    WBC 9.1 03/10/2025 11:55 AM    RBC 2.54 03/10/2025 11:55 AM    HGB 7.4 03/10/2025 11:55 AM    HCT 23.9 03/10/2025 11:55 AM    MCV 94.1 03/10/2025 11:55 AM    MCH 29.1 03/10/2025 11:55 AM    MCHC 31.0 03/10/2025 11:55 AM    RDW 15.2 03/10/2025 11:55 AM     03/10/2025 11:55 AM    MPV 9.5 03/10/2025 11:55 AM     BMP:    Lab Results   Component Value Date/Time     03/10/2025 11:55 AM    K 3.5 03/10/2025 11:55 AM     03/10/2025 11:55 AM    CO2 21 03/10/2025 11:55 AM    BUN 16 03/10/2025 11:55 AM    CREATININE 0.7 03/10/2025 11:55 AM    CALCIUM 8.0 03/10/2025 11:55 AM    GFRAA >60 10/11/2022 01:10 PM    LABGLOM >90 03/10/2025 11:55 AM    LABGLOM >60 03/04/2024 12:17 PM    GLUCOSE 104 03/10/2025 11:55 AM     PT/INR:    Lab Results   Component Value Date/Time    PROTIME 15.7 03/04/2025 03:50 AM    INR 1.4 03/04/2025 03:50 AM     PTT:    Lab Results   Component Value Date/Time    APTT 39.6 03/04/2025 03:50 AM   [APTT}    Current Inpatient Medications  Current Facility-Administered Medications: sodium chloride flush 0.9 % injection 5-40 mL, 5-40 mL, IntraVENous, 2 times per day  sodium chloride flush 0.9 % injection 5-40 mL, 5-40 mL, IntraVENous, PRN  0.9 % sodium chloride infusion, , IntraVENous, PRN  lidocaine PF 1 % injection 50 mg, 50 mg, IntraDERmal, Once  cefTRIAXone (ROCEPHIN) 2,000 mg in sterile water 20 mL IV syringe,

## 2025-03-10 NOTE — CONSULTS
03/08/25 2148 New Bag at 03/08/25 2148    white petrolatum ointment   Topical BID PRN Lyndsay Humphreys APRN - CNP   Given at 03/08/25 2148    menthol-zinc oxide (CALMOSEPTINE) 0.44-20.6 % ointment   Topical BID PRN Lyndsay Humphreys APRN - CNP   Given at 03/08/25 2149    sodium chloride flush 0.9 % injection 5-40 mL  5-40 mL IntraVENous 2 times per day Jorge Funk, DO   10 mL at 03/08/25 2205    sodium chloride flush 0.9 % injection 5-40 mL  5-40 mL IntraVENous PRN Jorge Funk DO        0.9 % sodium chloride infusion   IntraVENous PRN Jorge Funk DO        acetaminophen (TYLENOL) tablet 650 mg  650 mg Oral Q6H PRN Jorge Funk DO   650 mg at 03/07/25 1803     Or    acetaminophen (TYLENOL) suppository 650 mg  650 mg Rectal Q6H PRN Jorge Funk DO        pantoprazole (PROTONIX) 40 mg in sodium chloride (PF) 0.9 % 10 mL injection  40 mg IntraVENous Q12H Jorge Funk DO   40 mg at 03/08/25 2158    dofetilide (TIKOSYN) capsule 125 mcg  125 mcg Oral BID Sofia Arellano MD   125 mcg at 03/08/25 2158    latanoprost (XALATAN) 0.005 % ophthalmic solution 1 drop  1 drop Both Eyes Nightly Kevin Stone MD   1 drop at 03/08/25 2158    metoprolol succinate (TOPROL XL) extended release tablet 12.5 mg  12.5 mg Oral Daily Kevin Stone MD   12.5 mg at 03/08/25 0924    vitamin B-12 (CYANOCOBALAMIN) tablet 100 mcg  100 mcg Oral BID Kevin Stone MD   100 mcg at 03/08/25 2158    Vitamin D (CHOLECALCIFEROL) tablet 1,000 Units  1,000 Units Oral Daily Kevin Stone MD   1,000 Units at 03/08/25 0925    brimonidine (ALPHAGAN) 0.2 % ophthalmic solution 1 drop  1 drop Both Eyes BID Kevin Stone MD   1 drop at 03/08/25 2158     And    timolol (TIMOPTIC) 0.5 % ophthalmic solution 1 drop  1 drop Both Eyes BID Kevin Stone MD   1 drop at 03/08/25 2158    gabapentin (NEURONTIN) capsule 100 mg  100 mg Oral BID Jennifer Montero APRN - CNP   100 mg at 03/08/25 2158    morphine (PF) injection 2 mg  2  mg IntraVENous Q4H PRN MaheshJennifer covarrubias, APRN - CNP   2 mg at 03/06/25 1517    [Held by provider] dilTIAZem 100 mg in sodium chloride 0.9 % 100 mL infusion ([facility])  2.5-15 mg/hr IntraVENous Continuous MichelleLd vernon KEYANA Rajan - CNP   Stopped at 03/04/25 1619    mirtazapine (REMERON) tablet 15 mg  15 mg Oral Nightly Placido Walters MD   15 mg at 03/08/25 2158    atorvastatin (LIPITOR) tablet 10 mg  10 mg Oral Daily Placido Walters MD   10 mg at 03/08/25 0924         Allergies:    Ambrosia trifida (tall ragweed) allergy skin test and Gluten         Review of Systems   Constitutional: Negative.    HENT: Negative.     Eyes: Negative.    Respiratory: Negative.     Cardiovascular: Negative.    Gastrointestinal: Negative.    Endocrine: Negative.    Genitourinary: Negative.    Musculoskeletal:  Positive for back pain.   Skin: Negative.    Allergic/Immunologic: Negative.    Neurological: Negative.    Hematological: Negative.    Psychiatric/Behavioral: Negative.           Physical Exam      /60   Pulse 64   Temp 99.3 °F (37.4 °C) (Axillary)   Resp 18   Ht 1.727 m (5' 8\")   Wt 66.8 kg (147 lb 4.3 oz)   SpO2 93%   BMI 22.39 kg/m²    Physical Exam   Constitutional: Appears well-nourished.   Head: Normocephalic and atraumatic.   Eyes: EOM are normal. Pupils are equal, round, and reactive to light.   Neck: Normal range of motion. No tracheal deviation present.   Cardiovascular: Normal rate.    Pulmonary/Chest: No stridor.   Abdominal: No distension.   Neurological:   Oriented to person, place, and time.   CN 3-12 intact  Motor strength 4-/5 in LE, pain noted  Sensation intact to light touch   Skin: Skin is warm and dry.   Psychiatric: Thought content normal.       Assessment:   84 year old male with 4-5 week history of LBP and MRI suggestive of L1-2, L2-3, and L5-S1 discitis.  No significant abscess or stenosis.  bacteremia     Plan:  AB per ID  No surgical intervention indicated  Custom fit TLSO ordered to be

## 2025-03-10 NOTE — CARE COORDINATION
Echo pending, may need RINKU to rule out vegitiation, neurology plans for custom TLSO. IV rocephin continues Q24, will need duration prior to discharge. Anders following. Insurance waiving precert request until 3/14. Ambulance on soft chart.     For questions I can be reached at [phone]. LUCINA Ravi

## 2025-03-10 NOTE — PROGRESS NOTES
[facility] Hospitalist Progress Note    SYNOPSIS: Patient admitted on 3/3/2025 for GI bleed    This is a 84-year-old man with past medical history of A-fib, aortic stenosis s/p TAVR, HFpEF, prostate cancer, malleable penile implant, sinus node dysfunction, coronary artery disease, gastritis, GERD, hyperlipidemia, hypertension, diabetes mellitus, who presented to [facility] ED with concerns of rectal bleeding.  Patient was recently admitted last week for A-fib RVR, patient is on Eliquis.  Patient reported to have maroon-colored stool.  Hemoglobin at 9.3.  CT abdomen pelvis.  1. No evidence of active GI hemorrhage.  2. Resolution of linear hypodense lesion in the mid portion of the spleen.  3. Sigmoid diverticulosis without evidence of diverticulitis.  4. Multiple small bladder diverticuli likely as a result of chronic bladder  outlet obstruction.  5. Considerable fecal material within the rectum raising the possibility of  rectal fecal impaction.  CT head was ordered for altered mental status.  No acute finding.  Neurology, EP, general surgery has been consulted.  MRI brain reviewed  1. 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  No sign of hemorrhage or mass effect.  2. New mild age-appropriate atrophy and new mild small vessel ischemia.  MRI thoracic and cervical spine  1. No acute fracture or subluxation of the thoracic spine.  2. 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 This results in anterior displacement of the thoracic cord and thinning of the thoracic cord at the T3 and superior T4 levels. No abnormal cord signal is seen.  3. No additional spinal canal stenosis or neural foraminal narrowing of the thoracic spine.  4. Old mild compression fractures of T3, T5-T7, and L1.    3/4 patient slightly drowsy this morning however easily wakeful and is able to answer questions.  Updated family  Conjunctivae/corneas clear.   Neck: Supple no meningismus. No jugular venous distention.   Respiratory: symmetrical; clear to auscultation bilaterally; no wheezes; no rhonchi; no rales  Cardiovascular: rhythm regular; rate controlled; no murmurs  Abdomen: Soft, some tenderness to palpation, nondistended, FMS in place   Extremities:  peripheral pulses present; no peripheral edema; no ulcers  Musculoskeletal: No clubbing, cyanosis, no bilateral lower extremity edema. Brisk capillary refill.   Skin:  No rashes  on visible skin  Neurologic:, alert and following commands     ASSESSMENT and PLAN:    Assessment  Principal Problem:    GI bleed  Active Problems:    Persistent atrial fibrillation (HCC)    Severe protein-calorie malnutrition    Palliative care encounter    Acute ischemic multifocal multiple vascular territories stroke (HCC)    Spinal arachnoid cyst    Fever    Bacteremia  Resolved Problems:    * No resolved hospital problems. *      GI bleed  A-fib RVR  History of prostate cancer  CVA  Small acute embolic infarct of the anterior mid left temporal lobe in the anterior left PCA territory  Sigmoid diverticulosis  History of hematuria  Physical deconditioning  Ambulatory dysfunction  Spinal arachnoid cyst  Fever 102.2    Plan  GI bleeding with acute blood loss anemia  -Monitor on telemetry  -Continue to hold anticoagulation  -Imaging reviewed as above  -Required extensive bowel prep  -Continue PPI twice daily  General Surgery consult appreciated planning EGD and C-scope 3/6 demonstrating hiatal hernia, colon polyp which was excised.  May restart Eliquis 3/8.  Iron studies consistent with anemia of chronic disease    History of prostate CA bone scan no evidence of osseous disease    Paroxysmal atrial fibrillation-On Cardizem drip, dofetilide, Toprol-XL  -Monitor H&H, transfuse as indicated, keep hemoglobin more than 7 g/dL  -PT/OT/SLP eval  -Palliative care following, CODE STATUS Limited  -Neurology following,

## 2025-03-10 NOTE — PROGRESS NOTES
[facility]  [facility]  Progress Note      Chief Complaint   Patient presents with    Rectal Bleeding     (One episode this AM and has progressively gotten worse, \"dark gel color\" in stool tonight, +THINNERS)       SUBJECTIVE:    Patient is tolerating medications. No reported adverse drug reactions.  No nausea, vomiting, diarrhea.    Review of systems:  As stated above in the chief complaint, otherwise negative.    Medications:  Scheduled Meds:   cefTRIAXone (ROCEPHIN) IV  2,000 mg IntraVENous Q24H    bisacodyl  10 mg Rectal Daily    sodium chloride flush  5-40 mL IntraVENous 2 times per day    pantoprazole (PROTONIX) 40 mg in sodium chloride (PF) 0.9 % 10 mL injection  40 mg IntraVENous Q12H    dofetilide  125 mcg Oral BID    latanoprost  1 drop Both Eyes Nightly    metoprolol succinate  12.5 mg Oral Daily    vitamin B-12  100 mcg Oral BID    Vitamin D  1,000 Units Oral Daily    brimonidine  1 drop Both Eyes BID    And    timolol  1 drop Both Eyes BID    gabapentin  100 mg Oral BID    mirtazapine  15 mg Oral Nightly    atorvastatin  10 mg Oral Daily     Continuous Infusions:   lactated ringers 100 mL/hr at 258    sodium chloride      [Held by provider] dilTIAZem 100 mg in sodium chloride 0.9 % 100 mL infusion ([facility]) Stopped (25 1619)     PRN Meds:white petrolatum, menthol-zinc oxide, sodium chloride flush, sodium chloride, acetaminophen **OR** acetaminophen, morphine    OBJECTIVE:  BP (!) 141/67   Pulse 64   Temp 97.5 °F (36.4 °C) (Infrared)   Resp 21   Ht 1.727 m (5' 8\")   Wt 66.8 kg (147 lb 4.3 oz)   SpO2 94%   BMI 22.39 kg/m²   Temp  Av.7 °F (36.5 °C)  Min: 97.4 °F (36.3 °C)  Max: 98.2 °F (36.8 °C)  Constitutional: The patient is awake, alert, and oriented.   Skin: Warm and dry. No rashes were noted. No jaundice.  HEENT: Eyes show round, and reactive pupils. Moist mucous membranes, no ulcerations, no thrush.   Neck: Supple to movements. No  results found for: \"BLOODCULT2\", \"ORG\"  No results found for: \"WNDABS\"  No results found for: \"RESPSMEAR\"      Component Value Date/Time    MPNEUMO Not Detected 02/16/2025 1328     No results found for: \"CULTRESP\"  No results found for: \"CXCATHTIP\"  No results found for: \"BFCS\"  No results found for: \"CXSURG\"  No results found for: \"LABURIN\"  No results found for: \"MRSAC\"    Assessment:  Streptococcus Infantarius (group D streptococcus) bacteremia  Group D streptococcus bacteremia associated with GI tract  Bacteremia likely due to GI translocation  Colonoscopy showed sigmoid diverticulosis without any neoplastic growth  EGD showed small hiatal hernia  Patient presented with rectal bleeding  MRI of lumbar spine suggestive of L1-L2, L2-L3 and L5-S1 discitis, no abscess or stenosis     Plan:    Ceftriaxone 2 g IV daily to continue  CRP 55, ESR 34  TTE ordered, if TTE is negative for vegetations will need RINKU  PICC Ordered   Repeat blood cultures  ID will continue to follow    Hugo Wolf MD  3:02 PM  3/10/2025

## 2025-03-10 NOTE — PROGRESS NOTES
Comprehensive Nutrition Assessment    Type and Reason for Visit:  Reassess    Nutrition Recommendations/Plan:   Continue Diet.    Will Continue Current ONS and monitor.       Malnutrition Assessment:  Malnutrition Status:  Severe malnutrition (03/04/25 1032)    Context:  Chronic Illness     Findings of the 6 clinical characteristics of malnutrition:  Energy Intake:  75% or less estimated energy requirements for 1 month or longer  Weight Loss:  Greater than 7.5% over 3 months (~16% loss x ~3 months)     Body Fat Loss:   (moderate) Orbital, Triceps   Muscle Mass Loss:   (moderate) Temples (temporalis), Clavicles (pectoralis & deltoids)  Fluid Accumulation:  No fluid accumulation     Strength:  Not Performed    Nutrition Assessment:    Pt adm from SNF w/ abd pain/rectal bleed x ~1d pta w/ progressive weakness/AMS/poor appetite/intake x ~10d, s/p recent adm for AF w/ RVR/BRBPR.  PMHx HTN, HLD, CAD/NSTEMI, DM, VERN, GERD, hx prostate CA s/p XRT seeding, mod malnutrition (2/18).  Adm w/ GIB, sigmoid diverticulosis, embolic infarcts, FTT as well as suspected spinal mets vs paraneoplastic syndrome.  Now s/p EGD/C-scope revealing small HH/sigmoid diverticulosis 3/6.  Remains at risk d/t +Severe Malnutrition w/ subjective ~30# wt loss/poor intake pta.  Appetite/intake remains poor at times.  Will Continue Current ONS and monitor.    Nutrition Related Findings:    A&Ox3, confused at times, poor dentition, Abd/BS WDL, trace/+1 edema, +FMS, -I/O's, elevated Cl/BUN, hypocalcemia Wound Type: Skin Tears       Current Nutrition Intake & Therapies:    Average Meal Intake: 26-50%  Average Supplements Intake: Unable to assess  ADULT DIET; Regular  ADULT ORAL NUTRITION SUPPLEMENT; Breakfast, Dinner; Wound Healing Oral Supplement  ADULT ORAL NUTRITION SUPPLEMENT; Breakfast, Dinner; Diabetic Oral Supplement    Anthropometric Measures:  Height: 172.7 cm (5' 8\")  Ideal Body Weight (IBW): 154 lbs (70 kg)    Admission Body Weight: 66.7 kg  (147 lb) (actual 3/3)  Current Body Weight: 66.7 kg (147 lb) (actual 3/3),   IBW. Weight Source: Bed scale  Current BMI (kg/m2): 22.4  Usual Body Weight: 78.9 kg (174 lb) (actual 12/13/24 per EMR: ~16% loss x ~3 months)     % Weight Change (Calculated): -15.5  Weight Adjustment For: No Adjustment                 BMI Categories: Normal Weight (BMI 22.0 to 24.9) age over 65    Estimated Daily Nutrient Needs:  Energy Requirements Based On: Formula  Weight Used for Energy Requirements: Current  Energy (kcal/day): MSJx1.2SF per CBW= 8476-5630  Weight Used for Protein Requirements: Current  Protein (g/day): 1.3-1.5gm/kg CBW=   Method Used for Fluid Requirements: 1 ml/kcal  Fluid (ml/day): 0783-8543    Nutrition Diagnosis:   Severe malnutrition, in context of chronic illness related to catabolic illness (2/2 FTT w/ suspected mets) as evidenced by criteria as identified in malnutrition assessment    Nutrition Interventions:   Food and/or Nutrient Delivery: Continue Current Diet, Continue Oral Nutrition Supplement  Nutrition Education/Counseling: Education/Counseling not indicated  Coordination of Nutrition Care: Continue to monitor while inpatient       Goals:  Goals: PO intake 75% or greater, by next RD assessment  Type of Goal: Continue current goal  Previous Goal Met: Progressing toward Goal(s)    Nutrition Monitoring and Evaluation:   Behavioral-Environmental Outcomes: None Identified  Food/Nutrient Intake Outcomes: Food and Nutrient Intake, Supplement Intake  Physical Signs/Symptoms Outcomes: Biochemical Data, Chewing or Swallowing, GI Status, Fluid Status or Edema, Nutrition Focused Physical Findings, Skin, Weight    Discharge Planning:    Continue Oral Nutrition Supplement     Satinder Vazquez RD, LD  Contact: ext [phone]

## 2025-03-10 NOTE — PROGRESS NOTES
IV team notified of PICC line order.     Electronically signed by Omega Osborne RN on 3/10/2025 at 3:16 PM

## 2025-03-11 ENCOUNTER — APPOINTMENT (OUTPATIENT)
Dept: INTERVENTIONAL RADIOLOGY/VASCULAR | Age: 85
End: 2025-03-11
Payer: MEDICARE

## 2025-03-11 PROCEDURE — 2709999900 IR BIOPSY DEEP BONE

## 2025-03-11 PROCEDURE — 2580000003 HC RX 258: Performed by: INTERNAL MEDICINE

## 2025-03-11 PROCEDURE — 2580000003 HC RX 258: Performed by: FAMILY MEDICINE

## 2025-03-11 PROCEDURE — 6360000002 HC RX W HCPCS: Performed by: INTERNAL MEDICINE

## 2025-03-11 PROCEDURE — 6370000000 HC RX 637 (ALT 250 FOR IP): Performed by: INTERNAL MEDICINE

## 2025-03-11 PROCEDURE — 2500000003 HC RX 250 WO HCPCS: Performed by: INTERNAL MEDICINE

## 2025-03-11 PROCEDURE — 99232 SBSQ HOSP IP/OBS MODERATE 35: CPT | Performed by: INTERNAL MEDICINE

## 2025-03-11 PROCEDURE — 6370000000 HC RX 637 (ALT 250 FOR IP): Performed by: STUDENT IN AN ORGANIZED HEALTH CARE EDUCATION/TRAINING PROGRAM

## 2025-03-11 PROCEDURE — 87070 CULTURE OTHR SPECIMN AEROBIC: CPT

## 2025-03-11 PROCEDURE — 77002 NEEDLE LOCALIZATION BY XRAY: CPT

## 2025-03-11 PROCEDURE — 6370000000 HC RX 637 (ALT 250 FOR IP): Performed by: FAMILY MEDICINE

## 2025-03-11 PROCEDURE — 87205 SMEAR GRAM STAIN: CPT

## 2025-03-11 PROCEDURE — 6370000000 HC RX 637 (ALT 250 FOR IP): Performed by: NURSE PRACTITIONER

## 2025-03-11 PROCEDURE — 6360000002 HC RX W HCPCS: Performed by: RADIOLOGY

## 2025-03-11 PROCEDURE — 6360000002 HC RX W HCPCS: Performed by: FAMILY MEDICINE

## 2025-03-11 PROCEDURE — 0S923ZX DRAINAGE OF LUMBAR VERTEBRAL DISC, PERCUTANEOUS APPROACH, DIAGNOSTIC: ICD-10-PCS | Performed by: STUDENT IN AN ORGANIZED HEALTH CARE EDUCATION/TRAINING PROGRAM

## 2025-03-11 PROCEDURE — 2060000000 HC ICU INTERMEDIATE R&B

## 2025-03-11 PROCEDURE — 20225 BONE BIOPSY TROCAR/NDL DEEP: CPT

## 2025-03-11 PROCEDURE — 6370000000 HC RX 637 (ALT 250 FOR IP): Performed by: PSYCHIATRY & NEUROLOGY

## 2025-03-11 RX ORDER — LIDOCAINE HYDROCHLORIDE 20 MG/ML
INJECTION, SOLUTION INFILTRATION; PERINEURAL PRN
Status: COMPLETED | OUTPATIENT
Start: 2025-03-11 | End: 2025-03-11

## 2025-03-11 RX ADMIN — METOPROLOL SUCCINATE 12.5 MG: 25 TABLET, EXTENDED RELEASE ORAL at 08:49

## 2025-03-11 RX ADMIN — TIMOLOL MALEATE 1 DROP: 5 SOLUTION/ DROPS OPHTHALMIC at 20:46

## 2025-03-11 RX ADMIN — LIDOCAINE HYDROCHLORIDE 10 ML: 20 INJECTION, SOLUTION INFILTRATION; PERINEURAL at 14:27

## 2025-03-11 RX ADMIN — ATORVASTATIN CALCIUM 10 MG: 10 TABLET, FILM COATED ORAL at 08:49

## 2025-03-11 RX ADMIN — GABAPENTIN 100 MG: 100 CAPSULE ORAL at 20:48

## 2025-03-11 RX ADMIN — Medication 1000 UNITS: at 08:49

## 2025-03-11 RX ADMIN — DOFETILIDE 125 MCG: 0.12 CAPSULE ORAL at 20:46

## 2025-03-11 RX ADMIN — PANTOPRAZOLE SODIUM 40 MG: 40 INJECTION, POWDER, FOR SOLUTION INTRAVENOUS at 08:49

## 2025-03-11 RX ADMIN — BRIMONIDINE TARTRATE 1 DROP: 2 SOLUTION/ DROPS OPHTHALMIC at 20:46

## 2025-03-11 RX ADMIN — GABAPENTIN 100 MG: 100 CAPSULE ORAL at 08:49

## 2025-03-11 RX ADMIN — SODIUM CHLORIDE, SODIUM LACTATE, POTASSIUM CHLORIDE, AND CALCIUM CHLORIDE: .6; .31; .03; .02 INJECTION, SOLUTION INTRAVENOUS at 16:03

## 2025-03-11 RX ADMIN — TIMOLOL MALEATE 1 DROP: 5 SOLUTION/ DROPS OPHTHALMIC at 08:50

## 2025-03-11 RX ADMIN — WATER 2000 MG: 1 INJECTION INTRAMUSCULAR; INTRAVENOUS; SUBCUTANEOUS at 16:02

## 2025-03-11 RX ADMIN — PANTOPRAZOLE SODIUM 40 MG: 40 INJECTION, POWDER, FOR SOLUTION INTRAVENOUS at 20:46

## 2025-03-11 RX ADMIN — VITAM B12 100 MCG: 100 TAB at 08:49

## 2025-03-11 RX ADMIN — DOFETILIDE 125 MCG: 0.12 CAPSULE ORAL at 08:49

## 2025-03-11 RX ADMIN — BRIMONIDINE TARTRATE 1 DROP: 2 SOLUTION/ DROPS OPHTHALMIC at 08:50

## 2025-03-11 RX ADMIN — VITAM B12 100 MCG: 100 TAB at 18:21

## 2025-03-11 RX ADMIN — MIRTAZAPINE 15 MG: 15 TABLET, FILM COATED ORAL at 20:46

## 2025-03-11 RX ADMIN — ACETAMINOPHEN 650 MG: 325 TABLET ORAL at 08:49

## 2025-03-11 RX ADMIN — SODIUM CHLORIDE, PRESERVATIVE FREE 10 ML: 5 INJECTION INTRAVENOUS at 08:50

## 2025-03-11 ASSESSMENT — PAIN SCALES - GENERAL
PAINLEVEL_OUTOF10: 2
PAINLEVEL_OUTOF10: 0
PAINLEVEL_OUTOF10: 5

## 2025-03-11 ASSESSMENT — PAIN SCALES - WONG BAKER: WONGBAKER_NUMERICALRESPONSE: NO HURT

## 2025-03-11 ASSESSMENT — PAIN - FUNCTIONAL ASSESSMENT: PAIN_FUNCTIONAL_ASSESSMENT: ACTIVITIES ARE NOT PREVENTED

## 2025-03-11 ASSESSMENT — PAIN DESCRIPTION - FREQUENCY: FREQUENCY: INTERMITTENT

## 2025-03-11 ASSESSMENT — PAIN DESCRIPTION - DESCRIPTORS: DESCRIPTORS: ACHING;DISCOMFORT;DULL

## 2025-03-11 ASSESSMENT — PAIN DESCRIPTION - LOCATION: LOCATION: PENIS

## 2025-03-11 ASSESSMENT — PAIN DESCRIPTION - PAIN TYPE: TYPE: ACUTE PAIN

## 2025-03-11 NOTE — BRIEF OP NOTE
Brief Postoperative Note      Patient: Montana Gerard  YOB: 1940  MRN: [id number]    Date of Procedure: 3/11/2025    * No Diagnosis Codes entered *    Post-Op Diagnosis:  ?L1-2 discitis       * No procedures listed *    Surgeon(s):  Gregoria Tomlinson MD    Assistant:  * No surgical staff found *    Anesthesia: * No anesthesia type entered *    Estimated Blood Loss (mL): Minimal    Complications: None    Specimens:   * No specimens in log *    Implants:  * No implants in log *      Drains:   External Urinary Catheter (Active)   Site Assessment Clean,dry & intact 03/10/25 0830   Placement Repositioned 03/10/25 0830   Securement Method Tape 03/10/25 0830   Perineal Care Yes 03/10/25 0830   Suction 40 mmgHg continuous 03/09/25 0800   Urine Color Yellow 03/11/25 0900   Urine Appearance Clear 03/11/25 0900   Urine Odor Malodorous 03/09/25 0800   Output (mL) 700 mL 03/11/25 0900       [REMOVED] Rectal Tube (Removed)   Site Assessment Clean, dry & intact 03/03/25 1033   Stool Appearance Loose 03/03/25 1033   Stool Color Other (Comment) 03/03/25 1033   Stool Amount Small 03/03/25 1033       [REMOVED] Rectal Tube (Removed)       [REMOVED] External Urinary Catheter (Removed)   Site Assessment Clean,dry & intact 02/25/25 1105   Placement Replaced 02/25/25 1105   Securement Method Securing device (Describe) 02/24/25 2105   Catheter Care Catheter/Wick replaced 02/25/25 1105   Perineal Care Yes 02/25/25 1105   Suction 40 mmgHg continuous 02/25/25 1105   Urine Color Yellow 02/25/25 1105   Urine Appearance Clear 02/25/25 1105   Urine Odor Malodorous 02/24/25 2105   Output (mL) 350 mL 02/25/25 1105       Findings:  Infection Present At Time Of Surgery (PATOS) (choose all levels that have infection present): ?L1-2 discitis  Other Findings: L1-2 disc aspiration biopsy     Electronically signed by Gregoria Tomlinson MD on 3/11/2025 at 5:16 PM

## 2025-03-11 NOTE — PROCEDURES
PROCEDURE NOTE  Date: 3/11/2025   Name: Montana Gerard  YOB: 1940    Procedures  Discussed patient and IR procedure with bedside RN, all questions answered. Consent needed.   Will call when able to send for patient.

## 2025-03-11 NOTE — PLAN OF CARE
Problem: Safety - Adult  Goal: Free from fall injury  Outcome: Progressing     Problem: Chronic Conditions and Co-morbidities  Goal: Patient's chronic conditions and co-morbidity symptoms are monitored and maintained or improved  Outcome: Progressing     Problem: Discharge Planning  Goal: Discharge to home or other facility with appropriate resources  Outcome: Progressing     Problem: Skin/Tissue Integrity  Goal: Skin integrity remains intact  Outcome: Progressing     Problem: ABCDS Injury Assessment  Goal: Absence of physical injury  Outcome: Progressing     Problem: Gastrointestinal - Adult  Goal: Minimal or absence of nausea and vomiting  Outcome: Progressing  Goal: Maintains or returns to baseline bowel function  Outcome: Progressing  Goal: Maintains adequate nutritional intake  Outcome: Progressing  Goal: Establish and maintain optimal ostomy function  Outcome: Progressing     Problem: Hematologic - Adult  Goal: Maintains hematologic stability  Outcome: Progressing     Problem: Nutrition Deficit:  Goal: Optimize nutritional status  Outcome: Progressing  Flowsheets (Taken 3/10/2025 1426 by Satinder Vazquez RD, LD)  Nutrient intake appropriate for improving, restoring, or maintaining nutritional needs: Recommend appropriate diets, oral nutritional supplements, and vitamin/mineral supplements     Problem: Neurosensory - Adult  Goal: Achieves stable or improved neurological status  Outcome: Progressing     Problem: Respiratory - Adult  Goal: Achieves optimal ventilation and oxygenation  Outcome: Progressing     Problem: Cardiovascular - Adult  Goal: Maintains optimal cardiac output and hemodynamic stability  Outcome: Progressing     Problem: Skin/Tissue Integrity - Adult  Goal: Skin integrity remains intact  Outcome: Progressing     Problem: Musculoskeletal - Adult  Goal: Return mobility to safest level of function  Outcome: Progressing

## 2025-03-11 NOTE — PROGRESS NOTES
[facility]  [facility]  Progress Note      Chief Complaint   Patient presents with    Rectal Bleeding     (One episode this AM and has progressively gotten worse, \"dark gel color\" in stool tonight, +THINNERS)       SUBJECTIVE:    Patient is tolerating medications. No reported adverse drug reactions.  No nausea, vomiting, diarrhea.    Review of systems:  As stated above in the chief complaint, otherwise negative.    Medications:  Scheduled Meds:   sodium chloride flush  5-40 mL IntraVENous 2 times per day    lidocaine 1 % injection  50 mg IntraDERmal Once    apixaban  5 mg Oral BID    cefTRIAXone (ROCEPHIN) IV  2,000 mg IntraVENous Q24H    bisacodyl  10 mg Rectal Daily    sodium chloride flush  5-40 mL IntraVENous 2 times per day    pantoprazole (PROTONIX) 40 mg in sodium chloride (PF) 0.9 % 10 mL injection  40 mg IntraVENous Q12H    dofetilide  125 mcg Oral BID    latanoprost  1 drop Both Eyes Nightly    metoprolol succinate  12.5 mg Oral Daily    vitamin B-12  100 mcg Oral BID    Vitamin D  1,000 Units Oral Daily    brimonidine  1 drop Both Eyes BID    And    timolol  1 drop Both Eyes BID    gabapentin  100 mg Oral BID    mirtazapine  15 mg Oral Nightly    atorvastatin  10 mg Oral Daily     Continuous Infusions:   sodium chloride      lactated ringers 100 mL/hr at 03/10/25 1739    [Held by provider] dilTIAZem 100 mg in sodium chloride 0.9 % 100 mL infusion (ADD-Hector) Stopped (25 1619)     PRN Meds:sodium chloride flush, sodium chloride, white petrolatum, menthol-zinc oxide, sodium chloride flush, acetaminophen **OR** acetaminophen, morphine    OBJECTIVE:  /70   Pulse 63   Temp 98.6 °F (37 °C) (Temporal)   Resp 18   Ht 1.727 m (5' 8\")   Wt 66.8 kg (147 lb 4.3 oz)   SpO2 93%   BMI 22.39 kg/m²   Temp  Av.7 °F (36.5 °C)  Min: 97 °F (36.1 °C)  Max: 98.6 °F (37 °C)  Constitutional: The patient is awake, alert, and oriented.   Skin: Warm and dry. No rashes were    Component Value Date    SEDRATE 34 (H) 03/08/2025    SEDRATE 65 (H) 03/03/2025     Radiology:      Microbiology:   No results found for: \"BC\", \"ORG\"  No results found for: \"BLOODCULT2\", \"ORG\"  No results found for: \"WNDABS\"  No results found for: \"RESPSMEAR\"      Component Value Date/Time    MPNEUMO Not Detected 02/16/2025 1328     No results found for: \"CULTRESP\"  No results found for: \"CXCATHTIP\"  No results found for: \"BFCS\"  No results found for: \"CXSURG\"  No results found for: \"LABURIN\"  No results found for: \"MRSAC\"    Assessment:  Streptococcus Infantarius (group D streptococcus) bacteremia  Group D streptococcus bacteremia associated with GI tract  Bacteremia likely due to GI translocation  Colonoscopy showed sigmoid diverticulosis without any neoplastic growth  EGD showed small hiatal hernia  Patient presented with rectal bleeding  MRI of lumbar spine suggestive of L1-L2, L2-L3 and L5-S1 discitis, no abscess or stenosis  Status post IR biopsy of lumbar spine     Plan:    Ceftriaxone 2 g IV daily to continue  CRP 55, ESR 34  TTE negative for vegetations, RINKU ordered  PICC Ordered   Repeat blood cultures  ID will continue to follow    Hugo Wolf MD  3:42 PM  3/11/2025

## 2025-03-11 NOTE — PROCEDURES
PROCEDURE NOTE  Date: 3/11/2025   Name: Montana Gerard  YOB: 1940    Procedures: L1-L2 disc biopsy    1406 Patient arrived from floor for L1-L2 disc biopsy.  Chart/vs/medications reviewed.  PIV flushed. VSS    ***Dr *** at bedside to review procedure and obtain consent.

## 2025-03-11 NOTE — PLAN OF CARE
Problem: Safety - Adult  Goal: Free from fall injury  3/11/2025 1138 by Faviola Sosa RN  Outcome: Progressing  Flowsheets (Taken 3/11/2025 1137)  Free From Fall Injury: Instruct family/caregiver on patient safety  3/11/2025 0119 by Aislinn Smiley RN  Outcome: Progressing     Problem: Chronic Conditions and Co-morbidities  Goal: Patient's chronic conditions and co-morbidity symptoms are monitored and maintained or improved  3/11/2025 1138 by Faviola Sosa RN  Outcome: Progressing  3/11/2025 0119 by Aislinn Simley RN  Outcome: Progressing     Problem: Gastrointestinal - Adult  Goal: Minimal or absence of nausea and vomiting  3/11/2025 1138 by Faviola Sosa RN  Outcome: Progressing  3/11/2025 0119 by Aislinn Smiley RN  Outcome: Progressing     Problem: Respiratory - Adult  Goal: Achieves optimal ventilation and oxygenation  3/11/2025 1138 by Faviola Sosa RN  Outcome: Progressing  3/11/2025 0119 by Aislinn Smiley RN  Outcome: Progressing     Problem: Musculoskeletal - Adult  Goal: Return mobility to safest level of function  3/11/2025 1138 by Faviola Sosa RN  Outcome: Progressing  3/11/2025 0119 by Aislinn Smiley RN  Outcome: Progressing

## 2025-03-11 NOTE — PROGRESS NOTES
Physician Progress Note      PATIENT:               REJI DE LEON  CSN #:                  [id number]  :                       1940  ADMIT DATE:       3/3/2025 1:15 AM  DISCH DATE:  RESPONDING  PROVIDER #:        Edgardo Anaya MD          QUERY TEXT:    Pt admitted with GIB. Pt noted to have Bacteremia. If possible, please   document in the progress notes and discharge summary if you are evaluating and   /or treating any of the following:  The medical record reflects the following:  Risk Factors: Discitis, Bacteremia  Clinical Indicators: VS 3/3- T 101.5, P 100 RR 29 BP 91/54. WBC 13.8 CRP 55   Procal 0.24 Lactic 2.1 Per PN 3/140-  Lumbar discitis. ID 3/0- Streptococcus   Infantarius (group D streptococcus) bacteremia. Group D streptococcus   bacteremia associated with GI tract. Bacteremia likely due to GI   translocation. IM PN 3/9-Bacteremia blood culture  positive strep species   by PCR await final culture and sensitivity  Treatment: PICC line, Ceftriaxone 2 g IV daily, blood cultures, ID consult,  Options provided:  -- Sepsis, present on admission  -- Discitis without Sepsis  -- Other - I will add my own diagnosis  -- Disagree - Not applicable / Not valid  -- Disagree - Clinically unable to determine / Unknown  -- Refer to Clinical Documentation Reviewer    PROVIDER RESPONSE TEXT:    This patient has sepsis which was present on admission.    Query created by: Hugo Garrett on 3/11/2025 12:20 PM      Electronically signed by:  Edgardo Anaya MD 3/11/2025 3:36 PM

## 2025-03-11 NOTE — PROGRESS NOTES
[facility] Hospitalist Progress Note    SYNOPSIS: Patient admitted on 3/3/2025 for GI bleed    This is a 84-year-old man with past medical history of A-fib, aortic stenosis s/p TAVR, HFpEF, prostate cancer, malleable penile implant, sinus node dysfunction, coronary artery disease, gastritis, GERD, hyperlipidemia, hypertension, diabetes mellitus, who presented to [facility] ED with concerns of rectal bleeding.  Patient was recently admitted last week for A-fib RVR, patient is on Eliquis.  Patient reported to have maroon-colored stool.  Hemoglobin at 9.3.  CT abdomen pelvis.  1. No evidence of active GI hemorrhage.  2. Resolution of linear hypodense lesion in the mid portion of the spleen.  3. Sigmoid diverticulosis without evidence of diverticulitis.  4. Multiple small bladder diverticuli likely as a result of chronic bladder  outlet obstruction.  5. Considerable fecal material within the rectum raising the possibility of  rectal fecal impaction.  CT head was ordered for altered mental status.  No acute finding.  Neurology, EP, general surgery has been consulted.  MRI brain reviewed  1. 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  No sign of hemorrhage or mass effect.  2. New mild age-appropriate atrophy and new mild small vessel ischemia.  MRI thoracic and cervical spine  1. No acute fracture or subluxation of the thoracic spine.  2. 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 This results in anterior displacement of the thoracic cord and thinning of the thoracic cord at the T3 and superior T4 levels. No abnormal cord signal is seen.  3. No additional spinal canal stenosis or neural foraminal narrowing of the thoracic spine.  4. Old mild compression fractures of T3, T5-T7, and L1.    3/4 patient slightly drowsy this morning however easily wakeful and is able to answer questions.  Updated family

## 2025-03-11 NOTE — PROGRESS NOTES
Occupational Therapy  OT SESSION ATTEMPT     Date:3/11/2025  Patient Name: Montana Gerard  MRN: [id number]  : 1940  Room: [facility]     Attempted OT session this date:    [] unavailable due to other medical staff currently with pt   [] on hold per nursing staff   [] on hold per nursing staff secondary to lab / radiology results    [] politely declined treatment this date due to   [x] Patient off unit at this time for a biopsy.    [] Other:     Will reattempt OT session at a later time.      Maddie SUAREZ, CLT  [phone]

## 2025-03-12 PROBLEM — R53.81 PHYSICAL DECONDITIONING: Status: ACTIVE | Noted: 2025-03-12

## 2025-03-12 LAB
ANION GAP SERPL CALCULATED.3IONS-SCNC: 11 MMOL/L (ref 7–16)
BASOPHILS # BLD: 0.02 K/UL (ref 0–0.2)
BASOPHILS NFR BLD: 0 % (ref 0–2)
BUN SERPL-MCNC: 14 MG/DL (ref 6–23)
CALCIUM SERPL-MCNC: 8 MG/DL (ref 8.6–10.2)
CHLORIDE SERPL-SCNC: 103 MMOL/L (ref 98–107)
CO2 SERPL-SCNC: 22 MMOL/L (ref 22–29)
CREAT SERPL-MCNC: 0.7 MG/DL (ref 0.7–1.2)
EOSINOPHIL # BLD: 0.07 K/UL (ref 0.05–0.5)
EOSINOPHILS RELATIVE PERCENT: 1 % (ref 0–6)
ERYTHROCYTE [DISTWIDTH] IN BLOOD BY AUTOMATED COUNT: 15.9 % (ref 11.5–15)
GFR, ESTIMATED: >90 ML/MIN/1.73M2
GLUCOSE SERPL-MCNC: 105 MG/DL (ref 74–99)
HCT VFR BLD AUTO: 26.2 % (ref 37–54)
HGB BLD-MCNC: 8 G/DL (ref 12.5–16.5)
IMM GRANULOCYTES # BLD AUTO: 0.06 K/UL (ref 0–0.58)
IMM GRANULOCYTES NFR BLD: 1 % (ref 0–5)
LYMPHOCYTES NFR BLD: 0.91 K/UL (ref 1.5–4)
LYMPHOCYTES RELATIVE PERCENT: 13 % (ref 20–42)
MAGNESIUM SERPL-MCNC: 1.5 MG/DL (ref 1.6–2.6)
MCH RBC QN AUTO: 28.9 PG (ref 26–35)
MCHC RBC AUTO-ENTMCNC: 30.5 G/DL (ref 32–34.5)
MCV RBC AUTO: 94.6 FL (ref 80–99.9)
MICROORGANISM SPEC CULT: NORMAL
MONOCYTES NFR BLD: 0.44 K/UL (ref 0.1–0.95)
MONOCYTES NFR BLD: 6 % (ref 2–12)
NEUTROPHILS NFR BLD: 79 % (ref 43–80)
NEUTS SEG NFR BLD: 5.67 K/UL (ref 1.8–7.3)
PLATELET # BLD AUTO: 124 K/UL (ref 130–450)
PMV BLD AUTO: 9.2 FL (ref 7–12)
POTASSIUM SERPL-SCNC: 3.3 MMOL/L (ref 3.5–5)
RBC # BLD AUTO: 2.77 M/UL (ref 3.8–5.8)
SERVICE CMNT-IMP: NORMAL
SODIUM SERPL-SCNC: 136 MMOL/L (ref 132–146)
SPECIMEN DESCRIPTION: NORMAL
WBC OTHER # BLD: 7.2 K/UL (ref 4.5–11.5)

## 2025-03-12 PROCEDURE — 36415 COLL VENOUS BLD VENIPUNCTURE: CPT

## 2025-03-12 PROCEDURE — 6370000000 HC RX 637 (ALT 250 FOR IP): Performed by: PSYCHIATRY & NEUROLOGY

## 2025-03-12 PROCEDURE — 6360000002 HC RX W HCPCS: Performed by: INTERNAL MEDICINE

## 2025-03-12 PROCEDURE — 36569 INSJ PICC 5 YR+ W/O IMAGING: CPT

## 2025-03-12 PROCEDURE — 97530 THERAPEUTIC ACTIVITIES: CPT

## 2025-03-12 PROCEDURE — 80048 BASIC METABOLIC PNL TOTAL CA: CPT

## 2025-03-12 PROCEDURE — 6370000000 HC RX 637 (ALT 250 FOR IP): Performed by: NURSE PRACTITIONER

## 2025-03-12 PROCEDURE — 6370000000 HC RX 637 (ALT 250 FOR IP): Performed by: INTERNAL MEDICINE

## 2025-03-12 PROCEDURE — 97129 THER IVNTJ 1ST 15 MIN: CPT

## 2025-03-12 PROCEDURE — 6360000002 HC RX W HCPCS: Performed by: FAMILY MEDICINE

## 2025-03-12 PROCEDURE — 2060000000 HC ICU INTERMEDIATE R&B

## 2025-03-12 PROCEDURE — C1751 CATH, INF, PER/CENT/MIDLINE: HCPCS

## 2025-03-12 PROCEDURE — 2500000003 HC RX 250 WO HCPCS: Performed by: FAMILY MEDICINE

## 2025-03-12 PROCEDURE — 76937 US GUIDE VASCULAR ACCESS: CPT

## 2025-03-12 PROCEDURE — 2500000003 HC RX 250 WO HCPCS: Performed by: INTERNAL MEDICINE

## 2025-03-12 PROCEDURE — 85025 COMPLETE CBC W/AUTO DIFF WBC: CPT

## 2025-03-12 PROCEDURE — 6370000000 HC RX 637 (ALT 250 FOR IP): Performed by: FAMILY MEDICINE

## 2025-03-12 PROCEDURE — 2580000003 HC RX 258: Performed by: FAMILY MEDICINE

## 2025-03-12 PROCEDURE — 97535 SELF CARE MNGMENT TRAINING: CPT

## 2025-03-12 PROCEDURE — 99232 SBSQ HOSP IP/OBS MODERATE 35: CPT | Performed by: INTERNAL MEDICINE

## 2025-03-12 PROCEDURE — 83735 ASSAY OF MAGNESIUM: CPT

## 2025-03-12 PROCEDURE — 6370000000 HC RX 637 (ALT 250 FOR IP): Performed by: STUDENT IN AN ORGANIZED HEALTH CARE EDUCATION/TRAINING PROGRAM

## 2025-03-12 PROCEDURE — 2580000003 HC RX 258: Performed by: INTERNAL MEDICINE

## 2025-03-12 RX ORDER — MAGNESIUM SULFATE IN WATER 40 MG/ML
4000 INJECTION, SOLUTION INTRAVENOUS ONCE
Status: COMPLETED | OUTPATIENT
Start: 2025-03-12 | End: 2025-03-12

## 2025-03-12 RX ADMIN — Medication 1000 UNITS: at 08:33

## 2025-03-12 RX ADMIN — SODIUM CHLORIDE, PRESERVATIVE FREE 10 ML: 5 INJECTION INTRAVENOUS at 21:51

## 2025-03-12 RX ADMIN — MIRTAZAPINE 15 MG: 15 TABLET, FILM COATED ORAL at 21:50

## 2025-03-12 RX ADMIN — VITAM B12 100 MCG: 100 TAB at 18:29

## 2025-03-12 RX ADMIN — GABAPENTIN 100 MG: 100 CAPSULE ORAL at 21:50

## 2025-03-12 RX ADMIN — PANTOPRAZOLE SODIUM 40 MG: 40 INJECTION, POWDER, FOR SOLUTION INTRAVENOUS at 21:49

## 2025-03-12 RX ADMIN — PANTOPRAZOLE SODIUM 40 MG: 40 INJECTION, POWDER, FOR SOLUTION INTRAVENOUS at 08:35

## 2025-03-12 RX ADMIN — POTASSIUM BICARBONATE 40 MEQ: 782 TABLET, EFFERVESCENT ORAL at 11:29

## 2025-03-12 RX ADMIN — VITAM B12 100 MCG: 100 TAB at 08:33

## 2025-03-12 RX ADMIN — ANORECTAL OINTMENT: 15.7; .44; 24; 20.6 OINTMENT TOPICAL at 21:52

## 2025-03-12 RX ADMIN — DOFETILIDE 125 MCG: 0.12 CAPSULE ORAL at 21:50

## 2025-03-12 RX ADMIN — ATORVASTATIN CALCIUM 10 MG: 10 TABLET, FILM COATED ORAL at 08:34

## 2025-03-12 RX ADMIN — GABAPENTIN 100 MG: 100 CAPSULE ORAL at 08:34

## 2025-03-12 RX ADMIN — SODIUM CHLORIDE, PRESERVATIVE FREE 10 ML: 5 INJECTION INTRAVENOUS at 21:50

## 2025-03-12 RX ADMIN — TIMOLOL MALEATE 1 DROP: 5 SOLUTION/ DROPS OPHTHALMIC at 21:49

## 2025-03-12 RX ADMIN — LATANOPROST 1 DROP: 50 SOLUTION OPHTHALMIC at 21:49

## 2025-03-12 RX ADMIN — SODIUM CHLORIDE, PRESERVATIVE FREE 10 ML: 5 INJECTION INTRAVENOUS at 08:35

## 2025-03-12 RX ADMIN — METOPROLOL SUCCINATE 12.5 MG: 25 TABLET, EXTENDED RELEASE ORAL at 08:34

## 2025-03-12 RX ADMIN — SODIUM CHLORIDE, PRESERVATIVE FREE 10 ML: 5 INJECTION INTRAVENOUS at 08:34

## 2025-03-12 RX ADMIN — SODIUM CHLORIDE, SODIUM LACTATE, POTASSIUM CHLORIDE, AND CALCIUM CHLORIDE: .6; .31; .03; .02 INJECTION, SOLUTION INTRAVENOUS at 18:30

## 2025-03-12 RX ADMIN — DOFETILIDE 125 MCG: 0.12 CAPSULE ORAL at 08:33

## 2025-03-12 RX ADMIN — BRIMONIDINE TARTRATE 1 DROP: 2 SOLUTION/ DROPS OPHTHALMIC at 08:34

## 2025-03-12 RX ADMIN — PETROLATUM: 420 OINTMENT TOPICAL at 21:52

## 2025-03-12 RX ADMIN — ACETAMINOPHEN 650 MG: 325 TABLET ORAL at 08:33

## 2025-03-12 RX ADMIN — BRIMONIDINE TARTRATE 1 DROP: 2 SOLUTION/ DROPS OPHTHALMIC at 21:49

## 2025-03-12 RX ADMIN — MAGNESIUM SULFATE HEPTAHYDRATE 4000 MG: 40 INJECTION, SOLUTION INTRAVENOUS at 11:36

## 2025-03-12 RX ADMIN — TIMOLOL MALEATE 1 DROP: 5 SOLUTION/ DROPS OPHTHALMIC at 08:34

## 2025-03-12 RX ADMIN — WATER 2000 MG: 1 INJECTION INTRAMUSCULAR; INTRAVENOUS; SUBCUTANEOUS at 16:18

## 2025-03-12 ASSESSMENT — PAIN DESCRIPTION - LOCATION: LOCATION: BACK

## 2025-03-12 ASSESSMENT — PAIN SCALES - GENERAL
PAINLEVEL_OUTOF10: 0
PAINLEVEL_OUTOF10: 0
PAINLEVEL_OUTOF10: 3
PAINLEVEL_OUTOF10: 7

## 2025-03-12 ASSESSMENT — PAIN DESCRIPTION - DESCRIPTORS: DESCRIPTORS: ACHING;DULL;DISCOMFORT

## 2025-03-12 NOTE — PLAN OF CARE
Problem: Skin/Tissue Integrity  Goal: Skin integrity remains intact  Description: 1.  Monitor for areas of redness and/or skin breakdown  2.  Assess vascular access sites hourly  3.  Every 4-6 hours minimum:  Change oxygen saturation probe site  4.  Every 4-6 hours:  If on nasal continuous positive airway pressure, respiratory therapy assess nares and determine need for appliance change or resting period  Outcome: Progressing  Flowsheets (Taken 3/11/2025 1137 by Faviola Sosa RN)  Skin Integrity Remains Intact: Monitor for areas of redness and/or skin breakdown

## 2025-03-12 NOTE — PROGRESS NOTES
Power chg 2 lumen picc Placement 3/12/2025    Product number: [id number]   Lot Number: [id number]      Ultrasound: yes/sonosite   Left Brachial vein:                Upper Arm Circumference: (CM) 25    Size:(FR)/GUAGE 5.5 fr    Exposed Length: (CM) 2    Internal Length: (CM) 38   Cut: (CM) 15   Vein Measurement: 0.52              Lidocaine Given: yes 1 %. 3 ml from kit.  Jose R Hendrickson RN  3/12/2025  1:34 PM

## 2025-03-12 NOTE — PROGRESS NOTES
to supine and positioned in chair position. Completed BUE/LE ROM with in supine and educated to complete throughout the day. Completed cervical ROM 2/2 pt reported stiffness. All needs were met and call light in reach.     Treatment:  Patient practiced and was instructed in the following treatment:    Bed mobility training - pt given verbal and tactile cues to facilitate proper sequencing and safety during rolling and supine>sit as well as provided with physical assistance to complete task   Sitting EOB for >10 minutes for upright tolerance, postural awareness and BLE ROM  Skilled positioning - Pt placed in the chair position with pillows utilized to facilitate upright posture, joint and skin integrity, and interaction with environment.     Skilled monitoring of vitals throughout session.     PLAN:    Patient is making slow progress towards established goals.  Will continue with current POC.      Time in  0845  Time out  0933    Total Treatment Time  39 minutes     CPT codes:  [] Gait training 50026 0 minutes  [] Manual therapy 24125 0 minutes  [x] Therapeutic activities 78098 39 minutes  [] Therapeutic exercises 10320 0 minutes  [] Neuromuscular reeducation 20438 0 minutes    Aminata Angela PT, DPT  [id number]

## 2025-03-12 NOTE — PROGRESS NOTES
up  Seated/Standing at the Sink   UB Dressing Mod A/set up     Donning/doffing gown in semi-supine     Mod A  To don hospital gown bed level with assist required to manage gown over B shoulders and around trunk in back.   Dependent to don TLSO  Bed level. Pt educated on proper fit/placement and technique to don/doff. Increased time required to don secondary to pain. SUP/set up  Seated      LB Dressing Dep     Max A - donning footwear  Assist of 2 for simulated task of donning pants in supine - unable to stand for task  Pt ed for safe/adaptive techs, use of adaptive equip    Dependent  To don B socks bed level.  Mod A/set up  Seated/standing      Bathing Dep     Mod A - UB  Assist of 2 for LB bathing in supine  Pt ed for safe/adaptive techs     Max A  Simulated task bed level. Limited by pain. Mod A/set up  Seated/Standing       Toileting Dep     Max A for Hygiene, simulated Clothing adjustment + Max A of 1 for bed mobility to facilitate bed-level ADL  Pt ed for safe/adaptive techs     Dependent  For hygiene and clothing management bed level.  Mod A/set up  Seated/standing      Bed Mobility  Supine to sit: Max A   Sit to supine:  Max A      VCs for safe/adaptive techs/Log-Rolling    Rolling: Max A  Supine<>Sit: Max A x2  Rolling completed to L and R with cues for safety and to maintain spinal neutrality.  Supine to sit: Min A  Sit to supine: Min A      Functional Transfers Dep     Unable to stand from EOB w/ Max A of 1  Pt ed for safety/hand placement     NT  Unable to tolerate this date d/t pain. Will continue to assess. Mod A      Functional Mobility NT  Unable to stand    NT  Mod A      Balance Sitting:     Static:  SBA EOB    Dynamic:  SBA w/ functional ax EOB      Standing:     Static:  NT    Dynamic:  NT      Sitting:     Static: Min A    Dynamic: Max A    Pt tolerated sitting at EOB ~12 minutes to increase trunk control/core strength and sitting activity tolerance for functional activities.  Sitting:      Static:  IND    Dynamic:  IND w/ functional ax     Standing:     Static:  Mod A w/ AD PRN    Dynamic:  Mod A w/ functional ax/mobility w/ AD PRN   Activity Tolerance Fair(-)     Sitting Tolerance w/ light ax ~ 5 mins     Fair-  For light activity. Fair   Visual/  Perceptual     Hearing: WNL   Glasses: Reading     WFL - moderately [place]  Hearing Aids:  No           Comments:  Cleared by RN to see pt. Upon arrival patient in supine and agreeable to OT session. At end of session, patient in supine with TLSO donned and HOB elevated with call light within reach, all lines and tubes intact and bed alarm activated. Pt educated on adaptive techniques for ADL tasks, bed mobility techniques/safety, posture, body mechanics, precautions, maintaining precautions during functional activities, benefits of increasing activity and safety/fall prevention. Pt instructed on B UE AROM exercises to maintain strength/endurance and ROM to complete independently throughout the day (chest press & shoulder flex/ext) pt verbalized/demo'd understanding x5 reps.Pt able to sit EOB ~12 mins to increase core strength/balance/activity tolerance for ease with ADLs. Pt required increased time to complete ADLs/functional transfers due to pain. Pt would benefit from continued skilled OT to increase safety and independence with completion of ADL/IADL tasks for functional independence and quality of life.    Treatment: OT treatment provided this date includes:   ADL- Instruction/training on safety and adapted techniques for completion of ADLs.   Transfers/Mobility- Instruction/training on safety and improved independence with bed mobility/functional transfers and functional mobility.   Sitting/Standing Balance/Tolerance:  to increase balance and activity tolerance during ADLs and facilitate proper posture and positioning.   Activity tolerance- Instruction/training on energy conservation/work simplification for completion of ADLs.     Cognitive retraining -

## 2025-03-12 NOTE — PLAN OF CARE
Problem: Safety - Adult  Goal: Free from fall injury  Outcome: Progressing  Flowsheets (Taken 3/12/2025 1023)  Free From Fall Injury: Instruct family/caregiver on patient safety     Problem: Chronic Conditions and Co-morbidities  Goal: Patient's chronic conditions and co-morbidity symptoms are monitored and maintained or improved  Outcome: Progressing     Problem: Skin/Tissue Integrity  Goal: Skin integrity remains intact  Description: 1.  Monitor for areas of redness and/or skin breakdown  2.  Assess vascular access sites hourly  3.  Every 4-6 hours minimum:  Change oxygen saturation probe site  4.  Every 4-6 hours:  If on nasal continuous positive airway pressure, respiratory therapy assess nares and determine need for appliance change or resting period  3/12/2025 0129 by Zhang Smith RN  Outcome: Progressing  Flowsheets (Taken 3/11/2025 1137 by Faviola Sosa, RN)  Skin Integrity Remains Intact: Monitor for areas of redness and/or skin breakdown     Problem: Respiratory - Adult  Goal: Achieves optimal ventilation and oxygenation  Outcome: Progressing     Problem: Cardiovascular - Adult  Goal: Maintains optimal cardiac output and hemodynamic stability  Outcome: Progressing     Problem: Skin/Tissue Integrity - Adult  Goal: Skin integrity remains intact  Description: 1.  Monitor for areas of redness and/or skin breakdown  2.  Assess vascular access sites hourly  3.  Every 4-6 hours minimum:  Change oxygen saturation probe site  4.  Every 4-6 hours:  If on nasal continuous positive airway pressure, respiratory therapy assess nares and determine need for appliance change or resting period  3/12/2025 0129 by Zhang Smith RN  Outcome: Progressing  Flowsheets (Taken 3/11/2025 1137 by Faviola Sosa RN)  Skin Integrity Remains Intact: Monitor for areas of redness and/or skin breakdown     Problem: Musculoskeletal - Adult  Goal: Return mobility to safest level of function  Outcome: Progressing

## 2025-03-12 NOTE — PROGRESS NOTES
SPEECH LANGUAGE PATHOLOGY  DAILY PROGRESS NOTE        PATIENT NAME:  Montana Gerard      :  1940          TODAY'S DATE:  3/12/2025 ROOM:  [facility]    Patient was seen for ongoing cognitive linguistic therapy on this date. During session, he was sitting upright in bed and appeared more alert than the previous session. Patient presents with mild anomia and mild cognitive deficits regarding recent memory, organization, and executive functions. He demonstrates word-finding at the conversation level and requires increased processing time to express himself. Therapist reviewed word-finding strategies with patient. Patient completed a convergent naming task (e.g., name category member to a specific letter) with 80% accuracy given minimal assistance. He benefits from guided questions to improve his naming ability. Therapist also reviewed compensatory memory strategies with patient. Without assistance, patient recalls information after a 5 minute delay with less than 50% accuracy. When cued to use an association strategy, he was able to recall information with 80% accuracy. Overall, patient requires assistance to carry-over compensatory memory and word-finding strategies in his daily life to promote his functional recall and word-finding abilities.       CPT code(s) 75480  therapeutic interventions that focus on cognitive function , initial  15 min  Total minutes :  15 minutes     Cristiano Goss M.S., CCC-SLP/L   Speech-Language Pathologist  [id number]

## 2025-03-12 NOTE — PROGRESS NOTES
[facility] Hospitalist Progress Note    SYNOPSIS: Patient admitted on 3/3/2025 for GI bleed    This is a 84-year-old man with past medical history of A-fib, aortic stenosis s/p TAVR, HFpEF, prostate cancer, malleable penile implant, sinus node dysfunction, coronary artery disease, gastritis, GERD, hyperlipidemia, hypertension, diabetes mellitus, who presented to [facility] ED with concerns of rectal bleeding.  Patient was recently admitted last week for A-fib RVR, patient is on Eliquis.  Patient reported to have maroon-colored stool.  Hemoglobin at 9.3.  CT abdomen pelvis.  1. No evidence of active GI hemorrhage.  2. Resolution of linear hypodense lesion in the mid portion of the spleen.  3. Sigmoid diverticulosis without evidence of diverticulitis.  4. Multiple small bladder diverticuli likely as a result of chronic bladder  outlet obstruction.  5. Considerable fecal material within the rectum raising the possibility of  rectal fecal impaction.  CT head was ordered for altered mental status.  No acute finding.  Neurology, EP, general surgery has been consulted.  MRI brain reviewed  1. 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  No sign of hemorrhage or mass effect.  2. New mild age-appropriate atrophy and new mild small vessel ischemia.  MRI thoracic and cervical spine  1. No acute fracture or subluxation of the thoracic spine.  2. 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 This results in anterior displacement of the thoracic cord and thinning of the thoracic cord at the T3 and superior T4 levels. No abnormal cord signal is seen.  3. No additional spinal canal stenosis or neural foraminal narrowing of the thoracic spine.  4. Old mild compression fractures of T3, T5-T7, and L1.    3/4 patient slightly drowsy this morning however easily wakeful and is able to answer questions.  Updated family  mirtazapine  15 mg Oral Nightly    atorvastatin  10 mg Oral Daily     PRN Meds: sodium chloride flush, sodium chloride, white petrolatum, menthol-zinc oxide, sodium chloride flush, acetaminophen **OR** acetaminophen, morphine    Labs:     Recent Labs     03/10/25  1155 03/12/25  0821   WBC 9.1 7.2   HGB 7.4* 8.0*   HCT 23.9* 26.2*   * 124*         Recent Labs     03/10/25  1155 03/12/25  0821    136   K 3.5 3.3*    103   CO2 21* 22   BUN 16 14   CREATININE 0.7 0.7   CALCIUM 8.0* 8.0*         Recent Labs     03/10/25  1155   ALKPHOS 103   ALT 39   AST 24   BILITOT 0.3         Recent Labs     03/10/25  1551   INR 1.2         No results for input(s): \"CKTOTAL\", \"TROPONINI\" in the last 72 hours.    Chronic labs:    Lab Results   Component Value Date    CHOL 110 11/08/2024    TRIG 68 11/08/2024    HDL 20 (L) 03/04/2025    TSH 2.63 02/17/2025    PSA 0.06 03/03/2025    INR 1.2 03/10/2025    LABA1C 4.2 03/04/2025       Radiology: REVIEWED DAILY    +++++++++++++++++++++++++++++++++++++++++++++++++  Edgardo Anaya MD  [facility]- [place], [place]  +++++++++++++++++++++++++++++++++++++++++++++++++  NOTE: This report was transcribed using voice recognition software. Every effort was made to ensure accuracy; however, inadvertent computerized transcription errors may be present.

## 2025-03-12 NOTE — CARE COORDINATION
Transition of care update. Patient is for a RINKU today at 1330 per rounds. He will also need a PICC line, and IV team in the room. Will also need antibiotics per ID and length of treatment.  Currently on IV Rocephin , Lactated Ringers, and a Cardizem drip. Per previous note, [facility] is following, and insurance waiving precert until 3/14/25. Message left with [facility] if able to accept. Await a return call. Custom TLSO brace in room per neurology. CM will follow.  Electronically signed by Pete Steen RN on 3/12/2025 at 2:11 PM    Per [facility] at [facility], patient accepted and will start precert. Will need PASRR and transport form. MB------  PASRR and transport form is completed and is in the envelope on the soft chart. MB

## 2025-03-12 NOTE — PROGRESS NOTES
[facility]  [facility]  Progress Note      Chief Complaint   Patient presents with    Rectal Bleeding     (One episode this AM and has progressively gotten worse, \"dark gel color\" in stool tonight, +THINNERS)       SUBJECTIVE:    Patient is tolerating medications. No reported adverse drug reactions.  No nausea, vomiting, diarrhea.    Review of systems:  As stated above in the chief complaint, otherwise negative.    Medications:  Scheduled Meds:   magnesium sulfate  4,000 mg IntraVENous Once    sodium chloride flush  5-40 mL IntraVENous 2 times per day    lidocaine 1 % injection  50 mg IntraDERmal Once    apixaban  5 mg Oral BID    cefTRIAXone (ROCEPHIN) IV  2,000 mg IntraVENous Q24H    bisacodyl  10 mg Rectal Daily    sodium chloride flush  5-40 mL IntraVENous 2 times per day    pantoprazole (PROTONIX) 40 mg in sodium chloride (PF) 0.9 % 10 mL injection  40 mg IntraVENous Q12H    dofetilide  125 mcg Oral BID    latanoprost  1 drop Both Eyes Nightly    metoprolol succinate  12.5 mg Oral Daily    vitamin B-12  100 mcg Oral BID    Vitamin D  1,000 Units Oral Daily    brimonidine  1 drop Both Eyes BID    And    timolol  1 drop Both Eyes BID    gabapentin  100 mg Oral BID    mirtazapine  15 mg Oral Nightly    atorvastatin  10 mg Oral Daily     Continuous Infusions:   sodium chloride      lactated ringers 100 mL/hr at 25 1603    [Held by provider] dilTIAZem 100 mg in sodium chloride 0.9 % 100 mL infusion (ADD-[place]) Stopped (25 1619)     PRN Meds:sodium chloride flush, sodium chloride, white petrolatum, menthol-zinc oxide, sodium chloride flush, acetaminophen **OR** acetaminophen, morphine    OBJECTIVE:  BP (!) 167/75   Pulse 77   Temp 98.2 °F (36.8 °C) (Oral)   Resp 18   Ht 1.727 m (5' 8\")   Wt 66.8 kg (147 lb 4.3 oz)   SpO2 96%   BMI 22.39 kg/m²   Temp  Av.5 °F (36.9 °C)  Min: 98.2 °F (36.8 °C)  Max: 98.8 °F (37.1 °C)  Constitutional: The patient is awake, alert,

## 2025-03-13 ENCOUNTER — ANESTHESIA EVENT (OUTPATIENT)
Age: 85
End: 2025-03-13
Payer: MEDICARE

## 2025-03-13 ENCOUNTER — ANESTHESIA (OUTPATIENT)
Age: 85
End: 2025-03-13
Payer: MEDICARE

## 2025-03-13 ENCOUNTER — HOSPITAL ENCOUNTER (INPATIENT)
Age: 85
Discharge: HOME | End: 2025-03-15
Attending: INTERNAL MEDICINE
Payer: MEDICARE

## 2025-03-13 VITALS
TEMPERATURE: 97 F | BODY MASS INDEX: 23.57 KG/M2 | HEART RATE: 66 BPM | OXYGEN SATURATION: 99 % | WEIGHT: 155 LBS | SYSTOLIC BLOOD PRESSURE: 116 MMHG | DIASTOLIC BLOOD PRESSURE: 55 MMHG | RESPIRATION RATE: 20 BRPM

## 2025-03-13 LAB
ALBUMIN SERPL-MCNC: 2.2 G/DL (ref 3.5–5.2)
ALP SERPL-CCNC: 89 U/L (ref 40–129)
ALT SERPL-CCNC: 20 U/L (ref 0–40)
ANION GAP SERPL CALCULATED.3IONS-SCNC: 12 MMOL/L (ref 7–16)
AST SERPL-CCNC: 18 U/L (ref 0–39)
BASOPHILS # BLD: 0.01 K/UL (ref 0–0.2)
BASOPHILS NFR BLD: 0 % (ref 0–2)
BILIRUB SERPL-MCNC: 0.2 MG/DL (ref 0–1.2)
BUN SERPL-MCNC: 15 MG/DL (ref 6–23)
CA-I BLD-SCNC: 1.16 MMOL/L (ref 1.15–1.33)
CALCIUM SERPL-MCNC: 8.1 MG/DL (ref 8.6–10.2)
CHLORIDE SERPL-SCNC: 105 MMOL/L (ref 98–107)
CO2 SERPL-SCNC: 22 MMOL/L (ref 22–29)
CREAT SERPL-MCNC: 0.7 MG/DL (ref 0.7–1.2)
ECHO BSA: 1.79 M2
EOSINOPHIL # BLD: 0.09 K/UL (ref 0.05–0.5)
EOSINOPHILS RELATIVE PERCENT: 2 % (ref 0–6)
ERYTHROCYTE [DISTWIDTH] IN BLOOD BY AUTOMATED COUNT: 15.9 % (ref 11.5–15)
GFR, ESTIMATED: >90 ML/MIN/1.73M2
GLUCOSE SERPL-MCNC: 104 MG/DL (ref 74–99)
HCT VFR BLD AUTO: 24.5 % (ref 37–54)
HGB BLD-MCNC: 7.4 G/DL (ref 12.5–16.5)
IMM GRANULOCYTES # BLD AUTO: 0.04 K/UL (ref 0–0.58)
IMM GRANULOCYTES NFR BLD: 1 % (ref 0–5)
LYMPHOCYTES NFR BLD: 0.78 K/UL (ref 1.5–4)
LYMPHOCYTES RELATIVE PERCENT: 13 % (ref 20–42)
MAGNESIUM SERPL-MCNC: 1.8 MG/DL (ref 1.6–2.6)
MCH RBC QN AUTO: 29 PG (ref 26–35)
MCHC RBC AUTO-ENTMCNC: 30.2 G/DL (ref 32–34.5)
MCV RBC AUTO: 96.1 FL (ref 80–99.9)
MICROORGANISM SPEC CULT: NORMAL
MICROORGANISM SPEC CULT: NORMAL
MONOCYTES NFR BLD: 0.35 K/UL (ref 0.1–0.95)
MONOCYTES NFR BLD: 6 % (ref 2–12)
NEUTROPHILS NFR BLD: 78 % (ref 43–80)
NEUTS SEG NFR BLD: 4.56 K/UL (ref 1.8–7.3)
PHOSPHATE SERPL-MCNC: 2.5 MG/DL (ref 2.5–4.5)
PLATELET # BLD AUTO: 103 K/UL (ref 130–450)
PMV BLD AUTO: 9.9 FL (ref 7–12)
POTASSIUM SERPL-SCNC: 3.8 MMOL/L (ref 3.5–5)
PROT SERPL-MCNC: 4.9 G/DL (ref 6.4–8.3)
RBC # BLD AUTO: 2.55 M/UL (ref 3.8–5.8)
SERVICE CMNT-IMP: NORMAL
SERVICE CMNT-IMP: NORMAL
SODIUM SERPL-SCNC: 139 MMOL/L (ref 132–146)
SPECIMEN DESCRIPTION: NORMAL
SPECIMEN DESCRIPTION: NORMAL
SURGICAL PATHOLOGY REPORT: NORMAL
WBC OTHER # BLD: 5.8 K/UL (ref 4.5–11.5)

## 2025-03-13 PROCEDURE — 6360000002 HC RX W HCPCS: Performed by: FAMILY MEDICINE

## 2025-03-13 PROCEDURE — 93312 ECHO TRANSESOPHAGEAL: CPT | Performed by: INTERNAL MEDICINE

## 2025-03-13 PROCEDURE — 93320 DOPPLER ECHO COMPLETE: CPT | Performed by: INTERNAL MEDICINE

## 2025-03-13 PROCEDURE — 82330 ASSAY OF CALCIUM: CPT

## 2025-03-13 PROCEDURE — 80053 COMPREHEN METABOLIC PANEL: CPT

## 2025-03-13 PROCEDURE — 6370000000 HC RX 637 (ALT 250 FOR IP): Performed by: INTERNAL MEDICINE

## 2025-03-13 PROCEDURE — 36592 COLLECT BLOOD FROM PICC: CPT

## 2025-03-13 PROCEDURE — 93325 DOPPLER ECHO COLOR FLOW MAPG: CPT

## 2025-03-13 PROCEDURE — 7100000010 HC PHASE II RECOVERY - FIRST 15 MIN: Performed by: INTERNAL MEDICINE

## 2025-03-13 PROCEDURE — 85025 COMPLETE CBC W/AUTO DIFF WBC: CPT

## 2025-03-13 PROCEDURE — 99233 SBSQ HOSP IP/OBS HIGH 50: CPT | Performed by: STUDENT IN AN ORGANIZED HEALTH CARE EDUCATION/TRAINING PROGRAM

## 2025-03-13 PROCEDURE — 2580000003 HC RX 258: Performed by: NURSE ANESTHETIST, CERTIFIED REGISTERED

## 2025-03-13 PROCEDURE — 2580000003 HC RX 258: Performed by: FAMILY MEDICINE

## 2025-03-13 PROCEDURE — 93325 DOPPLER ECHO COLOR FLOW MAPG: CPT | Performed by: INTERNAL MEDICINE

## 2025-03-13 PROCEDURE — 84100 ASSAY OF PHOSPHORUS: CPT

## 2025-03-13 PROCEDURE — 6360000002 HC RX W HCPCS: Performed by: INTERNAL MEDICINE

## 2025-03-13 PROCEDURE — 83735 ASSAY OF MAGNESIUM: CPT

## 2025-03-13 PROCEDURE — 6370000000 HC RX 637 (ALT 250 FOR IP): Performed by: STUDENT IN AN ORGANIZED HEALTH CARE EDUCATION/TRAINING PROGRAM

## 2025-03-13 PROCEDURE — 6360000002 HC RX W HCPCS: Performed by: NURSE ANESTHETIST, CERTIFIED REGISTERED

## 2025-03-13 PROCEDURE — 2500000003 HC RX 250 WO HCPCS: Performed by: INTERNAL MEDICINE

## 2025-03-13 PROCEDURE — 3700000000 HC ANESTHESIA ATTENDED CARE: Performed by: INTERNAL MEDICINE

## 2025-03-13 PROCEDURE — 6370000000 HC RX 637 (ALT 250 FOR IP): Performed by: PSYCHIATRY & NEUROLOGY

## 2025-03-13 PROCEDURE — 3700000001 HC ADD 15 MINUTES (ANESTHESIA): Performed by: INTERNAL MEDICINE

## 2025-03-13 PROCEDURE — 2500000003 HC RX 250 WO HCPCS: Performed by: FAMILY MEDICINE

## 2025-03-13 PROCEDURE — 2060000000 HC ICU INTERMEDIATE R&B

## 2025-03-13 PROCEDURE — 6370000000 HC RX 637 (ALT 250 FOR IP): Performed by: NURSE PRACTITIONER

## 2025-03-13 RX ORDER — SODIUM CHLORIDE 9 MG/ML
INJECTION, SOLUTION INTRAVENOUS
Status: DISCONTINUED | OUTPATIENT
Start: 2025-03-13 | End: 2025-03-13 | Stop reason: SDUPTHER

## 2025-03-13 RX ORDER — OXYCODONE AND ACETAMINOPHEN 5; 325 MG/1; MG/1
1 TABLET ORAL EVERY 4 HOURS PRN
Refills: 0 | Status: DISCONTINUED | OUTPATIENT
Start: 2025-03-13 | End: 2025-03-14 | Stop reason: HOSPADM

## 2025-03-13 RX ORDER — BENZONATATE 100 MG/1
100 CAPSULE ORAL 3 TIMES DAILY
Status: DISCONTINUED | OUTPATIENT
Start: 2025-03-13 | End: 2025-03-14 | Stop reason: HOSPADM

## 2025-03-13 RX ORDER — PROPOFOL 10 MG/ML
INJECTION, EMULSION INTRAVENOUS
Status: DISCONTINUED | OUTPATIENT
Start: 2025-03-13 | End: 2025-03-13 | Stop reason: SDUPTHER

## 2025-03-13 RX ADMIN — DOFETILIDE 125 MCG: 0.12 CAPSULE ORAL at 20:57

## 2025-03-13 RX ADMIN — BENZONATATE 100 MG: 100 CAPSULE ORAL at 13:29

## 2025-03-13 RX ADMIN — PANTOPRAZOLE SODIUM 40 MG: 40 INJECTION, POWDER, FOR SOLUTION INTRAVENOUS at 20:57

## 2025-03-13 RX ADMIN — MIRTAZAPINE 15 MG: 15 TABLET, FILM COATED ORAL at 20:57

## 2025-03-13 RX ADMIN — APIXABAN 5 MG: 5 TABLET, FILM COATED ORAL at 20:57

## 2025-03-13 RX ADMIN — VITAM B12 100 MCG: 100 TAB at 16:31

## 2025-03-13 RX ADMIN — DOFETILIDE 125 MCG: 0.12 CAPSULE ORAL at 09:06

## 2025-03-13 RX ADMIN — SODIUM CHLORIDE, PRESERVATIVE FREE 10 ML: 5 INJECTION INTRAVENOUS at 09:12

## 2025-03-13 RX ADMIN — BRIMONIDINE TARTRATE 1 DROP: 2 SOLUTION/ DROPS OPHTHALMIC at 20:56

## 2025-03-13 RX ADMIN — PANTOPRAZOLE SODIUM 40 MG: 40 INJECTION, POWDER, FOR SOLUTION INTRAVENOUS at 09:05

## 2025-03-13 RX ADMIN — LATANOPROST 1 DROP: 50 SOLUTION OPHTHALMIC at 20:57

## 2025-03-13 RX ADMIN — ATORVASTATIN CALCIUM 10 MG: 10 TABLET, FILM COATED ORAL at 09:05

## 2025-03-13 RX ADMIN — PETROLATUM: 420 OINTMENT TOPICAL at 20:57

## 2025-03-13 RX ADMIN — BRIMONIDINE TARTRATE 1 DROP: 2 SOLUTION/ DROPS OPHTHALMIC at 09:06

## 2025-03-13 RX ADMIN — PETROLATUM: 420 OINTMENT TOPICAL at 09:11

## 2025-03-13 RX ADMIN — ANORECTAL OINTMENT: 15.7; .44; 24; 20.6 OINTMENT TOPICAL at 20:58

## 2025-03-13 RX ADMIN — TIMOLOL MALEATE 1 DROP: 5 SOLUTION/ DROPS OPHTHALMIC at 09:06

## 2025-03-13 RX ADMIN — OXYCODONE HYDROCHLORIDE AND ACETAMINOPHEN 1 TABLET: 5; 325 TABLET ORAL at 18:32

## 2025-03-13 RX ADMIN — SODIUM CHLORIDE, PRESERVATIVE FREE 10 ML: 5 INJECTION INTRAVENOUS at 20:58

## 2025-03-13 RX ADMIN — Medication 1000 UNITS: at 09:05

## 2025-03-13 RX ADMIN — METOPROLOL SUCCINATE 12.5 MG: 25 TABLET, EXTENDED RELEASE ORAL at 09:05

## 2025-03-13 RX ADMIN — PROPOFOL 80 MG: 10 INJECTION, EMULSION INTRAVENOUS at 12:31

## 2025-03-13 RX ADMIN — ANORECTAL OINTMENT: 15.7; .44; 24; 20.6 OINTMENT TOPICAL at 09:11

## 2025-03-13 RX ADMIN — WATER 2000 MG: 1 INJECTION INTRAMUSCULAR; INTRAVENOUS; SUBCUTANEOUS at 13:30

## 2025-03-13 RX ADMIN — BENZONATATE 100 MG: 100 CAPSULE ORAL at 20:57

## 2025-03-13 RX ADMIN — TIMOLOL MALEATE 1 DROP: 5 SOLUTION/ DROPS OPHTHALMIC at 20:56

## 2025-03-13 RX ADMIN — PROPOFOL 75 MCG/KG/MIN: 10 INJECTION, EMULSION INTRAVENOUS at 12:32

## 2025-03-13 RX ADMIN — SODIUM CHLORIDE: 9 INJECTION, SOLUTION INTRAVENOUS at 12:22

## 2025-03-13 RX ADMIN — GABAPENTIN 100 MG: 100 CAPSULE ORAL at 20:57

## 2025-03-13 RX ADMIN — VITAM B12 100 MCG: 100 TAB at 09:06

## 2025-03-13 RX ADMIN — GABAPENTIN 100 MG: 100 CAPSULE ORAL at 09:05

## 2025-03-13 ASSESSMENT — PAIN SCALES - GENERAL
PAINLEVEL_OUTOF10: 0
PAINLEVEL_OUTOF10: 6
PAINLEVEL_OUTOF10: 2

## 2025-03-13 ASSESSMENT — LIFESTYLE VARIABLES: SMOKING_STATUS: 0

## 2025-03-13 ASSESSMENT — PAIN DESCRIPTION - ORIENTATION: ORIENTATION: LOWER

## 2025-03-13 ASSESSMENT — PAIN DESCRIPTION - DESCRIPTORS: DESCRIPTORS: ACHING;CRUSHING;SHARP

## 2025-03-13 ASSESSMENT — PAIN DESCRIPTION - LOCATION: LOCATION: BACK

## 2025-03-13 NOTE — ANESTHESIA PRE PROCEDURE
Department of Anesthesiology  Preprocedure Note       Name:  Montana Gerard   Age:  84 y.o.  :  1940                                          MRN:  [id number]         Date:  3/13/2025      Surgeon: * No surgeons listed *Dr Mcgarry    Procedure: * No procedures listed *RINKU    Medications prior to admission:   Prior to Admission medications    Medication Sig Start Date End Date Taking? Authorizing Provider   baclofen (LIORESAL) 10 MG tablet Take 1 tablet by mouth 3 times daily as needed (spasm) Tired  DO NOT DRIVE 25   Haseeb Phan DO   tamsulosin (FLOMAX) 0.4 MG capsule Take 2 capsules by mouth nightly 24   Haseeb Phan DO   atorvastatin (LIPITOR) 40 MG tablet Take 1 tablet by mouth daily 24   Haseeb Phan DO   dofetilide (TIKOSYN) 125 MCG capsule Take 1 capsule by mouth 2 times daily    Sana Pedro MD   brimonidine-timolol (COMBIGAN) 0.2-0.5 % ophthalmic solution Place 1 drop into both eyes in the morning and 1 drop in the evening.    Sana Pedro MD   metoprolol succinate (TOPROL XL) 25 MG extended release tablet Take 0.5 tablets by mouth daily 23   Haseeb Phan DO   vitamin D3 (CHOLECALCIFEROL) 25 MCG (1000 UT) TABS tablet Take 1 tablet by mouth 5 times a week.    Sana Pedro MD   vitamin B-12 (CYANOCOBALAMIN) 100 MCG tablet Take 1 tablet by mouth in the morning and 1 tablet in the evening.    Sana Pedro MD   Biotin 2500 MCG CAPS Take 1 tablet by mouth daily    Sana Pedro MD   ferrous sulfate 27 MG TABS Take 1 tablet by mouth daily    Sana Pedro MD   apixaban (ELIQUIS) 5 MG TABS tablet Take by mouth 2 times daily    Sana Pedro MD   buPROPion (WELLBUTRIN SR) 100 MG extended release tablet Take 1 tablet by mouth daily    Sana Pedro MD   Coenzyme Q10 (COQ10) 100 MG CAPS Take 100 mg by mouth daily    Sana Pedro MD   latanoprost (XALATAN) 0.005 % ophthalmic solution Place 1

## 2025-03-13 NOTE — CARE COORDINATION
RINKU completed today. IV rocephin continues, will need 6 weeks of IV rocephin at discharge if RINKU negative for vegetation. [employer] following, auth with [employer] waived through 3/14. PICC placed yesterday. Ambulance on soft chart.     For questions I can be reached at [phone]. LUCINA Ravi

## 2025-03-13 NOTE — PROGRESS NOTES
Patient seen prior to RINKU    H&P reviewed    RINKU recommended to r/o endocarditis    Patient denies dysphagia.    Medical/Surgical history: Reviewed    Allergies:  Reviewed    Medications: reviewed.    Labs/imaging studies: Pertinent information reviewed.  Echo - 3/10/25    No gross evidence of Endocarditis. Valves not well visualized. Consider RINKU if  clinical suspicion of endocarditis is high.    Left Ventricle: Normal left ventricular systolic function with EF of 60 - 65%. Left ventricle size is normal. Mild asymmetric hypertrophy. No LVOT obstruction. Normal wall motion. Stage I diastolic dysfunction.    Aortic Valve: Mild to moderate stenosis-Peak gradient 37mmHg, mean 19mmHg, DUNG 1.4cm2 by Vmax, 1.7cm2 by VTI; Peak velocity index 0.47; DVI 0.57.    Mitral Valve: Mild annular calcification.    Pericardium: Anterior epicardial fat vs. Pericardial effusion.    Priro study done 4/1/2021.       Physical exam:     Vitals:    03/13/25 1223   BP: (!) 168/72   Pulse: 66   Resp: 20   Temp:    SpO2: 96%       In general who appears stated age, alert,  no apparent distress    HEET: eyes - conjunctivae pink, Pharynx - Clear  Neck -  No stridor, no carotid bruit.   LUNGS: Few rhonchi  Heart : Regular rate and rhythm, 2/6 systolic murmur  ABDOMEN: Bowel sounds present.  EXT: Distal pulses palpable  NEURO / PSYCH: oriented to person, place        Impression/Recommendations:    Bacteremia - RNIKU requested. Risk benefits of RINKU discussed- Agreeable          Roc Mcgarry MD  3/13/2025  12:26 PM  [facility]

## 2025-03-13 NOTE — PROGRESS NOTES
[facility]  [facility]  Progress Note      Chief Complaint   Patient presents with    Rectal Bleeding     (One episode this AM and has progressively gotten worse, \"dark gel color\" in stool tonight, +THINNERS)       SUBJECTIVE:    Patient is tolerating medications. No reported adverse drug reactions.  No nausea, vomiting, diarrhea.    Review of systems:  As stated above in the chief complaint, otherwise negative.    Medications:  Scheduled Meds:   benzonatate  100 mg Oral TID    sodium chloride flush  5-40 mL IntraVENous 2 times per day    lidocaine 1 % injection  50 mg IntraDERmal Once    apixaban  5 mg Oral BID    cefTRIAXone (ROCEPHIN) IV  2,000 mg IntraVENous Q24H    bisacodyl  10 mg Rectal Daily    sodium chloride flush  5-40 mL IntraVENous 2 times per day    pantoprazole (PROTONIX) 40 mg in sodium chloride (PF) 0.9 % 10 mL injection  40 mg IntraVENous Q12H    dofetilide  125 mcg Oral BID    latanoprost  1 drop Both Eyes Nightly    metoprolol succinate  12.5 mg Oral Daily    vitamin B-12  100 mcg Oral BID    Vitamin D  1,000 Units Oral Daily    brimonidine  1 drop Both Eyes BID    And    timolol  1 drop Both Eyes BID    gabapentin  100 mg Oral BID    mirtazapine  15 mg Oral Nightly    atorvastatin  10 mg Oral Daily     Continuous Infusions:   sodium chloride      lactated ringers 100 mL/hr at 25 1830    [Held by provider] dilTIAZem 100 mg in sodium chloride 0.9 % 100 mL infusion (ADD-[place]) Stopped (25 1619)     PRN Meds:oxyCODONE-acetaminophen, sodium chloride flush, sodium chloride, white petrolatum, menthol-zinc oxide, sodium chloride flush, acetaminophen **OR** acetaminophen, morphine    OBJECTIVE:  /72   Pulse 64   Temp 97.5 °F (36.4 °C) (Temporal)   Resp 14   Ht 1.727 m (5' 8\")   Wt 66.8 kg (147 lb 4.3 oz)   SpO2 98%   BMI 22.39 kg/m²   Temp  Av °F (36.7 °C)  Min: 97 °F (36.1 °C)  Max: 99.2 °F (37.3 °C)  Constitutional: The patient is awake,  alert, and oriented.   Skin: Warm and dry. No rashes were noted. No jaundice.  HEENT: Eyes show round, and reactive pupils. Moist mucous membranes, no ulcerations, no thrush.   Neck: Supple to movements. No lymphadenopathy.   Chest: No use of accessory muscles to breathe. Symmetrical expansion. Auscultation reveals no wheezing, crackles, or rhonchi.   Cardiovascular: S1 and S2 are rhythmic and regular. No murmurs appreciated.   Abdomen: Positive bowel sounds to auscultation. Benign to palpation. No masses felt. No hepatosplenomegaly.  Genitourinary: No pain in the lower abdomen  Extremities: No clubbing, no cyanosis, no edema.  Musculoskeletal: No pain in range of motion of any joints  Neurological: Following commands, no focal neurodeficit  Lines: peripheral    Laboratory and Tests Review:  Lab Results   Component Value Date    WBC 5.8 03/13/2025    WBC 7.2 03/12/2025    WBC 9.1 03/10/2025    HGB 7.4 (L) 03/13/2025    HCT 24.5 (L) 03/13/2025    MCV 96.1 03/13/2025     (L) 03/13/2025     Lab Results   Component Value Date    NEUTROABS 4.56 03/13/2025    NEUTROABS 5.67 03/12/2025    NEUTROABS 7.32 (H) 03/10/2025     No results found for: \"CRPHS\"  Lab Results   Component Value Date    ALT 20 03/13/2025    AST 18 03/13/2025    ALKPHOS 89 03/13/2025    BILITOT 0.2 03/13/2025     Lab Results   Component Value Date/Time     03/13/2025 09:00 AM    K 3.8 03/13/2025 09:00 AM     03/13/2025 09:00 AM    CO2 22 03/13/2025 09:00 AM    BUN 15 03/13/2025 09:00 AM    CREATININE 0.7 03/13/2025 09:00 AM    CREATININE 0.7 03/12/2025 08:21 AM    CREATININE 0.7 03/10/2025 11:55 AM    GFRAA >60 10/11/2022 01:10 PM    LABGLOM >90 03/13/2025 09:00 AM    LABGLOM >60 03/04/2024 12:17 PM    GLUCOSE 104 03/13/2025 09:00 AM    CALCIUM 8.1 03/13/2025 09:00 AM    BILITOT 0.2 03/13/2025 09:00 AM    ALKPHOS 89 03/13/2025 09:00 AM    AST 18 03/13/2025 09:00 AM    ALT 20 03/13/2025 09:00 AM     Lab Results   Component Value Date

## 2025-03-13 NOTE — PLAN OF CARE
Problem: Safety - Adult  Goal: Free from fall injury  Outcome: Progressing     Problem: Chronic Conditions and Co-morbidities  Goal: Patient's chronic conditions and co-morbidity symptoms are monitored and maintained or improved  Outcome: Progressing     Problem: Discharge Planning  Goal: Discharge to home or other facility with appropriate resources  Outcome: Progressing     Problem: Skin/Tissue Integrity  Goal: Skin integrity remains intact  Outcome: Progressing     Problem: ABCDS Injury Assessment  Goal: Absence of physical injury  Outcome: Progressing     Problem: Gastrointestinal - Adult  Goal: Minimal or absence of nausea and vomiting  Outcome: Progressing  Goal: Maintains or returns to baseline bowel function  Outcome: Progressing  Goal: Maintains adequate nutritional intake  Outcome: Progressing  Goal: Establish and maintain optimal ostomy function  Outcome: Progressing     Problem: Hematologic - Adult  Goal: Maintains hematologic stability  Outcome: Progressing     Problem: Nutrition Deficit:  Goal: Optimize nutritional status  Outcome: Progressing     Problem: Neurosensory - Adult  Goal: Achieves stable or improved neurological status  Outcome: Progressing     Problem: Respiratory - Adult  Goal: Achieves optimal ventilation and oxygenation  Outcome: Progressing     Problem: Cardiovascular - Adult  Goal: Maintains optimal cardiac output and hemodynamic stability  Outcome: Progressing     Problem: Skin/Tissue Integrity - Adult  Goal: Skin integrity remains intact  Outcome: Progressing     Problem: Musculoskeletal - Adult  Goal: Return mobility to safest level of function  Outcome: Progressing     Problem: Genitourinary - Adult  Goal: Absence of urinary retention  Outcome: Progressing      no

## 2025-03-13 NOTE — OP NOTE
Preliminary RINKU Note      Patient: Montana Gerard  YOB: 1940  MRN: [id number]    Date of Procedure: 3/13/2025      Pre-operative Diagnosis: Bacteremia, R/O Endocarditis    Post-operative Diagnosis: No endocarditis; s/p TAVR    Procedure: RINKU    Anesthesia: LMAC    Surgeons/Assistants: Dr Mcgarry    Estimated Blood Loss: None    Complications: None    Full report to follow in Echo system      Electronically signed by Roc Mcgarry MD on 3/13/2025 at 12:46 PM

## 2025-03-13 NOTE — PROGRESS NOTES
Hospitalist Progress Note      Admission Date: 3/3/2025     SYNOPSIS: 84-year-old man with past medical history of A-fib, aortic stenosis s/p TAVR, HFpEF, prostate cancer, malleable penile implant, sinus node dysfunction, coronary artery disease, gastritis, GERD, hyperlipidemia, hypertension, diabetes mellitus, who presented to [facility] ED with concerns of rectal bleeding.  Patient was recently admitted last week for A-fib RVR, patient is on Eliquis.  Patient reported to have maroon-colored stool.  Hemoglobin at 9.3.  CT abdomen pelvis with no evidence of active GI hemorrhage.  General surgery was consulted and recommended EGD and colonoscopy that was performed on 3/6 and demonstrated small hiatal hernia and sigmoid diverticulosis and cleared patient to resume Eliquis.   Neurology was consulted on 3/3 for progressive bilateral lower extremity edema, tremors and further images was obtained of brain and spine.  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  MRI lumbar spine showed discitis and osteomyelitis T12-L1, L1-2, L2-3 and L5-S1 levels on 3/7.  Neurosurgery was consulted and recommended conservative management and use of TLSO to use while upright.  Blood cultures grew Streptococcus infantarius ssp coli from 3/5.  Infectious disease was consulted.  Patient underwent IR L as biopsy.  Patient was initially treated with Zosyn and vancomycin which were discontinue and started on ceftriaxone 2 g IV daily per ID guidance which recommended to complete for least 6 weeks.  TTE negative for vegetations and RINKU was ordered on 3/12.      SUBJECTIVE:    Patient continues to endorse mid and lower back pain, lower extremity weakness.  He is having dry, nonproductive cough  Denies shortness of      03/10/25  1155   ALKPHOS 103   ALT 39   AST 24   BILITOT 0.3       Recent Labs     03/10/25  1551   INR 1.2       No results for input(s): \"CKTOTAL\", \"TROPONINI\" in the last 72 hours.    Chronic labs:    Lab Results   Component Value Date    CHOL 110 11/08/2024    TRIG 68 11/08/2024    HDL 20 (L) 03/04/2025    TSH 2.63 02/17/2025    PSA 0.06 03/03/2025    INR 1.2 03/10/2025    LABA1C 4.2 03/04/2025       Radiology: REVIEWED DAILY    +++++++++++++++++++++++++++++++++++++++++++++++++  Maria Marino Milanes, MD  [place], [place]  +++++++++++++++++++++++++++++++++++++++++++++++++  NOTE: This report was transcribed using voice recognition software. Every effort was made to ensure accuracy; however, inadvertent computerized transcription errors may be present.

## 2025-03-13 NOTE — ANESTHESIA POSTPROCEDURE EVALUATION
Department of Anesthesiology  Postprocedure Note    Patient: Montana Gerard  MRN: [id number]  YOB: 1940  Date of evaluation: 3/13/2025    Procedure Summary       Date: 03/13/25 Room / Location: [facility] Cardiac Cath Lab; [facility] Noninvasive Cardiology    Anesthesia Start: 1222 Anesthesia Stop: 1251    Procedure: RINKU (PRN CONTRAST/BUBBLE/3D) Diagnosis: Bacteremia    Scheduled Providers: Rowena Yanez MD Responsible Provider: Rowena Yanez MD    Anesthesia Type: MAC ASA Status: 4            Anesthesia Type: No value filed.    Jacky Phase I:      Jacky Phase II:      Anesthesia Post Evaluation    Patient location during evaluation: PACU  Patient participation: complete - patient participated  Level of consciousness: awake and alert  Airway patency: patent  Nausea & Vomiting: no nausea and no vomiting  Cardiovascular status: blood pressure returned to baseline and hemodynamically stable  Respiratory status: acceptable and spontaneous ventilation  Hydration status: euvolemic  Multimodal analgesia pain management approach  Pain management: adequate    No notable events documented.

## 2025-03-13 NOTE — PLAN OF CARE
Problem: Safety - Adult  Goal: Free from fall injury  3/13/2025 1111 by Faustino Perea RN  Outcome: Progressing  3/13/2025 0557 by Aislinn Smiley RN  Outcome: Progressing     Problem: Chronic Conditions and Co-morbidities  Goal: Patient's chronic conditions and co-morbidity symptoms are monitored and maintained or improved  3/13/2025 1111 by Faustino Perea RN  Outcome: Progressing  3/13/2025 0557 by Aislinn Smiley RN  Outcome: Progressing     Problem: Discharge Planning  Goal: Discharge to home or other facility with appropriate resources  3/13/2025 1111 by Faustino Perea RN  Outcome: Progressing  3/13/2025 0557 by Aislinn Smiley RN  Outcome: Progressing     Problem: Skin/Tissue Integrity  Goal: Skin integrity remains intact  Description: 1.  Monitor for areas of redness and/or skin breakdown  2.  Assess vascular access sites hourly  3.  Every 4-6 hours minimum:  Change oxygen saturation probe site  4.  Every 4-6 hours:  If on nasal continuous positive airway pressure, respiratory therapy assess nares and determine need for appliance change or resting period  3/13/2025 1111 by Faustino Perea RN  Outcome: Progressing  3/13/2025 0557 by Aislinn Smiley RN  Outcome: Progressing     Problem: ABCDS Injury Assessment  Goal: Absence of physical injury  3/13/2025 1111 by Faustino Perea RN  Outcome: Progressing  3/13/2025 0557 by Aislinn Smiley RN  Outcome: Progressing     Problem: Gastrointestinal - Adult  Goal: Minimal or absence of nausea and vomiting  3/13/2025 1111 by Faustino Perea RN  Outcome: Progressing  3/13/2025 0557 by Aislinn Smiley RN  Outcome: Progressing  Goal: Maintains or returns to baseline bowel function  3/13/2025 1111 by Faustino Perea RN  Outcome: Progressing  3/13/2025 0557 by Aislinn Smiley RN  Outcome: Progressing  Goal: Maintains adequate nutritional intake  3/13/2025 1111 by Faustino Perea RN  Outcome: Progressing  3/13/2025 0557 by Aislinn Smiley RN  Outcome: Progressing  Goal:

## 2025-03-14 VITALS
SYSTOLIC BLOOD PRESSURE: 128 MMHG | WEIGHT: 147.27 LBS | DIASTOLIC BLOOD PRESSURE: 69 MMHG | HEART RATE: 69 BPM | HEIGHT: 68 IN | RESPIRATION RATE: 16 BRPM | TEMPERATURE: 97.4 F | OXYGEN SATURATION: 95 % | BODY MASS INDEX: 22.32 KG/M2

## 2025-03-14 LAB
MICROORGANISM SPEC CULT: NO GROWTH
MICROORGANISM/AGENT SPEC: NORMAL
SERVICE CMNT-IMP: NORMAL
SPECIMEN DESCRIPTION: NORMAL

## 2025-03-14 PROCEDURE — 6370000000 HC RX 637 (ALT 250 FOR IP): Performed by: STUDENT IN AN ORGANIZED HEALTH CARE EDUCATION/TRAINING PROGRAM

## 2025-03-14 PROCEDURE — 2580000003 HC RX 258: Performed by: FAMILY MEDICINE

## 2025-03-14 PROCEDURE — 6370000000 HC RX 637 (ALT 250 FOR IP): Performed by: PSYCHIATRY & NEUROLOGY

## 2025-03-14 PROCEDURE — 2580000003 HC RX 258: Performed by: INTERNAL MEDICINE

## 2025-03-14 PROCEDURE — 99239 HOSP IP/OBS DSCHRG MGMT >30: CPT | Performed by: STUDENT IN AN ORGANIZED HEALTH CARE EDUCATION/TRAINING PROGRAM

## 2025-03-14 PROCEDURE — 6360000002 HC RX W HCPCS: Performed by: FAMILY MEDICINE

## 2025-03-14 PROCEDURE — 6370000000 HC RX 637 (ALT 250 FOR IP): Performed by: INTERNAL MEDICINE

## 2025-03-14 PROCEDURE — 6370000000 HC RX 637 (ALT 250 FOR IP): Performed by: NURSE PRACTITIONER

## 2025-03-14 RX ORDER — BENZONATATE 100 MG/1
100 CAPSULE ORAL 3 TIMES DAILY
Status: ON HOLD | DISCHARGE
Start: 2025-03-14 | End: 2025-03-21

## 2025-03-14 RX ORDER — ACETAMINOPHEN 325 MG/1
650 TABLET ORAL EVERY 8 HOURS SCHEDULED
Status: ON HOLD | DISCHARGE
Start: 2025-03-14

## 2025-03-14 RX ORDER — OXYCODONE AND ACETAMINOPHEN 5; 325 MG/1; MG/1
1 TABLET ORAL EVERY 4 HOURS PRN
Qty: 20 TABLET | Refills: 0 | Status: ON HOLD | OUTPATIENT
Start: 2025-03-14 | End: 2025-03-19

## 2025-03-14 RX ORDER — MIRTAZAPINE 15 MG/1
15 TABLET, FILM COATED ORAL NIGHTLY
Status: ON HOLD | DISCHARGE
Start: 2025-03-14

## 2025-03-14 RX ORDER — GABAPENTIN 100 MG/1
100 CAPSULE ORAL 2 TIMES DAILY
Qty: 60 CAPSULE | Refills: 0 | Status: ON HOLD | OUTPATIENT
Start: 2025-03-14 | End: 2025-04-13

## 2025-03-14 RX ORDER — PANTOPRAZOLE SODIUM 40 MG/1
40 TABLET, DELAYED RELEASE ORAL
Status: ON HOLD | DISCHARGE
Start: 2025-03-14

## 2025-03-14 RX ADMIN — PANTOPRAZOLE SODIUM 40 MG: 40 INJECTION, POWDER, FOR SOLUTION INTRAVENOUS at 09:04

## 2025-03-14 RX ADMIN — GABAPENTIN 100 MG: 100 CAPSULE ORAL at 09:04

## 2025-03-14 RX ADMIN — APIXABAN 5 MG: 5 TABLET, FILM COATED ORAL at 09:04

## 2025-03-14 RX ADMIN — DOFETILIDE 125 MCG: 0.12 CAPSULE ORAL at 09:04

## 2025-03-14 RX ADMIN — Medication 1000 UNITS: at 09:03

## 2025-03-14 RX ADMIN — ATORVASTATIN CALCIUM 10 MG: 10 TABLET, FILM COATED ORAL at 09:04

## 2025-03-14 RX ADMIN — BRIMONIDINE TARTRATE 1 DROP: 2 SOLUTION/ DROPS OPHTHALMIC at 09:06

## 2025-03-14 RX ADMIN — BENZONATATE 100 MG: 100 CAPSULE ORAL at 09:04

## 2025-03-14 RX ADMIN — VITAM B12 100 MCG: 100 TAB at 09:04

## 2025-03-14 RX ADMIN — METOPROLOL SUCCINATE 12.5 MG: 25 TABLET, EXTENDED RELEASE ORAL at 09:03

## 2025-03-14 RX ADMIN — SODIUM CHLORIDE, SODIUM LACTATE, POTASSIUM CHLORIDE, AND CALCIUM CHLORIDE: .6; .31; .03; .02 INJECTION, SOLUTION INTRAVENOUS at 09:11

## 2025-03-14 RX ADMIN — TIMOLOL MALEATE 1 DROP: 5 SOLUTION/ DROPS OPHTHALMIC at 09:06

## 2025-03-14 ASSESSMENT — PAIN SCALES - GENERAL: PAINLEVEL_OUTOF10: 0

## 2025-03-14 ASSESSMENT — PAIN SCALES - WONG BAKER: WONGBAKER_NUMERICALRESPONSE: NO HURT

## 2025-03-14 NOTE — DISCHARGE SUMMARY
to the right-sided L1-2 and L2-3 disc spaces where there is a mild amount of fluid and enhancement. Findings are worrisome for discitis and osteomyelitis. There is a mild amount of fluid in the anterior T12-L1 disc space along with mild edema and enhancement adjacent to the anterior disc space, more prominent on the left than the right.  This is also suspicious for discitis and osteomyelitis. There is fluid in the left L5-S1 disc space with mild edema and enhancement adjacent to the left disc space, consistent with discitis and osteomyelitis. The L3-4 and L4-5 disc spaces are normal. Incidental note is made of focal fat in the L2 vertebral body on the right. There is fatty marrow throughout the sacral ala bilaterally Bone marrow signal is otherwise normal. There are no Modic degenerative changes. SPINAL CORD:  The conus terminates normally. SOFT TISSUES: No abnormal enhancement is seen of the lumbar spine.  No paraspinal mass identified. There is a 1.9 cm cyst arising from the interpolar right kidney of no clinical concern.  There is a 2.1 cm cyst in the lower pole of the right kidney also of no clinical concern. L1-L2: Mild right subarticular zone disc bulging impinging upon the right L2 nerve root in the lateral recess without compression of the nerve root or spinal stenosis.  Moderate right and mild left foraminal zone disc bulging without contact with the exiting nerve roots.  Moderate loss of disc height on the right related to the scoliosis. L2-L3: Minimal diffuse disc bulging.  Mild right ligamentum flavum and facet joint hypertrophy without spinal stenosis.  Moderate left and mild right foraminal zone disc bulging without contact with the exiting nerve roots. Moderate loss of disc height on the right related to the scoliosis. L3-L4: Normal. L4-L5: Mild diffuse disc bulging without spinal stenosis.  Mild left foraminal zone disc bulging without contact with the exiting nerve root. Normal right neural foramen.  medications      baclofen 10 MG tablet  Commonly known as: LIORESAL     Biotin 2500 MCG Caps     omeprazole 20 MG delayed release capsule  Commonly known as: PRILOSEC               Where to Get Your Medications        You can get these medications from any pharmacy    Bring a paper prescription for each of these medications  gabapentin 100 MG capsule  oxyCODONE-acetaminophen 5-325 MG per tablet       Information about where to get these medications is not yet available    Ask your nurse or doctor about these medications  acetaminophen 325 MG tablet  benzonatate 100 MG capsule  cefTRIAXone infusion  menthol-zinc oxide 0.44-20.6 % Oint ointment  mirtazapine 15 MG tablet  pantoprazole 40 MG tablet         Time Spent on discharge is 35 minutes in the examination, evaluation, counseling and review of medications and discharge plan.    +++++++++++++++++++++++++++++++++++++++++++++++++  Maria Marino Milanes, MD  [facility] Hospitalist  [place], [place]  +++++++++++++++++++++++++++++++++++++++++++++++++  NOTE: This report was transcribed using voice recognition software. Every effort was made to ensure accuracy; however, inadvertent computerized transcription errors may be present.

## 2025-03-14 NOTE — PLAN OF CARE
Problem: Safety - Adult  Goal: Free from fall injury  3/14/2025 1139 by Faustino Perea RN  Outcome: Progressing  3/14/2025 0107 by Aislinn Smiley RN  Outcome: Progressing     Problem: Chronic Conditions and Co-morbidities  Goal: Patient's chronic conditions and co-morbidity symptoms are monitored and maintained or improved  3/14/2025 1139 by Faustino Perea RN  Outcome: Progressing  Flowsheets (Taken 3/14/2025 0743 by Maria L Lindsey RN)  Care Plan - Patient's Chronic Conditions and Co-Morbidity Symptoms are Monitored and Maintained or Improved: Monitor and assess patient's chronic conditions and comorbid symptoms for stability, deterioration, or improvement  3/14/2025 0107 by Aislinn Smiley RN  Outcome: Progressing     Problem: Discharge Planning  Goal: Discharge to home or other facility with appropriate resources  3/14/2025 1139 by Faustino Perea RN  Outcome: Progressing  Flowsheets (Taken 3/14/2025 0743 by Maria L Lindsey RN)  Discharge to home or other facility with appropriate resources: Identify barriers to discharge with patient and caregiver  3/14/2025 0107 by Aislinn Smiley RN  Outcome: Progressing     Problem: Skin/Tissue Integrity  Goal: Skin integrity remains intact  Description: 1.  Monitor for areas of redness and/or skin breakdown  2.  Assess vascular access sites hourly  3.  Every 4-6 hours minimum:  Change oxygen saturation probe site  4.  Every 4-6 hours:  If on nasal continuous positive airway pressure, respiratory therapy assess nares and determine need for appliance change or resting period  3/14/2025 1139 by Faustino Perea RN  Outcome: Progressing  Flowsheets (Taken 3/14/2025 0743 by Maria L Lindsey RN)  Skin Integrity Remains Intact: Monitor for areas of redness and/or skin breakdown  3/14/2025 0107 by Aislinn Smiley RN  Outcome: Progressing     Problem: ABCDS Injury Assessment  Goal: Absence of physical injury  3/14/2025 1139 by Faustino Perea RN  Outcome:

## 2025-03-14 NOTE — CARE COORDINATION
Patient for discharge today. Notified patient and spouse of discharge today at 1300. [facility] arranged. [facility] liaison notified of discharge time.     For questions I can be reached at [phone]. LUCINA Ravi

## 2025-03-14 NOTE — PROGRESS NOTES
[facility]  [facility]  Progress Note      Chief Complaint   Patient presents with    Rectal Bleeding     (One episode this AM and has progressively gotten worse, \"dark gel color\" in stool tonight, +THINNERS)       SUBJECTIVE:    Patient is tolerating medications. No reported adverse drug reactions.  No nausea, vomiting, diarrhea. Pain to tail bone. Family present.     Review of systems:  As stated above in the chief complaint, otherwise negative.    Medications:  Scheduled Meds:   benzonatate  100 mg Oral TID    sodium chloride flush  5-40 mL IntraVENous 2 times per day    lidocaine 1 % injection  50 mg IntraDERmal Once    apixaban  5 mg Oral BID    cefTRIAXone (ROCEPHIN) IV  2,000 mg IntraVENous Q24H    bisacodyl  10 mg Rectal Daily    sodium chloride flush  5-40 mL IntraVENous 2 times per day    pantoprazole (PROTONIX) 40 mg in sodium chloride (PF) 0.9 % 10 mL injection  40 mg IntraVENous Q12H    dofetilide  125 mcg Oral BID    latanoprost  1 drop Both Eyes Nightly    metoprolol succinate  12.5 mg Oral Daily    vitamin B-12  100 mcg Oral BID    Vitamin D  1,000 Units Oral Daily    brimonidine  1 drop Both Eyes BID    And    timolol  1 drop Both Eyes BID    gabapentin  100 mg Oral BID    mirtazapine  15 mg Oral Nightly    atorvastatin  10 mg Oral Daily     Continuous Infusions:   sodium chloride      lactated ringers 100 mL/hr at 25 0911    [Held by provider] dilTIAZem 100 mg in sodium chloride 0.9 % 100 mL infusion ([facility]) Stopped (25 1619)     PRN Meds:oxyCODONE-acetaminophen, sodium chloride flush, sodium chloride, white petrolatum, menthol-zinc oxide, sodium chloride flush, acetaminophen **OR** acetaminophen, morphine    OBJECTIVE:  BP (!) 151/80   Pulse 69   Temp 97.9 °F (36.6 °C) (Axillary)   Resp 16   Ht 1.727 m (5' 8\")   Wt 66.8 kg (147 lb 4.3 oz)   SpO2 95%   BMI 22.39 kg/m²   Temp  Av.2 °F (36.8 °C)  Min: 97 °F (36.1 °C)  Max: 99.2 °F (37.3

## 2025-03-14 NOTE — PLAN OF CARE
Problem: Nutrition Deficit:  Goal: Optimize nutritional status  3/14/2025 1139 by Faustino Perea RN  Outcome: Completed  3/14/2025 1139 by Faustino Perea RN  Outcome: Progressing  3/14/2025 0107 by Aislinn Smiley RN  Outcome: Progressing     Problem: Genitourinary - Adult  Goal: Absence of urinary retention  3/14/2025 1139 by Faustino Perea RN  Outcome: Completed  3/14/2025 1139 by Faustino Perea RN  Outcome: Progressing  Flowsheets (Taken 3/14/2025 0743 by Maria L Lindsey RN)  Absence of urinary retention: Assess patient’s ability to void and empty bladder  3/14/2025 0107 by Aislinn Smiley RN  Outcome: Progressing     Problem: Musculoskeletal - Adult  Goal: Return mobility to safest level of function  3/14/2025 1139 by Faustino Perea RN  Outcome: Completed  3/14/2025 1139 by Faustino Perea RN  Outcome: Progressing  Flowsheets (Taken 3/14/2025 0743 by Maria L Lindsey RN)  Return Mobility to Safest Level of Function: Assess patient stability and activity tolerance for standing, transferring and ambulating with or without assistive devices  3/14/2025 0107 by Aislinn Smiley RN  Outcome: Progressing     Problem: Skin/Tissue Integrity - Adult  Goal: Skin integrity remains intact  Description: 1.  Monitor for areas of redness and/or skin breakdown  2.  Assess vascular access sites hourly  3.  Every 4-6 hours minimum:  Change oxygen saturation probe site  4.  Every 4-6 hours:  If on nasal continuous positive airway pressure, respiratory therapy assess nares and determine need for appliance change or resting period  3/14/2025 1139 by Faustino Perea RN  Outcome: Completed  3/14/2025 1139 by Faustino Perea RN  Outcome: Progressing  Flowsheets (Taken 3/14/2025 0743 by Maria L Lindsey RN)  Skin Integrity Remains Intact: Monitor for areas of redness and/or skin breakdown  3/14/2025 0107 by Aislinn Smiley RN  Outcome: Progressing     Problem: Cardiovascular - Adult  Goal: Maintains optimal cardiac output and  RN  Outcome: Progressing  Flowsheets (Taken 3/14/2025 0743 by Maria L Lindsey, RN)  Discharge to home or other facility with appropriate resources: Identify barriers to discharge with patient and caregiver  3/14/2025 0107 by Aislinn Smiley RN  Outcome: Progressing     Problem: Safety - Adult  Goal: Free from fall injury  3/14/2025 1139 by Faustino Perea RN  Outcome: Completed  3/14/2025 1139 by Faustino Perea RN  Outcome: Progressing  3/14/2025 0107 by Aislinn Smiley RN  Outcome: Progressing     Problem: Chronic Conditions and Co-morbidities  Goal: Patient's chronic conditions and co-morbidity symptoms are monitored and maintained or improved  3/14/2025 1139 by Faustino Perea RN  Outcome: Completed  3/14/2025 1139 by Faustino Perea RN  Outcome: Progressing  Flowsheets (Taken 3/14/2025 0743 by Maria L Lindsey, RN)  Care Plan - Patient's Chronic Conditions and Co-Morbidity Symptoms are Monitored and Maintained or Improved: Monitor and assess patient's chronic conditions and comorbid symptoms for stability, deterioration, or improvement  3/14/2025 0107 by Aislinn Smiley RN  Outcome: Progressing

## 2025-03-14 NOTE — PLAN OF CARE
Problem: Safety - Adult  Goal: Free from fall injury  3/14/2025 0107 by Aislinn Smiley RN  Outcome: Progressing  3/13/2025 1111 by Faustino Perea RN  Outcome: Progressing     Problem: Chronic Conditions and Co-morbidities  Goal: Patient's chronic conditions and co-morbidity symptoms are monitored and maintained or improved  3/14/2025 0107 by Aislinn Smiley RN  Outcome: Progressing  3/13/2025 1111 by Faustino Perea RN  Outcome: Progressing     Problem: Discharge Planning  Goal: Discharge to home or other facility with appropriate resources  3/14/2025 0107 by Aislinn Smiley RN  Outcome: Progressing  3/13/2025 1111 by Faustino Perea RN  Outcome: Progressing     Problem: Skin/Tissue Integrity  Goal: Skin integrity remains intact  3/14/2025 0107 by Aislinn Smiley RN  Outcome: Progressing  3/13/2025 1111 by Faustino Perea RN  Outcome: Progressing     Problem: ABCDS Injury Assessment  Goal: Absence of physical injury  3/14/2025 0107 by Aislinn Smiley RN  Outcome: Progressing  3/13/2025 1111 by Faustino Perea RN  Outcome: Progressing     Problem: Gastrointestinal - Adult  Goal: Minimal or absence of nausea and vomiting  3/14/2025 0107 by Aislinn Smiley RN  Outcome: Progressing  3/13/2025 1111 by Faustino Perea RN  Outcome: Progressing  Goal: Maintains or returns to baseline bowel function  3/14/2025 0107 by Aislinn Smiley RN  Outcome: Progressing  3/13/2025 1111 by Faustino Perea RN  Outcome: Progressing  Goal: Maintains adequate nutritional intake  3/14/2025 0107 by Aislinn Smiley RN  Outcome: Progressing  3/13/2025 1111 by Faustino Perea RN  Outcome: Progressing  Goal: Establish and maintain optimal ostomy function  3/14/2025 0107 by Aislinn Smiley RN  Outcome: Progressing  3/13/2025 1111 by Faustino Perea RN  Outcome: Progressing     Problem: Hematologic - Adult  Goal: Maintains hematologic stability  3/14/2025 0107 by Aislinn Smiley RN  Outcome: Progressing  3/13/2025 1111 by Faustino Perea

## 2025-03-17 DIAGNOSIS — M46.46 LUMBAR DISCITIS: Primary | ICD-10-CM

## 2025-03-19 ENCOUNTER — HOSPITAL ENCOUNTER (INPATIENT)
Age: 85
LOS: 13 days | Discharge: NURSING, REHAB OR LONG-TERM CARE | DRG: 698 | End: 2025-04-01
Attending: STUDENT IN AN ORGANIZED HEALTH CARE EDUCATION/TRAINING PROGRAM | Admitting: STUDENT IN AN ORGANIZED HEALTH CARE EDUCATION/TRAINING PROGRAM
Payer: MEDICARE

## 2025-03-19 ENCOUNTER — APPOINTMENT (OUTPATIENT)
Dept: GENERAL RADIOLOGY | Age: 85
DRG: 698 | End: 2025-03-19
Payer: MEDICARE

## 2025-03-19 ENCOUNTER — ANCILLARY PROCEDURE (OUTPATIENT)
Dept: EMERGENCY DEPT | Age: 85
DRG: 698 | End: 2025-03-19
Attending: STUDENT IN AN ORGANIZED HEALTH CARE EDUCATION/TRAINING PROGRAM
Payer: MEDICARE

## 2025-03-19 ENCOUNTER — APPOINTMENT (OUTPATIENT)
Dept: CT IMAGING | Age: 85
DRG: 698 | End: 2025-03-19
Payer: MEDICARE

## 2025-03-19 DIAGNOSIS — R31.0 GROSS HEMATURIA: Primary | ICD-10-CM

## 2025-03-19 DIAGNOSIS — R16.1 SPLENOMEGALY: ICD-10-CM

## 2025-03-19 DIAGNOSIS — N40.0 PROSTATIC HYPERTROPHY: ICD-10-CM

## 2025-03-19 DIAGNOSIS — Z79.01 ANTICOAGULATED: ICD-10-CM

## 2025-03-19 DIAGNOSIS — N32.3 BLADDER DIVERTICULUM: ICD-10-CM

## 2025-03-19 DIAGNOSIS — K57.90 DIVERTICULOSIS: ICD-10-CM

## 2025-03-19 PROBLEM — R31.9 HEMATURIA: Status: ACTIVE | Noted: 2025-03-19

## 2025-03-19 LAB
ALBUMIN SERPL-MCNC: 2.4 G/DL (ref 3.5–5.2)
ALP SERPL-CCNC: 92 U/L (ref 40–129)
ALT SERPL-CCNC: 23 U/L (ref 0–40)
ANION GAP SERPL CALCULATED.3IONS-SCNC: 7 MMOL/L (ref 7–16)
AST SERPL-CCNC: 25 U/L (ref 0–39)
BACTERIA URNS QL MICRO: ABNORMAL
BASOPHILS # BLD: 0.02 K/UL (ref 0–0.2)
BASOPHILS NFR BLD: 0 % (ref 0–2)
BILIRUB SERPL-MCNC: 0.2 MG/DL (ref 0–1.2)
BILIRUB UR QL STRIP: NEGATIVE
BUN SERPL-MCNC: 23 MG/DL (ref 6–23)
CALCIUM SERPL-MCNC: 8.2 MG/DL (ref 8.6–10.2)
CHLORIDE SERPL-SCNC: 106 MMOL/L (ref 98–107)
CLARITY UR: ABNORMAL
CO2 SERPL-SCNC: 26 MMOL/L (ref 22–29)
COLOR UR: ABNORMAL
CREAT SERPL-MCNC: 0.7 MG/DL (ref 0.7–1.2)
EOSINOPHIL # BLD: 0.14 K/UL (ref 0.05–0.5)
EOSINOPHILS RELATIVE PERCENT: 3 % (ref 0–6)
ERYTHROCYTE [DISTWIDTH] IN BLOOD BY AUTOMATED COUNT: 15.7 % (ref 11.5–15)
GFR, ESTIMATED: >90 ML/MIN/1.73M2
GLUCOSE SERPL-MCNC: 104 MG/DL (ref 74–99)
GLUCOSE UR STRIP-MCNC: NEGATIVE MG/DL
HCT VFR BLD AUTO: 25.1 % (ref 37–54)
HCT VFR BLD AUTO: 25.5 % (ref 37–54)
HCT VFR BLD AUTO: 26.5 % (ref 37–54)
HGB BLD-MCNC: 7.1 G/DL (ref 12.5–16.5)
HGB BLD-MCNC: 7.4 G/DL (ref 12.5–16.5)
HGB BLD-MCNC: 7.6 G/DL (ref 12.5–16.5)
HGB UR QL STRIP.AUTO: ABNORMAL
IMM GRANULOCYTES # BLD AUTO: 0.04 K/UL (ref 0–0.58)
IMM GRANULOCYTES NFR BLD: 1 % (ref 0–5)
KETONES UR STRIP-MCNC: NEGATIVE MG/DL
LEUKOCYTE ESTERASE UR QL STRIP: NEGATIVE
LYMPHOCYTES NFR BLD: 0.69 K/UL (ref 1.5–4)
LYMPHOCYTES RELATIVE PERCENT: 15 % (ref 20–42)
MCH RBC QN AUTO: 28.4 PG (ref 26–35)
MCHC RBC AUTO-ENTMCNC: 28.3 G/DL (ref 32–34.5)
MCV RBC AUTO: 100.4 FL (ref 80–99.9)
MONOCYTES NFR BLD: 0.22 K/UL (ref 0.1–0.95)
MONOCYTES NFR BLD: 5 % (ref 2–12)
NEUTROPHILS NFR BLD: 77 % (ref 43–80)
NEUTS SEG NFR BLD: 3.63 K/UL (ref 1.8–7.3)
NITRITE UR QL STRIP: NEGATIVE
PH UR STRIP: 6.5 [PH] (ref 5–8)
PLATELET # BLD AUTO: 109 K/UL (ref 130–450)
PMV BLD AUTO: 9.8 FL (ref 7–12)
POTASSIUM SERPL-SCNC: 3.6 MMOL/L (ref 3.5–5)
PROT SERPL-MCNC: 5.4 G/DL (ref 6.4–8.3)
PROT UR STRIP-MCNC: >=300 MG/DL
RBC # BLD AUTO: 2.5 M/UL (ref 3.8–5.8)
RBC # BLD: ABNORMAL 10*6/UL
RBC #/AREA URNS HPF: ABNORMAL /HPF
SODIUM SERPL-SCNC: 139 MMOL/L (ref 132–146)
SP GR UR STRIP: 1.02 (ref 1–1.03)
UROBILINOGEN UR STRIP-ACNC: 0.2 EU/DL (ref 0–1)
WBC #/AREA URNS HPF: ABNORMAL /HPF
WBC OTHER # BLD: 4.7 K/UL (ref 4.5–11.5)

## 2025-03-19 PROCEDURE — 2500000003 HC RX 250 WO HCPCS: Performed by: INTERNAL MEDICINE

## 2025-03-19 PROCEDURE — 71045 X-RAY EXAM CHEST 1 VIEW: CPT

## 2025-03-19 PROCEDURE — 51702 INSERT TEMP BLADDER CATH: CPT

## 2025-03-19 PROCEDURE — 85014 HEMATOCRIT: CPT

## 2025-03-19 PROCEDURE — 85018 HEMOGLOBIN: CPT

## 2025-03-19 PROCEDURE — 74177 CT ABD & PELVIS W/CONTRAST: CPT

## 2025-03-19 PROCEDURE — 6360000004 HC RX CONTRAST MEDICATION: Performed by: RADIOLOGY

## 2025-03-19 PROCEDURE — 2500000003 HC RX 250 WO HCPCS: Performed by: STUDENT IN AN ORGANIZED HEALTH CARE EDUCATION/TRAINING PROGRAM

## 2025-03-19 PROCEDURE — 6370000000 HC RX 637 (ALT 250 FOR IP): Performed by: INTERNAL MEDICINE

## 2025-03-19 PROCEDURE — 99285 EMERGENCY DEPT VISIT HI MDM: CPT

## 2025-03-19 PROCEDURE — 6370000000 HC RX 637 (ALT 250 FOR IP): Performed by: STUDENT IN AN ORGANIZED HEALTH CARE EDUCATION/TRAINING PROGRAM

## 2025-03-19 PROCEDURE — 87086 URINE CULTURE/COLONY COUNT: CPT

## 2025-03-19 PROCEDURE — 51798 US URINE CAPACITY MEASURE: CPT

## 2025-03-19 PROCEDURE — 6360000002 HC RX W HCPCS: Performed by: INTERNAL MEDICINE

## 2025-03-19 PROCEDURE — 99222 1ST HOSP IP/OBS MODERATE 55: CPT | Performed by: STUDENT IN AN ORGANIZED HEALTH CARE EDUCATION/TRAINING PROGRAM

## 2025-03-19 PROCEDURE — 85025 COMPLETE CBC W/AUTO DIFF WBC: CPT

## 2025-03-19 PROCEDURE — 80053 COMPREHEN METABOLIC PANEL: CPT

## 2025-03-19 PROCEDURE — 81001 URINALYSIS AUTO W/SCOPE: CPT

## 2025-03-19 PROCEDURE — 36592 COLLECT BLOOD FROM PICC: CPT

## 2025-03-19 PROCEDURE — 1200000000 HC SEMI PRIVATE

## 2025-03-19 RX ORDER — IOPAMIDOL 755 MG/ML
75 INJECTION, SOLUTION INTRAVASCULAR
Status: COMPLETED | OUTPATIENT
Start: 2025-03-19 | End: 2025-03-19

## 2025-03-19 RX ORDER — IPRATROPIUM BROMIDE AND ALBUTEROL SULFATE 2.5; .5 MG/3ML; MG/3ML
1 SOLUTION RESPIRATORY (INHALATION)
Status: DISCONTINUED | OUTPATIENT
Start: 2025-03-19 | End: 2025-03-19

## 2025-03-19 RX ORDER — SODIUM CHLORIDE 9 MG/ML
INJECTION, SOLUTION INTRAVENOUS PRN
Status: DISCONTINUED | OUTPATIENT
Start: 2025-03-19 | End: 2025-04-01 | Stop reason: HOSPADM

## 2025-03-19 RX ORDER — ACETAMINOPHEN 650 MG/1
650 SUPPOSITORY RECTAL EVERY 6 HOURS PRN
Status: DISCONTINUED | OUTPATIENT
Start: 2025-03-19 | End: 2025-04-01 | Stop reason: HOSPADM

## 2025-03-19 RX ORDER — SODIUM CHLORIDE 0.9 % (FLUSH) 0.9 %
5-40 SYRINGE (ML) INJECTION EVERY 12 HOURS SCHEDULED
Status: DISCONTINUED | OUTPATIENT
Start: 2025-03-19 | End: 2025-04-01 | Stop reason: HOSPADM

## 2025-03-19 RX ORDER — BRIMONIDINE TARTRATE 2 MG/ML
1 SOLUTION/ DROPS OPHTHALMIC 2 TIMES DAILY
Status: DISCONTINUED | OUTPATIENT
Start: 2025-03-19 | End: 2025-04-01 | Stop reason: HOSPADM

## 2025-03-19 RX ORDER — ATORVASTATIN CALCIUM 40 MG/1
40 TABLET, FILM COATED ORAL DAILY
Status: DISCONTINUED | OUTPATIENT
Start: 2025-03-19 | End: 2025-04-01 | Stop reason: HOSPADM

## 2025-03-19 RX ORDER — ONDANSETRON 4 MG/1
4 TABLET, ORALLY DISINTEGRATING ORAL EVERY 8 HOURS PRN
Status: DISCONTINUED | OUTPATIENT
Start: 2025-03-19 | End: 2025-03-19 | Stop reason: ALTCHOICE

## 2025-03-19 RX ORDER — MAGNESIUM SULFATE IN WATER 40 MG/ML
2000 INJECTION, SOLUTION INTRAVENOUS PRN
Status: DISCONTINUED | OUTPATIENT
Start: 2025-03-19 | End: 2025-04-01 | Stop reason: HOSPADM

## 2025-03-19 RX ORDER — ONDANSETRON 2 MG/ML
4 INJECTION INTRAMUSCULAR; INTRAVENOUS EVERY 6 HOURS PRN
Status: DISCONTINUED | OUTPATIENT
Start: 2025-03-19 | End: 2025-03-19 | Stop reason: ALTCHOICE

## 2025-03-19 RX ORDER — OXYCODONE AND ACETAMINOPHEN 5; 325 MG/1; MG/1
1 TABLET ORAL EVERY 4 HOURS PRN
Status: DISCONTINUED | OUTPATIENT
Start: 2025-03-19 | End: 2025-04-01 | Stop reason: HOSPADM

## 2025-03-19 RX ORDER — MIRTAZAPINE 15 MG/1
15 TABLET, FILM COATED ORAL NIGHTLY
Status: DISCONTINUED | OUTPATIENT
Start: 2025-03-19 | End: 2025-04-01 | Stop reason: HOSPADM

## 2025-03-19 RX ORDER — POTASSIUM CHLORIDE 1500 MG/1
40 TABLET, EXTENDED RELEASE ORAL PRN
Status: DISCONTINUED | OUTPATIENT
Start: 2025-03-19 | End: 2025-04-01 | Stop reason: HOSPADM

## 2025-03-19 RX ORDER — PANTOPRAZOLE SODIUM 40 MG/1
40 TABLET, DELAYED RELEASE ORAL
Status: DISCONTINUED | OUTPATIENT
Start: 2025-03-19 | End: 2025-04-01 | Stop reason: HOSPADM

## 2025-03-19 RX ORDER — TAMSULOSIN HYDROCHLORIDE 0.4 MG/1
0.8 CAPSULE ORAL NIGHTLY
Status: DISCONTINUED | OUTPATIENT
Start: 2025-03-19 | End: 2025-04-01 | Stop reason: HOSPADM

## 2025-03-19 RX ORDER — VITAMIN B COMPLEX
1000 TABLET ORAL DAILY
Status: DISCONTINUED | OUTPATIENT
Start: 2025-03-19 | End: 2025-04-01 | Stop reason: HOSPADM

## 2025-03-19 RX ORDER — POTASSIUM CHLORIDE 7.45 MG/ML
10 INJECTION INTRAVENOUS PRN
Status: DISCONTINUED | OUTPATIENT
Start: 2025-03-19 | End: 2025-04-01 | Stop reason: HOSPADM

## 2025-03-19 RX ORDER — UBIDECARENONE 75 MG
100 CAPSULE ORAL 2 TIMES DAILY
Status: DISCONTINUED | OUTPATIENT
Start: 2025-03-19 | End: 2025-03-28

## 2025-03-19 RX ORDER — DOFETILIDE 0.12 MG/1
125 CAPSULE ORAL 2 TIMES DAILY
Status: DISCONTINUED | OUTPATIENT
Start: 2025-03-19 | End: 2025-04-01 | Stop reason: HOSPADM

## 2025-03-19 RX ORDER — PROCHLORPERAZINE MALEATE 10 MG
10 TABLET ORAL EVERY 8 HOURS PRN
Status: DISCONTINUED | OUTPATIENT
Start: 2025-03-19 | End: 2025-04-01 | Stop reason: HOSPADM

## 2025-03-19 RX ORDER — PROCHLORPERAZINE EDISYLATE 5 MG/ML
10 INJECTION INTRAMUSCULAR; INTRAVENOUS EVERY 6 HOURS PRN
Status: DISCONTINUED | OUTPATIENT
Start: 2025-03-19 | End: 2025-04-01 | Stop reason: HOSPADM

## 2025-03-19 RX ORDER — VITAMIN B COMPLEX
100 TABLET ORAL DAILY
Status: DISCONTINUED | OUTPATIENT
Start: 2025-03-19 | End: 2025-03-19 | Stop reason: DRUGHIGH

## 2025-03-19 RX ORDER — FERROUS SULFATE 325(65) MG
325 TABLET ORAL
Status: DISCONTINUED | OUTPATIENT
Start: 2025-03-19 | End: 2025-04-01 | Stop reason: HOSPADM

## 2025-03-19 RX ORDER — IPRATROPIUM BROMIDE AND ALBUTEROL SULFATE 2.5; .5 MG/3ML; MG/3ML
1 SOLUTION RESPIRATORY (INHALATION) EVERY 4 HOURS PRN
Status: DISCONTINUED | OUTPATIENT
Start: 2025-03-19 | End: 2025-04-01 | Stop reason: HOSPADM

## 2025-03-19 RX ORDER — POLYETHYLENE GLYCOL 3350 17 G/17G
17 POWDER, FOR SOLUTION ORAL DAILY PRN
Status: DISCONTINUED | OUTPATIENT
Start: 2025-03-19 | End: 2025-04-01 | Stop reason: HOSPADM

## 2025-03-19 RX ORDER — METOPROLOL SUCCINATE 25 MG/1
12.5 TABLET, EXTENDED RELEASE ORAL DAILY
Status: DISCONTINUED | OUTPATIENT
Start: 2025-03-19 | End: 2025-04-01 | Stop reason: HOSPADM

## 2025-03-19 RX ORDER — TIMOLOL MALEATE 5 MG/ML
1 SOLUTION/ DROPS OPHTHALMIC 2 TIMES DAILY
Status: DISCONTINUED | OUTPATIENT
Start: 2025-03-19 | End: 2025-04-01 | Stop reason: HOSPADM

## 2025-03-19 RX ORDER — BENZONATATE 100 MG/1
100 CAPSULE ORAL 3 TIMES DAILY
Status: DISCONTINUED | OUTPATIENT
Start: 2025-03-19 | End: 2025-04-01 | Stop reason: HOSPADM

## 2025-03-19 RX ORDER — GABAPENTIN 100 MG/1
100 CAPSULE ORAL 2 TIMES DAILY
Status: DISCONTINUED | OUTPATIENT
Start: 2025-03-19 | End: 2025-04-01 | Stop reason: HOSPADM

## 2025-03-19 RX ORDER — BRIMONIDINE TARTRATE AND TIMOLOL MALEATE 2; 5 MG/ML; MG/ML
1 SOLUTION OPHTHALMIC EVERY 12 HOURS
Status: DISCONTINUED | OUTPATIENT
Start: 2025-03-19 | End: 2025-03-19 | Stop reason: CLARIF

## 2025-03-19 RX ORDER — BUPROPION HYDROCHLORIDE 100 MG/1
100 TABLET, EXTENDED RELEASE ORAL DAILY
Status: DISCONTINUED | OUTPATIENT
Start: 2025-03-19 | End: 2025-04-01 | Stop reason: HOSPADM

## 2025-03-19 RX ORDER — ACETAMINOPHEN 325 MG/1
650 TABLET ORAL EVERY 6 HOURS PRN
Status: DISCONTINUED | OUTPATIENT
Start: 2025-03-19 | End: 2025-04-01 | Stop reason: HOSPADM

## 2025-03-19 RX ORDER — DAPSONE 25 MG/1
25 TABLET ORAL DAILY
Status: DISCONTINUED | OUTPATIENT
Start: 2025-03-19 | End: 2025-04-01 | Stop reason: HOSPADM

## 2025-03-19 RX ORDER — SODIUM CHLORIDE 0.9 % (FLUSH) 0.9 %
5-40 SYRINGE (ML) INJECTION PRN
Status: DISCONTINUED | OUTPATIENT
Start: 2025-03-19 | End: 2025-04-01 | Stop reason: HOSPADM

## 2025-03-19 RX ORDER — LATANOPROST 50 UG/ML
1 SOLUTION/ DROPS OPHTHALMIC NIGHTLY
Status: DISCONTINUED | OUTPATIENT
Start: 2025-03-19 | End: 2025-04-01 | Stop reason: HOSPADM

## 2025-03-19 RX ADMIN — BRIMONIDINE TARTRATE 1 DROP: 2 SOLUTION OPHTHALMIC at 21:58

## 2025-03-19 RX ADMIN — LATANOPROST 1 DROP: 50 SOLUTION OPHTHALMIC at 21:58

## 2025-03-19 RX ADMIN — BUPROPION HYDROCHLORIDE 100 MG: 100 TABLET, FILM COATED, EXTENDED RELEASE ORAL at 09:42

## 2025-03-19 RX ADMIN — WATER 2000 MG: 1 INJECTION INTRAMUSCULAR; INTRAVENOUS; SUBCUTANEOUS at 13:55

## 2025-03-19 RX ADMIN — Medication 100 MCG: at 09:42

## 2025-03-19 RX ADMIN — IOPAMIDOL 75 ML: 755 INJECTION, SOLUTION INTRAVENOUS at 05:09

## 2025-03-19 RX ADMIN — OXYCODONE HYDROCHLORIDE AND ACETAMINOPHEN 1 TABLET: 5; 325 TABLET ORAL at 18:23

## 2025-03-19 RX ADMIN — DOFETILIDE 125 MCG: 0.12 CAPSULE ORAL at 09:42

## 2025-03-19 RX ADMIN — PANTOPRAZOLE SODIUM 40 MG: 40 TABLET, DELAYED RELEASE ORAL at 14:42

## 2025-03-19 RX ADMIN — DOFETILIDE 125 MCG: 0.12 CAPSULE ORAL at 21:54

## 2025-03-19 RX ADMIN — METOPROLOL SUCCINATE 12.5 MG: 25 TABLET, FILM COATED, EXTENDED RELEASE ORAL at 09:39

## 2025-03-19 RX ADMIN — POLYETHYLENE GLYCOL 3350 17 G: 17 POWDER, FOR SOLUTION ORAL at 11:51

## 2025-03-19 RX ADMIN — FERROUS SULFATE TAB 325 MG (65 MG ELEMENTAL FE) 325 MG: 325 (65 FE) TAB at 09:42

## 2025-03-19 RX ADMIN — Medication 100 MCG: at 16:32

## 2025-03-19 RX ADMIN — GABAPENTIN 100 MG: 100 CAPSULE ORAL at 21:54

## 2025-03-19 RX ADMIN — ATORVASTATIN CALCIUM 40 MG: 40 TABLET, FILM COATED ORAL at 09:38

## 2025-03-19 RX ADMIN — GABAPENTIN 100 MG: 100 CAPSULE ORAL at 09:38

## 2025-03-19 RX ADMIN — BENZONATATE 100 MG: 100 CAPSULE ORAL at 21:54

## 2025-03-19 RX ADMIN — TIMOLOL MALEATE 1 DROP: 5 SOLUTION OPHTHALMIC at 21:58

## 2025-03-19 RX ADMIN — BRIMONIDINE TARTRATE 1 DROP: 2 SOLUTION OPHTHALMIC at 09:45

## 2025-03-19 RX ADMIN — OXYCODONE HYDROCHLORIDE AND ACETAMINOPHEN 1 TABLET: 5; 325 TABLET ORAL at 11:50

## 2025-03-19 RX ADMIN — Medication 1000 UNITS: at 09:38

## 2025-03-19 RX ADMIN — DAPSONE 25 MG: 25 TABLET ORAL at 09:43

## 2025-03-19 RX ADMIN — MIRTAZAPINE 15 MG: 15 TABLET, FILM COATED ORAL at 21:54

## 2025-03-19 RX ADMIN — BENZONATATE 100 MG: 100 CAPSULE ORAL at 14:42

## 2025-03-19 RX ADMIN — SODIUM CHLORIDE, PRESERVATIVE FREE 10 ML: 5 INJECTION INTRAVENOUS at 21:54

## 2025-03-19 RX ADMIN — PANTOPRAZOLE SODIUM 40 MG: 40 TABLET, DELAYED RELEASE ORAL at 09:42

## 2025-03-19 RX ADMIN — TAMSULOSIN HYDROCHLORIDE 0.8 MG: 0.4 CAPSULE ORAL at 21:54

## 2025-03-19 RX ADMIN — BENZONATATE 100 MG: 100 CAPSULE ORAL at 09:38

## 2025-03-19 RX ADMIN — TIMOLOL MALEATE 1 DROP: 5 SOLUTION OPHTHALMIC at 09:45

## 2025-03-19 ASSESSMENT — PAIN DESCRIPTION - LOCATION
LOCATION: ABDOMEN;GROIN
LOCATION: GENERALIZED

## 2025-03-19 ASSESSMENT — PAIN SCALES - GENERAL
PAINLEVEL_OUTOF10: 0
PAINLEVEL_OUTOF10: 8
PAINLEVEL_OUTOF10: 8

## 2025-03-19 ASSESSMENT — PAIN SCALES - WONG BAKER: WONGBAKER_NUMERICALRESPONSE: NO HURT

## 2025-03-19 ASSESSMENT — PAIN - FUNCTIONAL ASSESSMENT
PAIN_FUNCTIONAL_ASSESSMENT: NONE - DENIES PAIN

## 2025-03-19 NOTE — ED PROVIDER NOTES
[facility] EMERGENCY DEPARTMENT  EMERGENCY DEPARTMENT ENCOUNTER        Pt Name: Montana Gerard  MRN: [id number]  Birthdate 1940  Date of evaluation: 3/19/2025  Provider: Tonio Najera DO  PCP: Haseeb Phan DO  Note Started: 3:54 AM EDT 3/19/25    CHIEF COMPLAINT       Chief Complaint   Patient presents with    Hematuria       HISTORY OF PRESENT ILLNESS: 1 or more Elements   History From: Patient and daughter    Limitations to history : None    Montana Gerard is a 84 y.o. male with a past medical history of hypertension hyperlipidemia, PVCs, prostate cancer, erectile dysfunction, A-fib, and penile implant who presents to the ED with complaints of hematuria.  Patient said that he has no pain with urination, no difficulty urinating, he states that his nursing home noticed hematuria earlier today.  He did have a hemoglobin of 7 earlier this week, they were supposed to repeat labs today.  He also has not had a bowel movement in 3 to 4 days per his daughter.  Patient was recently discharged from [facility] after having hematuria that was observed and per daughter not treated.  He was subsequently admitted for constipation and GI bleed and has been discharged after this as well.  He is chronically on Eliquis.  He at the moment denies any complaints other than not having had a bowel movement and hematuria.    Patient denies fever, chills, headache, shortness of breath, chest pain, abdominal pain, nausea, vomiting, diarrhea, lightheadedness, dysuria, hematochezia, and melena.    Nursing Notes were all reviewed and agreed with or any disagreements were addressed in the HPI.        REVIEW OF EXTERNAL NOTES :         On 3/13/2025 patient had a RINKU with cardiology.  Last outpatient visit was 2/7/2025 for Medicare annual visit.      REVIEW OF SYSTEMS :           Positives and Pertinent negatives as per HPI.     SURGICAL HISTORY     Past Surgical History:   Procedure

## 2025-03-19 NOTE — PATIENT CARE CONFERENCE
[facility] Quality Flow/Interdisciplinary Rounds Progress Note        Quality Flow Rounds held on March 19, 2025    Disciplines Attending:  Bedside Nurse, , , and Nursing Unit Leadership    Montana Gerard was admitted on 3/19/2025  3:52 AM    Anticipated Discharge Date:       Disposition:    Kosta Score:  Kosta Scale Score: 14    [facility] RISK OF UNPLANNED READMISSION 2.0             26.1 Total Score        Discussed patient goal for the day, patient clinical progression, and barriers to discharge.  The following Goal(s) of the Day/Commitment(s) have been identified:  Diagnostics - Report Results and Labs - Report Results      Norah Norman RN  March 19, 2025

## 2025-03-19 NOTE — ED NOTES
18F davis catheter inserted without difficulty and 450cc of dark red blood returned.  CBI started and infusing without difficulty.  Urine now light pink.  No clots noted.

## 2025-03-19 NOTE — ED PROVIDER NOTES
[facility] EMERGENCY DEPARTMENT  EMERGENCY DEPARTMENT ENCOUNTER        Pt Name: Montana Gerard  MRN: [id number]  Birthdate 1940  Date of evaluation: 3/19/2025  Provider: Gabe Jacob MD  PCP: Haseeb Phan DO  Note Started: 3:35 AM EDT 3/19/25    CHIEF COMPLAINT       Chief Complaint   Patient presents with    Hematuria       HISTORY OF PRESENT ILLNESS: 1 or more Elements        Limitations to history : None    Montana Gerard is a 84 y.o. male who presents to the emergency department for suprapubic discomfort, gross hematuria.  Currently on Eliquis, caretaker at bedside relates that patient has recently had admissions for GI bleeding, had hematuria previously but that resolved.  Did not require CBI or piston irrigation at that time.  On evaluation patient denies any complaints, other than feeling a little bit more tired than typical.  But after palpating his abdomen he did admit that he does have some mild suprapubic discomfort as well.  When asked about his gross hematuria or his new anemia, patient states that it was news to him and he did not realize he was having those issues.    Nursing Notes were all reviewed and agreed with or any disagreements were addressed in the HPI.      REVIEW OF EXTERNAL NOTE :       As below    REVIEW OF SYSTEMS :      Positives and Pertinent negatives as per HPI.     SURGICAL HISTORY     Past Surgical History:   Procedure Laterality Date    CARDIAC SURGERY  03/31/2023    TAVR    CARDIOVASCULAR STRESS TEST  01/01/2004    CHOLECYSTECTOMY      COLONOSCOPY N/A 3/6/2025    COLONOSCOPY POLYPECTOMY SNARE/BIOPSY performed by Satinder Parekh [place] [facility]    IR BIOPSY DEEP BONE  3/11/2025    IR BIOPSY DEEP BONE 3/11/2025 Gregoria Tomlinson MD [facility]    UPPER GASTROINTESTINAL ENDOSCOPY N/A 3/6/2025    ESOPHAGOGASTRODUODENOSCOPY performed by Satinder Parekh MD at [facility]       CURRENTMEDICATIONS       Previous Medications

## 2025-03-19 NOTE — ED NOTES
ED to Inpatient Handoff Report    Notified 5W that electronic handoff available and patient ready for transport to room 515.    Safety Risks: Risk of falls    Patient in Restraints: no    Constant Observer or Patient : no    Telemetry Monitoring Ordered :No           Order to transfer to unit without monitor:N/A    Last MEWS: 1 Time completed: 0648    Deterioration Index Score:   Predictive Model Details          23 (Normal)  Factor Value    Calculated 3/19/2025 06:57 61% Age 84 years old    Deterioration Index Model 11% Respiratory rate 18     11% Systolic 148     9% Hematocrit abnormal (25.1 %)     2% Potassium 3.6 mmol/L     2% Platelet count abnormal (109 k/uL)     2% Pulse 63     1% WBC count 4.7 k/uL     1% Pulse oximetry 98 %     0% Temperature 98.2 °F (36.8 °C)     0% Sodium 139 mmol/L        Vitals:    03/19/25 0400 03/19/25 0605 03/19/25 0648   BP: 139/67 (!) 187/82 (!) 148/72   Pulse: 60 72 63   Resp: 18 16 18   Temp: 97.4 °F (36.3 °C)  98.2 °F (36.8 °C)   TempSrc: Oral     SpO2: 98% 100% 98%   Weight: 70.3 kg (155 lb)     Height: 1.727 m (5' 8\")           Opportunity for questions and clarification was provided.

## 2025-03-19 NOTE — ED NOTES
ED to Inpatient Handoff Report    Notified 5W Sylvia that electronic handoff available and patient ready for transport to room 515.    Safety Risks: Memory Problems, Hearing problems, Home safety issues, Difficulty with daily activities, and Risk of falls    Patient in Restraints: no    Constant Observer or Patient : no    Telemetry Monitoring Ordered: No          Order to transfer to unit without monitor: N/A    Last MEWS: 1 Time completed: 0730    Deterioration Index: 22.72    Vitals:    03/19/25 0400 03/19/25 0605 03/19/25 0648 03/19/25 0730   BP: 139/67 (!) 187/82 (!) 148/72 (!) 152/76   Pulse: 60 72 63 67   Resp: 18 16 18 16   Temp: 97.4 °F (36.3 °C)  98.2 °F (36.8 °C) 98.6 °F (37 °C)   TempSrc: Oral   Oral   SpO2: 98% 100% 98% 100%   Weight: 70.3 kg (155 lb)      Height: 1.727 m (5' 8\")          Opportunity for questions and clarification was provided.

## 2025-03-19 NOTE — H&P
[facility] Hospital[facility] History and Physical      CHIEF COMPLAINT:  hematuria     History of Present Illness:      This is an 84 year old male with past medical history of atrial fibrillation, aortic stenosis s/p TAVR, HFpEF, diet controlled diabetes, penile prosthesis, prostate cancer s/p brachytherapy, CAD, depression, gastritis, GERD, HLD, HTN, iron deficiency anemia, dermatitis herpetiforms who presents to ER with complaints of hematuria. He was recently [place] inpatient for GI bleed and hematuria. Neurology was consulted on 3/3 for progressive bilateral lower extremity edema, tremors and further images was obtained of brain and spine.  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 He underwent EGD and colonoscopy on 3/6. MRI lumbar spine showed discitis and osteomyelitis T12-L1, L1-2, L2-3 and L5-S1 levels on 3/7.  Neurosurgery was consulted and recommended conservative management and use of TLSO to use while upright.  Blood cultures grew Streptococcus infantarius ssp coli from 3/5 and patient underwent IR Lumbar biopsy on 3/11.  Patient was initially treated with Zosyn and vancomycin which were discontinue and started on ceftriaxone 2 g IV daily per ID guidance which recommended to complete for least 6 weeks.  TTE negative for vegetations and RINKU was ordered on 3/12 and it was negative for vegetations.  Eliquis was continued for discharge.     Per family, patient has had constipation for 4 days and hematuria that started last night. Daughter states that when he was downtown last week he had hematuria but it resolved on its own without urology intervention. CBI was initiated in ER. Urology consulted and recommend treating conservatively and removing davis as soon as possible. Lab workup:

## 2025-03-19 NOTE — CONSULTS
3/19/2025 8:19 AM  Montana Gerard  [id number]     Chief Complaint:    Gross hematuria, history of prostate cancer status post brachytherapy, history of semirigid penile prosthesis    History of Present Illness:      The patient is a 84 y.o. male patient for whom urology was consulted for the above.  Patient was admitted to [facility] last month for hematuria.  He had CBI for about a week and eventually underwent trial of void.  Notably he is on Eliquis for chronic A-fib.  Significant heart history as well.  He was treated for prostate cancer via brachytherapy and also had a semirigid penile prosthesis placed in 2024.  He presented to the [place] [facility] with gross hematuria with clots confirmed via imaging.  A three-way catheter was placed and CBI initiated.  Initially some clots were irrigated free.  During my time of exam his urine was running very light pink on slow drip CBI.      Past Medical History:   Diagnosis Date    Acute myocardial infarction, unspecified 06/02/2023    Anemia     Arthritis     CAD (coronary artery disease)     Cholelithiasis     Depressive disorder     Dermatitis herpetiformis     GLUTEN FREE DIET    Diet-controlled diabetes mellitus (HCC) 01/03/2022    Diverticulitis     Gastritis     GERD (gastroesophageal reflux disease)     Hiatal hernia     Hyperlipidemia     Hypertension     Iron deficiency anemia     Non-ST elevation myocardial infarction (NSTEMI) 06/02/2023    Peptic reflux disease     ST elevation (STEMI) myocardial infarction of unspecified site 06/02/2023         Past Surgical History:   Procedure Laterality Date    CARDIAC SURGERY  03/31/2023    TAVR    CARDIOVASCULAR STRESS TEST  01/01/2004    CHOLECYSTECTOMY      COLONOSCOPY N/A 3/6/2025    COLONOSCOPY POLYPECTOMY SNARE/BIOPSY performed by Satinder Parekh MD at [facility] ENDOSCOPY    IR BIOPSY DEEP BONE  3/11/2025    IR BIOPSY DEEP BONE 3/11/2025 Gregoria Tomlinson MD [facility] SPECIAL PROCEDURES    UPPER GASTROINTESTINAL ENDOSCOPY

## 2025-03-20 PROBLEM — D62 ANEMIA ASSOCIATED WITH ACUTE BLOOD LOSS: Status: ACTIVE | Noted: 2025-03-20

## 2025-03-20 PROBLEM — I48.91 ATRIAL FIBRILLATION (HCC): Status: ACTIVE | Noted: 2025-03-20

## 2025-03-20 PROBLEM — D69.6 THROMBOCYTOPENIA: Status: ACTIVE | Noted: 2025-03-20

## 2025-03-20 LAB
ALBUMIN SERPL-MCNC: 2.3 G/DL (ref 3.5–5.2)
ALP SERPL-CCNC: 85 U/L (ref 40–129)
ALT SERPL-CCNC: 16 U/L (ref 0–40)
ANION GAP SERPL CALCULATED.3IONS-SCNC: 8 MMOL/L (ref 7–16)
AST SERPL-CCNC: 18 U/L (ref 0–39)
BASOPHILS # BLD: 0.02 K/UL (ref 0–0.2)
BASOPHILS NFR BLD: 0 % (ref 0–2)
BILIRUB SERPL-MCNC: 0.3 MG/DL (ref 0–1.2)
BUN SERPL-MCNC: 23 MG/DL (ref 6–23)
CALCIUM SERPL-MCNC: 8.4 MG/DL (ref 8.6–10.2)
CHLORIDE SERPL-SCNC: 105 MMOL/L (ref 98–107)
CO2 SERPL-SCNC: 25 MMOL/L (ref 22–29)
CREAT SERPL-MCNC: 0.8 MG/DL (ref 0.7–1.2)
EOSINOPHIL # BLD: 0.26 K/UL (ref 0.05–0.5)
EOSINOPHILS RELATIVE PERCENT: 4 % (ref 0–6)
ERYTHROCYTE [DISTWIDTH] IN BLOOD BY AUTOMATED COUNT: 15.9 % (ref 11.5–15)
GFR, ESTIMATED: 88 ML/MIN/1.73M2
GLUCOSE SERPL-MCNC: 106 MG/DL (ref 74–99)
HCT VFR BLD AUTO: 24.2 % (ref 37–54)
HGB BLD-MCNC: 7 G/DL (ref 12.5–16.5)
IMM GRANULOCYTES # BLD AUTO: 0.04 K/UL (ref 0–0.58)
IMM GRANULOCYTES NFR BLD: 1 % (ref 0–5)
LYMPHOCYTES NFR BLD: 0.58 K/UL (ref 1.5–4)
LYMPHOCYTES RELATIVE PERCENT: 8 % (ref 20–42)
MAGNESIUM SERPL-MCNC: 1.7 MG/DL (ref 1.6–2.6)
MCH RBC QN AUTO: 28.3 PG (ref 26–35)
MCHC RBC AUTO-ENTMCNC: 28.9 G/DL (ref 32–34.5)
MCV RBC AUTO: 98 FL (ref 80–99.9)
MICROORGANISM SPEC CULT: NO GROWTH
MONOCYTES NFR BLD: 0.35 K/UL (ref 0.1–0.95)
MONOCYTES NFR BLD: 5 % (ref 2–12)
NEUTROPHILS NFR BLD: 83 % (ref 43–80)
NEUTS SEG NFR BLD: 6.07 K/UL (ref 1.8–7.3)
PLATELET # BLD AUTO: 119 K/UL (ref 130–450)
PMV BLD AUTO: 9.5 FL (ref 7–12)
POTASSIUM SERPL-SCNC: 3.5 MMOL/L (ref 3.5–5)
PROT SERPL-MCNC: 5.2 G/DL (ref 6.4–8.3)
RBC # BLD AUTO: 2.47 M/UL (ref 3.8–5.8)
RBC # BLD: ABNORMAL 10*6/UL
SERVICE CMNT-IMP: NORMAL
SODIUM SERPL-SCNC: 138 MMOL/L (ref 132–146)
SPECIMEN DESCRIPTION: NORMAL
WBC OTHER # BLD: 7.3 K/UL (ref 4.5–11.5)

## 2025-03-20 PROCEDURE — 6370000000 HC RX 637 (ALT 250 FOR IP): Performed by: INTERNAL MEDICINE

## 2025-03-20 PROCEDURE — 1200000000 HC SEMI PRIVATE

## 2025-03-20 PROCEDURE — 2500000003 HC RX 250 WO HCPCS: Performed by: INTERNAL MEDICINE

## 2025-03-20 PROCEDURE — 51702 INSERT TEMP BLADDER CATH: CPT

## 2025-03-20 PROCEDURE — 2500000003 HC RX 250 WO HCPCS: Performed by: STUDENT IN AN ORGANIZED HEALTH CARE EDUCATION/TRAINING PROGRAM

## 2025-03-20 PROCEDURE — 6360000002 HC RX W HCPCS: Performed by: INTERNAL MEDICINE

## 2025-03-20 PROCEDURE — 97530 THERAPEUTIC ACTIVITIES: CPT

## 2025-03-20 PROCEDURE — 97110 THERAPEUTIC EXERCISES: CPT

## 2025-03-20 PROCEDURE — 99232 SBSQ HOSP IP/OBS MODERATE 35: CPT | Performed by: INTERNAL MEDICINE

## 2025-03-20 PROCEDURE — 83735 ASSAY OF MAGNESIUM: CPT

## 2025-03-20 PROCEDURE — 85025 COMPLETE CBC W/AUTO DIFF WBC: CPT

## 2025-03-20 PROCEDURE — 80053 COMPREHEN METABOLIC PANEL: CPT

## 2025-03-20 PROCEDURE — 97162 PT EVAL MOD COMPLEX 30 MIN: CPT

## 2025-03-20 RX ADMIN — MIRTAZAPINE 15 MG: 15 TABLET, FILM COATED ORAL at 21:44

## 2025-03-20 RX ADMIN — PETROLATUM: 420 OINTMENT TOPICAL at 18:13

## 2025-03-20 RX ADMIN — GABAPENTIN 100 MG: 100 CAPSULE ORAL at 08:31

## 2025-03-20 RX ADMIN — BRIMONIDINE TARTRATE 1 DROP: 2 SOLUTION OPHTHALMIC at 08:32

## 2025-03-20 RX ADMIN — Medication 100 MCG: at 18:12

## 2025-03-20 RX ADMIN — TIMOLOL MALEATE 1 DROP: 5 SOLUTION OPHTHALMIC at 21:42

## 2025-03-20 RX ADMIN — WATER 2000 MG: 1 INJECTION INTRAMUSCULAR; INTRAVENOUS; SUBCUTANEOUS at 15:56

## 2025-03-20 RX ADMIN — BENZONATATE 100 MG: 100 CAPSULE ORAL at 21:41

## 2025-03-20 RX ADMIN — BENZONATATE 100 MG: 100 CAPSULE ORAL at 08:30

## 2025-03-20 RX ADMIN — BENZONATATE 100 MG: 100 CAPSULE ORAL at 15:57

## 2025-03-20 RX ADMIN — BRIMONIDINE TARTRATE 1 DROP: 2 SOLUTION OPHTHALMIC at 21:42

## 2025-03-20 RX ADMIN — FERROUS SULFATE TAB 325 MG (65 MG ELEMENTAL FE) 325 MG: 325 (65 FE) TAB at 08:31

## 2025-03-20 RX ADMIN — PANTOPRAZOLE SODIUM 40 MG: 40 TABLET, DELAYED RELEASE ORAL at 05:39

## 2025-03-20 RX ADMIN — TAMSULOSIN HYDROCHLORIDE 0.8 MG: 0.4 CAPSULE ORAL at 21:39

## 2025-03-20 RX ADMIN — PETROLATUM: 420 OINTMENT TOPICAL at 21:42

## 2025-03-20 RX ADMIN — LATANOPROST 1 DROP: 50 SOLUTION OPHTHALMIC at 21:42

## 2025-03-20 RX ADMIN — OXYCODONE HYDROCHLORIDE AND ACETAMINOPHEN 1 TABLET: 5; 325 TABLET ORAL at 09:35

## 2025-03-20 RX ADMIN — Medication 1000 UNITS: at 08:31

## 2025-03-20 RX ADMIN — DAPSONE 25 MG: 25 TABLET ORAL at 08:31

## 2025-03-20 RX ADMIN — METOPROLOL SUCCINATE 12.5 MG: 25 TABLET, FILM COATED, EXTENDED RELEASE ORAL at 08:30

## 2025-03-20 RX ADMIN — DOFETILIDE 125 MCG: 0.12 CAPSULE ORAL at 21:46

## 2025-03-20 RX ADMIN — ATORVASTATIN CALCIUM 40 MG: 40 TABLET, FILM COATED ORAL at 08:30

## 2025-03-20 RX ADMIN — GABAPENTIN 100 MG: 100 CAPSULE ORAL at 21:41

## 2025-03-20 RX ADMIN — PANTOPRAZOLE SODIUM 40 MG: 40 TABLET, DELAYED RELEASE ORAL at 15:57

## 2025-03-20 RX ADMIN — SODIUM CHLORIDE, PRESERVATIVE FREE 10 ML: 5 INJECTION INTRAVENOUS at 21:46

## 2025-03-20 RX ADMIN — OXYCODONE HYDROCHLORIDE AND ACETAMINOPHEN 1 TABLET: 5; 325 TABLET ORAL at 21:41

## 2025-03-20 RX ADMIN — TIMOLOL MALEATE 1 DROP: 5 SOLUTION OPHTHALMIC at 08:32

## 2025-03-20 RX ADMIN — BUPROPION HYDROCHLORIDE 100 MG: 100 TABLET, FILM COATED, EXTENDED RELEASE ORAL at 08:30

## 2025-03-20 RX ADMIN — SODIUM CHLORIDE, PRESERVATIVE FREE 10 ML: 5 INJECTION INTRAVENOUS at 08:32

## 2025-03-20 RX ADMIN — WATER 2000 MG: 1 INJECTION INTRAMUSCULAR; INTRAVENOUS; SUBCUTANEOUS at 03:27

## 2025-03-20 RX ADMIN — Medication 100 MCG: at 08:31

## 2025-03-20 RX ADMIN — PETROLATUM: 420 OINTMENT TOPICAL at 18:12

## 2025-03-20 RX ADMIN — DOFETILIDE 125 MCG: 0.12 CAPSULE ORAL at 08:31

## 2025-03-20 RX ADMIN — OXYCODONE HYDROCHLORIDE AND ACETAMINOPHEN 1 TABLET: 5; 325 TABLET ORAL at 15:57

## 2025-03-20 RX ADMIN — MICONAZOLE NITRATE: 20 POWDER TOPICAL at 21:46

## 2025-03-20 ASSESSMENT — PAIN SCALES - WONG BAKER
WONGBAKER_NUMERICALRESPONSE: NO HURT
WONGBAKER_NUMERICALRESPONSE: NO HURT

## 2025-03-20 ASSESSMENT — PAIN - FUNCTIONAL ASSESSMENT: PAIN_FUNCTIONAL_ASSESSMENT: ACTIVITIES ARE NOT PREVENTED

## 2025-03-20 ASSESSMENT — PAIN DESCRIPTION - LOCATION
LOCATION: ABDOMEN
LOCATION: BACK;PENIS
LOCATION: ABDOMEN

## 2025-03-20 ASSESSMENT — PAIN SCALES - GENERAL
PAINLEVEL_OUTOF10: 5
PAINLEVEL_OUTOF10: 8
PAINLEVEL_OUTOF10: 7

## 2025-03-20 ASSESSMENT — PAIN DESCRIPTION - DESCRIPTORS
DESCRIPTORS: ACHING;DISCOMFORT;PRESSURE
DESCRIPTORS: PRESSURE;DISCOMFORT
DESCRIPTORS: DISCOMFORT

## 2025-03-20 ASSESSMENT — PAIN DESCRIPTION - ORIENTATION
ORIENTATION: MID
ORIENTATION: MID

## 2025-03-20 NOTE — FLOWSHEET NOTE
Inpatient Wound Care (initial consult) 515    Admit Date: 3/19/2025  3:52 AM    Reason for consult:  left buttock    Patient laying down in bed, awake, alert and answers appropriately. Assist of two people to roll. Family at bedside.     Significant history:  per H&P    CHIEF COMPLAINT:  hematuria      History of Present Illness:       This is an 84 year old male with past medical history of atrial fibrillation, aortic stenosis s/p TAVR, HFpEF, diet controlled diabetes, penile prosthesis, prostate cancer s/p brachytherapy, CAD, depression, gastritis, GERD, HLD, HTN, iron deficiency anemia, dermatitis herpetiforms who presents to ER with complaints of hematuria.    Past Medical History:   Diagnosis Date    Acute myocardial infarction, unspecified 06/02/2023    Anemia     Arthritis     CAD (coronary artery disease)     Cholelithiasis     Depressive disorder     Dermatitis herpetiformis     GLUTEN FREE DIET    Diet-controlled diabetes mellitus (HCC) 01/03/2022    Diverticulitis     Gastritis     GERD (gastroesophageal reflux disease)     Hiatal hernia     Hyperlipidemia     Hypertension     Iron deficiency anemia     Non-ST elevation myocardial infarction (NSTEMI) 06/02/2023    Peptic reflux disease     ST elevation (STEMI) myocardial infarction of unspecified site 06/02/2023     Findings:     03/20/25 1639   Skin Integumentary    Skin Integrity Redness  (intact blanching, chronic skin irritation, purplish)   Location right buttock   Skin Integrity Site 2   Skin Integrity Location 2 Vascular discoloration   Location 2 BLE   Skin Integrity Site 3   Skin Integrity Location 3 Redness;Other (comment)  (intact blanching, boggy, dry flaky)    Location 3 both heels   Skin Integrity Site 4   Skin Integrity Location 4 Ecchymosis   Location 4 BUE,BLE, Abdomen   Skin Integrity Site 5   Skin Integrity Location 5 Redness;Rash  (groins)   Location 5 redness  (scrotum)   Wound 03/19/25 Buttocks Left   Date First Assessed/Time First

## 2025-03-20 NOTE — PATIENT CARE CONFERENCE
[facility] Quality Flow/Interdisciplinary Rounds Progress Note        Quality Flow Rounds held on March 20, 2025    Disciplines Attending:  Bedside Nurse, , , and Nursing Unit Leadership    Montana Gerard was admitted on 3/19/2025  3:52 AM    Anticipated Discharge Date:       Disposition:    Kosta Score:  Kosta Scale Score: 15    [facility] RISK OF UNPLANNED READMISSION 2.0             27 Total Score        Discussed patient goal for the day, patient clinical progression, and barriers to discharge.  The following Goal(s) of the Day/Commitment(s) have been identified:  Diagnostics - Report Results and Labs - Report Results      Norah Norman RN  March 20, 2025

## 2025-03-20 NOTE — ACP (ADVANCE CARE PLANNING)
Advance Care Planning   Healthcare Decision Maker:    Primary Decision Maker: Bernadette Gerard - Spouse - [phone]    Secondary Decision Maker: Ivan Boyle - Child - [phone]    Click here to complete Healthcare Decision Makers including selection of the Healthcare Decision Maker Relationship (ie \"Primary\").

## 2025-03-20 NOTE — CARE COORDINATION
Social Work discharge planning  Pt is from [facility], where he was snf skilled. He went there 3/14. Per [facility] with [facility], pt would need PTOT for precert if plan is return. MARILIN lvm for wife to discuss planning.  Electronically signed by LANETTE Johnson on 3/20/2025 at 1:02 PM     Addendum  Spoke to pt/wife. She does want pt in snf at discharge. Wife advised she got the keys for her apt at [facility], [facility] facility of [facility]. Choices are 1. [facility] 2. [facility].  MARILIN called Norah in admits at [facility]. Await their eval. [facility] can accept back after precert IF no bed at [facility].   Pt will need PT and OT for precert with either snf.  Electronically signed by LANETTE Johnson on 3/20/2025 at 1:50 PM

## 2025-03-21 LAB
ALBUMIN SERPL-MCNC: 2.3 G/DL (ref 3.5–5.2)
ALP SERPL-CCNC: 83 U/L (ref 40–129)
ALT SERPL-CCNC: 15 U/L (ref 0–40)
ANION GAP SERPL CALCULATED.3IONS-SCNC: 8 MMOL/L (ref 7–16)
AST SERPL-CCNC: 17 U/L (ref 0–39)
BASOPHILS # BLD: 0 K/UL (ref 0–0.2)
BASOPHILS NFR BLD: 0 % (ref 0–2)
BILIRUB SERPL-MCNC: 0.2 MG/DL (ref 0–1.2)
BUN SERPL-MCNC: 25 MG/DL (ref 6–23)
CALCIUM SERPL-MCNC: 8.2 MG/DL (ref 8.6–10.2)
CHLORIDE SERPL-SCNC: 106 MMOL/L (ref 98–107)
CO2 SERPL-SCNC: 25 MMOL/L (ref 22–29)
CREAT SERPL-MCNC: 0.8 MG/DL (ref 0.7–1.2)
EOSINOPHIL # BLD: 0.1 K/UL (ref 0.05–0.5)
EOSINOPHILS RELATIVE PERCENT: 2 % (ref 0–6)
ERYTHROCYTE [DISTWIDTH] IN BLOOD BY AUTOMATED COUNT: 15.9 % (ref 11.5–15)
GFR, ESTIMATED: 88 ML/MIN/1.73M2
GLUCOSE SERPL-MCNC: 114 MG/DL (ref 74–99)
HCT VFR BLD AUTO: 22.8 % (ref 37–54)
HCT VFR BLD AUTO: 25.6 % (ref 37–54)
HGB BLD-MCNC: 6.5 G/DL (ref 12.5–16.5)
HGB BLD-MCNC: 7.4 G/DL (ref 12.5–16.5)
LYMPHOCYTES NFR BLD: 0.58 K/UL (ref 1.5–4)
LYMPHOCYTES RELATIVE PERCENT: 11 % (ref 20–42)
MCH RBC QN AUTO: 28.3 PG (ref 26–35)
MCHC RBC AUTO-ENTMCNC: 28.5 G/DL (ref 32–34.5)
MCV RBC AUTO: 99.1 FL (ref 80–99.9)
MONOCYTES NFR BLD: 0.05 K/UL (ref 0.1–0.95)
MONOCYTES NFR BLD: 1 % (ref 2–12)
NEUTROPHILS NFR BLD: 87 % (ref 43–80)
NEUTS SEG NFR BLD: 4.68 K/UL (ref 1.8–7.3)
PLATELET # BLD AUTO: 108 K/UL (ref 130–450)
PMV BLD AUTO: 9.4 FL (ref 7–12)
POTASSIUM SERPL-SCNC: 3.6 MMOL/L (ref 3.5–5)
PROT SERPL-MCNC: 5.1 G/DL (ref 6.4–8.3)
RBC # BLD AUTO: 2.3 M/UL (ref 3.8–5.8)
RBC # BLD: ABNORMAL 10*6/UL
SODIUM SERPL-SCNC: 139 MMOL/L (ref 132–146)
WBC OTHER # BLD: 5.4 K/UL (ref 4.5–11.5)

## 2025-03-21 PROCEDURE — 99232 SBSQ HOSP IP/OBS MODERATE 35: CPT | Performed by: INTERNAL MEDICINE

## 2025-03-21 PROCEDURE — 80053 COMPREHEN METABOLIC PANEL: CPT

## 2025-03-21 PROCEDURE — 1200000000 HC SEMI PRIVATE

## 2025-03-21 PROCEDURE — 36430 TRANSFUSION BLD/BLD COMPNT: CPT

## 2025-03-21 PROCEDURE — P9016 RBC LEUKOCYTES REDUCED: HCPCS

## 2025-03-21 PROCEDURE — 6360000002 HC RX W HCPCS: Performed by: INTERNAL MEDICINE

## 2025-03-21 PROCEDURE — 30233N1 TRANSFUSION OF NONAUTOLOGOUS RED BLOOD CELLS INTO PERIPHERAL VEIN, PERCUTANEOUS APPROACH: ICD-10-PCS | Performed by: STUDENT IN AN ORGANIZED HEALTH CARE EDUCATION/TRAINING PROGRAM

## 2025-03-21 PROCEDURE — 36592 COLLECT BLOOD FROM PICC: CPT

## 2025-03-21 PROCEDURE — 2500000003 HC RX 250 WO HCPCS: Performed by: INTERNAL MEDICINE

## 2025-03-21 PROCEDURE — 85018 HEMOGLOBIN: CPT

## 2025-03-21 PROCEDURE — 6370000000 HC RX 637 (ALT 250 FOR IP): Performed by: INTERNAL MEDICINE

## 2025-03-21 PROCEDURE — 97165 OT EVAL LOW COMPLEX 30 MIN: CPT

## 2025-03-21 PROCEDURE — 86901 BLOOD TYPING SEROLOGIC RH(D): CPT

## 2025-03-21 PROCEDURE — 86923 COMPATIBILITY TEST ELECTRIC: CPT

## 2025-03-21 PROCEDURE — 86900 BLOOD TYPING SEROLOGIC ABO: CPT

## 2025-03-21 PROCEDURE — 85014 HEMATOCRIT: CPT

## 2025-03-21 PROCEDURE — 2500000003 HC RX 250 WO HCPCS: Performed by: STUDENT IN AN ORGANIZED HEALTH CARE EDUCATION/TRAINING PROGRAM

## 2025-03-21 PROCEDURE — 85025 COMPLETE CBC W/AUTO DIFF WBC: CPT

## 2025-03-21 PROCEDURE — 86850 RBC ANTIBODY SCREEN: CPT

## 2025-03-21 PROCEDURE — 6370000000 HC RX 637 (ALT 250 FOR IP)

## 2025-03-21 RX ORDER — LIDOCAINE HYDROCHLORIDE 20 MG/ML
JELLY TOPICAL
Status: COMPLETED
Start: 2025-03-21 | End: 2025-03-21

## 2025-03-21 RX ORDER — SODIUM CHLORIDE 9 MG/ML
INJECTION, SOLUTION INTRAVENOUS PRN
Status: DISCONTINUED | OUTPATIENT
Start: 2025-03-21 | End: 2025-04-01 | Stop reason: HOSPADM

## 2025-03-21 RX ADMIN — PETROLATUM: 420 OINTMENT TOPICAL at 15:00

## 2025-03-21 RX ADMIN — WATER 2000 MG: 1 INJECTION INTRAMUSCULAR; INTRAVENOUS; SUBCUTANEOUS at 03:12

## 2025-03-21 RX ADMIN — MICONAZOLE NITRATE: 20 POWDER TOPICAL at 09:02

## 2025-03-21 RX ADMIN — WATER 2000 MG: 1 INJECTION INTRAMUSCULAR; INTRAVENOUS; SUBCUTANEOUS at 15:00

## 2025-03-21 RX ADMIN — DOFETILIDE 125 MCG: 0.12 CAPSULE ORAL at 20:23

## 2025-03-21 RX ADMIN — SODIUM CHLORIDE, PRESERVATIVE FREE 10 ML: 5 INJECTION INTRAVENOUS at 09:03

## 2025-03-21 RX ADMIN — SODIUM CHLORIDE, PRESERVATIVE FREE 10 ML: 5 INJECTION INTRAVENOUS at 20:23

## 2025-03-21 RX ADMIN — PETROLATUM: 420 OINTMENT TOPICAL at 09:04

## 2025-03-21 RX ADMIN — BENZONATATE 100 MG: 100 CAPSULE ORAL at 20:23

## 2025-03-21 RX ADMIN — TIMOLOL MALEATE 1 DROP: 5 SOLUTION OPHTHALMIC at 20:22

## 2025-03-21 RX ADMIN — BRIMONIDINE TARTRATE 1 DROP: 2 SOLUTION OPHTHALMIC at 09:02

## 2025-03-21 RX ADMIN — PANTOPRAZOLE SODIUM 40 MG: 40 TABLET, DELAYED RELEASE ORAL at 15:00

## 2025-03-21 RX ADMIN — LIDOCAINE HYDROCHLORIDE: 20 JELLY TOPICAL at 10:43

## 2025-03-21 RX ADMIN — OXYCODONE HYDROCHLORIDE AND ACETAMINOPHEN 1 TABLET: 5; 325 TABLET ORAL at 19:05

## 2025-03-21 RX ADMIN — MICONAZOLE NITRATE: 20 POWDER TOPICAL at 20:24

## 2025-03-21 RX ADMIN — Medication 100 MCG: at 18:20

## 2025-03-21 RX ADMIN — TIMOLOL MALEATE 1 DROP: 5 SOLUTION OPHTHALMIC at 09:03

## 2025-03-21 RX ADMIN — TAMSULOSIN HYDROCHLORIDE 0.8 MG: 0.4 CAPSULE ORAL at 20:23

## 2025-03-21 RX ADMIN — GABAPENTIN 100 MG: 100 CAPSULE ORAL at 20:23

## 2025-03-21 RX ADMIN — BRIMONIDINE TARTRATE 1 DROP: 2 SOLUTION OPHTHALMIC at 20:22

## 2025-03-21 RX ADMIN — BENZONATATE 100 MG: 100 CAPSULE ORAL at 15:00

## 2025-03-21 RX ADMIN — MIRTAZAPINE 15 MG: 15 TABLET, FILM COATED ORAL at 20:23

## 2025-03-21 RX ADMIN — PETROLATUM: 420 OINTMENT TOPICAL at 09:02

## 2025-03-21 RX ADMIN — LATANOPROST 1 DROP: 50 SOLUTION OPHTHALMIC at 20:22

## 2025-03-21 ASSESSMENT — PAIN DESCRIPTION - DESCRIPTORS: DESCRIPTORS: ACHING;DISCOMFORT

## 2025-03-21 ASSESSMENT — PAIN SCALES - GENERAL: PAINLEVEL_OUTOF10: 0

## 2025-03-21 ASSESSMENT — PAIN DESCRIPTION - ORIENTATION: ORIENTATION: RIGHT;LEFT;MID

## 2025-03-21 ASSESSMENT — PAIN DESCRIPTION - LOCATION: LOCATION: BACK

## 2025-03-21 NOTE — DISCHARGE INSTR - COC
Continuity of Care Form    Patient Name: Montana Gerard   :  1940  MRN:  [id number]    Admit date:  3/19/2025  Discharge date:  25    Code Status Order: DNR-CCA   Advance Directives:     Admitting Physician:  Enrique Gaspar MD  PCP: Haseeb Phan DO    Discharging Nurse: Nelli Gomez RN  Discharging Hospital Unit/Room#: 0515/0515-A  Discharging Unit Phone Number: [phone]    Emergency Contact:   Extended Emergency Contact Information  Primary Emergency Contact: Bernadette Gerard  Address: [address]  Home Phone: [phone]  Mobile Phone: [phone]  Relation: Spouse  Secondary Emergency Contact: Ivan Boyle  Home Phone: [phone]  Relation: Child    Past Surgical History:  Past Surgical History:   Procedure Laterality Date    CARDIAC SURGERY  2023    TAVR    CARDIOVASCULAR STRESS TEST  2004    CHOLECYSTECTOMY      COLONOSCOPY N/A 3/6/2025    COLONOSCOPY POLYPECTOMY SNARE/BIOPSY performed by Satinder Parekh MD at [facility] [place] ENDOSCOPY    IR BIOPSY DEEP BONE  3/11/2025    IR BIOPSY DEEP BONE 3/11/2025 Gregoria Tomlinson MD [facility] [place] [facility] SPECIAL PROCEDURES    UPPER GASTROINTESTINAL ENDOSCOPY N/A 3/6/2025    ESOPHAGOGASTRODUODENOSCOPY performed by Satinder Parekh MD at [facility] [place] [facility] ENDOSCOPY       Immunization History:   Immunization History   Administered Date(s) Administered    COVID-19, MODERNA, , (age 12y+), IM, 50mcg/0.5mL 2023    COVID-19, PFIZER PURPLE top, DILUTE for use, (age 12 y+), 30mcg/0.3mL 2021, 2021, 10/05/2021    COVID-19, PFIZER, , (age 12y+), IM, 30mcg/0.3mL 2024    Influenza A ([id number]) Vaccine PF IM 12/15/2009    Influenza Virus Vaccine 10/31/2014, 10/01/2024    Influenza, FLUAD, (age 65 y+), IM, Quadv, 0.5mL 10/20/2021, 2022    Influenza, FLUAD, (age 65 y+), IM, Trivalent PF, 0.5mL 10/24/2018, 2019    Influenza, FLUARIX, FLULAVAL, FLUZONE (age 6 mo+) and AFLURIA, (age 3 y+),

## 2025-03-21 NOTE — PATIENT CARE CONFERENCE
[facility] Quality Flow/Interdisciplinary Rounds Progress Note        Quality Flow Rounds held on March 21, 2025    Disciplines Attending:  Bedside Nurse, , , and Nursing Unit Leadership    Montana Gerard was admitted on 3/19/2025  3:52 AM    Anticipated Discharge Date:       Disposition:    Kosta Score:  Kosta Scale Score: 19    [facility] RISK OF UNPLANNED READMISSION 2.0             27 Total Score        Discussed patient goal for the day, patient clinical progression, and barriers to discharge.  The following Goal(s) of the Day/Commitment(s) have been identified:  Diagnostics - Report Results      Apoorva Chavez RN  March 21, 2025

## 2025-03-21 NOTE — CARE COORDINATION
Social Work discharge planning  Received call from Norah with [facility]. She advised they will NOT have a bed for pt at their snf.  Plan will be return to [facility] at dischagre. Pt will need PTOT and precert.  Electronically signed by LANETTE Johnson on 3/21/2025 at 9:12 AM

## 2025-03-22 LAB
ABO/RH: NORMAL
ALBUMIN SERPL-MCNC: 2.5 G/DL (ref 3.5–5.2)
ALP SERPL-CCNC: 87 U/L (ref 40–129)
ALT SERPL-CCNC: 17 U/L (ref 0–40)
ANION GAP SERPL CALCULATED.3IONS-SCNC: 8 MMOL/L (ref 7–16)
ANTIBODY SCREEN: NEGATIVE
ARM BAND NUMBER: NORMAL
AST SERPL-CCNC: 19 U/L (ref 0–39)
BASOPHILS # BLD: 0.02 K/UL (ref 0–0.2)
BASOPHILS NFR BLD: 0 % (ref 0–2)
BILIRUB SERPL-MCNC: 0.4 MG/DL (ref 0–1.2)
BLOOD BANK BLOOD PRODUCT EXPIRATION DATE: NORMAL
BLOOD BANK DISPENSE STATUS: NORMAL
BLOOD BANK ISBT PRODUCT BLOOD TYPE: 7300
BLOOD BANK PRODUCT CODE: NORMAL
BLOOD BANK SAMPLE EXPIRATION: NORMAL
BLOOD BANK UNIT TYPE AND RH: NORMAL
BPU ID: NORMAL
BUN SERPL-MCNC: 18 MG/DL (ref 6–23)
CALCIUM SERPL-MCNC: 8.4 MG/DL (ref 8.6–10.2)
CHLORIDE SERPL-SCNC: 105 MMOL/L (ref 98–107)
CO2 SERPL-SCNC: 26 MMOL/L (ref 22–29)
COMPONENT: NORMAL
CREAT SERPL-MCNC: 0.7 MG/DL (ref 0.7–1.2)
CROSSMATCH RESULT: NORMAL
EOSINOPHIL # BLD: 0.3 K/UL (ref 0.05–0.5)
EOSINOPHILS RELATIVE PERCENT: 6 % (ref 0–6)
ERYTHROCYTE [DISTWIDTH] IN BLOOD BY AUTOMATED COUNT: 15.9 % (ref 11.5–15)
GFR, ESTIMATED: >90 ML/MIN/1.73M2
GLUCOSE SERPL-MCNC: 99 MG/DL (ref 74–99)
HCT VFR BLD AUTO: 27.1 % (ref 37–54)
HGB BLD-MCNC: 7.8 G/DL (ref 12.5–16.5)
IMM GRANULOCYTES # BLD AUTO: 0.05 K/UL (ref 0–0.58)
IMM GRANULOCYTES NFR BLD: 1 % (ref 0–5)
LYMPHOCYTES NFR BLD: 0.73 K/UL (ref 1.5–4)
LYMPHOCYTES RELATIVE PERCENT: 14 % (ref 20–42)
MAGNESIUM SERPL-MCNC: 1.7 MG/DL (ref 1.6–2.6)
MCH RBC QN AUTO: 28.2 PG (ref 26–35)
MCHC RBC AUTO-ENTMCNC: 28.8 G/DL (ref 32–34.5)
MCV RBC AUTO: 97.8 FL (ref 80–99.9)
MONOCYTES NFR BLD: 0.29 K/UL (ref 0.1–0.95)
MONOCYTES NFR BLD: 6 % (ref 2–12)
NEUTROPHILS NFR BLD: 74 % (ref 43–80)
NEUTS SEG NFR BLD: 3.86 K/UL (ref 1.8–7.3)
PLATELET # BLD AUTO: 116 K/UL (ref 130–450)
PMV BLD AUTO: 9.8 FL (ref 7–12)
POTASSIUM SERPL-SCNC: 3.5 MMOL/L (ref 3.5–5)
PROT SERPL-MCNC: 5.4 G/DL (ref 6.4–8.3)
RBC # BLD AUTO: 2.77 M/UL (ref 3.8–5.8)
RBC # BLD: ABNORMAL 10*6/UL
SODIUM SERPL-SCNC: 139 MMOL/L (ref 132–146)
TRANSFUSION STATUS: NORMAL
UNIT DIVISION: 0
UNIT ISSUE DATE/TIME: NORMAL
WBC OTHER # BLD: 5.3 K/UL (ref 4.5–11.5)

## 2025-03-22 PROCEDURE — 80053 COMPREHEN METABOLIC PANEL: CPT

## 2025-03-22 PROCEDURE — 1200000000 HC SEMI PRIVATE

## 2025-03-22 PROCEDURE — 85025 COMPLETE CBC W/AUTO DIFF WBC: CPT

## 2025-03-22 PROCEDURE — 97110 THERAPEUTIC EXERCISES: CPT

## 2025-03-22 PROCEDURE — 2500000003 HC RX 250 WO HCPCS: Performed by: STUDENT IN AN ORGANIZED HEALTH CARE EDUCATION/TRAINING PROGRAM

## 2025-03-22 PROCEDURE — 6360000002 HC RX W HCPCS: Performed by: INTERNAL MEDICINE

## 2025-03-22 PROCEDURE — 97530 THERAPEUTIC ACTIVITIES: CPT

## 2025-03-22 PROCEDURE — 6370000000 HC RX 637 (ALT 250 FOR IP): Performed by: INTERNAL MEDICINE

## 2025-03-22 PROCEDURE — 83735 ASSAY OF MAGNESIUM: CPT

## 2025-03-22 PROCEDURE — 99232 SBSQ HOSP IP/OBS MODERATE 35: CPT | Performed by: INTERNAL MEDICINE

## 2025-03-22 PROCEDURE — 2500000003 HC RX 250 WO HCPCS: Performed by: INTERNAL MEDICINE

## 2025-03-22 RX ADMIN — DOFETILIDE 125 MCG: 0.12 CAPSULE ORAL at 21:09

## 2025-03-22 RX ADMIN — METOPROLOL SUCCINATE 12.5 MG: 25 TABLET, FILM COATED, EXTENDED RELEASE ORAL at 08:34

## 2025-03-22 RX ADMIN — GABAPENTIN 100 MG: 100 CAPSULE ORAL at 21:10

## 2025-03-22 RX ADMIN — BUPROPION HYDROCHLORIDE 100 MG: 100 TABLET, FILM COATED, EXTENDED RELEASE ORAL at 08:34

## 2025-03-22 RX ADMIN — MICONAZOLE NITRATE: 20 POWDER TOPICAL at 09:00

## 2025-03-22 RX ADMIN — MIRTAZAPINE 15 MG: 15 TABLET, FILM COATED ORAL at 21:10

## 2025-03-22 RX ADMIN — TIMOLOL MALEATE 1 DROP: 5 SOLUTION OPHTHALMIC at 08:37

## 2025-03-22 RX ADMIN — WATER 2000 MG: 1 INJECTION INTRAMUSCULAR; INTRAVENOUS; SUBCUTANEOUS at 03:30

## 2025-03-22 RX ADMIN — Medication 100 MCG: at 08:36

## 2025-03-22 RX ADMIN — FERROUS SULFATE TAB 325 MG (65 MG ELEMENTAL FE) 325 MG: 325 (65 FE) TAB at 08:34

## 2025-03-22 RX ADMIN — SODIUM CHLORIDE, PRESERVATIVE FREE 10 ML: 5 INJECTION INTRAVENOUS at 08:37

## 2025-03-22 RX ADMIN — DAPSONE 25 MG: 25 TABLET ORAL at 08:36

## 2025-03-22 RX ADMIN — OXYCODONE HYDROCHLORIDE AND ACETAMINOPHEN 1 TABLET: 5; 325 TABLET ORAL at 21:10

## 2025-03-22 RX ADMIN — ATORVASTATIN CALCIUM 40 MG: 40 TABLET, FILM COATED ORAL at 08:35

## 2025-03-22 RX ADMIN — BENZONATATE 100 MG: 100 CAPSULE ORAL at 14:39

## 2025-03-22 RX ADMIN — GABAPENTIN 100 MG: 100 CAPSULE ORAL at 08:36

## 2025-03-22 RX ADMIN — TIMOLOL MALEATE 1 DROP: 5 SOLUTION OPHTHALMIC at 21:11

## 2025-03-22 RX ADMIN — Medication 100 MCG: at 18:13

## 2025-03-22 RX ADMIN — BRIMONIDINE TARTRATE 1 DROP: 2 SOLUTION OPHTHALMIC at 08:37

## 2025-03-22 RX ADMIN — BENZONATATE 100 MG: 100 CAPSULE ORAL at 21:10

## 2025-03-22 RX ADMIN — WATER 2000 MG: 1 INJECTION INTRAMUSCULAR; INTRAVENOUS; SUBCUTANEOUS at 14:39

## 2025-03-22 RX ADMIN — OXYCODONE HYDROCHLORIDE AND ACETAMINOPHEN 1 TABLET: 5; 325 TABLET ORAL at 06:46

## 2025-03-22 RX ADMIN — BENZONATATE 100 MG: 100 CAPSULE ORAL at 08:35

## 2025-03-22 RX ADMIN — DOFETILIDE 125 MCG: 0.12 CAPSULE ORAL at 08:35

## 2025-03-22 RX ADMIN — BRIMONIDINE TARTRATE 1 DROP: 2 SOLUTION OPHTHALMIC at 21:10

## 2025-03-22 RX ADMIN — PETROLATUM: 420 OINTMENT TOPICAL at 09:00

## 2025-03-22 RX ADMIN — PANTOPRAZOLE SODIUM 40 MG: 40 TABLET, DELAYED RELEASE ORAL at 06:46

## 2025-03-22 RX ADMIN — Medication 1000 UNITS: at 08:36

## 2025-03-22 RX ADMIN — LATANOPROST 1 DROP: 50 SOLUTION OPHTHALMIC at 21:10

## 2025-03-22 RX ADMIN — OXYCODONE HYDROCHLORIDE AND ACETAMINOPHEN 1 TABLET: 5; 325 TABLET ORAL at 11:26

## 2025-03-22 RX ADMIN — PETROLATUM: 420 OINTMENT TOPICAL at 17:16

## 2025-03-22 RX ADMIN — PANTOPRAZOLE SODIUM 40 MG: 40 TABLET, DELAYED RELEASE ORAL at 14:40

## 2025-03-22 RX ADMIN — SODIUM CHLORIDE, PRESERVATIVE FREE 10 ML: 5 INJECTION INTRAVENOUS at 21:11

## 2025-03-22 RX ADMIN — MICONAZOLE NITRATE: 20 POWDER TOPICAL at 21:11

## 2025-03-22 RX ADMIN — TAMSULOSIN HYDROCHLORIDE 0.8 MG: 0.4 CAPSULE ORAL at 21:10

## 2025-03-22 ASSESSMENT — PAIN DESCRIPTION - LOCATION
LOCATION: BACK
LOCATION: PENIS;ABDOMEN
LOCATION: BACK

## 2025-03-22 ASSESSMENT — PAIN SCALES - GENERAL
PAINLEVEL_OUTOF10: 0
PAINLEVEL_OUTOF10: 8
PAINLEVEL_OUTOF10: 4
PAINLEVEL_OUTOF10: 4
PAINLEVEL_OUTOF10: 0

## 2025-03-22 ASSESSMENT — PAIN DESCRIPTION - FREQUENCY: FREQUENCY: CONTINUOUS

## 2025-03-22 ASSESSMENT — PAIN DESCRIPTION - ORIENTATION
ORIENTATION: RIGHT;LEFT;MID;LOWER
ORIENTATION: LOWER

## 2025-03-22 ASSESSMENT — PAIN - FUNCTIONAL ASSESSMENT
PAIN_FUNCTIONAL_ASSESSMENT: PREVENTS OR INTERFERES WITH MANY ACTIVE NOT PASSIVE ACTIVITIES
PAIN_FUNCTIONAL_ASSESSMENT: ACTIVITIES ARE NOT PREVENTED
PAIN_FUNCTIONAL_ASSESSMENT: PREVENTS OR INTERFERES SOME ACTIVE ACTIVITIES AND ADLS

## 2025-03-22 ASSESSMENT — PAIN DESCRIPTION - DESCRIPTORS
DESCRIPTORS: ACHING;DISCOMFORT
DESCRIPTORS: PRESSURE

## 2025-03-22 ASSESSMENT — PAIN DESCRIPTION - ONSET: ONSET: ON-GOING

## 2025-03-22 ASSESSMENT — PAIN DESCRIPTION - PAIN TYPE: TYPE: CHRONIC PAIN;ACUTE PAIN

## 2025-03-23 PROCEDURE — 99232 SBSQ HOSP IP/OBS MODERATE 35: CPT | Performed by: INTERNAL MEDICINE

## 2025-03-23 PROCEDURE — 6370000000 HC RX 637 (ALT 250 FOR IP): Performed by: INTERNAL MEDICINE

## 2025-03-23 PROCEDURE — 2500000003 HC RX 250 WO HCPCS: Performed by: INTERNAL MEDICINE

## 2025-03-23 PROCEDURE — 6360000002 HC RX W HCPCS: Performed by: INTERNAL MEDICINE

## 2025-03-23 PROCEDURE — 51798 US URINE CAPACITY MEASURE: CPT

## 2025-03-23 PROCEDURE — 1200000000 HC SEMI PRIVATE

## 2025-03-23 PROCEDURE — 2500000003 HC RX 250 WO HCPCS: Performed by: STUDENT IN AN ORGANIZED HEALTH CARE EDUCATION/TRAINING PROGRAM

## 2025-03-23 RX ADMIN — WATER 2000 MG: 1 INJECTION INTRAMUSCULAR; INTRAVENOUS; SUBCUTANEOUS at 02:26

## 2025-03-23 RX ADMIN — BRIMONIDINE TARTRATE 1 DROP: 2 SOLUTION OPHTHALMIC at 08:28

## 2025-03-23 RX ADMIN — PANTOPRAZOLE SODIUM 40 MG: 40 TABLET, DELAYED RELEASE ORAL at 05:43

## 2025-03-23 RX ADMIN — SODIUM CHLORIDE, PRESERVATIVE FREE 10 ML: 5 INJECTION INTRAVENOUS at 15:07

## 2025-03-23 RX ADMIN — BENZONATATE 100 MG: 100 CAPSULE ORAL at 15:02

## 2025-03-23 RX ADMIN — DAPSONE 25 MG: 25 TABLET ORAL at 08:26

## 2025-03-23 RX ADMIN — TIMOLOL MALEATE 1 DROP: 5 SOLUTION OPHTHALMIC at 20:57

## 2025-03-23 RX ADMIN — Medication 1000 UNITS: at 08:26

## 2025-03-23 RX ADMIN — GABAPENTIN 100 MG: 100 CAPSULE ORAL at 08:26

## 2025-03-23 RX ADMIN — TAMSULOSIN HYDROCHLORIDE 0.8 MG: 0.4 CAPSULE ORAL at 20:56

## 2025-03-23 RX ADMIN — SODIUM CHLORIDE, PRESERVATIVE FREE 10 ML: 5 INJECTION INTRAVENOUS at 08:27

## 2025-03-23 RX ADMIN — Medication 100 MCG: at 08:26

## 2025-03-23 RX ADMIN — PANTOPRAZOLE SODIUM 40 MG: 40 TABLET, DELAYED RELEASE ORAL at 15:02

## 2025-03-23 RX ADMIN — MIRTAZAPINE 15 MG: 15 TABLET, FILM COATED ORAL at 20:56

## 2025-03-23 RX ADMIN — SODIUM CHLORIDE, PRESERVATIVE FREE 10 ML: 5 INJECTION INTRAVENOUS at 20:57

## 2025-03-23 RX ADMIN — WATER 2000 MG: 1 INJECTION INTRAMUSCULAR; INTRAVENOUS; SUBCUTANEOUS at 15:03

## 2025-03-23 RX ADMIN — METOPROLOL SUCCINATE 12.5 MG: 25 TABLET, FILM COATED, EXTENDED RELEASE ORAL at 08:26

## 2025-03-23 RX ADMIN — ATORVASTATIN CALCIUM 40 MG: 40 TABLET, FILM COATED ORAL at 08:26

## 2025-03-23 RX ADMIN — LATANOPROST 1 DROP: 50 SOLUTION OPHTHALMIC at 20:57

## 2025-03-23 RX ADMIN — APIXABAN 5 MG: 5 TABLET, FILM COATED ORAL at 20:56

## 2025-03-23 RX ADMIN — TIMOLOL MALEATE 1 DROP: 5 SOLUTION OPHTHALMIC at 08:29

## 2025-03-23 RX ADMIN — DOFETILIDE 125 MCG: 0.12 CAPSULE ORAL at 08:26

## 2025-03-23 RX ADMIN — OXYCODONE HYDROCHLORIDE AND ACETAMINOPHEN 1 TABLET: 5; 325 TABLET ORAL at 20:56

## 2025-03-23 RX ADMIN — BENZONATATE 100 MG: 100 CAPSULE ORAL at 08:26

## 2025-03-23 RX ADMIN — PETROLATUM: 420 OINTMENT TOPICAL at 15:04

## 2025-03-23 RX ADMIN — BRIMONIDINE TARTRATE 1 DROP: 2 SOLUTION OPHTHALMIC at 20:57

## 2025-03-23 RX ADMIN — BENZONATATE 100 MG: 100 CAPSULE ORAL at 20:56

## 2025-03-23 RX ADMIN — FERROUS SULFATE TAB 325 MG (65 MG ELEMENTAL FE) 325 MG: 325 (65 FE) TAB at 08:26

## 2025-03-23 RX ADMIN — BUPROPION HYDROCHLORIDE 100 MG: 100 TABLET, FILM COATED, EXTENDED RELEASE ORAL at 08:26

## 2025-03-23 RX ADMIN — Medication 100 MCG: at 20:56

## 2025-03-23 RX ADMIN — GABAPENTIN 100 MG: 100 CAPSULE ORAL at 20:56

## 2025-03-23 RX ADMIN — MICONAZOLE NITRATE: 20 POWDER TOPICAL at 08:27

## 2025-03-23 RX ADMIN — MICONAZOLE NITRATE: 20 POWDER TOPICAL at 20:57

## 2025-03-23 RX ADMIN — DOFETILIDE 125 MCG: 0.12 CAPSULE ORAL at 20:56

## 2025-03-23 RX ADMIN — PETROLATUM: 420 OINTMENT TOPICAL at 08:29

## 2025-03-23 ASSESSMENT — PAIN SCALES - GENERAL
PAINLEVEL_OUTOF10: 7
PAINLEVEL_OUTOF10: 0
PAINLEVEL_OUTOF10: 0

## 2025-03-23 ASSESSMENT — PAIN DESCRIPTION - PAIN TYPE: TYPE: ACUTE PAIN;CHRONIC PAIN

## 2025-03-23 ASSESSMENT — PAIN DESCRIPTION - FREQUENCY: FREQUENCY: CONTINUOUS

## 2025-03-23 ASSESSMENT — PAIN DESCRIPTION - DESCRIPTORS: DESCRIPTORS: ACHING;DISCOMFORT

## 2025-03-23 ASSESSMENT — PAIN DESCRIPTION - LOCATION: LOCATION: BACK

## 2025-03-23 ASSESSMENT — PAIN DESCRIPTION - ORIENTATION: ORIENTATION: RIGHT;LEFT;LOWER;MID

## 2025-03-23 ASSESSMENT — PAIN SCALES - WONG BAKER: WONGBAKER_NUMERICALRESPONSE: NO HURT

## 2025-03-23 ASSESSMENT — PAIN - FUNCTIONAL ASSESSMENT: PAIN_FUNCTIONAL_ASSESSMENT: PREVENTS OR INTERFERES SOME ACTIVE ACTIVITIES AND ADLS

## 2025-03-23 ASSESSMENT — PAIN DESCRIPTION - ONSET: ONSET: ON-GOING

## 2025-03-24 LAB
ANION GAP SERPL CALCULATED.3IONS-SCNC: 9 MMOL/L (ref 7–16)
BASOPHILS # BLD: 0.03 K/UL (ref 0–0.2)
BASOPHILS NFR BLD: 1 % (ref 0–2)
BUN SERPL-MCNC: 14 MG/DL (ref 6–23)
CALCIUM SERPL-MCNC: 8.3 MG/DL (ref 8.6–10.2)
CHLORIDE SERPL-SCNC: 103 MMOL/L (ref 98–107)
CO2 SERPL-SCNC: 26 MMOL/L (ref 22–29)
CREAT SERPL-MCNC: 0.6 MG/DL (ref 0.7–1.2)
EOSINOPHIL # BLD: 0.25 K/UL (ref 0.05–0.5)
EOSINOPHILS RELATIVE PERCENT: 4 % (ref 0–6)
ERYTHROCYTE [DISTWIDTH] IN BLOOD BY AUTOMATED COUNT: 15.8 % (ref 11.5–15)
GFR, ESTIMATED: >90 ML/MIN/1.73M2
GLUCOSE SERPL-MCNC: 93 MG/DL (ref 74–99)
HCT VFR BLD AUTO: 29.9 % (ref 37–54)
HGB BLD-MCNC: 8.7 G/DL (ref 12.5–16.5)
IMM GRANULOCYTES # BLD AUTO: 0.07 K/UL (ref 0–0.58)
IMM GRANULOCYTES NFR BLD: 1 % (ref 0–5)
LYMPHOCYTES NFR BLD: 0.86 K/UL (ref 1.5–4)
LYMPHOCYTES RELATIVE PERCENT: 14 % (ref 20–42)
MCH RBC QN AUTO: 28.2 PG (ref 26–35)
MCHC RBC AUTO-ENTMCNC: 29.1 G/DL (ref 32–34.5)
MCV RBC AUTO: 96.8 FL (ref 80–99.9)
MONOCYTES NFR BLD: 0.41 K/UL (ref 0.1–0.95)
MONOCYTES NFR BLD: 7 % (ref 2–12)
NEUTROPHILS NFR BLD: 74 % (ref 43–80)
NEUTS SEG NFR BLD: 4.71 K/UL (ref 1.8–7.3)
PLATELET # BLD AUTO: 116 K/UL (ref 130–450)
PMV BLD AUTO: 9.3 FL (ref 7–12)
POTASSIUM SERPL-SCNC: 3.5 MMOL/L (ref 3.5–5)
RBC # BLD AUTO: 3.09 M/UL (ref 3.8–5.8)
SODIUM SERPL-SCNC: 138 MMOL/L (ref 132–146)
WBC OTHER # BLD: 6.3 K/UL (ref 4.5–11.5)

## 2025-03-24 PROCEDURE — 97530 THERAPEUTIC ACTIVITIES: CPT

## 2025-03-24 PROCEDURE — 6370000000 HC RX 637 (ALT 250 FOR IP): Performed by: INTERNAL MEDICINE

## 2025-03-24 PROCEDURE — 99232 SBSQ HOSP IP/OBS MODERATE 35: CPT | Performed by: INTERNAL MEDICINE

## 2025-03-24 PROCEDURE — 36592 COLLECT BLOOD FROM PICC: CPT

## 2025-03-24 PROCEDURE — 80048 BASIC METABOLIC PNL TOTAL CA: CPT

## 2025-03-24 PROCEDURE — 85025 COMPLETE CBC W/AUTO DIFF WBC: CPT

## 2025-03-24 PROCEDURE — 2500000003 HC RX 250 WO HCPCS: Performed by: STUDENT IN AN ORGANIZED HEALTH CARE EDUCATION/TRAINING PROGRAM

## 2025-03-24 PROCEDURE — 1200000000 HC SEMI PRIVATE

## 2025-03-24 PROCEDURE — 6370000000 HC RX 637 (ALT 250 FOR IP): Performed by: STUDENT IN AN ORGANIZED HEALTH CARE EDUCATION/TRAINING PROGRAM

## 2025-03-24 PROCEDURE — 6360000002 HC RX W HCPCS: Performed by: INTERNAL MEDICINE

## 2025-03-24 PROCEDURE — 2500000003 HC RX 250 WO HCPCS: Performed by: INTERNAL MEDICINE

## 2025-03-24 RX ADMIN — WATER 2000 MG: 1 INJECTION INTRAMUSCULAR; INTRAVENOUS; SUBCUTANEOUS at 14:46

## 2025-03-24 RX ADMIN — WATER 2000 MG: 1 INJECTION INTRAMUSCULAR; INTRAVENOUS; SUBCUTANEOUS at 02:42

## 2025-03-24 RX ADMIN — Medication 100 MCG: at 16:33

## 2025-03-24 RX ADMIN — PANTOPRAZOLE SODIUM 40 MG: 40 TABLET, DELAYED RELEASE ORAL at 06:29

## 2025-03-24 RX ADMIN — MICONAZOLE NITRATE: 20 POWDER TOPICAL at 08:51

## 2025-03-24 RX ADMIN — TIMOLOL MALEATE 1 DROP: 5 SOLUTION OPHTHALMIC at 08:52

## 2025-03-24 RX ADMIN — FERROUS SULFATE TAB 325 MG (65 MG ELEMENTAL FE) 325 MG: 325 (65 FE) TAB at 08:51

## 2025-03-24 RX ADMIN — BUPROPION HYDROCHLORIDE 100 MG: 100 TABLET, FILM COATED, EXTENDED RELEASE ORAL at 08:51

## 2025-03-24 RX ADMIN — MICONAZOLE NITRATE: 20 POWDER TOPICAL at 20:56

## 2025-03-24 RX ADMIN — PANTOPRAZOLE SODIUM 40 MG: 40 TABLET, DELAYED RELEASE ORAL at 14:48

## 2025-03-24 RX ADMIN — Medication 100 MCG: at 08:51

## 2025-03-24 RX ADMIN — BENZONATATE 100 MG: 100 CAPSULE ORAL at 08:50

## 2025-03-24 RX ADMIN — METOPROLOL SUCCINATE 12.5 MG: 25 TABLET, FILM COATED, EXTENDED RELEASE ORAL at 08:50

## 2025-03-24 RX ADMIN — DAPSONE 25 MG: 25 TABLET ORAL at 08:51

## 2025-03-24 RX ADMIN — GABAPENTIN 100 MG: 100 CAPSULE ORAL at 08:51

## 2025-03-24 RX ADMIN — PETROLATUM: 420 OINTMENT TOPICAL at 14:51

## 2025-03-24 RX ADMIN — MIRTAZAPINE 15 MG: 15 TABLET, FILM COATED ORAL at 20:56

## 2025-03-24 RX ADMIN — ATORVASTATIN CALCIUM 40 MG: 40 TABLET, FILM COATED ORAL at 08:51

## 2025-03-24 RX ADMIN — LATANOPROST 1 DROP: 50 SOLUTION OPHTHALMIC at 20:56

## 2025-03-24 RX ADMIN — APIXABAN 5 MG: 5 TABLET, FILM COATED ORAL at 08:51

## 2025-03-24 RX ADMIN — Medication 1000 UNITS: at 08:51

## 2025-03-24 RX ADMIN — BENZONATATE 100 MG: 100 CAPSULE ORAL at 20:56

## 2025-03-24 RX ADMIN — PETROLATUM: 420 OINTMENT TOPICAL at 08:52

## 2025-03-24 RX ADMIN — SODIUM CHLORIDE, PRESERVATIVE FREE 10 ML: 5 INJECTION INTRAVENOUS at 08:51

## 2025-03-24 RX ADMIN — ACETAMINOPHEN 650 MG: 325 TABLET ORAL at 18:04

## 2025-03-24 RX ADMIN — APIXABAN 5 MG: 5 TABLET, FILM COATED ORAL at 20:56

## 2025-03-24 RX ADMIN — BRIMONIDINE TARTRATE 1 DROP: 2 SOLUTION OPHTHALMIC at 08:52

## 2025-03-24 RX ADMIN — TIMOLOL MALEATE 1 DROP: 5 SOLUTION OPHTHALMIC at 20:57

## 2025-03-24 RX ADMIN — BENZONATATE 100 MG: 100 CAPSULE ORAL at 14:46

## 2025-03-24 RX ADMIN — SODIUM CHLORIDE, PRESERVATIVE FREE 10 ML: 5 INJECTION INTRAVENOUS at 14:49

## 2025-03-24 RX ADMIN — DOFETILIDE 125 MCG: 0.12 CAPSULE ORAL at 20:56

## 2025-03-24 RX ADMIN — TAMSULOSIN HYDROCHLORIDE 0.8 MG: 0.4 CAPSULE ORAL at 20:56

## 2025-03-24 RX ADMIN — SODIUM CHLORIDE, PRESERVATIVE FREE 10 ML: 5 INJECTION INTRAVENOUS at 20:57

## 2025-03-24 RX ADMIN — DOFETILIDE 125 MCG: 0.12 CAPSULE ORAL at 08:51

## 2025-03-24 RX ADMIN — BRIMONIDINE TARTRATE 1 DROP: 2 SOLUTION OPHTHALMIC at 20:57

## 2025-03-24 RX ADMIN — GABAPENTIN 100 MG: 100 CAPSULE ORAL at 20:56

## 2025-03-24 ASSESSMENT — PAIN DESCRIPTION - ORIENTATION: ORIENTATION: LEFT

## 2025-03-24 ASSESSMENT — PAIN SCALES - GENERAL
PAINLEVEL_OUTOF10: 3
PAINLEVEL_OUTOF10: 3

## 2025-03-24 ASSESSMENT — PAIN DESCRIPTION - DESCRIPTORS: DESCRIPTORS: ACHING;DISCOMFORT

## 2025-03-24 ASSESSMENT — PAIN DESCRIPTION - LOCATION: LOCATION: BACK

## 2025-03-24 ASSESSMENT — PAIN - FUNCTIONAL ASSESSMENT: PAIN_FUNCTIONAL_ASSESSMENT: PREVENTS OR INTERFERES SOME ACTIVE ACTIVITIES AND ADLS

## 2025-03-24 NOTE — CARE COORDINATION
Social Work discharge planning  Notified [facility] that PTOT updates are in chart for precert to start.   Met with pt/wife. She is aware [facility] does not have bed available for pt. Plan is return to [facility] once precert obtained and medically ready per   Electronically signed by LANTETE Johnson on 3/24/2025 at 1:54 PM

## 2025-03-24 NOTE — PATIENT CARE CONFERENCE
[facility] Quality Flow/Interdisciplinary Rounds Progress Note        Quality Flow Rounds held on March 24, 2025    Disciplines Attending:  Bedside Nurse, , , and Nursing Unit Leadership    Montana Gerard was admitted on 3/19/2025  3:52 AM    Anticipated Discharge Date:       Disposition:    Kosta Score:  Kosta Scale Score: 15    [facility] RISK OF UNPLANNED READMISSION 2.0             28.2 Total Score        Discussed patient goal for the day, patient clinical progression, and barriers to discharge.  The following Goal(s) of the Day/Commitment(s) have been identified:  Diagnostics - Report Results and Labs - Report Results      Norah Norman RN  March 24, 2025

## 2025-03-25 PROCEDURE — 6370000000 HC RX 637 (ALT 250 FOR IP): Performed by: INTERNAL MEDICINE

## 2025-03-25 PROCEDURE — 99232 SBSQ HOSP IP/OBS MODERATE 35: CPT | Performed by: INTERNAL MEDICINE

## 2025-03-25 PROCEDURE — 1200000000 HC SEMI PRIVATE

## 2025-03-25 PROCEDURE — 2500000003 HC RX 250 WO HCPCS: Performed by: INTERNAL MEDICINE

## 2025-03-25 PROCEDURE — 2500000003 HC RX 250 WO HCPCS: Performed by: STUDENT IN AN ORGANIZED HEALTH CARE EDUCATION/TRAINING PROGRAM

## 2025-03-25 PROCEDURE — 6360000002 HC RX W HCPCS: Performed by: INTERNAL MEDICINE

## 2025-03-25 RX ADMIN — BENZONATATE 100 MG: 100 CAPSULE ORAL at 21:35

## 2025-03-25 RX ADMIN — TIMOLOL MALEATE 1 DROP: 5 SOLUTION OPHTHALMIC at 08:18

## 2025-03-25 RX ADMIN — GABAPENTIN 100 MG: 100 CAPSULE ORAL at 21:35

## 2025-03-25 RX ADMIN — TAMSULOSIN HYDROCHLORIDE 0.8 MG: 0.4 CAPSULE ORAL at 21:35

## 2025-03-25 RX ADMIN — Medication 100 MCG: at 08:15

## 2025-03-25 RX ADMIN — SODIUM CHLORIDE, PRESERVATIVE FREE 10 ML: 5 INJECTION INTRAVENOUS at 08:17

## 2025-03-25 RX ADMIN — PETROLATUM: 420 OINTMENT TOPICAL at 21:37

## 2025-03-25 RX ADMIN — BRIMONIDINE TARTRATE 1 DROP: 2 SOLUTION OPHTHALMIC at 21:36

## 2025-03-25 RX ADMIN — APIXABAN 5 MG: 5 TABLET, FILM COATED ORAL at 21:35

## 2025-03-25 RX ADMIN — MICONAZOLE NITRATE: 20 POWDER TOPICAL at 08:18

## 2025-03-25 RX ADMIN — MICONAZOLE NITRATE: 20 POWDER TOPICAL at 21:35

## 2025-03-25 RX ADMIN — PETROLATUM: 420 OINTMENT TOPICAL at 14:48

## 2025-03-25 RX ADMIN — BUPROPION HYDROCHLORIDE 100 MG: 100 TABLET, FILM COATED, EXTENDED RELEASE ORAL at 08:15

## 2025-03-25 RX ADMIN — METOPROLOL SUCCINATE 12.5 MG: 25 TABLET, FILM COATED, EXTENDED RELEASE ORAL at 08:16

## 2025-03-25 RX ADMIN — APIXABAN 5 MG: 5 TABLET, FILM COATED ORAL at 08:16

## 2025-03-25 RX ADMIN — MIRTAZAPINE 15 MG: 15 TABLET, FILM COATED ORAL at 21:35

## 2025-03-25 RX ADMIN — DAPSONE 25 MG: 25 TABLET ORAL at 08:15

## 2025-03-25 RX ADMIN — BRIMONIDINE TARTRATE 1 DROP: 2 SOLUTION OPHTHALMIC at 08:18

## 2025-03-25 RX ADMIN — PETROLATUM: 420 OINTMENT TOPICAL at 08:18

## 2025-03-25 RX ADMIN — DOFETILIDE 125 MCG: 0.12 CAPSULE ORAL at 21:35

## 2025-03-25 RX ADMIN — TIMOLOL MALEATE 1 DROP: 5 SOLUTION OPHTHALMIC at 21:36

## 2025-03-25 RX ADMIN — PANTOPRAZOLE SODIUM 40 MG: 40 TABLET, DELAYED RELEASE ORAL at 14:44

## 2025-03-25 RX ADMIN — LATANOPROST 1 DROP: 50 SOLUTION OPHTHALMIC at 21:35

## 2025-03-25 RX ADMIN — ATORVASTATIN CALCIUM 40 MG: 40 TABLET, FILM COATED ORAL at 08:16

## 2025-03-25 RX ADMIN — BENZONATATE 100 MG: 100 CAPSULE ORAL at 08:16

## 2025-03-25 RX ADMIN — WATER 2000 MG: 1 INJECTION INTRAMUSCULAR; INTRAVENOUS; SUBCUTANEOUS at 14:46

## 2025-03-25 RX ADMIN — Medication 1000 UNITS: at 08:48

## 2025-03-25 RX ADMIN — Medication 100 MCG: at 16:41

## 2025-03-25 RX ADMIN — BENZONATATE 100 MG: 100 CAPSULE ORAL at 14:44

## 2025-03-25 RX ADMIN — SODIUM CHLORIDE, PRESERVATIVE FREE 10 ML: 5 INJECTION INTRAVENOUS at 21:36

## 2025-03-25 RX ADMIN — DOFETILIDE 125 MCG: 0.12 CAPSULE ORAL at 08:15

## 2025-03-25 RX ADMIN — FERROUS SULFATE TAB 325 MG (65 MG ELEMENTAL FE) 325 MG: 325 (65 FE) TAB at 08:15

## 2025-03-25 RX ADMIN — GABAPENTIN 100 MG: 100 CAPSULE ORAL at 08:16

## 2025-03-25 RX ADMIN — WATER 2000 MG: 1 INJECTION INTRAMUSCULAR; INTRAVENOUS; SUBCUTANEOUS at 02:49

## 2025-03-25 NOTE — CARE COORDINATION
Social Work discharge planning  Per [facility] liaison, precert remains pending at this time.   Electronically signed by LANETTE Johnson on 3/25/2025 at 10:19 AM

## 2025-03-25 NOTE — PATIENT CARE CONFERENCE
[facility] Quality Flow/Interdisciplinary Rounds Progress Note        Quality Flow Rounds held on March 25, 2025    Disciplines Attending:  Bedside Nurse, , , and Nursing Unit Leadership    Montana Gerard was admitted on 3/19/2025  3:52 AM    Anticipated Discharge Date:       Disposition:    Kosta Score:  Kosta Scale Score: 14    [facility] RISK OF UNPLANNED READMISSION 2.0             27.7 Total Score        Discussed patient goal for the day, patient clinical progression, and barriers to discharge.  The following Goal(s) of the Day/Commitment(s) have been identified:  Diagnostics - Report Results and Labs - Report Results      Norah Norman RN  March 25, 2025

## 2025-03-26 PROBLEM — E44.0 MODERATE PROTEIN-CALORIE MALNUTRITION: Chronic | Status: ACTIVE | Noted: 2025-03-26

## 2025-03-26 PROBLEM — E44.0 MODERATE PROTEIN-CALORIE MALNUTRITION: Status: ACTIVE | Noted: 2025-03-04

## 2025-03-26 LAB
BASOPHILS # BLD: 0.05 K/UL (ref 0–0.2)
BASOPHILS NFR BLD: 1 % (ref 0–2)
EOSINOPHIL # BLD: 0.23 K/UL (ref 0.05–0.5)
EOSINOPHILS RELATIVE PERCENT: 3 % (ref 0–6)
ERYTHROCYTE [DISTWIDTH] IN BLOOD BY AUTOMATED COUNT: 16.3 % (ref 11.5–15)
HCT VFR BLD AUTO: 28.2 % (ref 37–54)
HGB BLD-MCNC: 8.4 G/DL (ref 12.5–16.5)
IMM GRANULOCYTES # BLD AUTO: 0.07 K/UL (ref 0–0.58)
IMM GRANULOCYTES NFR BLD: 1 % (ref 0–5)
LYMPHOCYTES NFR BLD: 0.85 K/UL (ref 1.5–4)
LYMPHOCYTES RELATIVE PERCENT: 11 % (ref 20–42)
MCH RBC QN AUTO: 28.7 PG (ref 26–35)
MCHC RBC AUTO-ENTMCNC: 29.8 G/DL (ref 32–34.5)
MCV RBC AUTO: 96.2 FL (ref 80–99.9)
MONOCYTES NFR BLD: 0.5 K/UL (ref 0.1–0.95)
MONOCYTES NFR BLD: 7 % (ref 2–12)
NEUTROPHILS NFR BLD: 78 % (ref 43–80)
NEUTS SEG NFR BLD: 5.96 K/UL (ref 1.8–7.3)
PLATELET # BLD AUTO: 111 K/UL (ref 130–450)
PMV BLD AUTO: 9.1 FL (ref 7–12)
RBC # BLD AUTO: 2.93 M/UL (ref 3.8–5.8)
WBC OTHER # BLD: 7.7 K/UL (ref 4.5–11.5)

## 2025-03-26 PROCEDURE — 85025 COMPLETE CBC W/AUTO DIFF WBC: CPT

## 2025-03-26 PROCEDURE — 6360000002 HC RX W HCPCS: Performed by: INTERNAL MEDICINE

## 2025-03-26 PROCEDURE — 1200000000 HC SEMI PRIVATE

## 2025-03-26 PROCEDURE — 6370000000 HC RX 637 (ALT 250 FOR IP): Performed by: INTERNAL MEDICINE

## 2025-03-26 PROCEDURE — 99232 SBSQ HOSP IP/OBS MODERATE 35: CPT | Performed by: INTERNAL MEDICINE

## 2025-03-26 PROCEDURE — 2500000003 HC RX 250 WO HCPCS: Performed by: STUDENT IN AN ORGANIZED HEALTH CARE EDUCATION/TRAINING PROGRAM

## 2025-03-26 PROCEDURE — 97530 THERAPEUTIC ACTIVITIES: CPT

## 2025-03-26 PROCEDURE — 2500000003 HC RX 250 WO HCPCS: Performed by: INTERNAL MEDICINE

## 2025-03-26 RX ADMIN — OXYCODONE HYDROCHLORIDE AND ACETAMINOPHEN 1 TABLET: 5; 325 TABLET ORAL at 21:08

## 2025-03-26 RX ADMIN — Medication 1000 UNITS: at 09:18

## 2025-03-26 RX ADMIN — METOPROLOL SUCCINATE 12.5 MG: 25 TABLET, FILM COATED, EXTENDED RELEASE ORAL at 09:19

## 2025-03-26 RX ADMIN — PANTOPRAZOLE SODIUM 40 MG: 40 TABLET, DELAYED RELEASE ORAL at 05:55

## 2025-03-26 RX ADMIN — SODIUM CHLORIDE, PRESERVATIVE FREE 10 ML: 5 INJECTION INTRAVENOUS at 21:07

## 2025-03-26 RX ADMIN — DOFETILIDE 125 MCG: 0.12 CAPSULE ORAL at 09:19

## 2025-03-26 RX ADMIN — BRIMONIDINE TARTRATE 1 DROP: 2 SOLUTION OPHTHALMIC at 09:20

## 2025-03-26 RX ADMIN — APIXABAN 5 MG: 5 TABLET, FILM COATED ORAL at 09:19

## 2025-03-26 RX ADMIN — SODIUM CHLORIDE, PRESERVATIVE FREE 10 ML: 5 INJECTION INTRAVENOUS at 09:21

## 2025-03-26 RX ADMIN — BUPROPION HYDROCHLORIDE 100 MG: 100 TABLET, FILM COATED, EXTENDED RELEASE ORAL at 09:19

## 2025-03-26 RX ADMIN — BENZONATATE 100 MG: 100 CAPSULE ORAL at 21:08

## 2025-03-26 RX ADMIN — GABAPENTIN 100 MG: 100 CAPSULE ORAL at 09:19

## 2025-03-26 RX ADMIN — MICONAZOLE NITRATE: 20 POWDER TOPICAL at 09:20

## 2025-03-26 RX ADMIN — Medication 100 MCG: at 17:59

## 2025-03-26 RX ADMIN — BENZONATATE 100 MG: 100 CAPSULE ORAL at 09:18

## 2025-03-26 RX ADMIN — TAMSULOSIN HYDROCHLORIDE 0.8 MG: 0.4 CAPSULE ORAL at 21:09

## 2025-03-26 RX ADMIN — TIMOLOL MALEATE 1 DROP: 5 SOLUTION OPHTHALMIC at 09:20

## 2025-03-26 RX ADMIN — FERROUS SULFATE TAB 325 MG (65 MG ELEMENTAL FE) 325 MG: 325 (65 FE) TAB at 09:18

## 2025-03-26 RX ADMIN — WATER 2000 MG: 1 INJECTION INTRAMUSCULAR; INTRAVENOUS; SUBCUTANEOUS at 02:40

## 2025-03-26 RX ADMIN — MICONAZOLE NITRATE: 20 POWDER TOPICAL at 21:07

## 2025-03-26 RX ADMIN — BENZONATATE 100 MG: 100 CAPSULE ORAL at 14:55

## 2025-03-26 RX ADMIN — DAPSONE 25 MG: 25 TABLET ORAL at 09:18

## 2025-03-26 RX ADMIN — PANTOPRAZOLE SODIUM 40 MG: 40 TABLET, DELAYED RELEASE ORAL at 14:55

## 2025-03-26 RX ADMIN — LATANOPROST 1 DROP: 50 SOLUTION OPHTHALMIC at 21:07

## 2025-03-26 RX ADMIN — GABAPENTIN 100 MG: 100 CAPSULE ORAL at 21:09

## 2025-03-26 RX ADMIN — WATER 2000 MG: 1 INJECTION INTRAMUSCULAR; INTRAVENOUS; SUBCUTANEOUS at 14:55

## 2025-03-26 RX ADMIN — ATORVASTATIN CALCIUM 40 MG: 40 TABLET, FILM COATED ORAL at 09:19

## 2025-03-26 RX ADMIN — BRIMONIDINE TARTRATE 1 DROP: 2 SOLUTION OPHTHALMIC at 21:07

## 2025-03-26 RX ADMIN — PETROLATUM: 420 OINTMENT TOPICAL at 14:55

## 2025-03-26 RX ADMIN — DOFETILIDE 125 MCG: 0.12 CAPSULE ORAL at 21:08

## 2025-03-26 RX ADMIN — APIXABAN 5 MG: 5 TABLET, FILM COATED ORAL at 21:08

## 2025-03-26 RX ADMIN — Medication 100 MCG: at 09:19

## 2025-03-26 RX ADMIN — TIMOLOL MALEATE 1 DROP: 5 SOLUTION OPHTHALMIC at 21:07

## 2025-03-26 RX ADMIN — MIRTAZAPINE 15 MG: 15 TABLET, FILM COATED ORAL at 21:09

## 2025-03-26 RX ADMIN — PETROLATUM: 420 OINTMENT TOPICAL at 09:20

## 2025-03-26 ASSESSMENT — PAIN SCALES - GENERAL
PAINLEVEL_OUTOF10: 9
PAINLEVEL_OUTOF10: 0
PAINLEVEL_OUTOF10: 8

## 2025-03-26 ASSESSMENT — PAIN DESCRIPTION - LOCATION: LOCATION: BACK

## 2025-03-26 ASSESSMENT — PAIN DESCRIPTION - DESCRIPTORS: DESCRIPTORS: ACHING;DISCOMFORT

## 2025-03-26 ASSESSMENT — PAIN - FUNCTIONAL ASSESSMENT: PAIN_FUNCTIONAL_ASSESSMENT: ACTIVITIES ARE NOT PREVENTED

## 2025-03-26 ASSESSMENT — PAIN DESCRIPTION - ORIENTATION: ORIENTATION: RIGHT;MID

## 2025-03-26 NOTE — CARE COORDINATION
Social Work discharge planning  LVM for [facility] snf liaison to check precert status.   Electronically signed by LANETTE Johnson on 3/26/2025 at 11:29 AM     Addendum  Precert remains pending.  Electronically signed by LANETTE Johnson on 3/26/2025 at 3:04 PM

## 2025-03-26 NOTE — DISCHARGE SUMMARY
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 Patient denies fever, chills, cp, sob, n/v.     apixaban  5 mg Oral BID    miconazole   Topical BID    white petrolatum   Topical BID    sodium chloride flush  5-40 mL IntraVENous 2 times per day    atorvastatin  40 mg Oral Daily    benzonatate  100 mg Oral TID    buPROPion  100 mg Oral Daily    dapsone  25 mg Oral Daily    dofetilide  125 mcg Oral BID    gabapentin  100 mg Oral BID    latanoprost  1 drop Both Eyes Nightly    metoprolol succinate  12.5 mg Oral Daily    mirtazapine  15 mg Oral Nightly    pantoprazole  40 mg Oral BID AC    tamsulosin  0.8 mg Oral Nightly    vitamin B-12  100 mcg Oral BID    Vitamin D  1,000 Units Oral Daily    ferrous sulfate  325 mg Oral Daily with breakfast    brimonidine  1 drop Both Eyes BID    And    timolol  1 drop Both Eyes BID    cefTRIAXone (ROCEPHIN) IV  2,000 mg IntraVENous Q12H     sodium chloride, , PRN  white petrolatum, , BID PRN  sodium chloride flush, 5-40 mL, PRN  sodium chloride, , PRN  potassium chloride, 40 mEq, PRN   Or  potassium alternative oral replacement, 40 mEq, PRN   Or  potassium chloride, 10 mEq, PRN  magnesium sulfate, 2,000 mg, PRN  polyethylene glycol, 17 g, Daily PRN  acetaminophen, 650 mg, Q6H PRN   Or  acetaminophen, 650 mg, Q6H PRN  oxyCODONE-acetaminophen, 1 tablet, Q4H PRN  prochlorperazine, 10 mg, Q8H PRN   Or  prochlorperazine, 10 mg, Q6H PRN  ipratropium 0.5 mg-albuterol 2.5 mg, 1 Dose, Q4H PRN         Objective:    BP (!) 146/76   Pulse

## 2025-03-27 PROBLEM — N30.41 HEMATURIA DUE TO IRRADIATION CYSTITIS: Status: ACTIVE | Noted: 2025-03-19

## 2025-03-27 LAB
BASOPHILS # BLD: 0.06 K/UL (ref 0–0.2)
BASOPHILS NFR BLD: 1 % (ref 0–2)
EOSINOPHIL # BLD: 0.18 K/UL (ref 0.05–0.5)
EOSINOPHILS RELATIVE PERCENT: 2 % (ref 0–6)
ERYTHROCYTE [DISTWIDTH] IN BLOOD BY AUTOMATED COUNT: 16.7 % (ref 11.5–15)
HCT VFR BLD AUTO: 32.1 % (ref 37–54)
HGB BLD-MCNC: 9.5 G/DL (ref 12.5–16.5)
IMM GRANULOCYTES # BLD AUTO: 0.06 K/UL (ref 0–0.58)
IMM GRANULOCYTES NFR BLD: 1 % (ref 0–5)
LYMPHOCYTES NFR BLD: 0.89 K/UL (ref 1.5–4)
LYMPHOCYTES RELATIVE PERCENT: 11 % (ref 20–42)
MCH RBC QN AUTO: 28.7 PG (ref 26–35)
MCHC RBC AUTO-ENTMCNC: 29.6 G/DL (ref 32–34.5)
MCV RBC AUTO: 97 FL (ref 80–99.9)
MONOCYTES NFR BLD: 0.43 K/UL (ref 0.1–0.95)
MONOCYTES NFR BLD: 5 % (ref 2–12)
NEUTROPHILS NFR BLD: 80 % (ref 43–80)
NEUTS SEG NFR BLD: 6.46 K/UL (ref 1.8–7.3)
PLATELET # BLD AUTO: 119 K/UL (ref 130–450)
PMV BLD AUTO: 10.4 FL (ref 7–12)
RBC # BLD AUTO: 3.31 M/UL (ref 3.8–5.8)
WBC OTHER # BLD: 8.1 K/UL (ref 4.5–11.5)

## 2025-03-27 PROCEDURE — 6370000000 HC RX 637 (ALT 250 FOR IP): Performed by: INTERNAL MEDICINE

## 2025-03-27 PROCEDURE — 2500000003 HC RX 250 WO HCPCS: Performed by: INTERNAL MEDICINE

## 2025-03-27 PROCEDURE — 85025 COMPLETE CBC W/AUTO DIFF WBC: CPT

## 2025-03-27 PROCEDURE — 99232 SBSQ HOSP IP/OBS MODERATE 35: CPT | Performed by: STUDENT IN AN ORGANIZED HEALTH CARE EDUCATION/TRAINING PROGRAM

## 2025-03-27 PROCEDURE — 6360000002 HC RX W HCPCS: Performed by: INTERNAL MEDICINE

## 2025-03-27 PROCEDURE — 1200000000 HC SEMI PRIVATE

## 2025-03-27 PROCEDURE — 2500000003 HC RX 250 WO HCPCS: Performed by: STUDENT IN AN ORGANIZED HEALTH CARE EDUCATION/TRAINING PROGRAM

## 2025-03-27 RX ADMIN — FERROUS SULFATE TAB 325 MG (65 MG ELEMENTAL FE) 325 MG: 325 (65 FE) TAB at 08:26

## 2025-03-27 RX ADMIN — Medication 100 MCG: at 17:19

## 2025-03-27 RX ADMIN — DOFETILIDE 125 MCG: 0.12 CAPSULE ORAL at 22:26

## 2025-03-27 RX ADMIN — METOPROLOL SUCCINATE 12.5 MG: 25 TABLET, FILM COATED, EXTENDED RELEASE ORAL at 08:26

## 2025-03-27 RX ADMIN — GABAPENTIN 100 MG: 100 CAPSULE ORAL at 08:26

## 2025-03-27 RX ADMIN — PETROLATUM: 420 OINTMENT TOPICAL at 08:26

## 2025-03-27 RX ADMIN — BENZONATATE 100 MG: 100 CAPSULE ORAL at 22:26

## 2025-03-27 RX ADMIN — MIRTAZAPINE 15 MG: 15 TABLET, FILM COATED ORAL at 22:27

## 2025-03-27 RX ADMIN — MICONAZOLE NITRATE: 20 POWDER TOPICAL at 22:27

## 2025-03-27 RX ADMIN — Medication 1000 UNITS: at 08:26

## 2025-03-27 RX ADMIN — APIXABAN 5 MG: 5 TABLET, FILM COATED ORAL at 08:26

## 2025-03-27 RX ADMIN — ATORVASTATIN CALCIUM 40 MG: 40 TABLET, FILM COATED ORAL at 08:26

## 2025-03-27 RX ADMIN — DAPSONE 25 MG: 25 TABLET ORAL at 08:25

## 2025-03-27 RX ADMIN — PANTOPRAZOLE SODIUM 40 MG: 40 TABLET, DELAYED RELEASE ORAL at 05:12

## 2025-03-27 RX ADMIN — SODIUM CHLORIDE, PRESERVATIVE FREE 10 ML: 5 INJECTION INTRAVENOUS at 22:26

## 2025-03-27 RX ADMIN — BRIMONIDINE TARTRATE 1 DROP: 2 SOLUTION OPHTHALMIC at 08:27

## 2025-03-27 RX ADMIN — DOFETILIDE 125 MCG: 0.12 CAPSULE ORAL at 08:25

## 2025-03-27 RX ADMIN — BENZONATATE 100 MG: 100 CAPSULE ORAL at 14:45

## 2025-03-27 RX ADMIN — WATER 2000 MG: 1 INJECTION INTRAMUSCULAR; INTRAVENOUS; SUBCUTANEOUS at 02:54

## 2025-03-27 RX ADMIN — WATER 2000 MG: 1 INJECTION INTRAMUSCULAR; INTRAVENOUS; SUBCUTANEOUS at 14:44

## 2025-03-27 RX ADMIN — TAMSULOSIN HYDROCHLORIDE 0.8 MG: 0.4 CAPSULE ORAL at 22:26

## 2025-03-27 RX ADMIN — BRIMONIDINE TARTRATE 1 DROP: 2 SOLUTION OPHTHALMIC at 22:27

## 2025-03-27 RX ADMIN — TIMOLOL MALEATE 1 DROP: 5 SOLUTION OPHTHALMIC at 22:27

## 2025-03-27 RX ADMIN — APIXABAN 5 MG: 5 TABLET, FILM COATED ORAL at 22:26

## 2025-03-27 RX ADMIN — PETROLATUM: 420 OINTMENT TOPICAL at 14:45

## 2025-03-27 RX ADMIN — TIMOLOL MALEATE 1 DROP: 5 SOLUTION OPHTHALMIC at 08:27

## 2025-03-27 RX ADMIN — MICONAZOLE NITRATE: 20 POWDER TOPICAL at 08:26

## 2025-03-27 RX ADMIN — GABAPENTIN 100 MG: 100 CAPSULE ORAL at 22:26

## 2025-03-27 RX ADMIN — BENZONATATE 100 MG: 100 CAPSULE ORAL at 08:26

## 2025-03-27 RX ADMIN — BUPROPION HYDROCHLORIDE 100 MG: 100 TABLET, FILM COATED, EXTENDED RELEASE ORAL at 08:25

## 2025-03-27 RX ADMIN — SODIUM CHLORIDE, PRESERVATIVE FREE 10 ML: 5 INJECTION INTRAVENOUS at 08:26

## 2025-03-27 RX ADMIN — PANTOPRAZOLE SODIUM 40 MG: 40 TABLET, DELAYED RELEASE ORAL at 14:45

## 2025-03-27 RX ADMIN — Medication 100 MCG: at 08:25

## 2025-03-27 RX ADMIN — LATANOPROST 1 DROP: 50 SOLUTION OPHTHALMIC at 22:27

## 2025-03-27 NOTE — CARE COORDINATION
Social Work discharge planning  CM and snf are checking on precert.  Electronically signed by LANETTE Johnson on 3/27/2025 at 9:45 AM     Addendum  Notified pt/wife insurance precert went to Insurance medical review. We do NOT have determination yet, but advised insurance may not approve snf time. Wife said \"He needs rehab if he is going to get any better\". Explained that IF it is denied there may be appeal option. Or pt can go, but would be private pay and/or need to apply for Medicaid.   Await insurance decision.  Electronically signed by LANETTE Johnson on 3/27/2025 at 10:49 AM     Addendum  Precert determination remains pending.  Electronically signed by LANETTE Johnson on 3/27/2025 at 4:05 PM

## 2025-03-27 NOTE — PATIENT CARE CONFERENCE
[facility] Quality Flow/Interdisciplinary Rounds Progress Note        Quality Flow Rounds held on March 27, 2025    Disciplines Attending:  Bedside Nurse, , , and Nursing Unit Leadership    Montana Gerard was admitted on 3/19/2025  3:52 AM    Anticipated Discharge Date:       Disposition:    Kosta Score:  Kosta Scale Score: 13    [facility] RISK OF UNPLANNED READMISSION 2.0             25 Total Score        Discussed patient goal for the day, patient clinical progression, and barriers to discharge.  The following Goal(s) of the Day/Commitment(s) have been identified:  Diagnostics - Report Results and Labs - Report Results      Norah Norman RN  March 27, 2025

## 2025-03-28 LAB
ANION GAP SERPL CALCULATED.3IONS-SCNC: 11 MMOL/L (ref 7–16)
BASOPHILS # BLD: 0.04 K/UL (ref 0–0.2)
BASOPHILS NFR BLD: 0 % (ref 0–2)
BUN SERPL-MCNC: 22 MG/DL (ref 6–23)
CALCIUM SERPL-MCNC: 8.9 MG/DL (ref 8.6–10.2)
CHLORIDE SERPL-SCNC: 103 MMOL/L (ref 98–107)
CO2 SERPL-SCNC: 24 MMOL/L (ref 22–29)
CREAT SERPL-MCNC: 0.7 MG/DL (ref 0.7–1.2)
EOSINOPHIL # BLD: 0.16 K/UL (ref 0.05–0.5)
EOSINOPHILS RELATIVE PERCENT: 2 % (ref 0–6)
ERYTHROCYTE [DISTWIDTH] IN BLOOD BY AUTOMATED COUNT: 16.5 % (ref 11.5–15)
GFR, ESTIMATED: >90 ML/MIN/1.73M2
GLUCOSE SERPL-MCNC: 119 MG/DL (ref 74–99)
HCT VFR BLD AUTO: 30.9 % (ref 37–54)
HGB BLD-MCNC: 9.1 G/DL (ref 12.5–16.5)
IMM GRANULOCYTES # BLD AUTO: 0.06 K/UL (ref 0–0.58)
IMM GRANULOCYTES NFR BLD: 1 % (ref 0–5)
LYMPHOCYTES NFR BLD: 0.84 K/UL (ref 1.5–4)
LYMPHOCYTES RELATIVE PERCENT: 9 % (ref 20–42)
MCH RBC QN AUTO: 28.6 PG (ref 26–35)
MCHC RBC AUTO-ENTMCNC: 29.4 G/DL (ref 32–34.5)
MCV RBC AUTO: 97.2 FL (ref 80–99.9)
MONOCYTES NFR BLD: 0.64 K/UL (ref 0.1–0.95)
MONOCYTES NFR BLD: 7 % (ref 2–12)
NEUTROPHILS NFR BLD: 81 % (ref 43–80)
NEUTS SEG NFR BLD: 7.16 K/UL (ref 1.8–7.3)
PLATELET # BLD AUTO: 114 K/UL (ref 130–450)
PMV BLD AUTO: 9.6 FL (ref 7–12)
POTASSIUM SERPL-SCNC: 4.1 MMOL/L (ref 3.5–5)
RBC # BLD AUTO: 3.18 M/UL (ref 3.8–5.8)
SODIUM SERPL-SCNC: 138 MMOL/L (ref 132–146)
WBC OTHER # BLD: 8.9 K/UL (ref 4.5–11.5)

## 2025-03-28 PROCEDURE — 36592 COLLECT BLOOD FROM PICC: CPT

## 2025-03-28 PROCEDURE — 80048 BASIC METABOLIC PNL TOTAL CA: CPT

## 2025-03-28 PROCEDURE — 2500000003 HC RX 250 WO HCPCS: Performed by: STUDENT IN AN ORGANIZED HEALTH CARE EDUCATION/TRAINING PROGRAM

## 2025-03-28 PROCEDURE — 6360000002 HC RX W HCPCS: Performed by: INTERNAL MEDICINE

## 2025-03-28 PROCEDURE — 99232 SBSQ HOSP IP/OBS MODERATE 35: CPT | Performed by: STUDENT IN AN ORGANIZED HEALTH CARE EDUCATION/TRAINING PROGRAM

## 2025-03-28 PROCEDURE — 6370000000 HC RX 637 (ALT 250 FOR IP): Performed by: STUDENT IN AN ORGANIZED HEALTH CARE EDUCATION/TRAINING PROGRAM

## 2025-03-28 PROCEDURE — 6370000000 HC RX 637 (ALT 250 FOR IP): Performed by: INTERNAL MEDICINE

## 2025-03-28 PROCEDURE — 97530 THERAPEUTIC ACTIVITIES: CPT

## 2025-03-28 PROCEDURE — 1200000000 HC SEMI PRIVATE

## 2025-03-28 PROCEDURE — 2500000003 HC RX 250 WO HCPCS: Performed by: INTERNAL MEDICINE

## 2025-03-28 PROCEDURE — 85025 COMPLETE CBC W/AUTO DIFF WBC: CPT

## 2025-03-28 RX ORDER — UBIDECARENONE 75 MG
100 CAPSULE ORAL EVERY 12 HOURS
Status: DISCONTINUED | OUTPATIENT
Start: 2025-03-28 | End: 2025-04-01 | Stop reason: HOSPADM

## 2025-03-28 RX ADMIN — TIMOLOL MALEATE 1 DROP: 5 SOLUTION OPHTHALMIC at 08:35

## 2025-03-28 RX ADMIN — MICONAZOLE NITRATE: 20 POWDER TOPICAL at 20:58

## 2025-03-28 RX ADMIN — BENZONATATE 100 MG: 100 CAPSULE ORAL at 20:56

## 2025-03-28 RX ADMIN — GABAPENTIN 100 MG: 100 CAPSULE ORAL at 20:56

## 2025-03-28 RX ADMIN — MICONAZOLE NITRATE: 20 POWDER TOPICAL at 08:36

## 2025-03-28 RX ADMIN — SODIUM CHLORIDE, PRESERVATIVE FREE 10 ML: 5 INJECTION INTRAVENOUS at 20:57

## 2025-03-28 RX ADMIN — OXYCODONE HYDROCHLORIDE AND ACETAMINOPHEN 1 TABLET: 5; 325 TABLET ORAL at 18:25

## 2025-03-28 RX ADMIN — Medication 1000 UNITS: at 08:35

## 2025-03-28 RX ADMIN — BRIMONIDINE TARTRATE 1 DROP: 2 SOLUTION OPHTHALMIC at 20:58

## 2025-03-28 RX ADMIN — WATER 2000 MG: 1 INJECTION INTRAMUSCULAR; INTRAVENOUS; SUBCUTANEOUS at 04:05

## 2025-03-28 RX ADMIN — OXYCODONE HYDROCHLORIDE AND ACETAMINOPHEN 1 TABLET: 5; 325 TABLET ORAL at 13:08

## 2025-03-28 RX ADMIN — APIXABAN 5 MG: 5 TABLET, FILM COATED ORAL at 08:35

## 2025-03-28 RX ADMIN — Medication 100 MCG: at 08:35

## 2025-03-28 RX ADMIN — Medication 100 MCG: at 20:57

## 2025-03-28 RX ADMIN — DAPSONE 25 MG: 25 TABLET ORAL at 08:34

## 2025-03-28 RX ADMIN — PANTOPRAZOLE SODIUM 40 MG: 40 TABLET, DELAYED RELEASE ORAL at 06:01

## 2025-03-28 RX ADMIN — WATER 2000 MG: 1 INJECTION INTRAMUSCULAR; INTRAVENOUS; SUBCUTANEOUS at 15:44

## 2025-03-28 RX ADMIN — GABAPENTIN 100 MG: 100 CAPSULE ORAL at 08:34

## 2025-03-28 RX ADMIN — BRIMONIDINE TARTRATE 1 DROP: 2 SOLUTION OPHTHALMIC at 08:35

## 2025-03-28 RX ADMIN — BUPROPION HYDROCHLORIDE 100 MG: 100 TABLET, FILM COATED, EXTENDED RELEASE ORAL at 08:34

## 2025-03-28 RX ADMIN — TAMSULOSIN HYDROCHLORIDE 0.8 MG: 0.4 CAPSULE ORAL at 20:56

## 2025-03-28 RX ADMIN — LATANOPROST 1 DROP: 50 SOLUTION OPHTHALMIC at 20:58

## 2025-03-28 RX ADMIN — FERROUS SULFATE TAB 325 MG (65 MG ELEMENTAL FE) 325 MG: 325 (65 FE) TAB at 08:34

## 2025-03-28 RX ADMIN — APIXABAN 5 MG: 5 TABLET, FILM COATED ORAL at 20:56

## 2025-03-28 RX ADMIN — TIMOLOL MALEATE 1 DROP: 5 SOLUTION OPHTHALMIC at 20:58

## 2025-03-28 RX ADMIN — ATORVASTATIN CALCIUM 40 MG: 40 TABLET, FILM COATED ORAL at 08:35

## 2025-03-28 RX ADMIN — BENZONATATE 100 MG: 100 CAPSULE ORAL at 08:35

## 2025-03-28 RX ADMIN — PETROLATUM: 420 OINTMENT TOPICAL at 08:36

## 2025-03-28 RX ADMIN — METOPROLOL SUCCINATE 12.5 MG: 25 TABLET, FILM COATED, EXTENDED RELEASE ORAL at 08:32

## 2025-03-28 RX ADMIN — PANTOPRAZOLE SODIUM 40 MG: 40 TABLET, DELAYED RELEASE ORAL at 15:44

## 2025-03-28 RX ADMIN — MIRTAZAPINE 15 MG: 15 TABLET, FILM COATED ORAL at 20:56

## 2025-03-28 RX ADMIN — DOFETILIDE 125 MCG: 0.12 CAPSULE ORAL at 08:35

## 2025-03-28 RX ADMIN — SODIUM CHLORIDE, PRESERVATIVE FREE 10 ML: 5 INJECTION INTRAVENOUS at 08:36

## 2025-03-28 RX ADMIN — PETROLATUM: 420 OINTMENT TOPICAL at 15:48

## 2025-03-28 RX ADMIN — BENZONATATE 100 MG: 100 CAPSULE ORAL at 15:44

## 2025-03-28 RX ADMIN — DOFETILIDE 125 MCG: 0.12 CAPSULE ORAL at 20:57

## 2025-03-28 ASSESSMENT — PAIN DESCRIPTION - FREQUENCY: FREQUENCY: INTERMITTENT

## 2025-03-28 ASSESSMENT — PAIN SCALES - GENERAL
PAINLEVEL_OUTOF10: 7
PAINLEVEL_OUTOF10: 4
PAINLEVEL_OUTOF10: 6

## 2025-03-28 ASSESSMENT — PAIN DESCRIPTION - LOCATION
LOCATION: BACK

## 2025-03-28 ASSESSMENT — PAIN - FUNCTIONAL ASSESSMENT: PAIN_FUNCTIONAL_ASSESSMENT: PREVENTS OR INTERFERES SOME ACTIVE ACTIVITIES AND ADLS

## 2025-03-28 ASSESSMENT — PAIN DESCRIPTION - ORIENTATION: ORIENTATION: LOWER;POSTERIOR

## 2025-03-28 ASSESSMENT — PAIN DESCRIPTION - ONSET: ONSET: ON-GOING

## 2025-03-28 ASSESSMENT — PAIN DESCRIPTION - DESCRIPTORS: DESCRIPTORS: DULL;STABBING;ACHING

## 2025-03-28 NOTE — CARE COORDINATION
Social Work discharge planning  Precert decision from pt's insurance remains pending.  Electronically signed by LANETTE Johnson on 3/28/2025 at 11:27 AM     Addendum  Updated PTOT sent to insurance. Precert remains pending.  Electronically signed by LANETTE Johnson on 3/28/2025 at 3:56 PM

## 2025-03-29 LAB
ANION GAP SERPL CALCULATED.3IONS-SCNC: 11 MMOL/L (ref 7–16)
BASOPHILS # BLD: 0.04 K/UL (ref 0–0.2)
BASOPHILS NFR BLD: 0 % (ref 0–2)
BUN SERPL-MCNC: 27 MG/DL (ref 6–23)
CALCIUM SERPL-MCNC: 8.8 MG/DL (ref 8.6–10.2)
CHLORIDE SERPL-SCNC: 101 MMOL/L (ref 98–107)
CO2 SERPL-SCNC: 24 MMOL/L (ref 22–29)
CREAT SERPL-MCNC: 0.7 MG/DL (ref 0.7–1.2)
EOSINOPHIL # BLD: 0.14 K/UL (ref 0.05–0.5)
EOSINOPHILS RELATIVE PERCENT: 1 % (ref 0–6)
ERYTHROCYTE [DISTWIDTH] IN BLOOD BY AUTOMATED COUNT: 16.5 % (ref 11.5–15)
GFR, ESTIMATED: >90 ML/MIN/1.73M2
GLUCOSE SERPL-MCNC: 111 MG/DL (ref 74–99)
HCT VFR BLD AUTO: 28.2 % (ref 37–54)
HGB BLD-MCNC: 8.3 G/DL (ref 12.5–16.5)
IMM GRANULOCYTES # BLD AUTO: 0.07 K/UL (ref 0–0.58)
IMM GRANULOCYTES NFR BLD: 1 % (ref 0–5)
LYMPHOCYTES NFR BLD: 0.89 K/UL (ref 1.5–4)
LYMPHOCYTES RELATIVE PERCENT: 8 % (ref 20–42)
MCH RBC QN AUTO: 28.3 PG (ref 26–35)
MCHC RBC AUTO-ENTMCNC: 29.4 G/DL (ref 32–34.5)
MCV RBC AUTO: 96.2 FL (ref 80–99.9)
MONOCYTES NFR BLD: 0.97 K/UL (ref 0.1–0.95)
MONOCYTES NFR BLD: 9 % (ref 2–12)
NEUTROPHILS NFR BLD: 81 % (ref 43–80)
NEUTS SEG NFR BLD: 9.01 K/UL (ref 1.8–7.3)
PLATELET # BLD AUTO: 95 K/UL (ref 130–450)
PLATELET CONFIRMATION: NORMAL
PMV BLD AUTO: 10.4 FL (ref 7–12)
POTASSIUM SERPL-SCNC: 3.8 MMOL/L (ref 3.5–5)
RBC # BLD AUTO: 2.93 M/UL (ref 3.8–5.8)
SODIUM SERPL-SCNC: 136 MMOL/L (ref 132–146)
WBC OTHER # BLD: 11.1 K/UL (ref 4.5–11.5)

## 2025-03-29 PROCEDURE — 6370000000 HC RX 637 (ALT 250 FOR IP): Performed by: INTERNAL MEDICINE

## 2025-03-29 PROCEDURE — 85025 COMPLETE CBC W/AUTO DIFF WBC: CPT

## 2025-03-29 PROCEDURE — 1200000000 HC SEMI PRIVATE

## 2025-03-29 PROCEDURE — 99232 SBSQ HOSP IP/OBS MODERATE 35: CPT | Performed by: STUDENT IN AN ORGANIZED HEALTH CARE EDUCATION/TRAINING PROGRAM

## 2025-03-29 PROCEDURE — 80048 BASIC METABOLIC PNL TOTAL CA: CPT

## 2025-03-29 PROCEDURE — 6370000000 HC RX 637 (ALT 250 FOR IP): Performed by: STUDENT IN AN ORGANIZED HEALTH CARE EDUCATION/TRAINING PROGRAM

## 2025-03-29 PROCEDURE — 2500000003 HC RX 250 WO HCPCS: Performed by: INTERNAL MEDICINE

## 2025-03-29 PROCEDURE — 6360000002 HC RX W HCPCS: Performed by: INTERNAL MEDICINE

## 2025-03-29 PROCEDURE — 2500000003 HC RX 250 WO HCPCS: Performed by: STUDENT IN AN ORGANIZED HEALTH CARE EDUCATION/TRAINING PROGRAM

## 2025-03-29 RX ADMIN — FERROUS SULFATE TAB 325 MG (65 MG ELEMENTAL FE) 325 MG: 325 (65 FE) TAB at 09:11

## 2025-03-29 RX ADMIN — PANTOPRAZOLE SODIUM 40 MG: 40 TABLET, DELAYED RELEASE ORAL at 05:34

## 2025-03-29 RX ADMIN — APIXABAN 5 MG: 5 TABLET, FILM COATED ORAL at 09:10

## 2025-03-29 RX ADMIN — GABAPENTIN 100 MG: 100 CAPSULE ORAL at 21:50

## 2025-03-29 RX ADMIN — WATER 2000 MG: 1 INJECTION INTRAMUSCULAR; INTRAVENOUS; SUBCUTANEOUS at 03:27

## 2025-03-29 RX ADMIN — BUPROPION HYDROCHLORIDE 100 MG: 100 TABLET, FILM COATED, EXTENDED RELEASE ORAL at 09:11

## 2025-03-29 RX ADMIN — BRIMONIDINE TARTRATE 1 DROP: 2 SOLUTION OPHTHALMIC at 21:51

## 2025-03-29 RX ADMIN — TIMOLOL MALEATE 1 DROP: 5 SOLUTION OPHTHALMIC at 09:13

## 2025-03-29 RX ADMIN — PETROLATUM: 420 OINTMENT TOPICAL at 16:53

## 2025-03-29 RX ADMIN — GABAPENTIN 100 MG: 100 CAPSULE ORAL at 09:12

## 2025-03-29 RX ADMIN — MICONAZOLE NITRATE: 20 POWDER TOPICAL at 21:51

## 2025-03-29 RX ADMIN — METOPROLOL SUCCINATE 12.5 MG: 25 TABLET, FILM COATED, EXTENDED RELEASE ORAL at 09:11

## 2025-03-29 RX ADMIN — TAMSULOSIN HYDROCHLORIDE 0.8 MG: 0.4 CAPSULE ORAL at 21:50

## 2025-03-29 RX ADMIN — DAPSONE 25 MG: 25 TABLET ORAL at 09:11

## 2025-03-29 RX ADMIN — PANTOPRAZOLE SODIUM 40 MG: 40 TABLET, DELAYED RELEASE ORAL at 16:52

## 2025-03-29 RX ADMIN — WATER 2000 MG: 1 INJECTION INTRAMUSCULAR; INTRAVENOUS; SUBCUTANEOUS at 14:34

## 2025-03-29 RX ADMIN — SODIUM CHLORIDE, PRESERVATIVE FREE 10 ML: 5 INJECTION INTRAVENOUS at 09:14

## 2025-03-29 RX ADMIN — MIRTAZAPINE 15 MG: 15 TABLET, FILM COATED ORAL at 21:50

## 2025-03-29 RX ADMIN — SODIUM CHLORIDE, PRESERVATIVE FREE 10 ML: 5 INJECTION INTRAVENOUS at 22:02

## 2025-03-29 RX ADMIN — ATORVASTATIN CALCIUM 40 MG: 40 TABLET, FILM COATED ORAL at 09:11

## 2025-03-29 RX ADMIN — DOFETILIDE 125 MCG: 0.12 CAPSULE ORAL at 21:50

## 2025-03-29 RX ADMIN — APIXABAN 5 MG: 5 TABLET, FILM COATED ORAL at 21:50

## 2025-03-29 RX ADMIN — DOFETILIDE 125 MCG: 0.12 CAPSULE ORAL at 09:11

## 2025-03-29 RX ADMIN — BENZONATATE 100 MG: 100 CAPSULE ORAL at 21:50

## 2025-03-29 RX ADMIN — PETROLATUM: 420 OINTMENT TOPICAL at 22:03

## 2025-03-29 RX ADMIN — OXYCODONE HYDROCHLORIDE AND ACETAMINOPHEN 1 TABLET: 5; 325 TABLET ORAL at 22:00

## 2025-03-29 RX ADMIN — Medication 100 MCG: at 09:12

## 2025-03-29 RX ADMIN — PETROLATUM: 420 OINTMENT TOPICAL at 09:12

## 2025-03-29 RX ADMIN — LATANOPROST 1 DROP: 50 SOLUTION OPHTHALMIC at 21:51

## 2025-03-29 RX ADMIN — BENZONATATE 100 MG: 100 CAPSULE ORAL at 14:34

## 2025-03-29 RX ADMIN — BENZONATATE 100 MG: 100 CAPSULE ORAL at 09:11

## 2025-03-29 RX ADMIN — Medication 1000 UNITS: at 09:11

## 2025-03-29 RX ADMIN — BRIMONIDINE TARTRATE 1 DROP: 2 SOLUTION OPHTHALMIC at 09:13

## 2025-03-29 RX ADMIN — MICONAZOLE NITRATE: 20 POWDER TOPICAL at 09:12

## 2025-03-29 RX ADMIN — TIMOLOL MALEATE 1 DROP: 5 SOLUTION OPHTHALMIC at 21:51

## 2025-03-29 RX ADMIN — Medication 100 MCG: at 21:50

## 2025-03-29 ASSESSMENT — PAIN DESCRIPTION - PAIN TYPE: TYPE: ACUTE PAIN

## 2025-03-29 ASSESSMENT — PAIN DESCRIPTION - ORIENTATION: ORIENTATION: LOWER

## 2025-03-29 ASSESSMENT — PAIN SCALES - GENERAL: PAINLEVEL_OUTOF10: 7

## 2025-03-29 ASSESSMENT — PAIN DESCRIPTION - LOCATION: LOCATION: BACK;NECK

## 2025-03-29 ASSESSMENT — PAIN DESCRIPTION - DESCRIPTORS: DESCRIPTORS: ACHING;DISCOMFORT

## 2025-03-30 PROBLEM — K57.91 GASTROINTESTINAL HEMORRHAGE ASSOCIATED WITH INTESTINAL DIVERTICULOSIS: Status: ACTIVE | Noted: 2025-02-16

## 2025-03-30 LAB
ANION GAP SERPL CALCULATED.3IONS-SCNC: 9 MMOL/L (ref 7–16)
BASOPHILS # BLD: 0.06 K/UL (ref 0–0.2)
BASOPHILS NFR BLD: 1 % (ref 0–2)
BUN SERPL-MCNC: 30 MG/DL (ref 6–23)
CALCIUM SERPL-MCNC: 9 MG/DL (ref 8.6–10.2)
CHLORIDE SERPL-SCNC: 103 MMOL/L (ref 98–107)
CO2 SERPL-SCNC: 25 MMOL/L (ref 22–29)
CREAT SERPL-MCNC: 0.7 MG/DL (ref 0.7–1.2)
EOSINOPHIL # BLD: 0.13 K/UL (ref 0.05–0.5)
EOSINOPHILS RELATIVE PERCENT: 1 % (ref 0–6)
ERYTHROCYTE [DISTWIDTH] IN BLOOD BY AUTOMATED COUNT: 16.3 % (ref 11.5–15)
GFR, ESTIMATED: >90 ML/MIN/1.73M2
GLUCOSE SERPL-MCNC: 117 MG/DL (ref 74–99)
HCT VFR BLD AUTO: 28.8 % (ref 37–54)
HCT VFR BLD AUTO: 30 % (ref 37–54)
HGB BLD-MCNC: 8.2 G/DL (ref 12.5–16.5)
HGB BLD-MCNC: 8.5 G/DL (ref 12.5–16.5)
IMM GRANULOCYTES # BLD AUTO: 0.07 K/UL (ref 0–0.58)
IMM GRANULOCYTES NFR BLD: 1 % (ref 0–5)
LYMPHOCYTES NFR BLD: 0.91 K/UL (ref 1.5–4)
LYMPHOCYTES RELATIVE PERCENT: 10 % (ref 20–42)
MCH RBC QN AUTO: 27.7 PG (ref 26–35)
MCHC RBC AUTO-ENTMCNC: 28.5 G/DL (ref 32–34.5)
MCV RBC AUTO: 97.3 FL (ref 80–99.9)
MONOCYTES NFR BLD: 0.73 K/UL (ref 0.1–0.95)
MONOCYTES NFR BLD: 8 % (ref 2–12)
NEUTROPHILS NFR BLD: 80 % (ref 43–80)
NEUTS SEG NFR BLD: 7.7 K/UL (ref 1.8–7.3)
PLATELET # BLD AUTO: 85 K/UL (ref 130–450)
PLATELET CONFIRMATION: NORMAL
PMV BLD AUTO: 10.5 FL (ref 7–12)
POTASSIUM SERPL-SCNC: 4.3 MMOL/L (ref 3.5–5)
RBC # BLD AUTO: 2.96 M/UL (ref 3.8–5.8)
RBC # BLD: ABNORMAL 10*6/UL
SODIUM SERPL-SCNC: 137 MMOL/L (ref 132–146)
WBC OTHER # BLD: 9.6 K/UL (ref 4.5–11.5)

## 2025-03-30 PROCEDURE — 85018 HEMOGLOBIN: CPT

## 2025-03-30 PROCEDURE — 2500000003 HC RX 250 WO HCPCS: Performed by: STUDENT IN AN ORGANIZED HEALTH CARE EDUCATION/TRAINING PROGRAM

## 2025-03-30 PROCEDURE — 6360000002 HC RX W HCPCS: Performed by: INTERNAL MEDICINE

## 2025-03-30 PROCEDURE — 6370000000 HC RX 637 (ALT 250 FOR IP): Performed by: INTERNAL MEDICINE

## 2025-03-30 PROCEDURE — 85014 HEMATOCRIT: CPT

## 2025-03-30 PROCEDURE — 2500000003 HC RX 250 WO HCPCS: Performed by: INTERNAL MEDICINE

## 2025-03-30 PROCEDURE — 1200000000 HC SEMI PRIVATE

## 2025-03-30 PROCEDURE — 85025 COMPLETE CBC W/AUTO DIFF WBC: CPT

## 2025-03-30 PROCEDURE — 99232 SBSQ HOSP IP/OBS MODERATE 35: CPT | Performed by: STUDENT IN AN ORGANIZED HEALTH CARE EDUCATION/TRAINING PROGRAM

## 2025-03-30 PROCEDURE — 80048 BASIC METABOLIC PNL TOTAL CA: CPT

## 2025-03-30 PROCEDURE — 6370000000 HC RX 637 (ALT 250 FOR IP): Performed by: STUDENT IN AN ORGANIZED HEALTH CARE EDUCATION/TRAINING PROGRAM

## 2025-03-30 RX ADMIN — MICONAZOLE NITRATE: 20 POWDER TOPICAL at 22:38

## 2025-03-30 RX ADMIN — Medication 100 MCG: at 22:35

## 2025-03-30 RX ADMIN — MICONAZOLE NITRATE: 20 POWDER TOPICAL at 09:31

## 2025-03-30 RX ADMIN — MIRTAZAPINE 15 MG: 15 TABLET, FILM COATED ORAL at 22:35

## 2025-03-30 RX ADMIN — FERROUS SULFATE TAB 325 MG (65 MG ELEMENTAL FE) 325 MG: 325 (65 FE) TAB at 09:30

## 2025-03-30 RX ADMIN — PANTOPRAZOLE SODIUM 40 MG: 40 TABLET, DELAYED RELEASE ORAL at 16:41

## 2025-03-30 RX ADMIN — Medication 100 MCG: at 09:30

## 2025-03-30 RX ADMIN — TIMOLOL MALEATE 1 DROP: 5 SOLUTION OPHTHALMIC at 09:29

## 2025-03-30 RX ADMIN — TAMSULOSIN HYDROCHLORIDE 0.8 MG: 0.4 CAPSULE ORAL at 22:35

## 2025-03-30 RX ADMIN — PANTOPRAZOLE SODIUM 40 MG: 40 TABLET, DELAYED RELEASE ORAL at 06:07

## 2025-03-30 RX ADMIN — GABAPENTIN 100 MG: 100 CAPSULE ORAL at 22:35

## 2025-03-30 RX ADMIN — GABAPENTIN 100 MG: 100 CAPSULE ORAL at 09:30

## 2025-03-30 RX ADMIN — BUPROPION HYDROCHLORIDE 100 MG: 100 TABLET, FILM COATED, EXTENDED RELEASE ORAL at 09:29

## 2025-03-30 RX ADMIN — LATANOPROST 1 DROP: 50 SOLUTION OPHTHALMIC at 22:37

## 2025-03-30 RX ADMIN — BRIMONIDINE TARTRATE 1 DROP: 2 SOLUTION OPHTHALMIC at 09:28

## 2025-03-30 RX ADMIN — BENZONATATE 100 MG: 100 CAPSULE ORAL at 22:35

## 2025-03-30 RX ADMIN — BENZONATATE 100 MG: 100 CAPSULE ORAL at 16:41

## 2025-03-30 RX ADMIN — DAPSONE 25 MG: 25 TABLET ORAL at 09:30

## 2025-03-30 RX ADMIN — PETROLATUM: 420 OINTMENT TOPICAL at 09:31

## 2025-03-30 RX ADMIN — BENZONATATE 100 MG: 100 CAPSULE ORAL at 09:29

## 2025-03-30 RX ADMIN — SODIUM CHLORIDE, PRESERVATIVE FREE 10 ML: 5 INJECTION INTRAVENOUS at 22:39

## 2025-03-30 RX ADMIN — BRIMONIDINE TARTRATE 1 DROP: 2 SOLUTION OPHTHALMIC at 22:37

## 2025-03-30 RX ADMIN — SODIUM CHLORIDE, PRESERVATIVE FREE 10 ML: 5 INJECTION INTRAVENOUS at 09:31

## 2025-03-30 RX ADMIN — PETROLATUM: 420 OINTMENT TOPICAL at 16:45

## 2025-03-30 RX ADMIN — DOFETILIDE 125 MCG: 0.12 CAPSULE ORAL at 22:35

## 2025-03-30 RX ADMIN — TIMOLOL MALEATE 1 DROP: 5 SOLUTION OPHTHALMIC at 22:37

## 2025-03-30 RX ADMIN — DOFETILIDE 125 MCG: 0.12 CAPSULE ORAL at 09:30

## 2025-03-30 RX ADMIN — METOPROLOL SUCCINATE 12.5 MG: 25 TABLET, FILM COATED, EXTENDED RELEASE ORAL at 09:30

## 2025-03-30 RX ADMIN — PETROLATUM: 420 OINTMENT TOPICAL at 09:36

## 2025-03-30 RX ADMIN — Medication 1000 UNITS: at 09:30

## 2025-03-30 RX ADMIN — ATORVASTATIN CALCIUM 40 MG: 40 TABLET, FILM COATED ORAL at 09:30

## 2025-03-30 RX ADMIN — WATER 2000 MG: 1 INJECTION INTRAMUSCULAR; INTRAVENOUS; SUBCUTANEOUS at 04:07

## 2025-03-30 RX ADMIN — WATER 2000 MG: 1 INJECTION INTRAMUSCULAR; INTRAVENOUS; SUBCUTANEOUS at 16:42

## 2025-03-31 LAB
ANION GAP SERPL CALCULATED.3IONS-SCNC: 8 MMOL/L (ref 7–16)
BASOPHILS # BLD: 0.05 K/UL (ref 0–0.2)
BASOPHILS NFR BLD: 1 % (ref 0–2)
BUN SERPL-MCNC: 27 MG/DL (ref 6–23)
CALCIUM SERPL-MCNC: 8.9 MG/DL (ref 8.6–10.2)
CHLORIDE SERPL-SCNC: 106 MMOL/L (ref 98–107)
CO2 SERPL-SCNC: 24 MMOL/L (ref 22–29)
CREAT SERPL-MCNC: 0.7 MG/DL (ref 0.7–1.2)
EOSINOPHIL # BLD: 0.13 K/UL (ref 0.05–0.5)
EOSINOPHILS RELATIVE PERCENT: 1 % (ref 0–6)
ERYTHROCYTE [DISTWIDTH] IN BLOOD BY AUTOMATED COUNT: 16.2 % (ref 11.5–15)
GFR, ESTIMATED: >90 ML/MIN/1.73M2
GLUCOSE SERPL-MCNC: 116 MG/DL (ref 74–99)
HCT VFR BLD AUTO: 29.8 % (ref 37–54)
HGB BLD-MCNC: 8.4 G/DL (ref 12.5–16.5)
IMM GRANULOCYTES # BLD AUTO: 0.05 K/UL (ref 0–0.58)
IMM GRANULOCYTES NFR BLD: 1 % (ref 0–5)
LYMPHOCYTES NFR BLD: 0.84 K/UL (ref 1.5–4)
LYMPHOCYTES RELATIVE PERCENT: 8 % (ref 20–42)
MCH RBC QN AUTO: 27.5 PG (ref 26–35)
MCHC RBC AUTO-ENTMCNC: 28.2 G/DL (ref 32–34.5)
MCV RBC AUTO: 97.7 FL (ref 80–99.9)
MONOCYTES NFR BLD: 0.7 K/UL (ref 0.1–0.95)
MONOCYTES NFR BLD: 7 % (ref 2–12)
NEUTROPHILS NFR BLD: 82 % (ref 43–80)
NEUTS SEG NFR BLD: 8.3 K/UL (ref 1.8–7.3)
PLATELET, FLUORESCENCE: 113 K/UL (ref 130–450)
PMV BLD AUTO: 10.5 FL (ref 7–12)
POTASSIUM SERPL-SCNC: 4.3 MMOL/L (ref 3.5–5)
RBC # BLD AUTO: 3.05 M/UL (ref 3.8–5.8)
RBC # BLD: ABNORMAL 10*6/UL
SODIUM SERPL-SCNC: 138 MMOL/L (ref 132–146)
WBC OTHER # BLD: 10.1 K/UL (ref 4.5–11.5)

## 2025-03-31 PROCEDURE — 6370000000 HC RX 637 (ALT 250 FOR IP): Performed by: STUDENT IN AN ORGANIZED HEALTH CARE EDUCATION/TRAINING PROGRAM

## 2025-03-31 PROCEDURE — 99232 SBSQ HOSP IP/OBS MODERATE 35: CPT | Performed by: STUDENT IN AN ORGANIZED HEALTH CARE EDUCATION/TRAINING PROGRAM

## 2025-03-31 PROCEDURE — 85025 COMPLETE CBC W/AUTO DIFF WBC: CPT

## 2025-03-31 PROCEDURE — 80048 BASIC METABOLIC PNL TOTAL CA: CPT

## 2025-03-31 PROCEDURE — 6370000000 HC RX 637 (ALT 250 FOR IP): Performed by: INTERNAL MEDICINE

## 2025-03-31 PROCEDURE — 2500000003 HC RX 250 WO HCPCS: Performed by: STUDENT IN AN ORGANIZED HEALTH CARE EDUCATION/TRAINING PROGRAM

## 2025-03-31 PROCEDURE — 6360000002 HC RX W HCPCS: Performed by: INTERNAL MEDICINE

## 2025-03-31 PROCEDURE — 2500000003 HC RX 250 WO HCPCS: Performed by: INTERNAL MEDICINE

## 2025-03-31 PROCEDURE — 36592 COLLECT BLOOD FROM PICC: CPT

## 2025-03-31 PROCEDURE — 1200000000 HC SEMI PRIVATE

## 2025-03-31 RX ADMIN — PETROLATUM: 420 OINTMENT TOPICAL at 15:19

## 2025-03-31 RX ADMIN — BRIMONIDINE TARTRATE 1 DROP: 2 SOLUTION OPHTHALMIC at 21:16

## 2025-03-31 RX ADMIN — BUPROPION HYDROCHLORIDE 100 MG: 100 TABLET, FILM COATED, EXTENDED RELEASE ORAL at 08:44

## 2025-03-31 RX ADMIN — Medication 100 MCG: at 08:44

## 2025-03-31 RX ADMIN — BENZONATATE 100 MG: 100 CAPSULE ORAL at 21:16

## 2025-03-31 RX ADMIN — PANTOPRAZOLE SODIUM 40 MG: 40 TABLET, DELAYED RELEASE ORAL at 15:19

## 2025-03-31 RX ADMIN — SODIUM CHLORIDE, PRESERVATIVE FREE 10 ML: 5 INJECTION INTRAVENOUS at 03:31

## 2025-03-31 RX ADMIN — DAPSONE 25 MG: 25 TABLET ORAL at 08:44

## 2025-03-31 RX ADMIN — SODIUM CHLORIDE, PRESERVATIVE FREE 10 ML: 5 INJECTION INTRAVENOUS at 08:45

## 2025-03-31 RX ADMIN — Medication 100 MCG: at 21:16

## 2025-03-31 RX ADMIN — ATORVASTATIN CALCIUM 40 MG: 40 TABLET, FILM COATED ORAL at 08:44

## 2025-03-31 RX ADMIN — OXYCODONE HYDROCHLORIDE AND ACETAMINOPHEN 1 TABLET: 5; 325 TABLET ORAL at 18:16

## 2025-03-31 RX ADMIN — TIMOLOL MALEATE 1 DROP: 5 SOLUTION OPHTHALMIC at 21:16

## 2025-03-31 RX ADMIN — WATER 2000 MG: 1 INJECTION INTRAMUSCULAR; INTRAVENOUS; SUBCUTANEOUS at 15:19

## 2025-03-31 RX ADMIN — GABAPENTIN 100 MG: 100 CAPSULE ORAL at 08:44

## 2025-03-31 RX ADMIN — TIMOLOL MALEATE 1 DROP: 5 SOLUTION OPHTHALMIC at 08:45

## 2025-03-31 RX ADMIN — OXYCODONE HYDROCHLORIDE AND ACETAMINOPHEN 1 TABLET: 5; 325 TABLET ORAL at 06:40

## 2025-03-31 RX ADMIN — FERROUS SULFATE TAB 325 MG (65 MG ELEMENTAL FE) 325 MG: 325 (65 FE) TAB at 08:44

## 2025-03-31 RX ADMIN — LATANOPROST 1 DROP: 50 SOLUTION OPHTHALMIC at 21:16

## 2025-03-31 RX ADMIN — BRIMONIDINE TARTRATE 1 DROP: 2 SOLUTION OPHTHALMIC at 08:45

## 2025-03-31 RX ADMIN — GABAPENTIN 100 MG: 100 CAPSULE ORAL at 21:16

## 2025-03-31 RX ADMIN — MICONAZOLE NITRATE: 20 POWDER TOPICAL at 21:16

## 2025-03-31 RX ADMIN — TAMSULOSIN HYDROCHLORIDE 0.8 MG: 0.4 CAPSULE ORAL at 21:16

## 2025-03-31 RX ADMIN — WATER 2000 MG: 1 INJECTION INTRAMUSCULAR; INTRAVENOUS; SUBCUTANEOUS at 03:30

## 2025-03-31 RX ADMIN — PETROLATUM: 420 OINTMENT TOPICAL at 08:45

## 2025-03-31 RX ADMIN — SODIUM CHLORIDE, PRESERVATIVE FREE 10 ML: 5 INJECTION INTRAVENOUS at 21:15

## 2025-03-31 RX ADMIN — BENZONATATE 100 MG: 100 CAPSULE ORAL at 08:44

## 2025-03-31 RX ADMIN — BENZONATATE 100 MG: 100 CAPSULE ORAL at 15:19

## 2025-03-31 RX ADMIN — DOFETILIDE 125 MCG: 0.12 CAPSULE ORAL at 21:16

## 2025-03-31 RX ADMIN — MICONAZOLE NITRATE: 20 POWDER TOPICAL at 08:45

## 2025-03-31 RX ADMIN — Medication 1000 UNITS: at 08:44

## 2025-03-31 RX ADMIN — METOPROLOL SUCCINATE 12.5 MG: 25 TABLET, FILM COATED, EXTENDED RELEASE ORAL at 08:44

## 2025-03-31 RX ADMIN — PANTOPRAZOLE SODIUM 40 MG: 40 TABLET, DELAYED RELEASE ORAL at 06:37

## 2025-03-31 RX ADMIN — MIRTAZAPINE 15 MG: 15 TABLET, FILM COATED ORAL at 21:16

## 2025-03-31 RX ADMIN — DOFETILIDE 125 MCG: 0.12 CAPSULE ORAL at 08:44

## 2025-03-31 ASSESSMENT — PAIN DESCRIPTION - ORIENTATION: ORIENTATION: LOWER

## 2025-03-31 ASSESSMENT — PAIN DESCRIPTION - LOCATION
LOCATION: BACK;NECK
LOCATION: BACK

## 2025-03-31 ASSESSMENT — PAIN DESCRIPTION - DESCRIPTORS: DESCRIPTORS: ACHING;DISCOMFORT

## 2025-03-31 ASSESSMENT — PAIN SCALES - GENERAL
PAINLEVEL_OUTOF10: 6
PAINLEVEL_OUTOF10: 5

## 2025-03-31 NOTE — PATIENT CARE CONFERENCE
P Quality Flow/Interdisciplinary Rounds Progress Note        Quality Flow Rounds held on March 31, 2025    Disciplines Attending:  Bedside Nurse, , , and Nursing Unit Leadership    Montana Gerard was admitted on 3/19/2025  3:52 AM    Anticipated Discharge Date:       Disposition:    Kosta Score:  Kosta Scale Score: 11    [facility] RISK OF UNPLANNED READMISSION 2.0             26.9 Total Score        Discussed patient goal for the day, patient clinical progression, and barriers to discharge.  The following Goal(s) of the Day/Commitment(s) have been identified:  dc planning      Vika Betancourt RN  March 31, 2025

## 2025-03-31 NOTE — CARE COORDINATION
Social Work discharge planning  Precert remains pending.  Wife asked if facility can be changed to [facility]. SW updated her that precert process already started last week for pt to return to [facility], and can not be changed.   Electronically signed by LANETTE Johnson on 3/31/2025 at 11:10 AM    Addendum  Precert remains pending.  Electronically signed by ALNETTE Johnson on 3/31/2025 at 3:04 PM

## 2025-04-01 VITALS
OXYGEN SATURATION: 95 % | HEIGHT: 68 IN | DIASTOLIC BLOOD PRESSURE: 61 MMHG | TEMPERATURE: 97.9 F | BODY MASS INDEX: 22.95 KG/M2 | SYSTOLIC BLOOD PRESSURE: 138 MMHG | WEIGHT: 151.46 LBS | HEART RATE: 77 BPM | RESPIRATION RATE: 16 BRPM

## 2025-04-01 LAB
ANION GAP SERPL CALCULATED.3IONS-SCNC: 9 MMOL/L (ref 7–16)
BASOPHILS # BLD: 0.07 K/UL (ref 0–0.2)
BASOPHILS NFR BLD: 1 % (ref 0–2)
BUN SERPL-MCNC: 27 MG/DL (ref 6–23)
CALCIUM SERPL-MCNC: 9.2 MG/DL (ref 8.6–10.2)
CHLORIDE SERPL-SCNC: 104 MMOL/L (ref 98–107)
CO2 SERPL-SCNC: 25 MMOL/L (ref 22–29)
CREAT SERPL-MCNC: 0.8 MG/DL (ref 0.7–1.2)
EOSINOPHIL # BLD: 0.19 K/UL (ref 0.05–0.5)
EOSINOPHILS RELATIVE PERCENT: 2 % (ref 0–6)
ERYTHROCYTE [DISTWIDTH] IN BLOOD BY AUTOMATED COUNT: 16.1 % (ref 11.5–15)
GFR, ESTIMATED: 89 ML/MIN/1.73M2
GLUCOSE SERPL-MCNC: 105 MG/DL (ref 74–99)
HCT VFR BLD AUTO: 26.5 % (ref 37–54)
HGB BLD-MCNC: 7.8 G/DL (ref 12.5–16.5)
IMM GRANULOCYTES # BLD AUTO: 0.06 K/UL (ref 0–0.58)
IMM GRANULOCYTES NFR BLD: 1 % (ref 0–5)
LYMPHOCYTES NFR BLD: 0.85 K/UL (ref 1.5–4)
LYMPHOCYTES RELATIVE PERCENT: 8 % (ref 20–42)
MCH RBC QN AUTO: 28.3 PG (ref 26–35)
MCHC RBC AUTO-ENTMCNC: 29.4 G/DL (ref 32–34.5)
MCV RBC AUTO: 96 FL (ref 80–99.9)
MONOCYTES NFR BLD: 0.92 K/UL (ref 0.1–0.95)
MONOCYTES NFR BLD: 9 % (ref 2–12)
NEUTROPHILS NFR BLD: 81 % (ref 43–80)
NEUTS SEG NFR BLD: 8.72 K/UL (ref 1.8–7.3)
PLATELET, FLUORESCENCE: 134 K/UL (ref 130–450)
PMV BLD AUTO: 10.1 FL (ref 7–12)
POTASSIUM SERPL-SCNC: 4.2 MMOL/L (ref 3.5–5)
RBC # BLD AUTO: 2.76 M/UL (ref 3.8–5.8)
SODIUM SERPL-SCNC: 138 MMOL/L (ref 132–146)
WBC OTHER # BLD: 10.8 K/UL (ref 4.5–11.5)

## 2025-04-01 PROCEDURE — 6370000000 HC RX 637 (ALT 250 FOR IP): Performed by: STUDENT IN AN ORGANIZED HEALTH CARE EDUCATION/TRAINING PROGRAM

## 2025-04-01 PROCEDURE — 80048 BASIC METABOLIC PNL TOTAL CA: CPT

## 2025-04-01 PROCEDURE — 85025 COMPLETE CBC W/AUTO DIFF WBC: CPT

## 2025-04-01 PROCEDURE — 6370000000 HC RX 637 (ALT 250 FOR IP): Performed by: INTERNAL MEDICINE

## 2025-04-01 PROCEDURE — 99239 HOSP IP/OBS DSCHRG MGMT >30: CPT | Performed by: STUDENT IN AN ORGANIZED HEALTH CARE EDUCATION/TRAINING PROGRAM

## 2025-04-01 PROCEDURE — 2500000003 HC RX 250 WO HCPCS: Performed by: INTERNAL MEDICINE

## 2025-04-01 PROCEDURE — 6360000002 HC RX W HCPCS: Performed by: INTERNAL MEDICINE

## 2025-04-01 PROCEDURE — 2500000003 HC RX 250 WO HCPCS: Performed by: STUDENT IN AN ORGANIZED HEALTH CARE EDUCATION/TRAINING PROGRAM

## 2025-04-01 RX ADMIN — BRIMONIDINE TARTRATE 1 DROP: 2 SOLUTION OPHTHALMIC at 08:45

## 2025-04-01 RX ADMIN — PETROLATUM: 420 OINTMENT TOPICAL at 08:44

## 2025-04-01 RX ADMIN — TIMOLOL MALEATE 1 DROP: 5 SOLUTION OPHTHALMIC at 08:45

## 2025-04-01 RX ADMIN — PANTOPRAZOLE SODIUM 40 MG: 40 TABLET, DELAYED RELEASE ORAL at 06:10

## 2025-04-01 RX ADMIN — ATORVASTATIN CALCIUM 40 MG: 40 TABLET, FILM COATED ORAL at 08:44

## 2025-04-01 RX ADMIN — PETROLATUM: 420 OINTMENT TOPICAL at 15:33

## 2025-04-01 RX ADMIN — OXYCODONE HYDROCHLORIDE AND ACETAMINOPHEN 1 TABLET: 5; 325 TABLET ORAL at 15:23

## 2025-04-01 RX ADMIN — BENZONATATE 100 MG: 100 CAPSULE ORAL at 08:44

## 2025-04-01 RX ADMIN — METOPROLOL SUCCINATE 12.5 MG: 25 TABLET, FILM COATED, EXTENDED RELEASE ORAL at 08:44

## 2025-04-01 RX ADMIN — WATER 2000 MG: 1 INJECTION INTRAMUSCULAR; INTRAVENOUS; SUBCUTANEOUS at 03:46

## 2025-04-01 RX ADMIN — BENZONATATE 100 MG: 100 CAPSULE ORAL at 15:23

## 2025-04-01 RX ADMIN — SODIUM CHLORIDE, PRESERVATIVE FREE 10 ML: 5 INJECTION INTRAVENOUS at 08:45

## 2025-04-01 RX ADMIN — Medication 1000 UNITS: at 08:44

## 2025-04-01 RX ADMIN — FERROUS SULFATE TAB 325 MG (65 MG ELEMENTAL FE) 325 MG: 325 (65 FE) TAB at 08:47

## 2025-04-01 RX ADMIN — DAPSONE 25 MG: 25 TABLET ORAL at 08:43

## 2025-04-01 RX ADMIN — WATER 2000 MG: 1 INJECTION INTRAMUSCULAR; INTRAVENOUS; SUBCUTANEOUS at 15:23

## 2025-04-01 RX ADMIN — BUPROPION HYDROCHLORIDE 100 MG: 100 TABLET, FILM COATED, EXTENDED RELEASE ORAL at 08:43

## 2025-04-01 RX ADMIN — GABAPENTIN 100 MG: 100 CAPSULE ORAL at 08:44

## 2025-04-01 RX ADMIN — Medication 100 MCG: at 08:44

## 2025-04-01 RX ADMIN — PANTOPRAZOLE SODIUM 40 MG: 40 TABLET, DELAYED RELEASE ORAL at 15:23

## 2025-04-01 RX ADMIN — MICONAZOLE NITRATE: 20 POWDER TOPICAL at 08:47

## 2025-04-01 RX ADMIN — OXYCODONE HYDROCHLORIDE AND ACETAMINOPHEN 1 TABLET: 5; 325 TABLET ORAL at 08:43

## 2025-04-01 RX ADMIN — DOFETILIDE 125 MCG: 0.12 CAPSULE ORAL at 08:43

## 2025-04-01 ASSESSMENT — PAIN DESCRIPTION - LOCATION: LOCATION: BACK

## 2025-04-01 ASSESSMENT — PAIN SCALES - WONG BAKER: WONGBAKER_NUMERICALRESPONSE: NO HURT

## 2025-04-01 ASSESSMENT — PAIN SCALES - GENERAL: PAINLEVEL_OUTOF10: 4

## 2025-04-01 NOTE — CARE COORDINATION
Social Work discharge planning   Per [facility] with [facility] approval received today. Pt to [facility] snf skilled today between 3-5p via [facility]. Charge Rn, SNF liaison, pt/wife notified.  Electronically signed by LANETTE Johnson on 4/1/2025 at 10:48 AM

## 2025-04-01 NOTE — DISCHARGE SUMMARY
[facility] Hospitalist Physician Discharge Summary       [address] [address] [phone]          Activity level: As tolerated     Dispo: SNF      Condition on discharge: Stable     Patient ID:  Montana Gerard  [id number]  84 y.o.  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 *      Consults:  IP CONSULT TO INTERNAL MEDICINE  IP CONSULT TO UROLOGY    Procedures:     Hospital Course:   Patient Montana Gerard is a 84 y.o. 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

## 2025-04-01 NOTE — PATIENT CARE CONFERENCE
P Quality Flow/Interdisciplinary Rounds Progress Note        Quality Flow Rounds held on April 1, 2025    Disciplines Attending:  Bedside Nurse, , , and Nursing Unit Leadership    Montana Gerard was admitted on 3/19/2025  3:52 AM    Anticipated Discharge Date:       Disposition:    Kosta Score:  Kosta Scale Score: 13    [facility] RISK OF UNPLANNED READMISSION 2.0             27.5 Total Score        Discussed patient goal for the day, patient clinical progression, and barriers to discharge.  The following Goal(s) of the Day/Commitment(s) have been identified:  Discharge - Obtain Order      Vika Betancourt RN  April 1, 2025

## 2025-04-01 NOTE — PROGRESS NOTES
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 Denies any new issues    ROS: denies fever, chills, cp, sob, n/v, HA unless stated above.     apixaban  5 mg Oral BID    miconazole   Topical BID    white petrolatum   Topical BID    sodium chloride flush  5-40 mL IntraVENous 2 times per day    atorvastatin  40 mg Oral Daily    benzonatate  100 mg Oral TID    buPROPion  100 mg Oral Daily    dapsone  25 mg Oral Daily    dofetilide  125 mcg Oral BID    gabapentin  100 mg Oral BID    latanoprost  1 drop Both Eyes Nightly    metoprolol succinate  12.5 mg Oral Daily    mirtazapine  15 mg Oral Nightly    pantoprazole  40 mg Oral BID AC    tamsulosin  0.8 mg Oral Nightly    vitamin B-12  100 mcg Oral BID    Vitamin D  1,000 Units Oral Daily    ferrous sulfate  325 mg Oral Daily with breakfast    brimonidine  1 drop Both Eyes BID    And    timolol  1 drop Both Eyes BID    cefTRIAXone (ROCEPHIN) IV  2,000 mg IntraVENous Q12H     sodium chloride, , PRN  white petrolatum, , BID PRN  sodium chloride flush, 5-40 mL, PRN  sodium chloride, , PRN  potassium chloride, 40 mEq, PRN   Or  potassium alternative oral replacement, 40 mEq, PRN   Or  potassium chloride, 10 mEq, PRN  magnesium sulfate, 2,000 mg, PRN  polyethylene glycol, 17 g, Daily PRN  acetaminophen, 650 mg, Q6H PRN   Or  acetaminophen, 650 mg, Q6H PRN  oxyCODONE-acetaminophen, 1 tablet, Q4H PRN  prochlorperazine, 10 mg, Q8H PRN   Or  prochlorperazine, 10 mg, Q6H PRN  ipratropium 0.5 mg-albuterol 
       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 Denies any new issues    ROS: denies fever, chills, cp, sob, n/v, HA unless stated above.     vitamin B-12  100 mcg Oral Q12H    apixaban  5 mg Oral BID    miconazole   Topical BID    white petrolatum   Topical BID    sodium chloride flush  5-40 mL IntraVENous 2 times per day    atorvastatin  40 mg Oral Daily    benzonatate  100 mg Oral TID    buPROPion  100 mg Oral Daily    dapsone  25 mg Oral Daily    dofetilide  125 mcg Oral BID    gabapentin  100 mg Oral BID    latanoprost  1 drop Both Eyes Nightly    metoprolol succinate  12.5 mg Oral Daily    mirtazapine  15 mg Oral Nightly    pantoprazole  40 mg Oral BID AC    tamsulosin  0.8 mg Oral Nightly    Vitamin D  1,000 Units Oral Daily    ferrous sulfate  325 mg Oral Daily with breakfast    brimonidine  1 drop Both Eyes BID    And    timolol  1 drop Both Eyes BID    cefTRIAXone (ROCEPHIN) IV  2,000 mg IntraVENous Q12H     sodium chloride, , PRN  white petrolatum, , BID PRN  sodium chloride flush, 5-40 mL, PRN  sodium chloride, , PRN  potassium chloride, 40 mEq, PRN   Or  potassium alternative oral replacement, 40 mEq, PRN   Or  potassium chloride, 10 mEq, PRN  magnesium sulfate, 2,000 mg, PRN  polyethylene glycol, 17 g, Daily PRN  acetaminophen, 650 mg, Q6H PRN   Or  acetaminophen, 650 mg, Q6H PRN  oxyCODONE-acetaminophen, 1 tablet, Q4H PRN  prochlorperazine, 10 mg, Q8H PRN   Or  prochlorperazine, 10 mg, Q6H PRN  ipratropium 0.5 mg-albuterol 
       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 Denies any new issues    ROS: denies fever, chills, cp, sob, n/v, HA unless stated above.     vitamin B-12  100 mcg Oral Q12H    miconazole   Topical BID    white petrolatum   Topical BID    sodium chloride flush  5-40 mL IntraVENous 2 times per day    atorvastatin  40 mg Oral Daily    benzonatate  100 mg Oral TID    buPROPion  100 mg Oral Daily    dapsone  25 mg Oral Daily    dofetilide  125 mcg Oral BID    gabapentin  100 mg Oral BID    latanoprost  1 drop Both Eyes Nightly    metoprolol succinate  12.5 mg Oral Daily    mirtazapine  15 mg Oral Nightly    pantoprazole  40 mg Oral BID AC    tamsulosin  0.8 mg Oral Nightly    Vitamin D  1,000 Units Oral Daily    ferrous sulfate  325 mg Oral Daily with breakfast    brimonidine  1 drop Both Eyes BID    And    timolol  1 drop Both Eyes BID    cefTRIAXone (ROCEPHIN) IV  2,000 mg IntraVENous Q12H     sodium chloride, , PRN  white petrolatum, , BID PRN  sodium chloride flush, 5-40 mL, PRN  sodium chloride, , PRN  potassium chloride, 40 mEq, PRN   Or  potassium alternative oral replacement, 40 mEq, PRN   Or  potassium chloride, 10 mEq, PRN  magnesium sulfate, 2,000 mg, PRN  polyethylene glycol, 17 g, Daily PRN  acetaminophen, 650 mg, Q6H PRN   Or  acetaminophen, 650 mg, Q6H PRN  oxyCODONE-acetaminophen, 1 tablet, Q4H PRN  prochlorperazine, 10 mg, Q8H PRN   Or  prochlorperazine, 10 mg, Q6H PRN  ipratropium 0.5 mg-albuterol 2.5 mg, 1 Dose, Q4H 
       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 Denies any new issues    ROS: denies fever, chills, cp, sob, n/v, HA unless stated above.     apixaban  5 mg Oral BID    miconazole   Topical BID    white petrolatum   Topical BID    sodium chloride flush  5-40 mL IntraVENous 2 times per day    atorvastatin  40 mg Oral Daily    benzonatate  100 mg Oral TID    buPROPion  100 mg Oral Daily    dapsone  25 mg Oral Daily    dofetilide  125 mcg Oral BID    gabapentin  100 mg Oral BID    latanoprost  1 drop Both Eyes Nightly    metoprolol succinate  12.5 mg Oral Daily    mirtazapine  15 mg Oral Nightly    pantoprazole  40 mg Oral BID AC    tamsulosin  0.8 mg Oral Nightly    vitamin B-12  100 mcg Oral BID    Vitamin D  1,000 Units Oral Daily    ferrous sulfate  325 mg Oral Daily with breakfast    brimonidine  1 drop Both Eyes BID    And    timolol  1 drop Both Eyes BID    cefTRIAXone (ROCEPHIN) IV  2,000 mg IntraVENous Q12H     sodium chloride, , PRN  white petrolatum, , BID PRN  sodium chloride flush, 5-40 mL, PRN  sodium chloride, , PRN  potassium chloride, 40 mEq, PRN   Or  potassium alternative oral replacement, 40 mEq, PRN   Or  potassium chloride, 10 mEq, PRN  magnesium sulfate, 2,000 mg, PRN  polyethylene glycol, 17 g, Daily PRN  acetaminophen, 650 mg, Q6H PRN   Or  acetaminophen, 650 mg, Q6H PRN  oxyCODONE-acetaminophen, 1 tablet, Q4H PRN  prochlorperazine, 10 mg, Q8H PRN   Or  prochlorperazine, 10 mg, Q6H PRN  ipratropium 0.5 mg-albuterol 
       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 Had a bloody bowel movement with a blood clot, had similar issue while on Eliquis earlier this month and had EGD and colonoscopy, likely diverticular bleed.    ROS: denies fever, chills, cp, sob, n/v, HA unless stated above.     vitamin B-12  100 mcg Oral Q12H    [Held by provider] apixaban  5 mg Oral BID    miconazole   Topical BID    white petrolatum   Topical BID    sodium chloride flush  5-40 mL IntraVENous 2 times per day    atorvastatin  40 mg Oral Daily    benzonatate  100 mg Oral TID    buPROPion  100 mg Oral Daily    dapsone  25 mg Oral Daily    dofetilide  125 mcg Oral BID    gabapentin  100 mg Oral BID    latanoprost  1 drop Both Eyes Nightly    metoprolol succinate  12.5 mg Oral Daily    mirtazapine  15 mg Oral Nightly    pantoprazole  40 mg Oral BID AC    tamsulosin  0.8 mg Oral Nightly    Vitamin D  1,000 Units Oral Daily    ferrous sulfate  325 mg Oral Daily with breakfast    brimonidine  1 drop Both Eyes BID    And    timolol  1 drop Both Eyes BID    cefTRIAXone (ROCEPHIN) IV  2,000 mg IntraVENous Q12H     sodium chloride, , PRN  white petrolatum, , BID PRN  sodium chloride flush, 5-40 mL, PRN  sodium chloride, , PRN  potassium chloride, 40 mEq, PRN   Or  potassium alternative oral replacement, 40 mEq, PRN   Or  potassium chloride, 10 mEq, PRN  magnesium sulfate, 2,000 mg, PRN  polyethylene glycol, 17 g, Daily PRN  acetaminophen, 650 mg, Q6H PRN   Or  acetaminophen, 650 mg, 
     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 Patient denies fever, chills, cp, sob, n/v.     miconazole   Topical BID    white petrolatum   Topical BID    sodium chloride flush  5-40 mL IntraVENous 2 times per day    atorvastatin  40 mg Oral Daily    benzonatate  100 mg Oral TID    buPROPion  100 mg Oral Daily    dapsone  25 mg Oral Daily    dofetilide  125 mcg Oral BID    gabapentin  100 mg Oral BID    latanoprost  1 drop Both Eyes Nightly    metoprolol succinate  12.5 mg Oral Daily    mirtazapine  15 mg Oral Nightly    pantoprazole  40 mg Oral BID AC    tamsulosin  0.8 mg Oral Nightly    vitamin B-12  100 mcg Oral BID    Vitamin D  1,000 Units Oral Daily    ferrous sulfate  325 mg Oral Daily with breakfast    brimonidine  1 drop Both Eyes BID    And    timolol  1 drop Both Eyes BID    cefTRIAXone (ROCEPHIN) IV  2,000 mg IntraVENous Q12H     sodium chloride, , PRN  white petrolatum, , BID PRN  sodium chloride flush, 5-40 mL, PRN  sodium chloride, , PRN  potassium chloride, 40 mEq, PRN   Or  potassium alternative oral replacement, 40 mEq, PRN   Or  potassium chloride, 10 mEq, PRN  magnesium sulfate, 2,000 mg, PRN  polyethylene glycol, 17 g, Daily PRN  acetaminophen, 650 mg, Q6H PRN   Or  acetaminophen, 650 mg, Q6H PRN  oxyCODONE-acetaminophen, 1 tablet, Q4H PRN  prochlorperazine, 10 mg, Q8H PRN   Or  prochlorperazine, 10 mg, Q6H PRN  ipratropium 0.5 mg-albuterol 2.5 mg, 1 Dose, Q4H PRN         Objective:    BP (!) 140/55   Pulse 62   Temp 97.9 °F (36.6 °C) 
     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 Patient denies fever, chills, cp, sob, n/v.     sodium chloride flush  5-40 mL IntraVENous 2 times per day    atorvastatin  40 mg Oral Daily    benzonatate  100 mg Oral TID    buPROPion  100 mg Oral Daily    dapsone  25 mg Oral Daily    dofetilide  125 mcg Oral BID    gabapentin  100 mg Oral BID    latanoprost  1 drop Both Eyes Nightly    metoprolol succinate  12.5 mg Oral Daily    mirtazapine  15 mg Oral Nightly    pantoprazole  40 mg Oral BID AC    tamsulosin  0.8 mg Oral Nightly    vitamin B-12  100 mcg Oral BID    Vitamin D  1,000 Units Oral Daily    ferrous sulfate  325 mg Oral Daily with breakfast    brimonidine  1 drop Both Eyes BID    And    timolol  1 drop Both Eyes BID    cefTRIAXone (ROCEPHIN) IV  2,000 mg IntraVENous Q12H     sodium chloride flush, 5-40 mL, PRN  sodium chloride, , PRN  potassium chloride, 40 mEq, PRN   Or  potassium alternative oral replacement, 40 mEq, PRN   Or  potassium chloride, 10 mEq, PRN  magnesium sulfate, 2,000 mg, PRN  polyethylene glycol, 17 g, Daily PRN  acetaminophen, 650 mg, Q6H PRN   Or  acetaminophen, 650 mg, Q6H PRN  oxyCODONE-acetaminophen, 1 tablet, Q4H PRN  prochlorperazine, 10 mg, Q8H PRN   Or  prochlorperazine, 10 mg, Q6H PRN  ipratropium 0.5 mg-albuterol 2.5 mg, 1 Dose, Q4H PRN         Objective:    /65   Pulse 70   Temp 97.3 °F (36.3 °C) (Oral)   Resp 16   Ht 1.727 m (5' 8\")   Wt 74 kg (163 lb 2.3 oz)   SpO2 94%   BMI 24.81 kg/m²   Skin: warm and 
     [facility] Hospitalist   Progress Note    Admitting Date and Time: 3/19/2025  3:52 AM  Admit Dx: Splenomegaly [R16.1]  Diverticulosis [K57.90]  Bladder diverticulum [N32.3]  Gross hematuria [R31.0]  Prostatic hypertrophy [N40.0]  Hematuria [R31.9]  Anticoagulated [Z79.01]    Subjective:    Patient was admitted with Splenomegaly [R16.1]  Diverticulosis [K57.90]  Bladder diverticulum [N32.3]  Gross hematuria [R31.0]  Prostatic hypertrophy [N40.0]  Hematuria [R31.9]  Anticoagulated [Z79.01]. Patient denies fever, chills, cp, sob, n/v.     apixaban  5 mg Oral BID    miconazole   Topical BID    white petrolatum   Topical BID    sodium chloride flush  5-40 mL IntraVENous 2 times per day    atorvastatin  40 mg Oral Daily    benzonatate  100 mg Oral TID    buPROPion  100 mg Oral Daily    dapsone  25 mg Oral Daily    dofetilide  125 mcg Oral BID    gabapentin  100 mg Oral BID    latanoprost  1 drop Both Eyes Nightly    metoprolol succinate  12.5 mg Oral Daily    mirtazapine  15 mg Oral Nightly    pantoprazole  40 mg Oral BID AC    tamsulosin  0.8 mg Oral Nightly    vitamin B-12  100 mcg Oral BID    Vitamin D  1,000 Units Oral Daily    ferrous sulfate  325 mg Oral Daily with breakfast    brimonidine  1 drop Both Eyes BID    And    timolol  1 drop Both Eyes BID    cefTRIAXone (ROCEPHIN) IV  2,000 mg IntraVENous Q12H     sodium chloride, , PRN  white petrolatum, , BID PRN  sodium chloride flush, 5-40 mL, PRN  sodium chloride, , PRN  potassium chloride, 40 mEq, PRN   Or  potassium alternative oral replacement, 40 mEq, PRN   Or  potassium chloride, 10 mEq, PRN  magnesium sulfate, 2,000 mg, PRN  polyethylene glycol, 17 g, Daily PRN  acetaminophen, 650 mg, Q6H PRN   Or  acetaminophen, 650 mg, Q6H PRN  oxyCODONE-acetaminophen, 1 tablet, Q4H PRN  prochlorperazine, 10 mg, Q8H PRN   Or  prochlorperazine, 10 mg, Q6H PRN  ipratropium 0.5 mg-albuterol 2.5 mg, 1 Dose, Q4H PRN         Objective:    /68   Pulse 61   
     [facility] Hospitalist   Progress Note    Admitting Date and Time: 3/19/2025  3:52 AM  Admit Dx: Splenomegaly [R16.1]  Diverticulosis [K57.90]  Bladder diverticulum [N32.3]  Gross hematuria [R31.0]  Prostatic hypertrophy [N40.0]  Hematuria [R31.9]  Anticoagulated [Z79.01]    Subjective:    Patient was admitted with Splenomegaly [R16.1]  Diverticulosis [K57.90]  Bladder diverticulum [N32.3]  Gross hematuria [R31.0]  Prostatic hypertrophy [N40.0]  Hematuria [R31.9]  Anticoagulated [Z79.01]. Patient denies fever, chills, cp, sob, n/v, HA unless stated above.     miconazole   Topical BID    white petrolatum   Topical BID    sodium chloride flush  5-40 mL IntraVENous 2 times per day    atorvastatin  40 mg Oral Daily    benzonatate  100 mg Oral TID    buPROPion  100 mg Oral Daily    dapsone  25 mg Oral Daily    dofetilide  125 mcg Oral BID    gabapentin  100 mg Oral BID    latanoprost  1 drop Both Eyes Nightly    metoprolol succinate  12.5 mg Oral Daily    mirtazapine  15 mg Oral Nightly    pantoprazole  40 mg Oral BID AC    tamsulosin  0.8 mg Oral Nightly    vitamin B-12  100 mcg Oral BID    Vitamin D  1,000 Units Oral Daily    ferrous sulfate  325 mg Oral Daily with breakfast    brimonidine  1 drop Both Eyes BID    And    timolol  1 drop Both Eyes BID    cefTRIAXone (ROCEPHIN) IV  2,000 mg IntraVENous Q12H     sodium chloride, , PRN  white petrolatum, , BID PRN  sodium chloride flush, 5-40 mL, PRN  sodium chloride, , PRN  potassium chloride, 40 mEq, PRN   Or  potassium alternative oral replacement, 40 mEq, PRN   Or  potassium chloride, 10 mEq, PRN  magnesium sulfate, 2,000 mg, PRN  polyethylene glycol, 17 g, Daily PRN  acetaminophen, 650 mg, Q6H PRN   Or  acetaminophen, 650 mg, Q6H PRN  oxyCODONE-acetaminophen, 1 tablet, Q4H PRN  prochlorperazine, 10 mg, Q8H PRN   Or  prochlorperazine, 10 mg, Q6H PRN  ipratropium 0.5 mg-albuterol 2.5 mg, 1 Dose, Q4H PRN         Objective:    BP (!) 169/83   Pulse 65  
     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 Patient denies fever, chills, cp, sob, n/v.     miconazole   Topical BID    white petrolatum   Topical BID    sodium chloride flush  5-40 mL IntraVENous 2 times per day    atorvastatin  40 mg Oral Daily    benzonatate  100 mg Oral TID    buPROPion  100 mg Oral Daily    dapsone  25 mg Oral Daily    dofetilide  125 mcg Oral BID    gabapentin  100 mg Oral BID    latanoprost  1 drop Both Eyes Nightly    metoprolol succinate  12.5 mg Oral Daily    mirtazapine  15 mg Oral Nightly    pantoprazole  40 mg Oral BID AC    tamsulosin  0.8 mg Oral Nightly    vitamin B-12  100 mcg Oral BID    Vitamin D  1,000 Units Oral Daily    ferrous sulfate  325 mg Oral Daily with breakfast    brimonidine  1 drop Both Eyes BID    And    timolol  1 drop Both Eyes BID    cefTRIAXone (ROCEPHIN) IV  2,000 mg IntraVENous Q12H     sodium chloride, , PRN  white petrolatum, , BID PRN  sodium chloride flush, 5-40 mL, PRN  sodium chloride, , PRN  potassium chloride, 40 mEq, PRN   Or  potassium alternative oral replacement, 40 mEq, PRN   Or  potassium chloride, 10 mEq, PRN  magnesium sulfate, 2,000 mg, PRN  polyethylene glycol, 17 g, Daily PRN  acetaminophen, 650 mg, Q6H PRN   Or  acetaminophen, 650 mg, Q6H PRN  oxyCODONE-acetaminophen, 1 tablet, Q4H PRN  prochlorperazine, 10 mg, Q8H PRN   Or  prochlorperazine, 10 mg, Q6H PRN  ipratropium 0.5 mg-albuterol 2.5 mg, 1 Dose, Q4H PRN         Objective:    BP (!) 165/88   Pulse 66   Temp 97.5 °F (36.4 °C) 
     [facility] Hospitalist   Progress Note    Admitting Date and Time: 3/19/2025  3:52 AM  Admit Dx: Splenomegaly [R16.1]  Diverticulosis [K57.90]  Bladder diverticulum [N32.3]  Gross hematuria [R31.0]  Prostatic hypertrophy [N40.0]  Hematuria [R31.9]  Anticoagulated [Z79.01]    Subjective:    Patient was admitted with Splenomegaly [R16.1]  Diverticulosis [K57.90]  Bladder diverticulum [N32.3]  Gross hematuria [R31.0]  Prostatic hypertrophy [N40.0]  Hematuria [R31.9]  Anticoagulated [Z79.01]. Patient denies fever, chills, cp, sob, n/v.     apixaban  5 mg Oral BID    miconazole   Topical BID    white petrolatum   Topical BID    sodium chloride flush  5-40 mL IntraVENous 2 times per day    atorvastatin  40 mg Oral Daily    benzonatate  100 mg Oral TID    buPROPion  100 mg Oral Daily    dapsone  25 mg Oral Daily    dofetilide  125 mcg Oral BID    gabapentin  100 mg Oral BID    latanoprost  1 drop Both Eyes Nightly    metoprolol succinate  12.5 mg Oral Daily    mirtazapine  15 mg Oral Nightly    pantoprazole  40 mg Oral BID AC    tamsulosin  0.8 mg Oral Nightly    vitamin B-12  100 mcg Oral BID    Vitamin D  1,000 Units Oral Daily    ferrous sulfate  325 mg Oral Daily with breakfast    brimonidine  1 drop Both Eyes BID    And    timolol  1 drop Both Eyes BID    cefTRIAXone (ROCEPHIN) IV  2,000 mg IntraVENous Q12H     sodium chloride, , PRN  white petrolatum, , BID PRN  sodium chloride flush, 5-40 mL, PRN  sodium chloride, , PRN  potassium chloride, 40 mEq, PRN   Or  potassium alternative oral replacement, 40 mEq, PRN   Or  potassium chloride, 10 mEq, PRN  magnesium sulfate, 2,000 mg, PRN  polyethylene glycol, 17 g, Daily PRN  acetaminophen, 650 mg, Q6H PRN   Or  acetaminophen, 650 mg, Q6H PRN  oxyCODONE-acetaminophen, 1 tablet, Q4H PRN  prochlorperazine, 10 mg, Q8H PRN   Or  prochlorperazine, 10 mg, Q6H PRN  ipratropium 0.5 mg-albuterol 2.5 mg, 1 Dose, Q4H PRN         Objective:    /72   Pulse 63   
    3/21/2025 9:10 AM  Montana Gerard  [id number]    Subjective:    He is lying in bed  He is confused  His wife is present at the bedside    Review of Systems  Unable to accurately obtain due to confusion      Scheduled Meds:   miconazole   Topical BID    white petrolatum   Topical BID    sodium chloride flush  5-40 mL IntraVENous 2 times per day    atorvastatin  40 mg Oral Daily    benzonatate  100 mg Oral TID    buPROPion  100 mg Oral Daily    dapsone  25 mg Oral Daily    dofetilide  125 mcg Oral BID    gabapentin  100 mg Oral BID    latanoprost  1 drop Both Eyes Nightly    metoprolol succinate  12.5 mg Oral Daily    mirtazapine  15 mg Oral Nightly    pantoprazole  40 mg Oral BID AC    tamsulosin  0.8 mg Oral Nightly    vitamin B-12  100 mcg Oral BID    Vitamin D  1,000 Units Oral Daily    ferrous sulfate  325 mg Oral Daily with breakfast    brimonidine  1 drop Both Eyes BID    And    timolol  1 drop Both Eyes BID    cefTRIAXone (ROCEPHIN) IV  2,000 mg IntraVENous Q12H       Objective:  Vitals:    03/21/25 0845   BP: (!) 140/55   Pulse: 62   Resp: 16   Temp: 97.9 °F (36.6 °C)   SpO2: 98%         Allergies: Ambrosia trifida (tall ragweed) allergy skin test and Gluten    General Appearance: alert and oriented to person, place and time and in no acute distress  Skin: no rash or erythema  Head: normocephalic and atraumatic  Pulmonary/Chest: normal air movement, no respiratory distress  Abdomen: soft, non-tender, non-distended  Genitourinary: Three-way Enriquez with CBI running and a moderate rate with light pink urine draining into bag  Extremities: no cyanosis, clubbing or edema         Labs:     Recent Labs     03/21/25  0220      K 3.6      CO2 25   BUN 25*   CREATININE 0.8   GLUCOSE 114*   CALCIUM 8.2*       Lab Results   Component Value Date/Time    HGB 6.5 03/21/2025 02:20 AM    HCT 22.8 03/21/2025 02:20 AM       Lab Results   Component Value Date    PSA 0.06 03/03/2025    PSA 0.08 02/18/2025    PSA 0.12 
    3/22/2025 10:28 AM  [place] Gerard  [id number]    Subjective:    Laying in bed, hard of hearing.  Seems less confused today.  The catheter is draining well with clear yellow urine.      Scheduled Meds:   miconazole   Topical BID    white petrolatum   Topical BID    sodium chloride flush  5-40 mL IntraVENous 2 times per day    atorvastatin  40 mg Oral Daily    benzonatate  100 mg Oral TID    buPROPion  100 mg Oral Daily    dapsone  25 mg Oral Daily    dofetilide  125 mcg Oral BID    gabapentin  100 mg Oral BID    latanoprost  1 drop Both Eyes Nightly    metoprolol succinate  12.5 mg Oral Daily    mirtazapine  15 mg Oral Nightly    pantoprazole  40 mg Oral BID AC    tamsulosin  0.8 mg Oral Nightly    vitamin B-12  100 mcg Oral BID    Vitamin D  1,000 Units Oral Daily    ferrous sulfate  325 mg Oral Daily with breakfast    brimonidine  1 drop Both Eyes BID    And    timolol  1 drop Both Eyes BID    cefTRIAXone (ROCEPHIN) IV  2,000 mg IntraVENous Q12H       Objective:  Vitals:    03/22/25 0734   BP: (!) 165/88   Pulse: 66   Resp:    Temp: 97.5 °F (36.4 °C)   SpO2: 97%         Allergies: Ambrosia trifida (tall ragweed) allergy skin test and Gluten    General Appearance: alert and oriented to person, place and time and in no acute distress  Skin: no rash or erythema  Head: normocephalic and atraumatic  Pulmonary/Chest: normal air movement, no respiratory distress  Abdomen: soft, non-tender, non-distended  Genitourinary: Three-way Enriquez with CBI paused and running with clear yellow urine.  Extremities: no cyanosis, clubbing or edema         Labs:     Recent Labs     03/22/25  0233      K 3.5      CO2 26   BUN 18   CREATININE 0.7   GLUCOSE 99   CALCIUM 8.4*       Lab Results   Component Value Date/Time    HGB 7.8 03/22/2025 02:33 AM    HCT 27.1 03/22/2025 02:33 AM       Lab Results   Component Value Date    PSA 0.06 03/03/2025    PSA 0.08 02/18/2025    PSA 0.12 02/07/2025         Assessment/Plan:  Gross 
  Physician Progress Note      PATIENT:               REJI DE LEON  CSN #:                  [id number]  :                       1940  ADMIT DATE:       3/19/2025 3:52 AM  DISCH DATE:  RESPONDING  PROVIDER #:        Clint Del Castillo MD          QUERY TEXT:    Patient admitted with hematuria likely from radiation cystitis. Noted to have   dietician assessment with moderate malnutrition diagnosis in 25. If   possible, please document in progress notes and discharge summary if you are   evaluating and /or treating any of the following:    The medical record reflects the following:  Risk Factors: 84 yom, radiation cystitis, PMH prostate cancer, ABLA, strep   bacteremia, A fib  Clinical Indicators: dietitian assessment 25: Energy Intake:  75% or   less estimated energy requirements for 1 month or longer,Weight Loss:  Greater   than 7.5% over 3  months, Body Fat Loss:  Mild body fat loss Orbital, Triceps,  Muscle Mass   Loss:  Mild muscle mass loss Clavicles (pectoralis & deltoids), Temples, HT    5'8\", , BMI 22.9  Treatment: dietitian assessment, ONS, daily wt, I/O    Thank you  Prince Tobar BSN RN CDS   M-F 6am-2pm  Options provided:  -- Protein calorie malnutrition moderate  -- Other - I will add my own diagnosis  -- Disagree - Not applicable / Not valid  -- Disagree - Clinically unable to determine / Unknown  -- Refer to Clinical Documentation Reviewer    PROVIDER RESPONSE TEXT:    This patient has moderate protein calorie malnutrition.    Query created by: Prince Tobar on 3/27/2025 7:54 AM      Electronically signed by:  Clint Del Castillo MD 3/27/2025 10:13 AM          
3/23/2025 8:21 AM  Service: Urology  Group: [facility] (Steven/Alex/Crista)    Montana Gerard  [id number]    Subjective:  confused, but comfortable  Daughter at bedside    Review of Systems  Constitutional: No fever or chills   Respiratory: negative for cough and hemoptysis  Cardiovascular: negative for chest pain and dyspnea  Gastrointestinal: negative for abdominal pain, diarrhea, nausea and vomiting   : See above  Derm: negative for rash and skin lesion(s)  Neurological: negative for seizures and tremors  Musculoskeletal: Negative    Psychiatric: Negative   All other reviews are negative      Scheduled Meds:   miconazole   Topical BID    white petrolatum   Topical BID    sodium chloride flush  5-40 mL IntraVENous 2 times per day    atorvastatin  40 mg Oral Daily    benzonatate  100 mg Oral TID    buPROPion  100 mg Oral Daily    dapsone  25 mg Oral Daily    dofetilide  125 mcg Oral BID    gabapentin  100 mg Oral BID    latanoprost  1 drop Both Eyes Nightly    metoprolol succinate  12.5 mg Oral Daily    mirtazapine  15 mg Oral Nightly    pantoprazole  40 mg Oral BID AC    tamsulosin  0.8 mg Oral Nightly    vitamin B-12  100 mcg Oral BID    Vitamin D  1,000 Units Oral Daily    ferrous sulfate  325 mg Oral Daily with breakfast    brimonidine  1 drop Both Eyes BID    And    timolol  1 drop Both Eyes BID    cefTRIAXone (ROCEPHIN) IV  2,000 mg IntraVENous Q12H       Objective:  Vitals:    03/23/25 0744   BP: (!) 169/83   Pulse: 65   Resp: 16   Temp: 98.1 °F (36.7 °C)   SpO2: 95%         Allergies: Ambrosia trifida (tall ragweed) allergy skin test and Gluten    General Appearance: alert and oriented to person, place and time and in no acute distress  Skin: no rash or erythema  Head: normocephalic and atraumatic  Pulmonary/Chest: normal air movement, no respiratory distress  Abdomen: soft, non-tender, non-distended  Genitourinary: External catheter draining omar urine  Extremities: no cyanosis, clubbing or edema 
4 Eyes Skin Assessment     NAME:  Montana Gerard  YOB: 1940  MEDICAL RECORD NUMBER:  [id number]    The patient is being assessed for  Admission    I agree that at least one RN has performed a thorough Head to Toe Skin Assessment on the patient. ALL assessment sites listed below have been assessed.      Areas assessed by both nurses:    Head, Face, Ears, Shoulders, Back, Chest, Arms, Elbows, Hands, Sacrum. Buttock, Coccyx, Ischium, Legs. Feet and Heels, and Under Medical Devices         Does the Patient have a Wound? Yes wound(s) were present on assessment. LDA wound assessment was Initiated and completed by RN       Kosta Prevention initiated by RN: Yes  Wound Care Orders initiated by RN: Yes    Pressure Injury (Stage 3,4, Unstageable, DTI, NWPT, and Complex wounds) if present, place Wound referral order by RN under : No    New Ostomies, if present place, Ostomy referral order under : No     Nurse 1 eSignature: Electronically signed by CAMPOS LO RN on 3/19/25 at 9:04 AM EDT    **SHARE this note so that the co-signing nurse can place an eSignature**    Nurse 2 eSignature: Electronically signed by Christi Chang RN on 3/19/25 at 9:41 AM EDT   
CBI flow slowed down and patient started to complain of pain. This nurse manually irrigated davis catheter with 50 cc and in return got 400 cc clear pink urine with small red flecks. CBI continued and is flowing without any problems or complaints from patient.   
CBI stopped flowing and patient complaining of pressure. Manually irrigated with 50cc. Many small clots removed. Pulled back until clot free. CBI now flowing, pink clear output.     Electronically signed by Xochitl Lutz RN on 3/20/2025 at 9:50 PM    
CBI stopped flowing and there was leaking around davis insertion site. Maually irrigated with 50cc and got back 550cc pink clear. CBI flowing now    Electronically signed by Xochitl Lutz RN on 3/20/2025 at 5:57 AM    
CBI stopped flowing. Manually irrigated davis catheter with 50cc. Got back 600cc- clear pink with a couple red specks. CBI continued and flowing now    Electronically signed by Xochitl Lutz RN on 3/20/2025 at 5:56 AM    
Comprehensive Nutrition Assessment    Type and Reason for Visit:  Initial, LOS    Nutrition Recommendations/Plan:   Continue current diet, as tolerated  Ensure Plus HP BID, Magic cup QD  Continue inpatient monitoring POC     Malnutrition Assessment:  Malnutrition Status:  Moderate malnutrition (03/26/25 1539)    Context:  Chronic Illness     Findings of the 6 clinical characteristics of malnutrition:  Energy Intake:  75% or less estimated energy requirements for 1 month or longer  Weight Loss:  Greater than 7.5% over 3 months     Body Fat Loss:  Mild body fat loss Orbital, Triceps   Muscle Mass Loss:  Mild muscle mass loss Clavicles (pectoralis & deltoids), Temples (temporalis)  Fluid Accumulation:  No fluid accumulation     Strength:  Not Performed    Nutrition Assessment:    Pt admit 2/2 hematuria likely d/t radiation cystitis. PMHx=prostate CA s/p brachytherapy, diverticulosis, bladder diverticulum. Note wt loss since admit with -fluid balance 12L. Pt meets criteria for PCM AEB significant wt loss and somatic fat/muscle wasting. Will add ONS to meals and continue inpatient monitoring POC.    Nutrition Related Findings:    A&Ox1, decreased appetite, soft tender abd +BS, -I/O 12L, trace LE edema Wound Type: None (chronic redness, skin irritation buttocks per wound consult)       Current Nutrition Intake & Therapies:    Average Meal Intake: 51-75%, 26-50%  Average Supplements Intake: None Ordered  ADULT DIET; Regular  ADULT ORAL NUTRITION SUPPLEMENT; Breakfast, Dinner; Standard High Calorie/High Protein Oral Supplement  ADULT ORAL NUTRITION SUPPLEMENT; Lunch; Frozen Oral Supplement    Anthropometric Measures:  Height: 172.7 cm (5' 8\")  Ideal Body Weight (IBW): 154 lbs (70 kg)    Admission Body Weight: 74 kg (163 lb 2.3 oz) (3/20/25)  Current Body Weight: 68.4 kg (150 lb 12.7 oz), 97.9 % IBW. Weight Source: Bed scale (3/26)  Current BMI (kg/m2): 22.9  Usual Body Weight: 78.9 kg (173 lb 15.1 oz) (12/13/2024 
Notified Dr. Del Castillo that pt. Wife is here for him to speak with now.    Electronically signed by Carmen Schaeffer RN on 3/30/2025 at 1:34 PM    
Nurse to nurse report given to Lani casanova [facility]. Opportunity provided to ask questions Electronically signed by Nelli Gomez RN on 4/1/2025 at 11:30 AM    
Occupational Therapy  OT BEDSIDE TREATMENT NOTE      Date:3/24/2025  Patient Name: Montana Gerard  MRN: [id number]  : 1940  Room: [address] [place] [address]     Evaluating OT: Suly Reyes, OTR/KAYLIE - [id number]     Referring Provider: Guero Mccarthy MD  Specific Provider Orders/Date: \"OT eval and treat\" - 3/19/2025     Diagnosis: Splenomegaly [R16.1]  Diverticulosis [K57.90]  Bladder diverticulum [N32.3]  Gross hematuria [R31.0]  Prostatic hypertrophy [N40.0]  Hematuria [R31.9]  Anticoagulated [Z79.01]      Pertinent Medical History: recent IR biopsy of lumbar spine (3/11/2025), CAD, stroke (per patient report), arthritis, HTN, DM, MI      Precautions: fall risk, TLSO to be donned while supine, davis catheter, bed alarm, skin integrity     Assessment of Current Deficits:    [x] Functional mobility             [x] ADLs          [x] Strength                  [x] Cognition   [x] Functional transfers           [x] IADLs         [x] Safety Awareness   [x] Endurance   [] Fine Motor Coordination    [x] Balance      [] Vision/Perception   [x] Sensation    [] Gross Motor Coordination [] ROM          [] Delirium                  [] Motor Control      OT PLAN OF CARE   OT POC is based on physician orders, patient diagnosis, and results of clinical assessment.  Frequency/Duration 2-5 days/week for 2-4 weeks PRN   Specific OT Treatment Interventions to Include:   * Instruction/training on adapted ADL techniques and AE recommendations to increase functional independence within precautions       * Training on energy conservation strategies, correct breathing pattern and techniques to improve independence/tolerance for self-care routine  * Functional transfer/mobility training/DME recommendations for increased independence, safety, and fall prevention  * Patient/Family education to increase follow through with safety techniques and functional independence  * Recommendation of environmental modifications for increased safety with functional 
Occupational Therapy  OT BEDSIDE TREATMENT NOTE      Date:3/26/2025  Patient Name: Montana Gerard  MRN: [id number]  : 1940  Room: [address] [place] [address]     Evaluating OT: Suly Reyes, OTR/KAYLIE - [id number]     Referring Provider: Guero Mccarthy MD  Specific Provider Orders/Date: \"OT eval and treat\" - 3/19/2025     Diagnosis: Splenomegaly [R16.1]  Diverticulosis [K57.90]  Bladder diverticulum [N32.3]  Gross hematuria [R31.0]  Prostatic hypertrophy [N40.0]  Hematuria [R31.9]  Anticoagulated [Z79.01]      Pertinent Medical History: recent IR biopsy of lumbar spine (3/11/2025), CAD, stroke (per patient report), arthritis, HTN, DM, MI      Precautions: fall risk, TLSO to be donned while supine, davis catheter, bed alarm, skin integrity     Assessment of Current Deficits:    [x] Functional mobility             [x] ADLs          [x] Strength                  [x] Cognition   [x] Functional transfers           [x] IADLs         [x] Safety Awareness   [x] Endurance   [] Fine Motor Coordination    [x] Balance      [] Vision/Perception   [x] Sensation    [] Gross Motor Coordination [] ROM          [] Delirium                  [] Motor Control      OT PLAN OF CARE   OT POC is based on physician orders, patient diagnosis, and results of clinical assessment.  Frequency/Duration 2-5 days/week for 2-4 weeks PRN   Specific OT Treatment Interventions to Include:   * Instruction/training on adapted ADL techniques and AE recommendations to increase functional independence within precautions       * Training on energy conservation strategies, correct breathing pattern and techniques to improve independence/tolerance for self-care routine  * Functional transfer/mobility training/DME recommendations for increased independence, safety, and fall prevention  * Patient/Family education to increase follow through with safety techniques and functional independence  * Recommendation of environmental modifications for increased safety with functional 
Occupational Therapy  OT BEDSIDE TREATMENT NOTE      Date:3/28/2025  Patient Name: Montana Gerard  MRN: [id number]  : 1940  Room: [address] [place] [address]     Evaluating OT: Suly Reyes, OTR/KAYLIE - [id number]     Referring Provider: Guero Mccarthy MD  Specific Provider Orders/Date: \"OT eval and treat\" - 3/19/2025     Diagnosis: Splenomegaly [R16.1]  Diverticulosis [K57.90]  Bladder diverticulum [N32.3]  Gross hematuria [R31.0]  Prostatic hypertrophy [N40.0]  Hematuria [R31.9]  Anticoagulated [Z79.01]      Pertinent Medical History: recent IR biopsy of lumbar spine (3/11/2025), CAD, stroke (per patient report), arthritis, HTN, DM, MI      Precautions: fall risk, TLSO to be donned while supine, davis catheter, bed alarm, skin integrity     Assessment of Current Deficits:    [x] Functional mobility             [x] ADLs          [x] Strength                  [x] Cognition   [x] Functional transfers           [x] IADLs         [x] Safety Awareness   [x] Endurance   [] Fine Motor Coordination    [x] Balance      [] Vision/Perception   [x] Sensation    [] Gross Motor Coordination [] ROM          [] Delirium                  [] Motor Control      OT PLAN OF CARE   OT POC is based on physician orders, patient diagnosis, and results of clinical assessment.  Frequency/Duration 2-5 days/week for 2-4 weeks PRN   Specific OT Treatment Interventions to Include:   * Instruction/training on adapted ADL techniques and AE recommendations to increase functional independence within precautions       * Training on energy conservation strategies, correct breathing pattern and techniques to improve independence/tolerance for self-care routine  * Functional transfer/mobility training/DME recommendations for increased independence, safety, and fall prevention  * Patient/Family education to increase follow through with safety techniques and functional independence  * Recommendation of environmental modifications for increased safety with functional 
Occupational Therapy  OT eval and treat order received.Attempted OT eval and pt was being assessed by Attending Physician and family was present and was talking with the Attending Doctor. Will attempt eval at a later time.Suzie Bonilla OTR/L [id number]   
PVR; void 200ml, bladder scan 14ml. No need for Enriquez catheter at this time.  
Pharmacy Note    Montana Gerard was ordered Co-Enzyme Q-10 capsules.  As per the [facility] Formulary Committee Policy, herbals and certain dietary supplements will be discontinued.  The herbal or dietary supplement may be continued after discharge from the hospital.    
Physical Therapy  Facility/Department: [address] MED SURG    Name: Montana Gerard  : 1940  MRN: [id number]    Order received, chart reviewed and PT eval attempted this pm.  Pt found sleeping with family present.  Per chart review and discussion with family, pt has a TLSO brace which is not here at this hospital.  A family member will be bringing it in.  Pt admitted from nursing facility.  Staff was using favian lift for transfers and pt was working with physical therapy working on standing with Max A x 2.  Will complete PT eval once brace present.      Vika Thakkar, [place] [id number]    
Physical Therapy  Facility/Department: [address] MED SURG  Physical Therapy Treatment Note    Name: Montana Gerard  : 1940  MRN: [id number]  Date of Service: 3/22/2025    Attending Provider:  Siddhartha Eid DO    Evaluating PT:  Siddhartha Hodgson Jr., P.T.    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  He has then been in/out of the hospital and [facility] and has been very immobile and stiff.  He has chronic compression fractures at T3, T5, T7, and L1.   Precautions:  Hgb is 7.0, falls, bed/chair alarm, soft TLSO for back support and comfort    SUBJECTIVE:    Since being in hospital and [facility], his wife has moved into [facility] and plan in the future when pt is able to amb and move around again he will join her there.  Pt came in from [facility] this admission and daughter and wife report pt has not amb since 2025. While at [facility] staff uses a favian lift to transfer him and when able he works on sit<>stands with PT and is a MAX A of 2 persons.  Due to being in/out of hospital he is in bed a lot more and his muscles are stiffening up and getting weaker.      OBJECTIVE:   Initial Evaluation  Date: 3/20/25 Treatment  3/22/2025 Short Term/ Long Term   Goals   Was pt agreeable to Eval/treatment? yes yes    Does pt have pain? C/o moderate pain at rest and severe pain with just lowering HOB a little at a time.  LBP with bed adjustment and attempt to perform supine to sit    Bed Mobility  Rolling: MOD A  Supine to sit: dependent  Sit to supine: dependent  Scooting: dependent to HOB Rolling: Mod A  Supine to sit: Pt unable due to low back pain SBA   Transfers Sit to stand: NA  Stand to sit: NA  Stand pivot: NA NT MIN A   Ambulation   NA NT 50 feet with ww MIN A 
Physical Therapy  Facility/Department: [address] MED SURG  Physical Therapy Treatment Note    Name: Montana Gerard  : 1940  MRN: [id number]  Date of Service: 3/28/2025    Attending Provider:  Clint Del Castillo MD    Evaluating PT:  Siddhartha Hodgson Jr., P.T.    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  He has then been in/out of the hospital and [facility] and has been very immobile and stiff.  He has chronic compression fractures at T3, T5, T7, and L1.   Precautions:  Hgb is 7.0, falls, bed/chair alarm, soft TLSO for back support and comfort    SUBJECTIVE:    Since being in hospital and [facility], his wife has moved into [facility] and plan in the future when pt is able to amb and move around again he will join her there.  Pt came in from [facility] this admission and daughter and wife report pt has not amb since 2025. While at [facility] staff uses a favian lift to transfer him and when able he works on sit<>stands with PT and is a MAX A of 2 persons.  Due to being in/out of hospital he is in bed a lot more and his muscles are stiffening up and getting weaker.      OBJECTIVE:   Initial Evaluation  Date: 3/20/25 Treatment  3/28/2025 Short Term/ Long Term   Goals   Was pt agreeable to Eval/treatment? yes yes    Does pt have pain? C/o moderate pain at rest and severe pain with just lowering HOB a little at a time.  LBP with supine to sit, sitting EOB and standing    Bed Mobility  Rolling: MOD A  Supine to sit: dependent  Sit to supine: dependent  Scooting: dependent to HOB Rolling: Min A  Supine to sit: Max A of 2  Scooting: Max A seated to EOB, Dep A of 2 supine in bed SBA   Transfers Sit to stand: NA  Stand to sit: NA  Stand pivot: NA Sit to stand: Max A of 2 
Physical Therapy  Facility/Department: [address] MED SURG  Physical Therapy Initial Assessment    Name: Montana Gerard  : 1940  MRN: [id number]  Date of Service: 3/20/2025    Attending Provider:  Siddhartha Eid DO    Evaluating PT:  Siddhartha Hodgson Jr., P.T.    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  He has then been in/out of the hospital and [facility] and has been very immobile and stiff.  He has chronic compression fractures at T3, T5, T7, and L1.   Precautions:  Hgb is 7.0, falls, bed/chair alarm, soft TLSO for back support and comfort    SUBJECTIVE:    Since being in hospital and [facility], his wife has moved into [facility] and plan in the future when pt is able to amb and move around again he will join her there.  Pt came in from [facility] this admission and daughter and wife report pt has not amb since 2025. While at [facility] staff uses a favian lift to transfer him and when able he works on sit<>stands with PT and is a MAX A of 2 persons.  Due to being in/out of hospital he is in bed a lot more and his muscles are stiffening up and getting weaker.      OBJECTIVE:   Initial Evaluation  Date: 3/20/25 Treatment Short Term/ Long Term   Goals   Was pt agreeable to Eval/treatment? yes     Does pt have pain? C/o moderate pain at rest and severe pain with just lowering HOB a little at a time.      Bed Mobility  Rolling: MOD A  Supine to sit: dependent  Sit to supine: dependent  Scooting: dependent to HOB  SBA   Transfers Sit to stand: NA  Stand to sit: NA  Stand pivot: NA  MIN A   Ambulation   NA  50 feet with ww MIN A   Stair negotiation: ascended and descended NA  NA   AM-PAC 6 Clicks        BLE ROM is WFL, but end ranges are limited due to 
Physical Therapy  Facility/Department: [address] MED SURG  Physical Therapy Treatment Note    Name: Montana Gerard  : 1940  MRN: [id number]  Date of Service: 3/24/2025    Attending Provider:  Siddhartha Eid DO    Evaluating PT:  Siddhartha Hodgson Jr., P.T.    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  He has then been in/out of the hospital and [facility] and has been very immobile and stiff.  He has chronic compression fractures at T3, T5, T7, and L1.   Precautions:  Hgb is 7.0, falls, bed/chair alarm, soft TLSO for back support and comfort    SUBJECTIVE:    Since being in hospital and [facility], his wife has moved into [facility] and plan in the future when pt is able to amb and move around again he will join her there.  Pt came in from [facility] this admission and daughter and wife report pt has not amb since 2025. While at [facility] staff uses a favian lift to transfer him and when able he works on sit<>stands with PT and is a MAX A of 2 persons.  Due to being in/out of hospital he is in bed a lot more and his muscles are stiffening up and getting weaker.      OBJECTIVE:   Initial Evaluation  Date: 3/20/25 Treatment  3/24/2025 Short Term/ Long Term   Goals   Was pt agreeable to Eval/treatment? yes yes    Does pt have pain? C/o moderate pain at rest and severe pain with just lowering HOB a little at a time.  LBP with bed adjustment,  performing supine to sit and sitting EOB    Bed Mobility  Rolling: MOD A  Supine to sit: dependent  Sit to supine: dependent  Scooting: dependent to HOB Rolling: Mod A  Supine to sit: Max A of 2  Scooting: Max A seated to EOB, Dep A of 2 supine in bed SBA   Transfers Sit to stand: NA  Stand to sit: NA  Stand pivot: NA NT. Pt 
Physical Therapy  Facility/Department: [address] MED SURG  Physical Therapy Treatment Note    Name: Montana Gerard  : 1940  MRN: [id number]  Date of Service: 3/26/2025    Attending Provider:  Siddhartha Eid DO    Evaluating PT:  Siddhartha Hodgson Jr., P.T.    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  He has then been in/out of the hospital and [facility] and has been very immobile and stiff.  He has chronic compression fractures at T3, T5, T7, and L1.   Precautions:  Hgb is 7.0, falls, bed/chair alarm, soft TLSO for back support and comfort    SUBJECTIVE:    Since being in hospital and [facility], his wife has moved into [facility] and plan in the future when pt is able to amb and move around again he will join her there.  Pt came in from [facility] this admission and daughter and wife report pt has not amb since 2025. While at [facility] staff uses a favian lift to transfer him and when able he works on sit<>stands with PT and is a MAX A of 2 persons.  Due to being in/out of hospital he is in bed a lot more and his muscles are stiffening up and getting weaker.      OBJECTIVE:   Initial Evaluation  Date: 3/20/25 Treatment  3/26/2025 Short Term/ Long Term   Goals   Was pt agreeable to Eval/treatment? yes yes    Does pt have pain? C/o moderate pain at rest and severe pain with just lowering HOB a little at a time.  LBP with bed adjustment,  performing supine to sit and sitting EOB    Bed Mobility  Rolling: MOD A  Supine to sit: dependent  Sit to supine: dependent  Scooting: dependent to HOB Rolling: Mod A  Supine to sit: Max A of 2  Scooting: Max A seated to EOB, Dep A of 2 supine in bed SBA   Transfers Sit to stand: NA  Stand to sit: NA  Stand pivot: NA Semi stand 
Pt. Had BM this morning that had moy red blood.  POCT occult (+).  Eliquis held for now until evaluated by physician.    Electronically signed by Carmen Schaeffer RN on 3/30/2025 at 9:30 AM    
Update provided to pt. Wife, Bernadette.  Electronically signed by Carmen Schaeffer RN on 3/26/2025 at 1:57 PM    
Wife was requesting earlier this evening that we see if patient's Percocet could be ordered routinely as he does not always ask for it and she feels he needs it every four hours. Notified [facility]. Said she does not want to order it routinely and wife can speak to the rounding physician in the morning about it.  
(seated)   UB Dressing Max A  Mod A (including management of TLSO)   LB Dressing Dependent  Mod A - with use of AE, as needed/appropriate   Bathing Dependent  Mod A - with use of AE/DME, as needed/appropriate   Toileting Dependent  Patient with davis catheter currently.  Mod A   Bed Mobility  Log Rolling: Max A to Dependent  Supine-to-Sit: Not attempted secondary to Poor tolerance of bed mobility due to back pain (RN aware).  Min A in order to maximize patient's independence/participation with ADLs, re-positioning, and other functional tasks.   Functional Transfers Not assessed.  [place] was being used at SNF[facility] recently.  Max A   Functional Mobility Not assessed.  N/A   Balance N/A  Fair dynamic sitting balance during completion of ADLs/IADLs and other functional tasks.   Activity Tolerance Limited due to back pain; RN aware.  Patient will demonstrate Good understanding and consistent implementation of energy conservation techniques and work simplification techniques into ADL/IADL routines.   Visual/  Perceptual WFL grossly     N/A   B UE Strength, ROM, and FMC B UE ROM: WFL grossly (0 - 90 degrees of active shoulder flexion observed), distal AROM WFL grossly  B UE Strength: B shoulders 3/5 to 3+/5 grossly (per observation), distally 3+/5 to 4-/5 grossly    FMC/Dexterity: Fair  Patient will demonstrate 4+/5 distal B UE strength in order to maximize independence with ADLs/IADLs and functional transfers.     Additional Long-Term Goal: Patient will increase functional independence to PLOF in order to allow patient to live in least restrictive environment.     Hearing: WFL grossly  Sensation: No c/o numbness/tingling in B UEs.  Tone: WFL  Edema: No    Comments: RN approved patient's participation in OT session. Upon arrival, patient supine in bed. At end of session, patient supine in bed with call light and phone within reach, bed alarm activated, and all lines and tubes intact. Patient would benefit from continued

## 2025-04-01 NOTE — PLAN OF CARE
Problem: Chronic Conditions and Co-morbidities  Goal: Patient's chronic conditions and co-morbidity symptoms are monitored and maintained or improved  3/24/2025 2316 by Elmira Emerson RN  Outcome: Progressing  3/24/2025 1013 by Shawnee Hoang RN  Outcome: Progressing     Problem: Skin/Tissue Integrity  Goal: Skin integrity remains intact  Description: 1.  Monitor for areas of redness and/or skin breakdown  2.  Assess vascular access sites hourly  3.  Every 4-6 hours minimum:  Change oxygen saturation probe site  4.  Every 4-6 hours:  If on nasal continuous positive airway pressure, respiratory therapy assess nares and determine need for appliance change or resting period  3/24/2025 2316 by Elmira Emerson RN  Outcome: Progressing  3/24/2025 1013 by Shawnee Hoang RN  Outcome: Progressing     Problem: Safety - Adult  Goal: Free from fall injury  3/24/2025 2316 by Elmira Emerson RN  Outcome: Progressing  3/24/2025 1013 by Shawnee Hoang RN  Outcome: Progressing     Problem: Pain  Goal: Verbalizes/displays adequate comfort level or baseline comfort level  Outcome: Progressing     Problem: ABCDS Injury Assessment  Goal: Absence of physical injury  Outcome: Progressing     
  Problem: Chronic Conditions and Co-morbidities  Goal: Patient's chronic conditions and co-morbidity symptoms are monitored and maintained or improved  3/26/2025 0020 by Elmira Emerson RN  Outcome: Progressing  3/25/2025 1028 by Shawnee Hoang RN  Outcome: Progressing     Problem: Skin/Tissue Integrity  Goal: Skin integrity remains intact  Description: 1.  Monitor for areas of redness and/or skin breakdown  2.  Assess vascular access sites hourly  3.  Every 4-6 hours minimum:  Change oxygen saturation probe site  4.  Every 4-6 hours:  If on nasal continuous positive airway pressure, respiratory therapy assess nares and determine need for appliance change or resting period  3/26/2025 0020 by Elmira Emerson RN  Outcome: Progressing  Flowsheets (Taken 3/25/2025 2015)  Skin Integrity Remains Intact: Monitor for areas of redness and/or skin breakdown  3/25/2025 1028 by Shawnee Hoang RN  Outcome: Progressing     Problem: Safety - Adult  Goal: Free from fall injury  3/26/2025 0020 by Elmira Emerson RN  Outcome: Progressing  3/25/2025 1028 by Shawnee Hoang RN  Outcome: Progressing     Problem: Pain  Goal: Verbalizes/displays adequate comfort level or baseline comfort level  Outcome: Progressing     Problem: ABCDS Injury Assessment  Goal: Absence of physical injury  Outcome: Progressing     
  Problem: Chronic Conditions and Co-morbidities  Goal: Patient's chronic conditions and co-morbidity symptoms are monitored and maintained or improved  3/26/2025 1115 by Carmen Schaeffer RN  Outcome: Progressing     Problem: Skin/Tissue Integrity  Goal: Skin integrity remains intact  Description: 1.  Monitor for areas of redness and/or skin breakdown  2.  Assess vascular access sites hourly  3.  Every 4-6 hours minimum:  Change oxygen saturation probe site  4.  Every 4-6 hours:  If on nasal continuous positive airway pressure, respiratory therapy assess nares and determine need for appliance change or resting period  3/26/2025 1115 by Carmen Schaeffer RN  Outcome: Progressing     Problem: Safety - Adult  Goal: Free from fall injury  3/26/2025 1115 by Carmen Schaeffer RN  Outcome: Progressing     Problem: Pain  Goal: Verbalizes/displays adequate comfort level or baseline comfort level  3/26/2025 1115 by Carmen Schaeffer RN  Outcome: Progressing     Problem: ABCDS Injury Assessment  Goal: Absence of physical injury  3/26/2025 1115 by Carmen Schaeffer RN  Outcome: Progressing     
  Problem: Chronic Conditions and Co-morbidities  Goal: Patient's chronic conditions and co-morbidity symptoms are monitored and maintained or improved  3/27/2025 0038 by Ese Stuart RN  Outcome: Progressing  3/26/2025 1115 by Carmen Schaeffer RN  Outcome: Progressing     Problem: Skin/Tissue Integrity  Goal: Skin integrity remains intact  Description: 1.  Monitor for areas of redness and/or skin breakdown  2.  Assess vascular access sites hourly  3.  Every 4-6 hours minimum:  Change oxygen saturation probe site  4.  Every 4-6 hours:  If on nasal continuous positive airway pressure, respiratory therapy assess nares and determine need for appliance change or resting period  3/27/2025 0038 by Ese Stuart RN  Outcome: Progressing  3/26/2025 1115 by Carmen Schaeffer RN  Outcome: Progressing     Problem: Safety - Adult  Goal: Free from fall injury  3/27/2025 0038 by Ese Stuart RN  Outcome: Progressing  3/26/2025 1115 by Carmen Schaeffer RN  Outcome: Progressing     Problem: Pain  Goal: Verbalizes/displays adequate comfort level or baseline comfort level  3/27/2025 0038 by Ese Stuart RN  Outcome: Progressing  3/26/2025 1115 by Carmen Schaeffer RN  Outcome: Progressing     Problem: ABCDS Injury Assessment  Goal: Absence of physical injury  3/27/2025 0038 by Ese Stuart RN  Outcome: Progressing  3/26/2025 1115 by Carmen Schaeffer RN  Outcome: Progressing     
  Problem: Chronic Conditions and Co-morbidities  Goal: Patient's chronic conditions and co-morbidity symptoms are monitored and maintained or improved  3/28/2025 1055 by Nelli Gomez RN  Outcome: Progressing  3/28/2025 0209 by Rosa Stuart RN  Outcome: Progressing     Problem: Skin/Tissue Integrity  Goal: Skin integrity remains intact  Description: 1.  Monitor for areas of redness and/or skin breakdown  2.  Assess vascular access sites hourly  3.  Every 4-6 hours minimum:  Change oxygen saturation probe site  4.  Every 4-6 hours:  If on nasal continuous positive airway pressure, respiratory therapy assess nares and determine need for appliance change or resting period  3/28/2025 1055 by Nelli Gomez RN  Outcome: Progressing  3/28/2025 0209 by Rosa Stuart RN  Outcome: Progressing  Flowsheets (Taken 3/28/2025 0208)  Skin Integrity Remains Intact: Monitor for areas of redness and/or skin breakdown     Problem: Safety - Adult  Goal: Free from fall injury  3/28/2025 1055 by Nelli Gomez RN  Outcome: Progressing  3/28/2025 0209 by Rosa Stuart RN  Outcome: Progressing     Problem: Pain  Goal: Verbalizes/displays adequate comfort level or baseline comfort level  3/28/2025 1055 by Nelli Gomez RN  Outcome: Progressing  3/28/2025 0209 by Rosa Stuart RN  Outcome: Progressing     Problem: ABCDS Injury Assessment  Goal: Absence of physical injury  Outcome: Progressing     
  Problem: Chronic Conditions and Co-morbidities  Goal: Patient's chronic conditions and co-morbidity symptoms are monitored and maintained or improved  3/30/2025 1105 by Carmen Schaeffer RN  Outcome: Progressing     Problem: Skin/Tissue Integrity  Goal: Skin integrity remains intact  Description: 1.  Monitor for areas of redness and/or skin breakdown  2.  Assess vascular access sites hourly  3.  Every 4-6 hours minimum:  Change oxygen saturation probe site  4.  Every 4-6 hours:  If on nasal continuous positive airway pressure, respiratory therapy assess nares and determine need for appliance change or resting period  3/30/2025 1105 by Carmen Schaeffer RN  Outcome: Progressing  Flowsheets (Taken 3/29/2025 2335 by Lani Marc RN)  Skin Integrity Remains Intact: Monitor for areas of redness and/or skin breakdown     Problem: Safety - Adult  Goal: Free from fall injury  3/30/2025 1105 by Carmen Schaeffer RN  Outcome: Progressing     Problem: Pain  Goal: Verbalizes/displays adequate comfort level or baseline comfort level  Outcome: Progressing     
  Problem: Chronic Conditions and Co-morbidities  Goal: Patient's chronic conditions and co-morbidity symptoms are monitored and maintained or improved  4/1/2025 0953 by Nelli Gomez RN  Outcome: Progressing  3/31/2025 2223 by Lily Phelan  Outcome: Progressing     Problem: Skin/Tissue Integrity  Goal: Skin integrity remains intact  Description: 1.  Monitor for areas of redness and/or skin breakdown  2.  Assess vascular access sites hourly  3.  Every 4-6 hours minimum:  Change oxygen saturation probe site  4.  Every 4-6 hours:  If on nasal continuous positive airway pressure, respiratory therapy assess nares and determine need for appliance change or resting period  4/1/2025 0953 by Nelli Gomez RN  Outcome: Progressing  3/31/2025 2223 by Lily Phelan  Outcome: Progressing     Problem: Safety - Adult  Goal: Free from fall injury  4/1/2025 0953 by Nelli Gomez RN  Outcome: Progressing  3/31/2025 2223 by Lily Phelan  Outcome: Progressing     Problem: Pain  Goal: Verbalizes/displays adequate comfort level or baseline comfort level  4/1/2025 0953 by Nelli Gomez RN  Outcome: Progressing  3/31/2025 2223 by Lily Phelan  Outcome: Progressing     Problem: ABCDS Injury Assessment  Goal: Absence of physical injury  4/1/2025 0953 by Nelli Gomez RN  Outcome: Progressing  3/31/2025 2223 by Lily Phelan  Outcome: Progressing     Problem: Neurosensory - Adult  Goal: Achieves stable or improved neurological status  4/1/2025 0953 by Nelli Gomez RN  Outcome: Progressing  3/31/2025 2223 by Lily Phelan  Outcome: Progressing  Goal: Absence of seizures  4/1/2025 0953 by Nelli Gomez RN  Outcome: Progressing  3/31/2025 2223 by Lily Phelan  Outcome: Progressing  Goal: Remains free of injury related to seizures activity  4/1/2025 0953 by Nelli Gomez RN  Outcome: Progressing  3/31/2025 2223 by Lily Phelan  Outcome: Progressing  Goal: Achieves maximal functionality and 
  Problem: Chronic Conditions and Co-morbidities  Goal: Patient's chronic conditions and co-morbidity symptoms are monitored and maintained or improved  Outcome: Progressing     Problem: Skin/Tissue Integrity  Goal: Skin integrity remains intact  Description: 1.  Monitor for areas of redness and/or skin breakdown  2.  Assess vascular access sites hourly  3.  Every 4-6 hours minimum:  Change oxygen saturation probe site  4.  Every 4-6 hours:  If on nasal continuous positive airway pressure, respiratory therapy assess nares and determine need for appliance change or resting period  Outcome: Progressing     Problem: Safety - Adult  Goal: Free from fall injury  Outcome: Progressing     
  Problem: Chronic Conditions and Co-morbidities  Goal: Patient's chronic conditions and co-morbidity symptoms are monitored and maintained or improved  Outcome: Progressing     Problem: Skin/Tissue Integrity  Goal: Skin integrity remains intact  Description: 1.  Monitor for areas of redness and/or skin breakdown  2.  Assess vascular access sites hourly  3.  Every 4-6 hours minimum:  Change oxygen saturation probe site  4.  Every 4-6 hours:  If on nasal continuous positive airway pressure, respiratory therapy assess nares and determine need for appliance change or resting period  Outcome: Progressing     Problem: Safety - Adult  Goal: Free from fall injury  Outcome: Progressing     
  Problem: Chronic Conditions and Co-morbidities  Goal: Patient's chronic conditions and co-morbidity symptoms are monitored and maintained or improved  Outcome: Progressing     Problem: Skin/Tissue Integrity  Goal: Skin integrity remains intact  Description: 1.  Monitor for areas of redness and/or skin breakdown  2.  Assess vascular access sites hourly  3.  Every 4-6 hours minimum:  Change oxygen saturation probe site  4.  Every 4-6 hours:  If on nasal continuous positive airway pressure, respiratory therapy assess nares and determine need for appliance change or resting period  Outcome: Progressing     Problem: Safety - Adult  Goal: Free from fall injury  Outcome: Progressing     Problem: Pain  Goal: Verbalizes/displays adequate comfort level or baseline comfort level  Outcome: Progressing     Problem: ABCDS Injury Assessment  Goal: Absence of physical injury  Outcome: Progressing     Problem: Neurosensory - Adult  Goal: Achieves stable or improved neurological status  Outcome: Progressing  Goal: Absence of seizures  Outcome: Progressing  Goal: Remains free of injury related to seizures activity  Outcome: Progressing  Goal: Achieves maximal functionality and self care  Outcome: Progressing     Problem: Respiratory - Adult  Goal: Achieves optimal ventilation and oxygenation  Outcome: Progressing     Problem: Cardiovascular - Adult  Goal: Maintains optimal cardiac output and hemodynamic stability  Outcome: Progressing  Goal: Absence of cardiac dysrhythmias or at baseline  Outcome: Progressing     Problem: Musculoskeletal - Adult  Goal: Return mobility to safest level of function  Outcome: Progressing  Goal: Maintain proper alignment of affected body part  Outcome: Progressing  Goal: Return ADL status to a safe level of function  Outcome: Progressing     Problem: Gastrointestinal - Adult  Goal: Minimal or absence of nausea and vomiting  Outcome: Progressing  Goal: Maintains or returns to baseline bowel 
  Problem: Chronic Conditions and Co-morbidities  Goal: Patient's chronic conditions and co-morbidity symptoms are monitored and maintained or improved  Outcome: Progressing     Problem: Skin/Tissue Integrity  Goal: Skin integrity remains intact  Description: 1.  Monitor for areas of redness and/or skin breakdown  2.  Assess vascular access sites hourly  3.  Every 4-6 hours minimum:  Change oxygen saturation probe site  4.  Every 4-6 hours:  If on nasal continuous positive airway pressure, respiratory therapy assess nares and determine need for appliance change or resting period  Outcome: Progressing     Problem: Safety - Adult  Goal: Free from fall injury  Outcome: Progressing     Problem: Skin/Tissue Integrity - Adult  Goal: Skin integrity remains intact  Description: 1.  Monitor for areas of redness and/or skin breakdown  2.  Assess vascular access sites hourly  3.  Every 4-6 hours minimum:  Change oxygen saturation probe site  4.  Every 4-6 hours:  If on nasal continuous positive airway pressure, respiratory therapy assess nares and determine need for appliance change or resting period  Outcome: Progressing     
  Problem: Chronic Conditions and Co-morbidities  Goal: Patient's chronic conditions and co-morbidity symptoms are monitored and maintained or improved  Outcome: Progressing     Problem: Skin/Tissue Integrity  Goal: Skin integrity remains intact  Description: 1.  Monitor for areas of redness and/or skin breakdown  2.  Assess vascular access sites hourly  3.  Every 4-6 hours minimum:  Change oxygen saturation probe site  4.  Every 4-6 hours:  If on nasal continuous positive airway pressure, respiratory therapy assess nares and determine need for appliance change or resting period  Outcome: Progressing  Flowsheets (Taken 3/28/2025 0208)  Skin Integrity Remains Intact: Monitor for areas of redness and/or skin breakdown     Problem: Safety - Adult  Goal: Free from fall injury  Outcome: Progressing     Problem: Pain  Goal: Verbalizes/displays adequate comfort level or baseline comfort level  Outcome: Progressing     
Progressing     Problem: Genitourinary - Adult  Goal: Absence of urinary retention  3/31/2025 2223 by Lily Phelan  Outcome: Progressing  3/31/2025 1021 by Nelli Gomez RN  Outcome: Progressing  Goal: Urinary catheter remains patent  3/31/2025 1021 by Nelli Gomez RN  Outcome: Progressing     Problem: Infection - Adult  Goal: Absence of infection at discharge  3/31/2025 2223 by Lily Phelan  Outcome: Progressing  3/31/2025 1021 by Nelli Goemz RN  Outcome: Progressing  Goal: Absence of infection during hospitalization  3/31/2025 2223 by Lily Phelan  Outcome: Progressing  3/31/2025 1021 by Nelli Gomez RN  Outcome: Progressing  Goal: Absence of fever/infection during anticipated neutropenic period  3/31/2025 1021 by Nelli Gomez RN  Outcome: Progressing     Problem: Metabolic/Fluid and Electrolytes - Adult  Goal: Electrolytes maintained within normal limits  3/31/2025 2223 by Lily Phelan  Outcome: Progressing  3/31/2025 1021 by Nelli Gomez RN  Outcome: Progressing  Goal: Hemodynamic stability and optimal renal function maintained  3/31/2025 2223 by Lily Phelan  Outcome: Progressing  3/31/2025 1021 by Nelli Gomez RN  Outcome: Progressing  Goal: Glucose maintained within prescribed range  3/31/2025 2223 by Lily Phelan  Outcome: Progressing  3/31/2025 1021 by Nelli Gomez RN  Outcome: Progressing     Problem: Hematologic - Adult  Goal: Maintains hematologic stability  3/31/2025 2223 by Lily Phelan  Outcome: Progressing  3/31/2025 1021 by Nelli Gomez RN  Outcome: Progressing     Problem: Skin/Tissue Integrity - Adult  Goal: Skin integrity remains intact  Description: 1.  Monitor for areas of redness and/or skin breakdown  2.  Assess vascular access sites hourly  3.  Every 4-6 hours minimum:  Change oxygen saturation probe site  4.  Every 4-6 hours:  If on nasal continuous positive airway pressure, respiratory therapy assess nares and determine

## 2025-04-07 ENCOUNTER — HOSPITAL ENCOUNTER (OUTPATIENT)
Age: 85
Discharge: HOME | End: 2025-04-09
Payer: MEDICARE

## 2025-04-07 ENCOUNTER — HOSPITAL ENCOUNTER (OUTPATIENT)
Dept: GENERAL RADIOLOGY | Age: 85
Discharge: HOME | End: 2025-04-09
Payer: MEDICARE

## 2025-04-07 DIAGNOSIS — M46.46 LUMBAR DISCITIS: ICD-10-CM

## 2025-04-07 PROCEDURE — 72100 X-RAY EXAM L-S SPINE 2/3 VWS: CPT

## 2025-04-08 ENCOUNTER — OFFICE VISIT (OUTPATIENT)
Dept: NEUROSURGERY | Age: 85
End: 2025-04-08

## 2025-04-08 DIAGNOSIS — R78.81 BACTEREMIA: Primary | ICD-10-CM

## 2025-04-08 PROCEDURE — 99024 POSTOP FOLLOW-UP VISIT: CPT | Performed by: PHYSICIAN ASSISTANT

## 2025-04-08 NOTE — PROGRESS NOTES
Patient is here for follow up from a hospital consult for: lumbar discitis.  He is still having severe LBP.  On AB by ID.    Physical exam  Alert and Oriented X3  PERRLA, EOMI  GODWIN 5/5  Sensation intact to LT and PP  Reflexes are 2+ and symmetric    A/P: patient is here for follow up for: lumbar discitis.  Lumbar x-rays stable.  Continue with brace and restrictions.  Continue with AB per ID.  Follow up in 2 months with x-rays.

## 2025-04-15 ENCOUNTER — APPOINTMENT (OUTPATIENT)
Dept: CT IMAGING | Age: 85
End: 2025-04-15
Payer: MEDICARE

## 2025-04-15 ENCOUNTER — HOSPITAL ENCOUNTER (INPATIENT)
Age: 85
LOS: 3 days | Discharge: NURSING, REHAB OR LONG-TERM CARE | End: 2025-04-18
Attending: EMERGENCY MEDICINE | Admitting: INTERNAL MEDICINE
Payer: MEDICARE

## 2025-04-15 ENCOUNTER — APPOINTMENT (OUTPATIENT)
Dept: GENERAL RADIOLOGY | Age: 85
End: 2025-04-15
Payer: MEDICARE

## 2025-04-15 DIAGNOSIS — K62.5 RECTAL BLEEDING: Primary | ICD-10-CM

## 2025-04-15 PROBLEM — K52.0 RADIATION COLITIS: Status: ACTIVE | Noted: 2025-04-15

## 2025-04-15 PROBLEM — K62.7 RADIATION PROCTITIS: Status: ACTIVE | Noted: 2025-04-15

## 2025-04-15 PROBLEM — K92.2 GI BLEED: Status: ACTIVE | Noted: 2025-04-15

## 2025-04-15 LAB
ABO + RH BLD: NORMAL
ALBUMIN SERPL-MCNC: 3.1 G/DL (ref 3.5–5.2)
ALP SERPL-CCNC: 136 U/L (ref 40–129)
ALT SERPL-CCNC: 19 U/L (ref 0–40)
ANION GAP SERPL CALCULATED.3IONS-SCNC: 8 MMOL/L (ref 7–16)
ARM BAND NUMBER: NORMAL
AST SERPL-CCNC: 18 U/L (ref 0–39)
BASOPHILS # BLD: 0.06 K/UL (ref 0–0.2)
BASOPHILS NFR BLD: 1 % (ref 0–2)
BILIRUB SERPL-MCNC: 0.3 MG/DL (ref 0–1.2)
BLOOD BANK SAMPLE EXPIRATION: NORMAL
BLOOD GROUP ANTIBODIES SERPL: NEGATIVE
BUN SERPL-MCNC: 22 MG/DL (ref 6–23)
CALCIUM SERPL-MCNC: 9.1 MG/DL (ref 8.6–10.2)
CHLORIDE SERPL-SCNC: 103 MMOL/L (ref 98–107)
CO2 SERPL-SCNC: 26 MMOL/L (ref 22–29)
CREAT SERPL-MCNC: 0.7 MG/DL (ref 0.7–1.2)
EOSINOPHIL # BLD: 0.46 K/UL (ref 0.05–0.5)
EOSINOPHILS RELATIVE PERCENT: 5 % (ref 0–6)
ERYTHROCYTE [DISTWIDTH] IN BLOOD BY AUTOMATED COUNT: 17.4 % (ref 11.5–15)
GFR, ESTIMATED: >90 ML/MIN/1.73M2
GLUCOSE SERPL-MCNC: 103 MG/DL (ref 74–99)
HCT VFR BLD AUTO: 30.7 % (ref 37–54)
HGB BLD-MCNC: 9.4 G/DL (ref 12.5–16.5)
IMM GRANULOCYTES # BLD AUTO: 0.06 K/UL (ref 0–0.58)
IMM GRANULOCYTES NFR BLD: 1 % (ref 0–5)
INR PPP: 1.5
LYMPHOCYTES NFR BLD: 1 K/UL (ref 1.5–4)
LYMPHOCYTES RELATIVE PERCENT: 10 % (ref 20–42)
MCH RBC QN AUTO: 28.7 PG (ref 26–35)
MCHC RBC AUTO-ENTMCNC: 30.6 G/DL (ref 32–34.5)
MCV RBC AUTO: 93.6 FL (ref 80–99.9)
MONOCYTES NFR BLD: 0.52 K/UL (ref 0.1–0.95)
MONOCYTES NFR BLD: 5 % (ref 2–12)
NEUTROPHILS NFR BLD: 79 % (ref 43–80)
NEUTS SEG NFR BLD: 7.77 K/UL (ref 1.8–7.3)
PARTIAL THROMBOPLASTIN TIME: 45.2 SEC (ref 24.5–35.1)
PLATELET # BLD AUTO: 170 K/UL (ref 130–450)
PMV BLD AUTO: 9.5 FL (ref 7–12)
POTASSIUM SERPL-SCNC: 4.1 MMOL/L (ref 3.5–5)
PROT SERPL-MCNC: 5.6 G/DL (ref 6.4–8.3)
PROTHROMBIN TIME: 15.9 SEC (ref 9.3–12.4)
RBC # BLD AUTO: 3.28 M/UL (ref 3.8–5.8)
SODIUM SERPL-SCNC: 137 MMOL/L (ref 132–146)
WBC OTHER # BLD: 9.9 K/UL (ref 4.5–11.5)

## 2025-04-15 PROCEDURE — 6370000000 HC RX 637 (ALT 250 FOR IP): Performed by: INTERNAL MEDICINE

## 2025-04-15 PROCEDURE — 6360000002 HC RX W HCPCS

## 2025-04-15 PROCEDURE — 6360000002 HC RX W HCPCS: Performed by: INTERNAL MEDICINE

## 2025-04-15 PROCEDURE — 86850 RBC ANTIBODY SCREEN: CPT

## 2025-04-15 PROCEDURE — 85025 COMPLETE CBC W/AUTO DIFF WBC: CPT

## 2025-04-15 PROCEDURE — 2060000000 HC ICU INTERMEDIATE R&B

## 2025-04-15 PROCEDURE — 85730 THROMBOPLASTIN TIME PARTIAL: CPT

## 2025-04-15 PROCEDURE — 85610 PROTHROMBIN TIME: CPT

## 2025-04-15 PROCEDURE — 86901 BLOOD TYPING SEROLOGIC RH(D): CPT

## 2025-04-15 PROCEDURE — 2500000003 HC RX 250 WO HCPCS: Performed by: INTERNAL MEDICINE

## 2025-04-15 PROCEDURE — 99223 1ST HOSP IP/OBS HIGH 75: CPT | Performed by: INTERNAL MEDICINE

## 2025-04-15 PROCEDURE — 99285 EMERGENCY DEPT VISIT HI MDM: CPT

## 2025-04-15 PROCEDURE — 2580000003 HC RX 258

## 2025-04-15 PROCEDURE — 71045 X-RAY EXAM CHEST 1 VIEW: CPT

## 2025-04-15 PROCEDURE — 80053 COMPREHEN METABOLIC PANEL: CPT

## 2025-04-15 PROCEDURE — 86900 BLOOD TYPING SEROLOGIC ABO: CPT

## 2025-04-15 PROCEDURE — 6370000000 HC RX 637 (ALT 250 FOR IP): Performed by: STUDENT IN AN ORGANIZED HEALTH CARE EDUCATION/TRAINING PROGRAM

## 2025-04-15 RX ORDER — BRIMONIDINE TARTRATE 2 MG/ML
1 SOLUTION/ DROPS OPHTHALMIC 2 TIMES DAILY
Status: DISCONTINUED | OUTPATIENT
Start: 2025-04-15 | End: 2025-04-18 | Stop reason: HOSPADM

## 2025-04-15 RX ORDER — GABAPENTIN 100 MG/1
100 CAPSULE ORAL 2 TIMES DAILY
Status: DISCONTINUED | OUTPATIENT
Start: 2025-04-15 | End: 2025-04-18 | Stop reason: HOSPADM

## 2025-04-15 RX ORDER — FERROUS SULFATE 325(65) MG
325 TABLET ORAL 2 TIMES DAILY
Status: DISCONTINUED | OUTPATIENT
Start: 2025-04-15 | End: 2025-04-18 | Stop reason: HOSPADM

## 2025-04-15 RX ORDER — ATORVASTATIN CALCIUM 40 MG/1
40 TABLET, FILM COATED ORAL EVERY MORNING
Status: DISCONTINUED | OUTPATIENT
Start: 2025-04-15 | End: 2025-04-18 | Stop reason: HOSPADM

## 2025-04-15 RX ORDER — POTASSIUM CHLORIDE 7.45 MG/ML
10 INJECTION INTRAVENOUS PRN
Status: DISCONTINUED | OUTPATIENT
Start: 2025-04-15 | End: 2025-04-18 | Stop reason: HOSPADM

## 2025-04-15 RX ORDER — HEPARIN SODIUM,PORCINE 10 UNIT/ML
10 VIAL (ML) INTRAVENOUS 2 TIMES DAILY
Status: ON HOLD | COMMUNITY
End: 2025-04-18 | Stop reason: HOSPADM

## 2025-04-15 RX ORDER — TROSPIUM CHLORIDE 20 MG/1
20 TABLET, FILM COATED ORAL NIGHTLY
Status: DISCONTINUED | OUTPATIENT
Start: 2025-04-15 | End: 2025-04-18 | Stop reason: HOSPADM

## 2025-04-15 RX ORDER — FERROUS SULFATE 325(65) MG
325 TABLET ORAL 2 TIMES DAILY
Status: ON HOLD | COMMUNITY
End: 2025-04-18 | Stop reason: HOSPADM

## 2025-04-15 RX ORDER — TRAMADOL HYDROCHLORIDE 50 MG/1
50 TABLET ORAL EVERY 6 HOURS SCHEDULED
Status: DISCONTINUED | OUTPATIENT
Start: 2025-04-15 | End: 2025-04-16

## 2025-04-15 RX ORDER — ONDANSETRON 2 MG/ML
4 INJECTION INTRAMUSCULAR; INTRAVENOUS EVERY 6 HOURS PRN
Status: DISCONTINUED | OUTPATIENT
Start: 2025-04-15 | End: 2025-04-15

## 2025-04-15 RX ORDER — SODIUM CHLORIDE 0.9 % (FLUSH) 0.9 %
5-40 SYRINGE (ML) INJECTION EVERY 12 HOURS SCHEDULED
Status: DISCONTINUED | OUTPATIENT
Start: 2025-04-15 | End: 2025-04-18 | Stop reason: HOSPADM

## 2025-04-15 RX ORDER — TIMOLOL MALEATE 5 MG/ML
1 SOLUTION/ DROPS OPHTHALMIC 2 TIMES DAILY
Status: DISCONTINUED | OUTPATIENT
Start: 2025-04-15 | End: 2025-04-18 | Stop reason: HOSPADM

## 2025-04-15 RX ORDER — DOFETILIDE 0.12 MG/1
125 CAPSULE ORAL 2 TIMES DAILY
Status: DISCONTINUED | OUTPATIENT
Start: 2025-04-15 | End: 2025-04-18 | Stop reason: HOSPADM

## 2025-04-15 RX ORDER — ACETAMINOPHEN 650 MG/1
650 SUPPOSITORY RECTAL EVERY 6 HOURS PRN
Status: DISCONTINUED | OUTPATIENT
Start: 2025-04-15 | End: 2025-04-18 | Stop reason: HOSPADM

## 2025-04-15 RX ORDER — SODIUM CHLORIDE 9 MG/ML
INJECTION, SOLUTION INTRAVENOUS PRN
Status: DISCONTINUED | OUTPATIENT
Start: 2025-04-15 | End: 2025-04-18 | Stop reason: HOSPADM

## 2025-04-15 RX ORDER — BISACODYL 10 MG
10 SUPPOSITORY, RECTAL RECTAL DAILY PRN
COMMUNITY

## 2025-04-15 RX ORDER — SENNOSIDES A AND B 8.6 MG/1
1 TABLET, FILM COATED ORAL 2 TIMES DAILY
Status: DISCONTINUED | OUTPATIENT
Start: 2025-04-15 | End: 2025-04-18

## 2025-04-15 RX ORDER — GABAPENTIN 100 MG/1
100 CAPSULE ORAL 2 TIMES DAILY
COMMUNITY

## 2025-04-15 RX ORDER — POTASSIUM CHLORIDE 1500 MG/1
40 TABLET, EXTENDED RELEASE ORAL PRN
Status: DISCONTINUED | OUTPATIENT
Start: 2025-04-15 | End: 2025-04-18 | Stop reason: HOSPADM

## 2025-04-15 RX ORDER — TAMSULOSIN HYDROCHLORIDE 0.4 MG/1
0.8 CAPSULE ORAL NIGHTLY
Status: DISCONTINUED | OUTPATIENT
Start: 2025-04-15 | End: 2025-04-18 | Stop reason: HOSPADM

## 2025-04-15 RX ORDER — METOPROLOL SUCCINATE 25 MG/1
12.5 TABLET, EXTENDED RELEASE ORAL EVERY MORNING
Status: DISCONTINUED | OUTPATIENT
Start: 2025-04-15 | End: 2025-04-18

## 2025-04-15 RX ORDER — TRAMADOL HYDROCHLORIDE 50 MG/1
50 TABLET ORAL EVERY 6 HOURS PRN
COMMUNITY

## 2025-04-15 RX ORDER — LATANOPROST 50 UG/ML
1 SOLUTION/ DROPS OPHTHALMIC NIGHTLY
Status: DISCONTINUED | OUTPATIENT
Start: 2025-04-15 | End: 2025-04-18 | Stop reason: HOSPADM

## 2025-04-15 RX ORDER — VITS A,C,E/LUTEIN/MINERALS 300MCG-200
1 TABLET ORAL EVERY MORNING
Status: DISCONTINUED | OUTPATIENT
Start: 2025-04-15 | End: 2025-04-18 | Stop reason: HOSPADM

## 2025-04-15 RX ORDER — PANTOPRAZOLE SODIUM 40 MG/1
40 TABLET, DELAYED RELEASE ORAL
Status: DISCONTINUED | OUTPATIENT
Start: 2025-04-15 | End: 2025-04-18 | Stop reason: HOSPADM

## 2025-04-15 RX ORDER — ACETAMINOPHEN 325 MG/1
650 TABLET ORAL EVERY 6 HOURS PRN
Status: DISCONTINUED | OUTPATIENT
Start: 2025-04-15 | End: 2025-04-18 | Stop reason: HOSPADM

## 2025-04-15 RX ORDER — MIRTAZAPINE 15 MG/1
15 TABLET, FILM COATED ORAL NIGHTLY
Status: DISCONTINUED | OUTPATIENT
Start: 2025-04-15 | End: 2025-04-18 | Stop reason: HOSPADM

## 2025-04-15 RX ORDER — DAPSONE 25 MG/1
25 TABLET ORAL EVERY MORNING
Status: DISCONTINUED | OUTPATIENT
Start: 2025-04-15 | End: 2025-04-18 | Stop reason: HOSPADM

## 2025-04-15 RX ORDER — BUPROPION HYDROCHLORIDE 100 MG/1
100 TABLET, EXTENDED RELEASE ORAL EVERY MORNING
Status: DISCONTINUED | OUTPATIENT
Start: 2025-04-15 | End: 2025-04-18 | Stop reason: HOSPADM

## 2025-04-15 RX ORDER — PROMETHAZINE HYDROCHLORIDE 25 MG/1
12.5 TABLET ORAL EVERY 6 HOURS PRN
Status: DISCONTINUED | OUTPATIENT
Start: 2025-04-15 | End: 2025-04-18 | Stop reason: HOSPADM

## 2025-04-15 RX ORDER — BRIMONIDINE TARTRATE AND TIMOLOL MALEATE 2; 5 MG/ML; MG/ML
1 SOLUTION OPHTHALMIC 2 TIMES DAILY
Status: DISCONTINUED | OUTPATIENT
Start: 2025-04-15 | End: 2025-04-15 | Stop reason: CLARIF

## 2025-04-15 RX ORDER — METOPROLOL SUCCINATE 25 MG/1
12.5 TABLET, EXTENDED RELEASE ORAL EVERY MORNING
Status: ON HOLD | COMMUNITY
End: 2025-04-18 | Stop reason: HOSPADM

## 2025-04-15 RX ORDER — ONDANSETRON 4 MG/1
4 TABLET, ORALLY DISINTEGRATING ORAL EVERY 8 HOURS PRN
Status: DISCONTINUED | OUTPATIENT
Start: 2025-04-15 | End: 2025-04-15

## 2025-04-15 RX ORDER — MAGNESIUM SULFATE IN WATER 40 MG/ML
2000 INJECTION, SOLUTION INTRAVENOUS PRN
Status: DISCONTINUED | OUTPATIENT
Start: 2025-04-15 | End: 2025-04-18 | Stop reason: HOSPADM

## 2025-04-15 RX ORDER — SODIUM CHLORIDE 0.9 % (FLUSH) 0.9 %
5-40 SYRINGE (ML) INJECTION PRN
Status: DISCONTINUED | OUTPATIENT
Start: 2025-04-15 | End: 2025-04-18 | Stop reason: HOSPADM

## 2025-04-15 RX ORDER — ATORVASTATIN CALCIUM 40 MG/1
40 TABLET, FILM COATED ORAL EVERY MORNING
Status: ON HOLD | COMMUNITY
End: 2025-04-18 | Stop reason: HOSPADM

## 2025-04-15 RX ORDER — TRAMADOL HYDROCHLORIDE 50 MG/1
50 TABLET ORAL EVERY 6 HOURS PRN
Status: DISCONTINUED | OUTPATIENT
Start: 2025-04-15 | End: 2025-04-15

## 2025-04-15 RX ORDER — POLYETHYLENE GLYCOL 3350 17 G/17G
17 POWDER, FOR SOLUTION ORAL DAILY PRN
Status: DISCONTINUED | OUTPATIENT
Start: 2025-04-15 | End: 2025-04-18 | Stop reason: HOSPADM

## 2025-04-15 RX ORDER — PROMETHAZINE HYDROCHLORIDE 25 MG/ML
6.25 INJECTION, SOLUTION INTRAMUSCULAR; INTRAVENOUS EVERY 6 HOURS PRN
Status: DISCONTINUED | OUTPATIENT
Start: 2025-04-15 | End: 2025-04-18 | Stop reason: HOSPADM

## 2025-04-15 RX ADMIN — GABAPENTIN 100 MG: 100 CAPSULE ORAL at 21:02

## 2025-04-15 RX ADMIN — SODIUM CHLORIDE 80 MG: 9 INJECTION, SOLUTION INTRAMUSCULAR; INTRAVENOUS; SUBCUTANEOUS at 10:05

## 2025-04-15 RX ADMIN — ATORVASTATIN CALCIUM 40 MG: 40 TABLET, FILM COATED ORAL at 17:43

## 2025-04-15 RX ADMIN — DAPSONE 25 MG: 25 TABLET ORAL at 17:43

## 2025-04-15 RX ADMIN — LATANOPROST 1 DROP: 50 SOLUTION OPHTHALMIC at 21:21

## 2025-04-15 RX ADMIN — FERROUS SULFATE TAB 325 MG (65 MG ELEMENTAL FE) 325 MG: 325 (65 FE) TAB at 21:02

## 2025-04-15 RX ADMIN — TIMOLOL MALEATE 1 DROP: 5 SOLUTION OPHTHALMIC at 14:21

## 2025-04-15 RX ADMIN — SODIUM CHLORIDE, PRESERVATIVE FREE 10 ML: 5 INJECTION INTRAVENOUS at 14:13

## 2025-04-15 RX ADMIN — METOPROLOL SUCCINATE 12.5 MG: 25 TABLET, EXTENDED RELEASE ORAL at 17:43

## 2025-04-15 RX ADMIN — TIMOLOL MALEATE 1 DROP: 5 SOLUTION OPHTHALMIC at 21:03

## 2025-04-15 RX ADMIN — BRIMONIDINE TARTRATE 1 DROP: 2 SOLUTION OPHTHALMIC at 14:21

## 2025-04-15 RX ADMIN — SODIUM CHLORIDE, PRESERVATIVE FREE 10 ML: 5 INJECTION INTRAVENOUS at 21:02

## 2025-04-15 RX ADMIN — MIRTAZAPINE 15 MG: 15 TABLET, FILM COATED ORAL at 21:02

## 2025-04-15 RX ADMIN — SENNOSIDES 8.6 MG: 8.6 TABLET, COATED ORAL at 17:50

## 2025-04-15 RX ADMIN — TROSPIUM CHLORIDE 20 MG: 20 TABLET, FILM COATED ORAL at 21:02

## 2025-04-15 RX ADMIN — PANTOPRAZOLE SODIUM 40 MG: 40 TABLET, DELAYED RELEASE ORAL at 17:43

## 2025-04-15 RX ADMIN — TRAMADOL HYDROCHLORIDE 50 MG: 50 TABLET, COATED ORAL at 17:43

## 2025-04-15 RX ADMIN — Medication 1 TABLET: at 17:43

## 2025-04-15 RX ADMIN — DOFETILIDE 125 MCG: 0.12 CAPSULE ORAL at 21:01

## 2025-04-15 RX ADMIN — BRIMONIDINE TARTRATE 1 DROP: 2 SOLUTION OPHTHALMIC at 21:13

## 2025-04-15 RX ADMIN — BUPROPION HYDROCHLORIDE 100 MG: 100 TABLET, FILM COATED, EXTENDED RELEASE ORAL at 17:43

## 2025-04-15 RX ADMIN — WATER 2000 MG: 1 INJECTION INTRAMUSCULAR; INTRAVENOUS; SUBCUTANEOUS at 14:13

## 2025-04-15 RX ADMIN — TAMSULOSIN HYDROCHLORIDE 0.8 MG: 0.4 CAPSULE ORAL at 21:02

## 2025-04-15 ASSESSMENT — PAIN DESCRIPTION - LOCATION: LOCATION: GENERALIZED

## 2025-04-15 ASSESSMENT — PAIN - FUNCTIONAL ASSESSMENT: PAIN_FUNCTIONAL_ASSESSMENT: NONE - DENIES PAIN

## 2025-04-15 ASSESSMENT — PAIN DESCRIPTION - DESCRIPTORS: DESCRIPTORS: DISCOMFORT

## 2025-04-15 ASSESSMENT — PAIN SCALES - GENERAL: PAINLEVEL_OUTOF10: 3

## 2025-04-15 NOTE — PLAN OF CARE
Problem: ABCDS Injury Assessment  Goal: Absence of physical injury  Outcome: Progressing     Problem: Skin/Tissue Integrity  Goal: Skin integrity remains intact  Description: 1.  Monitor for areas of redness and/or skin breakdown2.  Assess vascular access sites hourly3.  Every 4-6 hours minimum:  Change oxygen saturation probe site4.  Every 4-6 hours:  If on nasal continuous positive airway pressure, respiratory therapy assess nares and determine need for appliance change or resting period  Outcome: Progressing     Problem: Discharge Planning  Goal: Discharge to home or other facility with appropriate resources  Outcome: Progressing  Flowsheets (Taken 4/15/2025 8600 by Zaida Connor, RN)  Discharge to home or other facility with appropriate resources: Refer to discharge planning if patient needs post-hospital services based on physician order or complex needs related to functional status, cognitive ability or social support system     Problem: Chronic Conditions and Co-morbidities  Goal: Patient's chronic conditions and co-morbidity symptoms are monitored and maintained or improved  Outcome: Progressing

## 2025-04-15 NOTE — CARE COORDINATION
Social Work / Discharge Planning :RE-Admission. Patient was admitted from [facility]. SW left VM with [facility] from [facility] to verify if patient is a bed hold and if patient can return. Patient will need pre-cert. Will need to verify D/C plan with spouse once SW receives info from Janiya Madrid. GI and Palliative consulted., Will need therapy ordered. AWait treatment plan and recommendations. SW to follow. Electronically signed by LUCINA Cid on 4/15/25 at 1:24 PM EDT

## 2025-04-15 NOTE — CONSULTS
Palliative Care Department  [phone]  Palliative Care Initial Consult  MD Montana Cosme  [id number]  Hospital Day: 1  Location of Service: [facility]  Date of Initial Consult: 04/15/2025  Referring Provider: Tyrone Monique  Palliative Medicine was consulted for assistance with: Assist with goals of care    ASSESSMENT & PLAN:     Prostate Cancer  S/p radiation seed placement and Brachy therapy  Rectal bleeding  Radiation cystitis    Pain  Start Tramadol 50 gm Q4h around-the clock  Continue tylenol 650 mg Q6H PRN  Continue Gabapentin 100 mg BID    Nausea  Continue Phenergan 12.5 mg PO Q6H PRN    Anorexia/Insomnia  Continue Mirtazapine 15 mg HS     Palliative Care Encounter  At this time, the patient does have capacity for medical decision-making  Will continue to follow for ongoing monitoring of progression of Pain, Nausea, Anxiety, and Anorexia as well as for appropriateness for hospice care due to Cancer  Will continue to evaluate test results related to and response to treatment of Cancer and medication effectiveness for Pain, Nausea, Anxiety, Constipation, and Anorexia,  Will obtain testing as needed to monitor medication results:N/A  Will continue to assess patient status including monitor for side effects of treatments-Traomadol and monitor for opiate induced constipation  Ongoing counseling of patient and family regarding diagnoses and prognosis of Cancer  Will continue treatment including tylenol    Goals of care: To Be Determined  Surrogate:    Primary Decision Maker: Bernadette Gerard - Spouse - [phone]  Secondary Decision Maker: Ivan Boyle - Child - [phone]  Prognosis: depends upon goals  Spiritual assessment: No spiritual distress identified   Bereavement and grief: Low Risk Bereavement    Advance Care Planning Documents:    Healthcare Power of : Completed  Financial Power of : Completed  Living Will: Completed  Code Status: Limited

## 2025-04-15 NOTE — PROGRESS NOTES
Database initiated. Patient is alert with some forgetfulness. He comes in from [facility] and requires a favian into a wheelchair and is RA at baseline. He has fallen recently and is having diarrhea. [place] [facility] [place] [facility]

## 2025-04-15 NOTE — ED PROVIDER NOTES
FILEMON 6S INTERMEDIATE  EMERGENCY DEPARTMENT ENCOUNTER        Pt Name: Montana Gerard  MRN: [id number]  Birthdate 1940  Date of evaluation: 4/15/2025  Provider: Rober Dubois MD  PCP: Haseeb Phan DO  Note Started: 6:28 AM EDT 4/15/25    CHIEF COMPLAINT       Chief Complaint   Patient presents with    Rectal Bleeding     Pt states that he was told by the facility that he was bleeding rectally       HISTORY OF PRESENT ILLNESS: 1 or more Elements   History From: Patient    Limitations to history : None    Montana Gerard is a 84 y.o. male with past medical history of malnutrition, colitis, proctitis, GI bleed, hyperlipidemia, hypertension, anemia, PVCs, prostate cancer, hypothyroidism, osteoarthritis, CAD, A-fib on Eliquis, CVA, deconditioning, anemia who presents from [facility] with complaints of possible rectal bleeding.  Patient states he was awoken by nursing staff and told that he had evidence of a blood clot at his rectum.  Patient denies any previous history of trauma to the area or surgeries or rectal bleeding.  Patient denies any pain at this time.  Patient dates he is currently on Rocephin IV for bladder diverticula.  Patient states he is on Eliquis but states he believes this was stopped at the facility.  Patient denies any nausea, vomiting, chest pain, shortness breath, abdominal pain, fevers or chills.    Nursing Notes were all reviewed and agreed with or any disagreements were addressed in the HPI.    ROS:   Pertinent positives and negatives are stated within HPI, all other systems reviewed and are negative.    --------------------------------------------- PAST HISTORY ---------------------------------------------  Past Medical History:  has a past medical history of Acute myocardial infarction, unspecified, Anemia, Arthritis, CAD (coronary artery disease), Cholelithiasis, Depressive disorder, Dermatitis herpetiformis, Diet-controlled diabetes mellitus (HCC), Diverticulitis,  melena noted with some bright red blood also.  Hemoccult positive.  Normal rectal tone.  No external hemorrhoids noted.   Musculoskeletal: Moves all extremities x 4. Warm and well perfused, no clubbing, cyanosis, or edema. Capillary refill <3 seconds. Compartments soft.  Skin: Warm and dry. No rashes.   Neurologic: GCS 15, no gross focal neurologic deficits  Psych: Normal Affect    -------------------------------------------------- RESULTS -------------------------------------------------  I have personally reviewed all laboratory and imaging results for this patient. Results are listed below.     LABS:  Results for orders placed or performed during the hospital encounter of 04/15/25   CBC with Auto Differential   Result Value Ref Range    WBC 9.9 4.5 - 11.5 k/uL    RBC 3.28 (L) 3.80 - 5.80 m/uL    Hemoglobin 9.4 (L) 12.5 - 16.5 g/dL    Hematocrit 30.7 (L) 37.0 - 54.0 %    MCV 93.6 80.0 - 99.9 fL    MCH 28.7 26.0 - 35.0 pg    MCHC 30.6 (L) 32.0 - 34.5 g/dL    RDW 17.4 (H) 11.5 - 15.0 %    Platelets 170 130 - 450 k/uL    MPV 9.5 7.0 - 12.0 fL    Neutrophils % 79 43.0 - 80.0 %    Lymphocytes % 10 (L) 20.0 - 42.0 %    Monocytes % 5 2.0 - 12.0 %    Eosinophils % 5 0 - 6 %    Basophils % 1 0.0 - 2.0 %    Immature Granulocytes % 1 0.0 - 5.0 %    Neutrophils Absolute 7.77 (H) 1.80 - 7.30 k/uL    Lymphocytes Absolute 1.00 (L) 1.50 - 4.00 k/uL    Monocytes Absolute 0.52 0.10 - 0.95 k/uL    Eosinophils Absolute 0.46 0.05 - 0.50 k/uL    Basophils Absolute 0.06 0.00 - 0.20 k/uL    Immature Granulocytes Absolute 0.06 0.00 - 0.58 k/uL   Comprehensive Metabolic Panel w/ Reflex to MG   Result Value Ref Range    Sodium 137 132 - 146 mmol/L    Potassium 4.1 3.5 - 5.0 mmol/L    Chloride 103 98 - 107 mmol/L    CO2 26 22 - 29 mmol/L    Anion Gap 8 7 - 16 mmol/L    Glucose 103 (H) 74 - 99 mg/dL    BUN 22 6 - 23 mg/dL    Creatinine 0.7 0.70 - 1.20 mg/dL    Est, Glom Filt Rate >90 >60 mL/min/1.73m2    Calcium 9.1 8.6 - 10.2 mg/dL    Total

## 2025-04-15 NOTE — H&P
[facility] Hospitalist Group History and Physical      CHIEF COMPLAINT:  GI bleed     History of Present Illness:      This is an 84 year old male with past medical history of atrial fibrillation, aortic stenosis s/p TAVR, HFpEF, diet controlled diabetes, penile prosthesis, prostate cancer s/p brachytherapy, CAD, depression, gastritis, GERD, HLD, HTN, iron deficiency anemia, dermatitis herpetiforms who presents to ER with complaints of GI bleed at nursing facility.     Present ER visit 4/15/25: Vitals stable. Lab workup: glucose 103, Protein 5.6, Albumin 3.1, Hgb 9.4, Hct 30.7. he was given Protonix IV x 1 and decision to admit.     Recent hospitalizations: [place] for GI bleed and hematuria. Neurology was consulted on 3/3 for progressive bilateral lower extremity edema, tremors and further images was obtained of brain and spine.  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 He underwent EGD and colonoscopy on 3/6. MRI lumbar spine showed discitis and osteomyelitis T12-L1, L1-2, L2-3 and L5-S1 levels on 3/7.  Neurosurgery was consulted and recommended conservative management and use of TLSO to use while upright.  Blood cultures grew Streptococcus infantarius ssp coli from 3/5 and patient underwent IR Lumbar biopsy on 3/11.  Patient was initially treated with Zosyn and vancomycin which were discontinue and started on ceftriaxone 2 g IV daily per ID guidance which recommended to complete for least 6 weeks.  TTE negative for vegetations and RINKU was ordered on 3/12 and it was negative for vegetations.  Eliquis was continued for discharge. Patient then presented to ER 3/19 for hematuria and GI bleed. Urology consulted and determined hematuria is radiation cystitis. Patient was discharged  protocol. Tentative stop date 4/19. 3/14/25 4/19/25  Juana Osborne, APRN - CNP   gabapentin (NEURONTIN) 100 MG capsule Take 1 capsule by mouth 2 times daily for 30 days. 3/14/25 4/13/25  Milanes Marino, Maria, MD   mirtazapine (REMERON) 15 MG tablet Take 1 tablet by mouth nightly 3/14/25   Milanes Marino, Maria, MD   pantoprazole (PROTONIX) 40 MG tablet Take 1 tablet by mouth 2 times daily (before meals) 3/14/25   Milanes Marino, Maria, MD   tamsulosin (FLOMAX) 0.4 MG capsule Take 2 capsules by mouth nightly 12/9/24   Haseeb Phan DO   atorvastatin (LIPITOR) 40 MG tablet Take 1 tablet by mouth daily 6/18/24   Haseeb Phan DO   dofetilide (TIKOSYN) 125 MCG capsule Take 1 capsule by mouth 2 times daily    Sana Pedro MD   brimonidine-timolol (COMBIGAN) 0.2-0.5 % ophthalmic solution Place 1 drop into both eyes in the morning and 1 drop in the evening.    Sana Pedro MD   metoprolol succinate (TOPROL XL) 25 MG extended release tablet Take 0.5 tablets by mouth daily 6/16/23   Haseeb Phan DO   vitamin D3 (CHOLECALCIFEROL) 25 MCG (1000 UT) TABS tablet Take 1 tablet by mouth 5 times a week.    Sana Pedro MD   vitamin B-12 (CYANOCOBALAMIN) 100 MCG tablet Take 1 tablet by mouth in the morning and 1 tablet in the evening.    Sana Pedro MD   ferrous sulfate 27 MG TABS Take 1 tablet by mouth daily    Sana Pedro MD   buPROPion (WELLBUTRIN SR) 100 MG extended release tablet Take 1 tablet by mouth daily    Sana Pedro MD   Coenzyme Q10 (COQ10) 100 MG CAPS Take 100 mg by mouth daily    Sana Pedro MD   latanoprost (XALATAN) 0.005 % ophthalmic solution Place 1 drop into both eyes nightly 1/4/20   Sana Pedro MD   Multiple Vitamins-Minerals (THERAPEUTIC MULTIVITAMIN-MINERALS) tablet Take 1 tablet by mouth daily    Sana Pedro MD   dapsone 25 MG tablet Take 1 tablet by mouth daily    Sana Pedro MD     Allergies:

## 2025-04-15 NOTE — PROGRESS NOTES
Consulted to assess PICC.  Pt has not had a CXR in ED to confirm placement so PICC tip must be confirmed before using.  Spoke with floor RN about this, they will look into it.  Evening IV Team can come to floor after CXR results for any other assessments not included in the consult. Will follow up.    Electronically signed by Marleny Caceres RN on 4/15/2025 at 6:30 PM

## 2025-04-15 NOTE — ED NOTES
ED to Inpatient Handoff Report    Notified floor that electronic handoff available and patient ready for transport to room 645.    Safety Risks: Risk of falls    Patient in Restraints: no    Constant Observer or Patient : no    Telemetry Monitoring Ordered: Yes          Order to transfer to unit without monitor: YES    Last MEWS: 1 Time completed: 1040    Deterioration Index: 19.85    Vitals:    04/15/25 0640 04/15/25 1040   BP: (!) 133/95 (!) 149/79   Pulse: 70 71   Resp: 16 16   Temp: 97.8 °F (36.6 °C) 98 °F (36.7 °C)   TempSrc: Oral Oral   SpO2: 98% 95%   Weight: 70.3 kg (155 lb)    Height: 1.727 m (5' 8\")        Opportunity for questions and clarification was provided.

## 2025-04-15 NOTE — ACP (ADVANCE CARE PLANNING)
Advance Care Planning   Healthcare Decision Maker:    Primary Decision Maker: Bernadette Gerard - Spouse - [phone]    Secondary Decision Maker: Ivan Boyle - Child - [phone]    Click here to complete Healthcare Decision Makers including selection of the Healthcare Decision Maker Relationship (ie \"Primary\").  Today we documented Decision Maker(s) consistent with Legal Next of Kin hierarchy.

## 2025-04-15 NOTE — PROGRESS NOTES
4 Eyes Skin Assessment     NAME:  Montana Gerard  YOB: 1940  MEDICAL RECORD NUMBER:  [id number]    The patient is being assessed for  Admission    I agree that at least one RN has performed a thorough Head to Toe Skin Assessment on the patient. ALL assessment sites listed below have been assessed.      Areas assessed by both nurses:    Head, Face, Ears, Shoulders, Back, Chest, Arms, Elbows, Hands, Sacrum. Buttock, Coccyx, Ischium, Legs. Feet and Heels, and Under Medical Devices         Does the Patient have a Wound? Yes wound(s) were present on assessment. LDA wound assessment was Initiated and completed by RN       Kosta Prevention initiated by RN: Yes  Wound Care Orders initiated by RN: Yes    Pressure Injury (Stage 3,4, Unstageable, DTI, NWPT, and Complex wounds) if present, place Wound referral order by RN under : No  Wound on buttocks  New Ostomies, if present place, Ostomy referral order under : No     Nurse 1 eSignature: Electronically signed by Sagar Najera RN on 4/15/25 at 3:50 PM EDT    **SHARE this note so that the co-signing nurse can place an eSignature**    Nurse 2 eSignature: Electronically signed by Caroline Rios RN on 4/15/25 at 3:50 PM EDT

## 2025-04-16 LAB
ALBUMIN SERPL-MCNC: 2.8 G/DL (ref 3.5–5.2)
ALP SERPL-CCNC: 122 U/L (ref 40–129)
ALT SERPL-CCNC: 22 U/L (ref 0–40)
ANION GAP SERPL CALCULATED.3IONS-SCNC: 13 MMOL/L (ref 7–16)
AST SERPL-CCNC: 25 U/L (ref 0–39)
BASOPHILS # BLD: 0.06 K/UL (ref 0–0.2)
BASOPHILS NFR BLD: 1 % (ref 0–2)
BILIRUB SERPL-MCNC: 0.4 MG/DL (ref 0–1.2)
BUN SERPL-MCNC: 24 MG/DL (ref 6–23)
CALCIUM SERPL-MCNC: 9.1 MG/DL (ref 8.6–10.2)
CHLORIDE SERPL-SCNC: 102 MMOL/L (ref 98–107)
CO2 SERPL-SCNC: 21 MMOL/L (ref 22–29)
CREAT SERPL-MCNC: 0.7 MG/DL (ref 0.7–1.2)
EOSINOPHIL # BLD: 0.36 K/UL (ref 0.05–0.5)
EOSINOPHILS RELATIVE PERCENT: 3 % (ref 0–6)
ERYTHROCYTE [DISTWIDTH] IN BLOOD BY AUTOMATED COUNT: 17.7 % (ref 11.5–15)
GFR, ESTIMATED: >90 ML/MIN/1.73M2
GLUCOSE SERPL-MCNC: 77 MG/DL (ref 74–99)
HCT VFR BLD AUTO: 29.4 % (ref 37–54)
HGB BLD-MCNC: 8.8 G/DL (ref 12.5–16.5)
IMM GRANULOCYTES # BLD AUTO: 0.09 K/UL (ref 0–0.58)
IMM GRANULOCYTES NFR BLD: 1 % (ref 0–5)
LYMPHOCYTES NFR BLD: 0.84 K/UL (ref 1.5–4)
LYMPHOCYTES RELATIVE PERCENT: 8 % (ref 20–42)
MCH RBC QN AUTO: 27.9 PG (ref 26–35)
MCHC RBC AUTO-ENTMCNC: 29.9 G/DL (ref 32–34.5)
MCV RBC AUTO: 93.3 FL (ref 80–99.9)
MONOCYTES NFR BLD: 0.6 K/UL (ref 0.1–0.95)
MONOCYTES NFR BLD: 6 % (ref 2–12)
NEUTROPHILS NFR BLD: 82 % (ref 43–80)
NEUTS SEG NFR BLD: 9.03 K/UL (ref 1.8–7.3)
PLATELET # BLD AUTO: 166 K/UL (ref 130–450)
PMV BLD AUTO: 9.7 FL (ref 7–12)
POTASSIUM SERPL-SCNC: 4.1 MMOL/L (ref 3.5–5)
PROT SERPL-MCNC: 5.5 G/DL (ref 6.4–8.3)
RBC # BLD AUTO: 3.15 M/UL (ref 3.8–5.8)
SODIUM SERPL-SCNC: 136 MMOL/L (ref 132–146)
WBC OTHER # BLD: 11 K/UL (ref 4.5–11.5)

## 2025-04-16 PROCEDURE — 6370000000 HC RX 637 (ALT 250 FOR IP)

## 2025-04-16 PROCEDURE — 36415 COLL VENOUS BLD VENIPUNCTURE: CPT

## 2025-04-16 PROCEDURE — 2500000003 HC RX 250 WO HCPCS: Performed by: INTERNAL MEDICINE

## 2025-04-16 PROCEDURE — 6360000002 HC RX W HCPCS: Performed by: INTERNAL MEDICINE

## 2025-04-16 PROCEDURE — 6370000000 HC RX 637 (ALT 250 FOR IP): Performed by: STUDENT IN AN ORGANIZED HEALTH CARE EDUCATION/TRAINING PROGRAM

## 2025-04-16 PROCEDURE — 97165 OT EVAL LOW COMPLEX 30 MIN: CPT

## 2025-04-16 PROCEDURE — 6370000000 HC RX 637 (ALT 250 FOR IP): Performed by: INTERNAL MEDICINE

## 2025-04-16 PROCEDURE — 80053 COMPREHEN METABOLIC PANEL: CPT

## 2025-04-16 PROCEDURE — 99232 SBSQ HOSP IP/OBS MODERATE 35: CPT | Performed by: INTERNAL MEDICINE

## 2025-04-16 PROCEDURE — 2060000000 HC ICU INTERMEDIATE R&B

## 2025-04-16 PROCEDURE — 97161 PT EVAL LOW COMPLEX 20 MIN: CPT

## 2025-04-16 PROCEDURE — 6370000000 HC RX 637 (ALT 250 FOR IP): Performed by: NURSE PRACTITIONER

## 2025-04-16 PROCEDURE — 85025 COMPLETE CBC W/AUTO DIFF WBC: CPT

## 2025-04-16 RX ORDER — OXYCODONE HYDROCHLORIDE 5 MG/1
5 TABLET ORAL
Refills: 0 | Status: DISCONTINUED | OUTPATIENT
Start: 2025-04-16 | End: 2025-04-18 | Stop reason: HOSPADM

## 2025-04-16 RX ORDER — OXYCODONE HYDROCHLORIDE 5 MG/1
TABLET ORAL
Status: COMPLETED
Start: 2025-04-16 | End: 2025-04-16

## 2025-04-16 RX ORDER — POLYETHYLENE GLYCOL 3350 17 G/17G
17 POWDER, FOR SOLUTION ORAL DAILY
Status: DISCONTINUED | OUTPATIENT
Start: 2025-04-16 | End: 2025-04-18

## 2025-04-16 RX ORDER — OXYCODONE HYDROCHLORIDE 5 MG/1
5 TABLET ORAL EVERY 4 HOURS
Refills: 0 | Status: DISCONTINUED | OUTPATIENT
Start: 2025-04-16 | End: 2025-04-18 | Stop reason: HOSPADM

## 2025-04-16 RX ORDER — MESALAMINE 1000 MG/1
1000 SUPPOSITORY RECTAL NIGHTLY
Status: DISCONTINUED | OUTPATIENT
Start: 2025-04-16 | End: 2025-04-18 | Stop reason: HOSPADM

## 2025-04-16 RX ADMIN — TIMOLOL MALEATE 1 DROP: 5 SOLUTION OPHTHALMIC at 10:48

## 2025-04-16 RX ADMIN — OXYCODONE HYDROCHLORIDE 5 MG: 5 TABLET ORAL at 18:12

## 2025-04-16 RX ADMIN — OXYCODONE 5 MG: 5 TABLET ORAL at 18:12

## 2025-04-16 RX ADMIN — FERROUS SULFATE TAB 325 MG (65 MG ELEMENTAL FE) 325 MG: 325 (65 FE) TAB at 20:15

## 2025-04-16 RX ADMIN — METOPROLOL SUCCINATE 12.5 MG: 25 TABLET, EXTENDED RELEASE ORAL at 10:46

## 2025-04-16 RX ADMIN — LATANOPROST 1 DROP: 50 SOLUTION OPHTHALMIC at 20:55

## 2025-04-16 RX ADMIN — OXYCODONE 5 MG: 5 TABLET ORAL at 20:15

## 2025-04-16 RX ADMIN — FERROUS SULFATE TAB 325 MG (65 MG ELEMENTAL FE) 325 MG: 325 (65 FE) TAB at 10:46

## 2025-04-16 RX ADMIN — DOFETILIDE 125 MCG: 0.12 CAPSULE ORAL at 10:46

## 2025-04-16 RX ADMIN — PANTOPRAZOLE SODIUM 40 MG: 40 TABLET, DELAYED RELEASE ORAL at 06:41

## 2025-04-16 RX ADMIN — WATER 2000 MG: 1 INJECTION INTRAMUSCULAR; INTRAVENOUS; SUBCUTANEOUS at 04:13

## 2025-04-16 RX ADMIN — TAMSULOSIN HYDROCHLORIDE 0.8 MG: 0.4 CAPSULE ORAL at 20:16

## 2025-04-16 RX ADMIN — ATORVASTATIN CALCIUM 40 MG: 40 TABLET, FILM COATED ORAL at 10:46

## 2025-04-16 RX ADMIN — MESALAMINE 1000 MG: 1000 SUPPOSITORY RECTAL at 20:16

## 2025-04-16 RX ADMIN — SENNOSIDES 8.6 MG: 8.6 TABLET, COATED ORAL at 20:15

## 2025-04-16 RX ADMIN — TRAMADOL HYDROCHLORIDE 50 MG: 50 TABLET, COATED ORAL at 10:46

## 2025-04-16 RX ADMIN — PANTOPRAZOLE SODIUM 40 MG: 40 TABLET, DELAYED RELEASE ORAL at 14:53

## 2025-04-16 RX ADMIN — DOFETILIDE 125 MCG: 0.12 CAPSULE ORAL at 20:17

## 2025-04-16 RX ADMIN — BUPROPION HYDROCHLORIDE 100 MG: 100 TABLET, FILM COATED, EXTENDED RELEASE ORAL at 10:46

## 2025-04-16 RX ADMIN — SENNOSIDES 8.6 MG: 8.6 TABLET, COATED ORAL at 10:46

## 2025-04-16 RX ADMIN — SODIUM CHLORIDE, PRESERVATIVE FREE 10 ML: 5 INJECTION INTRAVENOUS at 20:17

## 2025-04-16 RX ADMIN — GABAPENTIN 100 MG: 100 CAPSULE ORAL at 10:46

## 2025-04-16 RX ADMIN — BRIMONIDINE TARTRATE 1 DROP: 2 SOLUTION OPHTHALMIC at 20:27

## 2025-04-16 RX ADMIN — SODIUM CHLORIDE, PRESERVATIVE FREE 10 ML: 5 INJECTION INTRAVENOUS at 10:48

## 2025-04-16 RX ADMIN — POLYETHYLENE GLYCOL 3350 17 G: 17 POWDER, FOR SOLUTION ORAL at 10:46

## 2025-04-16 RX ADMIN — TIMOLOL MALEATE 1 DROP: 5 SOLUTION OPHTHALMIC at 20:41

## 2025-04-16 RX ADMIN — GABAPENTIN 100 MG: 100 CAPSULE ORAL at 20:26

## 2025-04-16 RX ADMIN — WATER 2000 MG: 1 INJECTION INTRAMUSCULAR; INTRAVENOUS; SUBCUTANEOUS at 14:53

## 2025-04-16 RX ADMIN — TROSPIUM CHLORIDE 20 MG: 20 TABLET, FILM COATED ORAL at 20:17

## 2025-04-16 RX ADMIN — Medication 1 TABLET: at 10:46

## 2025-04-16 RX ADMIN — BRIMONIDINE TARTRATE 1 DROP: 2 SOLUTION OPHTHALMIC at 10:48

## 2025-04-16 RX ADMIN — DAPSONE 25 MG: 25 TABLET ORAL at 10:47

## 2025-04-16 RX ADMIN — TRAMADOL HYDROCHLORIDE 50 MG: 50 TABLET, COATED ORAL at 06:41

## 2025-04-16 RX ADMIN — MIRTAZAPINE 15 MG: 15 TABLET, FILM COATED ORAL at 20:16

## 2025-04-16 RX ADMIN — TRAMADOL HYDROCHLORIDE 50 MG: 50 TABLET, COATED ORAL at 00:03

## 2025-04-16 ASSESSMENT — PAIN DESCRIPTION - DESCRIPTORS: DESCRIPTORS: ACHING;DISCOMFORT

## 2025-04-16 ASSESSMENT — PAIN DESCRIPTION - LOCATION: LOCATION: BACK

## 2025-04-16 ASSESSMENT — PAIN SCALES - GENERAL
PAINLEVEL_OUTOF10: 0
PAINLEVEL_OUTOF10: 3

## 2025-04-16 NOTE — PROGRESS NOTES
Consulted to assess patency of LUE dual lumen PICC; CXR report confirmed distal tip of PICC at level of SVC; PICC dressing intact; no erythema or drainage at insertion site; Pink flushes easily/blood return obtained; White flushes easily/blood return obtained; Discussed assessment with Pt's MEL Watters RN

## 2025-04-16 NOTE — CARE COORDINATION
Social Work / Discharge Planning : SW met with patient daughter and explained role as discharge planner/ transition of care. Family currently awaiting meeting with Palliative. They also have Oncology and Urology consulted. Patient was admitted from [facility]. Goals at discharge have not yet been determined. Family and patient wants to gather all the information from consulting doctors and then decide. Family updated that once they are aware which route they want at discharge, then SW/CM will assist in putting plan in place. Daughter did indicate  [facility] is too far from them so IF SNF is plan, they will be deciding new location.  Support provided. AWait treatment plan and recommendations and family/ patient decisions. SW to follow. Electronically signed by LUCINA Cid on 4/16/25 at 3:09 PM EDT

## 2025-04-16 NOTE — PLAN OF CARE
Problem: ABCDS Injury Assessment  Goal: Absence of physical injury  4/16/2025 1314 by Sagar Najera RN  Outcome: Progressing  4/16/2025 0150 by Marlo Shetty RN  Outcome: Progressing  4/16/2025 0133 by Marlo Shetty RN  Outcome: Progressing     Problem: Skin/Tissue Integrity  Goal: Skin integrity remains intact  Description: 1.  Monitor for areas of redness and/or skin breakdown2.  Assess vascular access sites hourly3.  Every 4-6 hours minimum:  Change oxygen saturation probe site4.  Every 4-6 hours:  If on nasal continuous positive airway pressure, respiratory therapy assess nares and determine need for appliance change or resting period  4/16/2025 1314 by Sagar Najera RN  Outcome: Progressing  4/16/2025 0150 by Marlo Shetty RN  Outcome: Progressing  4/16/2025 0133 by Marlo Shetty RN  Outcome: Progressing     Problem: Discharge Planning  Goal: Discharge to home or other facility with appropriate resources  4/16/2025 1314 by Sagar Najera RN  Outcome: Progressing  4/16/2025 0150 by Marlo Shetty RN  Outcome: Progressing  4/16/2025 0133 by Marlo Shetty RN  Outcome: Progressing     Problem: Chronic Conditions and Co-morbidities  Goal: Patient's chronic conditions and co-morbidity symptoms are monitored and maintained or improved  4/16/2025 1314 by Sagar Najera RN  Outcome: Progressing  4/16/2025 0150 by Marlo Shetty RN  Outcome: Progressing  4/16/2025 0133 by Marlo Shetty RN  Outcome: Progressing     Problem: Safety - Adult  Goal: Free from fall injury  4/16/2025 1314 by Sagar Najera RN  Outcome: Progressing  4/16/2025 0150 by Marlo Shetty RN  Outcome: Progressing  4/16/2025 0133 by Marlo Shetty RN  Outcome: Progressing     Problem: Pain  Goal: Verbalizes/displays adequate comfort level or baseline comfort level  4/16/2025 1314 by Sagar Najera

## 2025-04-16 NOTE — PLAN OF CARE
Problem: ABCDS Injury Assessment  Goal: Absence of physical injury  4/16/2025 0133 by Marlo Shetty, RN  Outcome: Progressing     Problem: Skin/Tissue Integrity  Goal: Skin integrity remains intact  Description: 1.  Monitor for areas of redness and/or skin breakdown2.  Assess vascular access sites hourly3.  Every 4-6 hours minimum:  Change oxygen saturation probe site4.  Every 4-6 hours:  If on nasal continuous positive airway pressure, respiratory therapy assess nares and determine need for appliance change or resting period  4/16/2025 0133 by Marlo Shetty, RN  Outcome: Progressing     Problem: Discharge Planning  Goal: Discharge to home or other facility with appropriate resources  4/16/2025 0133 by Marlo Shetty, RN  Outcome: Progressing     Problem: Chronic Conditions and Co-morbidities  Goal: Patient's chronic conditions and co-morbidity symptoms are monitored and maintained or improved  4/16/2025 0133 by Marlo Shetty, RN  Outcome: Progressing     Problem: Safety - Adult  Goal: Free from fall injury  Outcome: Progressing

## 2025-04-16 NOTE — PROGRESS NOTES
Occupational Therapy    OCCUPATIONAL THERAPY INITIAL EVALUATION    [facility]   [address], [place]         Date:2025                                                  Patient Name: Montana Gerard    MRN: [id number]    : 1940    Room: [address] [place] [address]      Evaluating OT: Jordyn Alvarado OTR/L   [id number]      Referring Provider:Tyrnoe Monique MD     Specific Provider Orders/Date:OT eval and treat 2025      Diagnosis:  Rectal bleeding [K62.5]  GI bleed [K92.2]     Pertinent Medical History:   recent IR biopsy of lumbar spine (3/11/2025) CAD, stroke (per patient report), arthritis, HTN, DM, MI     Precautions:  Fall Risk, TLSO       Assessment of current deficits    [x] Functional mobility  [x]ADLs  [x] Strength               [x]Cognition    [x] Functional transfers   [x] IADLs         [x] Safety Awareness   [x]Endurance    [x] Fine Coordination              [x] Balance      [] Vision/perception   [x]Sensation     []Gross Motor Coordination  [] ROM  [] Delirium                   [] Motor Control     OT PLAN OF CARE   OT POC based on physician orders, patient diagnosis and results of clinical assessment    Frequency/Duration  1-3 days/wk for  PRN   Specific OT Treatment Interventions to include:   ADL retraining/adapted techniques and AE recommendations to increase functional independence within precautions                    Energy conservation techniques to improve tolerance for selfcare routine   Functional transfer/mobility training/DME recommendations for increased independence, safety and fall prevention         Patient/family education to increase safety and functional independence             Environmental modifications for safe mobility and completion of ADLs                             Therapeutic activity to improve functional performance during ADLs.                                         Therapeutic exercise to improve tolerance

## 2025-04-16 NOTE — PROGRESS NOTES
[facility] Hospitalist Progress Note    Admitting Date and Time: 4/15/2025  6:28 AM  Admit Dx: Rectal bleeding [K62.5]  GI bleed [K92.2]    Subjective:  Patient is being followed for Rectal bleeding [K62.5]  GI bleed [K92.2]   Patient is seen today. No acute complaints     ROS: denies fever, chills, cp, sob, n/v, HA unless stated above.      sodium chloride flush  5-40 mL IntraVENous 2 times per day    atorvastatin  40 mg Oral QAM    buPROPion  100 mg Oral QAM    dapsone  25 mg Oral QAM    dofetilide  125 mcg Oral BID    ferrous sulfate  325 mg Oral BID    gabapentin  100 mg Oral BID    latanoprost  1 drop Both Eyes Nightly    metoprolol succinate  12.5 mg Oral QAM    mirtazapine  15 mg Oral Nightly    ocuvite-lutein  1 tablet Oral QAM    pantoprazole  40 mg Oral BID AC    tamsulosin  0.8 mg Oral Nightly    trospium  20 mg Oral Nightly    cefTRIAXone (ROCEPHIN) IV  2,000 mg IntraVENous Q12H    brimonidine  1 drop Both Eyes BID    And    timolol  1 drop Both Eyes BID    traMADol  50 mg Oral 4 times per day    senna  1 tablet Oral BID     sodium chloride flush, 5-40 mL, PRN  sodium chloride, , PRN  potassium chloride, 40 mEq, PRN   Or  potassium alternative oral replacement, 40 mEq, PRN   Or  potassium chloride, 10 mEq, PRN  magnesium sulfate, 2,000 mg, PRN  polyethylene glycol, 17 g, Daily PRN  acetaminophen, 650 mg, Q6H PRN   Or  acetaminophen, 650 mg, Q6H PRN  promethazine, 6.25 mg, Q6H PRN   Or  promethazine, 12.5 mg, Q6H PRN         Objective:    BP (!) 148/77   Pulse 71   Temp 97.7 °F (36.5 °C) (Oral)   Resp 19   Ht 1.727 m (5' 8\")   Wt 70.3 kg (155 lb)   SpO2 94%   BMI 23.57 kg/m²     General Appearance: alert and oriented to person, place and time and in no acute distress  Skin: warm and dry  Head: normocephalic and atraumatic  Eyes: pupils equal, round, and reactive to light, extraocular eye movements intact, conjunctivae normal  Neck: neck supple and non tender without mass   Pulmonary/Chest:

## 2025-04-16 NOTE — CONSULTS
4/16/2025 1:46 PM  Montana Gerard  [id number]     Chief Complaint:    Radiation proctitis    History of Present Illness:      The patient is a 84 y.o. male patient for whom urology was consulted for the above.    Patient has a history of prostate cancer treated via brachytherapy at [facility].  Also has a history of penile prosthesis.  He states he is voiding well without issue currently with overall clear yellow urine.  He currently is having blood in his stools.  Gastroenterology is following.      Past Medical History:   Diagnosis Date    Acute myocardial infarction, unspecified 06/02/2023    Anemia     Arthritis     CAD (coronary artery disease)     Cholelithiasis     Depressive disorder     Dermatitis herpetiformis     GLUTEN FREE DIET    Diet-controlled diabetes mellitus (HCC) 01/03/2022    Diverticulitis     Gastritis     GERD (gastroesophageal reflux disease)     Hiatal hernia     Hyperlipidemia     Hypertension     Iron deficiency anemia     Non-ST elevation myocardial infarction (NSTEMI) 06/02/2023    Peptic reflux disease     ST elevation (STEMI) myocardial infarction of unspecified site 06/02/2023         Past Surgical History:   Procedure Laterality Date    CARDIAC SURGERY  03/31/2023    TAVR    CARDIOVASCULAR STRESS TEST  01/01/2004    CHOLECYSTECTOMY      COLONOSCOPY N/A 03/06/2025    COLONOSCOPY POLYPECTOMY SNARE/BIOPSY performed by Satinder Parekh MD at [facility]    IR BIOPSY DEEP BONE  03/11/2025    IR BIOPSY DEEP BONE 3/11/2025 Gregoria Tomlinson MD [facility]    UPPER GASTROINTESTINAL ENDOSCOPY N/A 03/06/2025    ESOPHAGOGASTRODUODENOSCOPY performed by Satinder Parekh MD at [facility]       Medications Prior to Admission:    Medications Prior to Admission: gabapentin (NEURONTIN) 100 MG capsule, Take 1 capsule by mouth 2 times daily.  apixaban (ELIQUIS) 5 MG TABS tablet, Take 1 tablet by mouth 2 times daily  ferrous sulfate (IRON 325) 325 (65 Fe) MG tablet,

## 2025-04-16 NOTE — CONSULTS
Gastroenterology Consult Note   DOMINIQUE Benavidez with Sowmya Solares M.D. Consult Note        Date of Service: 4/16/2025  Reason for Consult:?  Radiation colitis, GI bleed on Eliquis  Requesting Physician: Dr. Monique    CHIEF COMPLAINT: GI bleed    History Obtained From: Patient and EMR    HISTORY OF PRESENT ILLNESS:       Montana Gerard is a 84 y.o. male with significant past medical history of A-fib, aortic stenosis status post TAVR, heart failure, diabetes, penile prosthesis, prostate cancer status post brachytherapy, CAD, depression, gastritis, GERD, hyperlipidemia, hypertension and iron deficiency anemia presenting to ED for GI bleed from nursing facility and admitted for further workup.  Chart reviewed patient was recently [place] for GI bleed and hematuria.  Patient had EGD and colonoscopy on 3/6 with general surgery.  Colonoscopy-preparation of the colon was fair.  1 small polyp in the cecum, removed with cold biopsy forceps.  Diverticulosis in the sigmoid colon.  The examination was otherwise normal on direct and retroflexion views.  EGD-normal examined duodenum.  3 cm hiatal hernia.  Normal esophagus.  No specimens collected. At that time patient was also seen by neurology for lumbar spine discitis and osteomyelitis who recommended conservative management.  Patient seen by ID for positive blood cultures and was treated with IV antibiotics.  RINKU negative for vegetations.  Patient was seen by urology for hematuria and determined secondary to radiation cystitis.  Eliquis was resumed prior to discharge and was to follow-up with [facility] for possible Watchman procedure.  Patient was ultimately discharged in stable condition.  Pt reports he was told while at the facility that he had BRBPR and was sent in for evaluation. States he thought he was doing well post recent hospitalization. No associated rectal or abdominal pain. No c/o n/v. Eating well, with good appetite. Reports  software. Every effort has been made to ensure accuracy, however; inadvertent computerized transcription errors may be present.     Thank you very much for your consultation. We will follow closely with you.    Discussed with Dr. Solares  Treatment plan developed by Dr. Beck Shaver, EKYANA-NP-QUAN 4/16/2025 11:15 AM for Dr. Solares

## 2025-04-16 NOTE — PROGRESS NOTES
[facility] Quality Flow/Interdisciplinary Rounds Progress Note        Quality Flow Rounds held on April 16, 2025    Disciplines Attending:  Bedside Nurse, , , and Nursing Unit Leadership    Montana Gerard was admitted on 4/15/2025  6:28 AM    Anticipated Discharge Date:       Disposition:    Kosta Score:  Kosta Scale Score: 15    [facility] RISK OF UNPLANNED READMISSION 2.0             29.9 Total Score        Discussed patient goal for the day, patient clinical progression, and barriers to discharge.  The following Goal(s) of the Day/Commitment(s) have been identified:   discharge planning, IV ABX, urology, GI, palliative      Oscar Rey RN  April 16, 2025

## 2025-04-16 NOTE — PROGRESS NOTES
Spiritual Health History and Assessment/Progress Note  [facility]  [facility]    Initial Encounter, Attempted Encounter,  ,  ,      Name: Montana Gerard MRN: [id number]    Age: 84 y.o.     Sex: male   Language: English   Jainism: Unitarian Universalist   GI bleed     Date: 4/16/2025                           Spiritual Assessment began in [facility] INTERMEDIATE        Referral/Consult From: Rounding   Encounter Overview/Reason: Initial Encounter, Attempted Encounter  Service Provided For: Patient, Patient not available    Althea, Belief, Meaning:   Patient is connected with a althea tradition or spiritual practice  Family/Friends No family/friends present      Importance and Influence:  Patient unable to assess at this time  Family/Friends No family/friends present    Community:  Patient is connected with a spiritual community and feels well-supported. Support system includes: Spouse/Partner and Children  Family/Friends No family/friends present    Assessment and Plan of Care:     Patient Interventions include: Other: Staff caring for the patient Prayer respectful of the patient's althea silently offered at the time.  Family/Friends Interventions include: No family/friends present    Patient Plan of Care: Spiritual Care available upon further referral  Family/Friends Plan of Care: No family/friends present    Electronically signed by Chaplain Bud on 4/16/2025 at 1:17 PM

## 2025-04-16 NOTE — CONSULTS
Comprehensive Nutrition Assessment    Type and Reason for Visit:  Initial, Consult    Nutrition Recommendations/Plan:   Continue current diet, advance diet as medically feasible  Will add Ensure TID, chocolate per pt preference  Continue inpatient monitoring      Malnutrition Assessment:  Malnutrition Status:  Moderate malnutrition (04/16/25 1537)    Context:  Chronic Illness     Findings of the 6 clinical characteristics of malnutrition:  Energy Intake:  75% or less estimated energy requirements for 1 month or longer  Weight Loss:  Unable to assess (d/t fluid shifts, CHF)     Body Fat Loss:  Mild body fat loss (moderate) Orbital, Triceps, Buccal region   Muscle Mass Loss:  Mild muscle mass loss (moderate) Temples (temporalis), Clavicles (pectoralis & deltoids)  Fluid Accumulation:  Unable to assess (multifactorial)     Strength:  Not Performed    Nutrition Assessment:    Pt admits from SNF w/ BRBPR. Hx CAD, HFpEF, CVA, prostate CA s/p brachytherapy. Will add ONS per discussion w/ pt.    Nutrition Related Findings:    A&O X3/disoriented at times, I&Os WDL, BLE +1 edema, abd soft, +BS, constipation (+bowel regimen), Remeron   Wound Type:  (L buttock- pink/red)       Current Nutrition Intake & Therapies:    Average Meal Intake:  (ate pudding per pt)  Average Supplements Intake: None Ordered  ADULT DIET; Full Liquid    Anthropometric Measures:  Height: 172.7 cm (5' 8\")  Ideal Body Weight (IBW): 154 lbs (70 kg)    Admission Body Weight: 63.6 kg (140 lb 3.2 oz) (4/16 bed per RD measurement)  Current Body Weight: 63.6 kg (140 lb 3.2 oz) (4/16), 91 % IBW. Weight Source: Bed scale  Current BMI (kg/m2): 21.3  Usual Body Weight: 73.6 kg (162 lb 4 oz) (2/17/25 per EMR)     % Weight Change (Calculated): -13.6                    BMI Categories: Underweight (BMI less than 22) age over 65    Estimated Daily Nutrient Needs:  Energy Requirements Based On: Formula  Weight Used for Energy Requirements: Current  Energy (kcal/day):

## 2025-04-16 NOTE — PLAN OF CARE
Problem: ABCDS Injury Assessment  Goal: Absence of physical injury  4/16/2025 0133 by Marlo Shetty, RN  Outcome: Progressing     Problem: Skin/Tissue Integrity  Goal: Skin integrity remains intact  Description: 1.  Monitor for areas of redness and/or skin breakdown2.  Assess vascular access sites hourly3.  Every 4-6 hours minimum:  Change oxygen saturation probe site4.  Every 4-6 hours:  If on nasal continuous positive airway pressure, respiratory therapy assess nares and determine need for appliance change or resting period  4/16/2025 0150 by Marlo Shetty, RN  Outcome: Progressing     Problem: Discharge Planning  Goal: Discharge to home or other facility with appropriate resources  4/16/2025 0150 by Marlo Shetty, RN  Outcome: Progressing     Problem: Chronic Conditions and Co-morbidities  Goal: Patient's chronic conditions and co-morbidity symptoms are monitored and maintained or improved  4/16/2025 0150 by Marlo Shetty, RN  Outcome: Progressing

## 2025-04-17 LAB
ALBUMIN SERPL-MCNC: 2.9 G/DL (ref 3.5–5.2)
ALP SERPL-CCNC: 150 U/L (ref 40–129)
ALT SERPL-CCNC: 25 U/L (ref 0–40)
ANION GAP SERPL CALCULATED.3IONS-SCNC: 10 MMOL/L (ref 7–16)
AST SERPL-CCNC: 24 U/L (ref 0–39)
BASOPHILS # BLD: 0.04 K/UL (ref 0–0.2)
BASOPHILS NFR BLD: 0 % (ref 0–2)
BILIRUB SERPL-MCNC: 0.2 MG/DL (ref 0–1.2)
BUN SERPL-MCNC: 31 MG/DL (ref 6–23)
CALCIUM SERPL-MCNC: 9.1 MG/DL (ref 8.6–10.2)
CHLORIDE SERPL-SCNC: 103 MMOL/L (ref 98–107)
CO2 SERPL-SCNC: 24 MMOL/L (ref 22–29)
CREAT SERPL-MCNC: 0.8 MG/DL (ref 0.7–1.2)
EOSINOPHIL # BLD: 0.38 K/UL (ref 0.05–0.5)
EOSINOPHILS RELATIVE PERCENT: 4 % (ref 0–6)
ERYTHROCYTE [DISTWIDTH] IN BLOOD BY AUTOMATED COUNT: 17.8 % (ref 11.5–15)
GFR, ESTIMATED: 88 ML/MIN/1.73M2
GLUCOSE SERPL-MCNC: 123 MG/DL (ref 74–99)
HCT VFR BLD AUTO: 29.7 % (ref 37–54)
HGB BLD-MCNC: 9 G/DL (ref 12.5–16.5)
IMM GRANULOCYTES # BLD AUTO: 0.05 K/UL (ref 0–0.58)
IMM GRANULOCYTES NFR BLD: 1 % (ref 0–5)
LYMPHOCYTES NFR BLD: 0.66 K/UL (ref 1.5–4)
LYMPHOCYTES RELATIVE PERCENT: 7 % (ref 20–42)
MCH RBC QN AUTO: 28.1 PG (ref 26–35)
MCHC RBC AUTO-ENTMCNC: 30.3 G/DL (ref 32–34.5)
MCV RBC AUTO: 92.8 FL (ref 80–99.9)
MONOCYTES NFR BLD: 0.68 K/UL (ref 0.1–0.95)
MONOCYTES NFR BLD: 7 % (ref 2–12)
NEUTROPHILS NFR BLD: 81 % (ref 43–80)
NEUTS SEG NFR BLD: 7.91 K/UL (ref 1.8–7.3)
PLATELET # BLD AUTO: 161 K/UL (ref 130–450)
PMV BLD AUTO: 10.2 FL (ref 7–12)
POTASSIUM SERPL-SCNC: 4.1 MMOL/L (ref 3.5–5)
PROT SERPL-MCNC: 5.3 G/DL (ref 6.4–8.3)
RBC # BLD AUTO: 3.2 M/UL (ref 3.8–5.8)
SODIUM SERPL-SCNC: 137 MMOL/L (ref 132–146)
WBC OTHER # BLD: 9.7 K/UL (ref 4.5–11.5)

## 2025-04-17 PROCEDURE — 6370000000 HC RX 637 (ALT 250 FOR IP): Performed by: STUDENT IN AN ORGANIZED HEALTH CARE EDUCATION/TRAINING PROGRAM

## 2025-04-17 PROCEDURE — 36415 COLL VENOUS BLD VENIPUNCTURE: CPT

## 2025-04-17 PROCEDURE — 51701 INSERT BLADDER CATHETER: CPT

## 2025-04-17 PROCEDURE — 2500000003 HC RX 250 WO HCPCS: Performed by: INTERNAL MEDICINE

## 2025-04-17 PROCEDURE — 2060000000 HC ICU INTERMEDIATE R&B

## 2025-04-17 PROCEDURE — 85025 COMPLETE CBC W/AUTO DIFF WBC: CPT

## 2025-04-17 PROCEDURE — 6360000002 HC RX W HCPCS: Performed by: INTERNAL MEDICINE

## 2025-04-17 PROCEDURE — 6370000000 HC RX 637 (ALT 250 FOR IP): Performed by: INTERNAL MEDICINE

## 2025-04-17 PROCEDURE — 6370000000 HC RX 637 (ALT 250 FOR IP): Performed by: NURSE PRACTITIONER

## 2025-04-17 PROCEDURE — 51798 US URINE CAPACITY MEASURE: CPT

## 2025-04-17 PROCEDURE — 99232 SBSQ HOSP IP/OBS MODERATE 35: CPT | Performed by: INTERNAL MEDICINE

## 2025-04-17 PROCEDURE — 80053 COMPREHEN METABOLIC PANEL: CPT

## 2025-04-17 RX ORDER — LACTULOSE 10 G/15ML
20 SOLUTION ORAL
Status: DISCONTINUED | OUTPATIENT
Start: 2025-04-17 | End: 2025-04-18 | Stop reason: HOSPADM

## 2025-04-17 RX ADMIN — METOPROLOL SUCCINATE 12.5 MG: 25 TABLET, EXTENDED RELEASE ORAL at 09:35

## 2025-04-17 RX ADMIN — APIXABAN 5 MG: 5 TABLET, FILM COATED ORAL at 11:32

## 2025-04-17 RX ADMIN — TIMOLOL MALEATE 1 DROP: 5 SOLUTION OPHTHALMIC at 21:23

## 2025-04-17 RX ADMIN — GABAPENTIN 100 MG: 100 CAPSULE ORAL at 21:22

## 2025-04-17 RX ADMIN — LACTULOSE 20 G: 20 SOLUTION ORAL at 14:50

## 2025-04-17 RX ADMIN — BUPROPION HYDROCHLORIDE 100 MG: 100 TABLET, FILM COATED, EXTENDED RELEASE ORAL at 09:34

## 2025-04-17 RX ADMIN — SODIUM CHLORIDE, PRESERVATIVE FREE 10 ML: 5 INJECTION INTRAVENOUS at 09:35

## 2025-04-17 RX ADMIN — OXYCODONE 5 MG: 5 TABLET ORAL at 14:38

## 2025-04-17 RX ADMIN — BRIMONIDINE TARTRATE 1 DROP: 2 SOLUTION OPHTHALMIC at 09:35

## 2025-04-17 RX ADMIN — APIXABAN 5 MG: 5 TABLET, FILM COATED ORAL at 21:22

## 2025-04-17 RX ADMIN — FERROUS SULFATE TAB 325 MG (65 MG ELEMENTAL FE) 325 MG: 325 (65 FE) TAB at 21:21

## 2025-04-17 RX ADMIN — OXYCODONE 5 MG: 5 TABLET ORAL at 18:30

## 2025-04-17 RX ADMIN — ATORVASTATIN CALCIUM 40 MG: 40 TABLET, FILM COATED ORAL at 09:35

## 2025-04-17 RX ADMIN — TAMSULOSIN HYDROCHLORIDE 0.8 MG: 0.4 CAPSULE ORAL at 21:21

## 2025-04-17 RX ADMIN — MIRTAZAPINE 15 MG: 15 TABLET, FILM COATED ORAL at 21:22

## 2025-04-17 RX ADMIN — FERROUS SULFATE TAB 325 MG (65 MG ELEMENTAL FE) 325 MG: 325 (65 FE) TAB at 09:34

## 2025-04-17 RX ADMIN — SENNOSIDES 8.6 MG: 8.6 TABLET, COATED ORAL at 09:34

## 2025-04-17 RX ADMIN — PANTOPRAZOLE SODIUM 40 MG: 40 TABLET, DELAYED RELEASE ORAL at 14:38

## 2025-04-17 RX ADMIN — TROSPIUM CHLORIDE 20 MG: 20 TABLET, FILM COATED ORAL at 21:21

## 2025-04-17 RX ADMIN — BRIMONIDINE TARTRATE 1 DROP: 2 SOLUTION OPHTHALMIC at 21:23

## 2025-04-17 RX ADMIN — DOFETILIDE 125 MCG: 0.12 CAPSULE ORAL at 09:34

## 2025-04-17 RX ADMIN — SENNOSIDES 8.6 MG: 8.6 TABLET, COATED ORAL at 21:22

## 2025-04-17 RX ADMIN — Medication 1 TABLET: at 09:34

## 2025-04-17 RX ADMIN — DAPSONE 25 MG: 25 TABLET ORAL at 09:35

## 2025-04-17 RX ADMIN — LACTULOSE 20 G: 20 SOLUTION ORAL at 21:21

## 2025-04-17 RX ADMIN — LATANOPROST 1 DROP: 50 SOLUTION OPHTHALMIC at 21:23

## 2025-04-17 RX ADMIN — SODIUM CHLORIDE, PRESERVATIVE FREE 10 ML: 5 INJECTION INTRAVENOUS at 21:22

## 2025-04-17 RX ADMIN — GABAPENTIN 100 MG: 100 CAPSULE ORAL at 09:34

## 2025-04-17 RX ADMIN — OXYCODONE 5 MG: 5 TABLET ORAL at 01:52

## 2025-04-17 RX ADMIN — OXYCODONE 5 MG: 5 TABLET ORAL at 05:51

## 2025-04-17 RX ADMIN — OXYCODONE 5 MG: 5 TABLET ORAL at 21:21

## 2025-04-17 RX ADMIN — DOFETILIDE 125 MCG: 0.12 CAPSULE ORAL at 21:22

## 2025-04-17 RX ADMIN — PANTOPRAZOLE SODIUM 40 MG: 40 TABLET, DELAYED RELEASE ORAL at 05:51

## 2025-04-17 RX ADMIN — TIMOLOL MALEATE 1 DROP: 5 SOLUTION OPHTHALMIC at 09:35

## 2025-04-17 RX ADMIN — MESALAMINE 1000 MG: 1000 SUPPOSITORY RECTAL at 21:22

## 2025-04-17 RX ADMIN — POLYETHYLENE GLYCOL 3350 17 G: 17 POWDER, FOR SOLUTION ORAL at 09:33

## 2025-04-17 RX ADMIN — OXYCODONE 5 MG: 5 TABLET ORAL at 09:34

## 2025-04-17 RX ADMIN — WATER 2000 MG: 1 INJECTION INTRAMUSCULAR; INTRAVENOUS; SUBCUTANEOUS at 01:52

## 2025-04-17 RX ADMIN — WATER 2000 MG: 1 INJECTION INTRAMUSCULAR; INTRAVENOUS; SUBCUTANEOUS at 14:39

## 2025-04-17 ASSESSMENT — PAIN DESCRIPTION - LOCATION
LOCATION: GENERALIZED
LOCATION: BACK
LOCATION: BACK

## 2025-04-17 ASSESSMENT — PAIN DESCRIPTION - DESCRIPTORS
DESCRIPTORS: ACHING;DISCOMFORT
DESCRIPTORS: ACHING;GNAWING;NAGGING
DESCRIPTORS: ACHING;DISCOMFORT

## 2025-04-17 ASSESSMENT — PAIN SCALES - GENERAL
PAINLEVEL_OUTOF10: 6
PAINLEVEL_OUTOF10: 7
PAINLEVEL_OUTOF10: 0
PAINLEVEL_OUTOF10: 4

## 2025-04-17 ASSESSMENT — PAIN DESCRIPTION - ORIENTATION: ORIENTATION: LOWER

## 2025-04-17 NOTE — PROGRESS NOTES
[facility] Hospitalist Progress Note    Admitting Date and Time: 4/15/2025  6:28 AM  Admit Dx: Rectal bleeding [K62.5]  GI bleed [K92.2]    Subjective:  Patient is being followed for Rectal bleeding [K62.5]  GI bleed [K92.2]   Patient is seen today. No acute complaints     ROS: denies fever, chills, cp, sob, n/v, HA unless stated above.      polyethylene glycol  17 g Oral Daily    mesalamine  1,000 mg Rectal Nightly    oxyCODONE  5 mg Oral Q4H    sodium chloride flush  5-40 mL IntraVENous 2 times per day    atorvastatin  40 mg Oral QAM    buPROPion  100 mg Oral QAM    dapsone  25 mg Oral QAM    dofetilide  125 mcg Oral BID    ferrous sulfate  325 mg Oral BID    gabapentin  100 mg Oral BID    latanoprost  1 drop Both Eyes Nightly    metoprolol succinate  12.5 mg Oral QAM    mirtazapine  15 mg Oral Nightly    ocuvite-lutein  1 tablet Oral QAM    pantoprazole  40 mg Oral BID AC    tamsulosin  0.8 mg Oral Nightly    trospium  20 mg Oral Nightly    cefTRIAXone (ROCEPHIN) IV  2,000 mg IntraVENous Q12H    brimonidine  1 drop Both Eyes BID    And    timolol  1 drop Both Eyes BID    senna  1 tablet Oral BID     oxyCODONE, 5 mg, Q2H PRN  sodium chloride flush, 5-40 mL, PRN  sodium chloride, , PRN  potassium chloride, 40 mEq, PRN   Or  potassium alternative oral replacement, 40 mEq, PRN   Or  potassium chloride, 10 mEq, PRN  magnesium sulfate, 2,000 mg, PRN  polyethylene glycol, 17 g, Daily PRN  acetaminophen, 650 mg, Q6H PRN   Or  acetaminophen, 650 mg, Q6H PRN  promethazine, 6.25 mg, Q6H PRN   Or  promethazine, 12.5 mg, Q6H PRN         Objective:    /64   Pulse 64   Temp 97.7 °F (36.5 °C) (Oral)   Resp 19   Ht 1.727 m (5' 8\")   Wt 63.6 kg (140 lb 3.2 oz)   SpO2 93%   BMI 21.32 kg/m²     General Appearance: alert and oriented to person, place and time and in no acute distress  Skin: warm and dry  Head: normocephalic and atraumatic  Eyes: pupils equal, round, and reactive to light, extraocular eye

## 2025-04-17 NOTE — CARE COORDINATION
Social Work / Discharge PLanning : SW met with spouse, daughter and son late yesterday afternoon. Goals at discharge discussed and their plan is SNF with Palliative services. SNF choices: 1.) [facility] 2.) [facility] and 3.) [facility] on [address]. SW called and spoke to Norah at [facility]. REferral provided. Family would like private pay cost due to once insurance cuts, they will be paying private pay. [facility] is 340.00 . [facility]  Private Room 370.00 semi private 340.00 and [facility] : 390.00 private ( no semi). AWait to hear if Norah from [facility] can accept and once SW receives answers on SNF acceptance, will follow up with family and patient. . SW to follow. Electronically signed by LUCINA Cid on 4/17/25 at 10:53 AM EDT     Addendum: SW received denial from [facility] Norah. They do NOT have a bed for patient. [facility]. SW called and updated Spouse Bernadette and also provided her with private pay information. [facility] did submit pre-cert and aware if denied, then patient will be provate pay. They would like Palliative to follow at SNF.[facility] updated they would like private room. Await Auth. N 17 and transport forms completed. SW to follow. Electronically signed by LUCINA Cid on 4/17/25 at 12:58 PM EDT  SW to follow. Electronically signed by LUCINA Cid on 4/17/25 at 12:57 PM EDT

## 2025-04-17 NOTE — PROGRESS NOTES
[facility] Quality Flow/Interdisciplinary Rounds Progress Note        Quality Flow Rounds held on April 17, 2025    Disciplines Attending:  Bedside Nurse, , , and Nursing Unit Leadership    Montana Gerard was admitted on 4/15/2025  6:28 AM    Anticipated Discharge Date:       Disposition:    Kosta Score:  Kosta Scale Score: 13    [facility] RISK OF UNPLANNED READMISSION 2.0             28.1 Total Score        Discussed patient goal for the day, patient clinical progression, and barriers to discharge.  The following Goal(s) of the Day/Commitment(s) have been identified:   iv abx, picc, until 4/19, no plan for scopes, monitor labs,.      Maddie Brooks RN  April 17, 2025         arias

## 2025-04-17 NOTE — PLAN OF CARE
Problem: ABCDS Injury Assessment  Goal: Absence of physical injury  4/17/2025 0030 by Marlo Shetty, RN  Outcome: Progressing     Problem: Skin/Tissue Integrity  Goal: Skin integrity remains intact  Description: 1.  Monitor for areas of redness and/or skin breakdown2.  Assess vascular access sites hourly3.  Every 4-6 hours minimum:  Change oxygen saturation probe site4.  Every 4-6 hours:  If on nasal continuous positive airway pressure, respiratory therapy assess nares and determine need for appliance change or resting period  4/17/2025 0030 by Marlo Shetty, RN  Outcome: Progressing     Problem: Discharge Planning  Goal: Discharge to home or other facility with appropriate resources  4/17/2025 0030 by Marlo Shetty RN  Outcome: Progressing     Problem: Chronic Conditions and Co-morbidities  Goal: Patient's chronic conditions and co-morbidity symptoms are monitored and maintained or improved  4/17/2025 0030 by Marlo Shetty RN  Outcome: Progressing     Problem: Safety - Adult  Goal: Free from fall injury  4/17/2025 0030 by Marlo Shetty, RN  Outcome: Progressing     Problem: Pain  Goal: Verbalizes/displays adequate comfort level or baseline comfort level  4/17/2025 0030 by Marlo Shetty RN  Outcome: Progressing     Problem: Nutrition Deficit:  Goal: Optimize nutritional status  Outcome: Progressing

## 2025-04-17 NOTE — PROGRESS NOTES
Palliative Care Department  [phone]  Palliative Care Progress Note  MD Montana Cosme  [id number]  Hospital Day: 3  Location of Service: [facility]  Date of Initial Consult: 04/15/2025  Referring Provider: Tyrone Monique  Palliative Medicine was consulted for assistance with: Assist with goals of care    ASSESSMENT & PLAN:     Prostate Cancer  S/p radiation seed placement and Brachy therapy  Rectal bleeding  Radiation cystitis    Pain  Start Oxycodone 5 mg IR Q4h around-the clock  Start oxycodone 5 mg IR Q2H PRN  Continue tylenol 650 mg Q6H PRN  Continue Gabapentin 100 mg BID    Nausea  Continue Phenergan 12.5 mg PO Q6H PRN    Anorexia/Insomnia  Continue Mirtazapine 15 mg HS     Palliative Care Encounter  At this time, the patient does have capacity for medical decision-making  Will continue to follow for ongoing monitoring of progression of Pain, Nausea, Anxiety, and Anorexia as well as for appropriateness for hospice care due to Cancer  Will continue to evaluate test results related to and response to treatment of Cancer and medication effectiveness for Pain, Nausea, Anxiety, Constipation, and Anorexia,  Will obtain testing as needed to monitor medication results:N/A  Will continue to assess patient status including monitor for side effects of treatments-Traomadol and monitor for opiate induced constipation  Ongoing counseling of patient and family regarding diagnoses and prognosis of Cancer  Will continue treatment including tylenol    Goals of care: To Be Determined  Surrogate:    Primary Decision Maker: Bernadette Gerard - Spouse - [phone]  Secondary Decision Maker: Ivan Boyle - Child - [phone]  Prognosis: depends upon goals  Spiritual assessment: No spiritual distress identified   Bereavement and grief: Low Risk Bereavement    Advance Care Planning Documents:    Healthcare Power of : Completed  Financial Power of : Completed  Living Will:

## 2025-04-17 NOTE — PLAN OF CARE
Problem: ABCDS Injury Assessment  Goal: Absence of physical injury  4/17/2025 1128 by Sagar Najera RN  Outcome: Progressing  4/17/2025 0030 by Marlo Shetty RN  Outcome: Progressing     Problem: Skin/Tissue Integrity  Goal: Skin integrity remains intact  Description: 1.  Monitor for areas of redness and/or skin breakdown2.  Assess vascular access sites hourly3.  Every 4-6 hours minimum:  Change oxygen saturation probe site4.  Every 4-6 hours:  If on nasal continuous positive airway pressure, respiratory therapy assess nares and determine need for appliance change or resting period  4/17/2025 1128 by Sagar Najera RN  Outcome: Progressing  4/17/2025 0030 by Marlo Shetty RN  Outcome: Progressing     Problem: Discharge Planning  Goal: Discharge to home or other facility with appropriate resources  4/17/2025 1128 by Sagar Najera RN  Outcome: Progressing  4/17/2025 0030 by Marlo Shetty RN  Outcome: Progressing     Problem: Chronic Conditions and Co-morbidities  Goal: Patient's chronic conditions and co-morbidity symptoms are monitored and maintained or improved  4/17/2025 1128 by Sagar Najera RN  Outcome: Progressing  4/17/2025 0030 by Marlo Shetty RN  Outcome: Progressing     Problem: Safety - Adult  Goal: Free from fall injury  4/17/2025 1128 by Sagar Najera RN  Outcome: Progressing  4/17/2025 0030 by Marlo Shetty RN  Outcome: Progressing     Problem: Pain  Goal: Verbalizes/displays adequate comfort level or baseline comfort level  4/17/2025 1128 by Sagar Najera RN  Outcome: Progressing  4/17/2025 0030 by Marlo Shetty RN  Outcome: Progressing     Problem: Nutrition Deficit:  Goal: Optimize nutritional status  4/17/2025 1128 by Sagar Najera RN  Outcome: Progressing  4/17/2025 0030 by Marlo Shetty RN  Outcome: Progressing

## 2025-04-17 NOTE — DISCHARGE INSTR - COC
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       Isolation/Infection:   Isolation            No Isolation          Patient Infection Status    None to display              Nurse Assessment:  Last Vital Signs: /72   Pulse 72   Temp 98.2 °F (36.8 °C) (Oral)   Resp 18   Ht 1.727 m (5' 8\")   Wt 63.6 kg (140 lb 3.2 oz)   SpO2 96%   BMI 21.32 kg/m²     Last documented pain score (0-10 scale): Pain Level: 7  Last Weight:   Wt Readings from Last 1 Encounters:   04/16/25 63.6 kg (140 lb 3.2 oz)     Mental Status:  disoriented, oriented, and  Disoriented at times    IV Access:  - None    Nursing Mobility/ADLs:  Walking   Dependent  Transfer  Dependent  Bathing  Dependent  Dressing  Dependent  Toileting  Dependent  Feeding  Assisted  Med Admin  Assisted  Med Delivery   whole    Wound Care Documentation and Therapy:  Wound 03/19/25 Buttocks Left (Active)   Dressing Status Clean;Dry;Intact 04/17/25 0930   Dressing/Treatment Protective barrier;Xeroform 04/17/25 0930   Wound Length (cm) 1.5 cm 04/15/25 1108   Wound Width (cm) 1 cm 04/15/25 1108   Wound Surface Area (cm^2) 1.5 cm^2 04/15/25 1108   Change in Wound Size % (l*w) 25 04/15/25 1108   Wound Assessment Pink/red;Other (Comment) 04/17/25 0930   Drainage Amount Small (< 25%) 04/17/25 0930   Drainage Description Sanguinous 04/17/25 0930   Number of days: 29        Elimination:  Continence:   Bowel: No  Bladder: Patient has been straight cathed. Not much urine output  Urinary Catheter: None   Colostomy/Ileostomy/Ileal Conduit: No       Date of Last BM: 4/18/2025    Intake/Output Summary (Last 24 hours) at 4/17/2025 1302  Last data filed at 4/17/2025 0553  Gross per 24 hour   Intake --   Output 600 ml   Net -600 ml     I/O last 3 completed shifts:  In: -   Out: 1150 [Urine:1150]    Safety Concerns:     None    Impairments/Disabilities:      None    Nutrition Therapy:  Current Nutrition Therapy:   - Oral Diet:  General    Routes of  Feeding: Oral  Liquids: Thin Liquids  Daily Fluid Restriction: no  Last Modified Barium Swallow with Video (Video Swallowing Test): not done    Treatments at the Time of Hospital Discharge:   Respiratory Treatments: n/a  Oxygen Therapy:  is not on home oxygen therapy.  Ventilator:    - No ventilator support    Rehab Therapies: None  Weight Bearing Status/Restrictions: No weight bearing restrictions  Other Medical Equipment (for information only, NOT a DME order):  n/a  Other Treatments: n/a    Patient's personal belongings (please select all that are sent with patient):  None    RN SIGNATURE:  Electronically signed by Lucoh Saldaña RN on 4/18/25 at 2:15 PM EDT    CASE MANAGEMENT/SOCIAL WORK SECTION    Inpatient Status Date: ***    Readmission Risk Assessment Score:  [facility] RISK OF UNPLANNED READMISSION 2.0             28.5 Total Score        Discharging to Facility/ Agency   Name: [facility]   Address:[address] [place] [address]  Phone:[phone]  Fax:[phone]    Dialysis Facility (if applicable)   Name:  Address:  Dialysis Schedule:  Phone:  Fax:    / signature: {Esignature:[id number]}Electronically signed by LUCINA Cid on 4/17/25 at 1:03 PM EDT     PHYSICIAN SECTION    Prognosis: Guarded    Condition at Discharge: Stable    Rehab Potential (if transferring to Rehab): Fair    Recommended Labs or Other Treatments After Discharge:     Physician Certification: I certify the above information and transfer of Montana Gerard  is necessary for the continuing treatment of the diagnosis listed and that he requires Hospice for less 30 days.     Update Admission H&P: Changes in H&P as follows - refer to d/c summary     PHYSICIAN SIGNATURE:  Electronically signed by Tyrone Monique MD on 4/18/25 at 12:44 PM EDT

## 2025-04-18 VITALS
TEMPERATURE: 97.5 F | SYSTOLIC BLOOD PRESSURE: 125 MMHG | WEIGHT: 140.2 LBS | HEIGHT: 68 IN | OXYGEN SATURATION: 99 % | HEART RATE: 82 BPM | RESPIRATION RATE: 20 BRPM | BODY MASS INDEX: 21.25 KG/M2 | DIASTOLIC BLOOD PRESSURE: 75 MMHG

## 2025-04-18 LAB
ALBUMIN SERPL-MCNC: 3.3 G/DL (ref 3.5–5.2)
ALBUMIN SERPL-MCNC: 3.3 G/DL (ref 3.5–5.2)
ALP SERPL-CCNC: 322 U/L (ref 40–129)
ALP SERPL-CCNC: 330 U/L (ref 40–129)
ALT SERPL-CCNC: 139 U/L (ref 0–40)
ALT SERPL-CCNC: 85 U/L (ref 0–40)
ANION GAP SERPL CALCULATED.3IONS-SCNC: 10 MMOL/L (ref 7–16)
ANION GAP SERPL CALCULATED.3IONS-SCNC: 12 MMOL/L (ref 7–16)
AST SERPL-CCNC: 135 U/L (ref 0–39)
AST SERPL-CCNC: 149 U/L (ref 0–39)
BASOPHILS # BLD: 0.05 K/UL (ref 0–0.2)
BASOPHILS NFR BLD: 0 % (ref 0–2)
BILIRUB SERPL-MCNC: 0.2 MG/DL (ref 0–1.2)
BILIRUB SERPL-MCNC: 0.3 MG/DL (ref 0–1.2)
BUN SERPL-MCNC: 34 MG/DL (ref 6–23)
BUN SERPL-MCNC: 34 MG/DL (ref 6–23)
CALCIUM SERPL-MCNC: 9.5 MG/DL (ref 8.6–10.2)
CALCIUM SERPL-MCNC: 9.8 MG/DL (ref 8.6–10.2)
CHLORIDE SERPL-SCNC: 103 MMOL/L (ref 98–107)
CHLORIDE SERPL-SCNC: 104 MMOL/L (ref 98–107)
CO2 SERPL-SCNC: 23 MMOL/L (ref 22–29)
CO2 SERPL-SCNC: 25 MMOL/L (ref 22–29)
CREAT SERPL-MCNC: 0.8 MG/DL (ref 0.7–1.2)
CREAT SERPL-MCNC: 0.8 MG/DL (ref 0.7–1.2)
EKG ATRIAL RATE: 138 BPM
EKG Q-T INTERVAL: 300 MS
EKG QRS DURATION: 94 MS
EKG QTC CALCULATION (BAZETT): 474 MS
EKG R AXIS: 32 DEGREES
EKG T AXIS: 124 DEGREES
EKG VENTRICULAR RATE: 150 BPM
EOSINOPHIL # BLD: 0.27 K/UL (ref 0.05–0.5)
EOSINOPHILS RELATIVE PERCENT: 1 % (ref 0–6)
ERYTHROCYTE [DISTWIDTH] IN BLOOD BY AUTOMATED COUNT: 17.8 % (ref 11.5–15)
GFR, ESTIMATED: 88 ML/MIN/1.73M2
GFR, ESTIMATED: >90 ML/MIN/1.73M2
GLUCOSE SERPL-MCNC: 136 MG/DL (ref 74–99)
GLUCOSE SERPL-MCNC: 140 MG/DL (ref 74–99)
HCT VFR BLD AUTO: 34.8 % (ref 37–54)
HGB BLD-MCNC: 10.7 G/DL (ref 12.5–16.5)
IMM GRANULOCYTES # BLD AUTO: 0.12 K/UL (ref 0–0.58)
IMM GRANULOCYTES NFR BLD: 1 % (ref 0–5)
LYMPHOCYTES NFR BLD: 0.96 K/UL (ref 1.5–4)
LYMPHOCYTES RELATIVE PERCENT: 5 % (ref 20–42)
MAGNESIUM SERPL-MCNC: 1.6 MG/DL (ref 1.6–2.6)
MAGNESIUM SERPL-MCNC: 2.3 MG/DL (ref 1.6–2.6)
MCH RBC QN AUTO: 28.7 PG (ref 26–35)
MCHC RBC AUTO-ENTMCNC: 30.7 G/DL (ref 32–34.5)
MCV RBC AUTO: 93.3 FL (ref 80–99.9)
MONOCYTES NFR BLD: 1.24 K/UL (ref 0.1–0.95)
MONOCYTES NFR BLD: 7 % (ref 2–12)
NEUTROPHILS NFR BLD: 86 % (ref 43–80)
NEUTS SEG NFR BLD: 16.12 K/UL (ref 1.8–7.3)
PHOSPHATE SERPL-MCNC: 3 MG/DL (ref 2.5–4.5)
PLATELET # BLD AUTO: 229 K/UL (ref 130–450)
PMV BLD AUTO: 9.8 FL (ref 7–12)
POTASSIUM SERPL-SCNC: 3.9 MMOL/L (ref 3.5–5)
POTASSIUM SERPL-SCNC: 3.9 MMOL/L (ref 3.5–5)
PROT SERPL-MCNC: 5.9 G/DL (ref 6.4–8.3)
PROT SERPL-MCNC: 6.1 G/DL (ref 6.4–8.3)
RBC # BLD AUTO: 3.73 M/UL (ref 3.8–5.8)
SODIUM SERPL-SCNC: 138 MMOL/L (ref 132–146)
SODIUM SERPL-SCNC: 139 MMOL/L (ref 132–146)
WBC OTHER # BLD: 18.8 K/UL (ref 4.5–11.5)

## 2025-04-18 PROCEDURE — 80053 COMPREHEN METABOLIC PANEL: CPT

## 2025-04-18 PROCEDURE — 85025 COMPLETE CBC W/AUTO DIFF WBC: CPT

## 2025-04-18 PROCEDURE — 2500000003 HC RX 250 WO HCPCS: Performed by: INTERNAL MEDICINE

## 2025-04-18 PROCEDURE — 51701 INSERT BLADDER CATHETER: CPT

## 2025-04-18 PROCEDURE — 6370000000 HC RX 637 (ALT 250 FOR IP): Performed by: INTERNAL MEDICINE

## 2025-04-18 PROCEDURE — 84100 ASSAY OF PHOSPHORUS: CPT

## 2025-04-18 PROCEDURE — 83735 ASSAY OF MAGNESIUM: CPT

## 2025-04-18 PROCEDURE — 6360000002 HC RX W HCPCS: Performed by: INTERNAL MEDICINE

## 2025-04-18 PROCEDURE — 2500000003 HC RX 250 WO HCPCS

## 2025-04-18 PROCEDURE — 6370000000 HC RX 637 (ALT 250 FOR IP): Performed by: STUDENT IN AN ORGANIZED HEALTH CARE EDUCATION/TRAINING PROGRAM

## 2025-04-18 PROCEDURE — 93005 ELECTROCARDIOGRAM TRACING: CPT | Performed by: INTERNAL MEDICINE

## 2025-04-18 PROCEDURE — 93010 ELECTROCARDIOGRAM REPORT: CPT | Performed by: INTERNAL MEDICINE

## 2025-04-18 PROCEDURE — 99232 SBSQ HOSP IP/OBS MODERATE 35: CPT | Performed by: INTERNAL MEDICINE

## 2025-04-18 PROCEDURE — 51798 US URINE CAPACITY MEASURE: CPT

## 2025-04-18 PROCEDURE — 99223 1ST HOSP IP/OBS HIGH 75: CPT | Performed by: INTERNAL MEDICINE

## 2025-04-18 PROCEDURE — 6370000000 HC RX 637 (ALT 250 FOR IP): Performed by: NURSE PRACTITIONER

## 2025-04-18 RX ORDER — METOPROLOL SUCCINATE 25 MG/1
12.5 TABLET, EXTENDED RELEASE ORAL 2 TIMES DAILY
Qty: 30 TABLET | Refills: 0 | Status: SHIPPED | OUTPATIENT
Start: 2025-04-18

## 2025-04-18 RX ORDER — METOPROLOL TARTRATE 1 MG/ML
5 INJECTION, SOLUTION INTRAVENOUS ONCE
Status: COMPLETED | OUTPATIENT
Start: 2025-04-18 | End: 2025-04-18

## 2025-04-18 RX ORDER — METOPROLOL SUCCINATE 25 MG/1
12.5 TABLET, EXTENDED RELEASE ORAL 2 TIMES DAILY
Status: DISCONTINUED | OUTPATIENT
Start: 2025-04-18 | End: 2025-04-18 | Stop reason: HOSPADM

## 2025-04-18 RX ORDER — SENNOSIDES A AND B 8.6 MG/1
1 TABLET, FILM COATED ORAL DAILY
Status: DISCONTINUED | OUTPATIENT
Start: 2025-04-19 | End: 2025-04-18 | Stop reason: HOSPADM

## 2025-04-18 RX ADMIN — BUPROPION HYDROCHLORIDE 100 MG: 100 TABLET, FILM COATED, EXTENDED RELEASE ORAL at 09:34

## 2025-04-18 RX ADMIN — OXYCODONE 5 MG: 5 TABLET ORAL at 11:18

## 2025-04-18 RX ADMIN — OXYCODONE 5 MG: 5 TABLET ORAL at 18:00

## 2025-04-18 RX ADMIN — OXYCODONE 5 MG: 5 TABLET ORAL at 09:32

## 2025-04-18 RX ADMIN — BRIMONIDINE TARTRATE 1 DROP: 2 SOLUTION OPHTHALMIC at 09:44

## 2025-04-18 RX ADMIN — LACTULOSE 20 G: 20 SOLUTION ORAL at 09:34

## 2025-04-18 RX ADMIN — METOPROLOL SUCCINATE 12.5 MG: 25 TABLET, EXTENDED RELEASE ORAL at 09:33

## 2025-04-18 RX ADMIN — FERROUS SULFATE TAB 325 MG (65 MG ELEMENTAL FE) 325 MG: 325 (65 FE) TAB at 09:34

## 2025-04-18 RX ADMIN — LACTULOSE 20 G: 20 SOLUTION ORAL at 00:14

## 2025-04-18 RX ADMIN — LACTULOSE 20 G: 20 SOLUTION ORAL at 11:18

## 2025-04-18 RX ADMIN — Medication 1 TABLET: at 09:34

## 2025-04-18 RX ADMIN — SODIUM CHLORIDE, PRESERVATIVE FREE 10 ML: 5 INJECTION INTRAVENOUS at 09:34

## 2025-04-18 RX ADMIN — OXYCODONE 5 MG: 5 TABLET ORAL at 13:24

## 2025-04-18 RX ADMIN — OXYCODONE 5 MG: 5 TABLET ORAL at 15:53

## 2025-04-18 RX ADMIN — APIXABAN 5 MG: 5 TABLET, FILM COATED ORAL at 09:34

## 2025-04-18 RX ADMIN — GABAPENTIN 100 MG: 100 CAPSULE ORAL at 09:33

## 2025-04-18 RX ADMIN — POLYETHYLENE GLYCOL 3350 17 G: 17 POWDER, FOR SOLUTION ORAL at 09:31

## 2025-04-18 RX ADMIN — MAGNESIUM SULFATE HEPTAHYDRATE 2000 MG: 40 INJECTION, SOLUTION INTRAVENOUS at 05:14

## 2025-04-18 RX ADMIN — LACTULOSE 20 G: 20 SOLUTION ORAL at 05:17

## 2025-04-18 RX ADMIN — SENNOSIDES 8.6 MG: 8.6 TABLET, COATED ORAL at 09:34

## 2025-04-18 RX ADMIN — WATER 2000 MG: 1 INJECTION INTRAMUSCULAR; INTRAVENOUS; SUBCUTANEOUS at 13:23

## 2025-04-18 RX ADMIN — DOFETILIDE 125 MCG: 0.12 CAPSULE ORAL at 09:34

## 2025-04-18 RX ADMIN — DAPSONE 25 MG: 25 TABLET ORAL at 09:43

## 2025-04-18 RX ADMIN — WATER 2000 MG: 1 INJECTION INTRAMUSCULAR; INTRAVENOUS; SUBCUTANEOUS at 02:16

## 2025-04-18 RX ADMIN — OXYCODONE 5 MG: 5 TABLET ORAL at 05:16

## 2025-04-18 RX ADMIN — TIMOLOL MALEATE 1 DROP: 5 SOLUTION OPHTHALMIC at 09:44

## 2025-04-18 RX ADMIN — METOPROLOL TARTRATE 5 MG: 5 INJECTION INTRAVENOUS at 00:14

## 2025-04-18 RX ADMIN — METOPROLOL TARTRATE 5 MG: 5 INJECTION INTRAVENOUS at 02:17

## 2025-04-18 RX ADMIN — PANTOPRAZOLE SODIUM 40 MG: 40 TABLET, DELAYED RELEASE ORAL at 05:17

## 2025-04-18 RX ADMIN — ATORVASTATIN CALCIUM 40 MG: 40 TABLET, FILM COATED ORAL at 09:34

## 2025-04-18 ASSESSMENT — PAIN DESCRIPTION - LOCATION
LOCATION: GENERALIZED
LOCATION: BACK;GENERALIZED
LOCATION: GENERALIZED;BACK

## 2025-04-18 ASSESSMENT — PAIN DESCRIPTION - DESCRIPTORS
DESCRIPTORS: DISCOMFORT;ACHING
DESCRIPTORS: ACHING
DESCRIPTORS: ACHING;DISCOMFORT
DESCRIPTORS: ACHING;DISCOMFORT
DESCRIPTORS: ACHING

## 2025-04-18 ASSESSMENT — PAIN SCALES - GENERAL
PAINLEVEL_OUTOF10: 8
PAINLEVEL_OUTOF10: 9
PAINLEVEL_OUTOF10: 6
PAINLEVEL_OUTOF10: 4

## 2025-04-18 NOTE — CONSULTS
INPATIENT CARDIOLOGY CONSULT     Reason for Consult: Afib /rectal bleeding    Cardiologist: Dr. Ambriz.     Requesting Physician:  Dr. Monique    Date of Consultation: 4/18/2025    HISTORY OF PRESENT ILLNESS:   Montana Gerard is a 84-year-old  male who is known to Dr. White (last seen in hospital consultation 3/3/2025), Dr. Marcio Martinez (last seen in OP 1/27/2022) and [facility] (last seen 1/30/2025)     See PMH below.     Please note that the patient is a limited historian.     Recent Encounters:   Admission 2/16/2025 patient was found to be in A-fib with RVR and spontaneously converted to sinus rhythm.  He did have hematuria and blood in stool during hospitalization.  No endoscopic procedure was done on that admission.  Admission 3/3/2025 patient was found to have rectal bleeding while on Eliquis.  GI and electrophysiology were consulted.  Hemoglobin was 9.3.  CT abdomen/pelvis showed no evidence of active GI hemorrhage.  General surgery was consulted recommending EGD and colonoscopy that was performed on 3/6/2025 that demonstrated a small hiatal hernia and sigmoid diverticulosis.  Patient was resumed on Eliquis.  Patient had an MRI of the brain 3/3/2025 demonstrating a small acute emboli infarct of the anterior PCA territory with no hemorrhage or mass effect.  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  Patient was also found to have discitis and osteomyelitis on MRI of the lumbar spine.  Neurosurgery was consulted recommending conservative management and the use of a TLSO brace.  Blood cultures were positive for Streptococcus  infantarius ssp coli from 3/5.  Infectious disease was consulted.  Patient underwent IR L as biopsy.  Patient was initially treated with Zosyn and vancomycin which were discontinue and started on ceftriaxone 2 g IV daily per ID guidance which recommended to complete for least 6 weeks.  TTE negative for  mEq, Oral, PRN **OR** potassium bicarb-citric acid (EFFER-K) effervescent tablet 40 mEq, 40 mEq, Oral, PRN **OR** potassium chloride 10 mEq/100 mL IVPB (Peripheral Line), 10 mEq, IntraVENous, PRN, Zaida King APRN - NP    magnesium sulfate 2000 mg in 50 mL IVPB premix, 2,000 mg, IntraVENous, PRN, Zaida King APRN - NP, Stopped at 04/18/25 0805    polyethylene glycol (GLYCOLAX) packet 17 g, 17 g, Oral, Daily PRN, Ziada King APRN - NP    acetaminophen (TYLENOL) tablet 650 mg, 650 mg, Oral, Q6H PRN **OR** acetaminophen (TYLENOL) suppository 650 mg, 650 mg, Rectal, Q6H PRN, Zaida King APRN - NP    atorvastatin (LIPITOR) tablet 40 mg, 40 mg, Oral, QAM, Zaida King APRN - NP, 40 mg at 04/18/25 0934    buPROPion (WELLBUTRIN SR) extended release tablet 100 mg, 100 mg, Oral, QAMFernando Nicole M, APRN - NP, 100 mg at 04/18/25 0934    dapsone tablet 25 mg, 25 mg, Oral, QAFernando ARCINIEGA Nicole M, APRN - NP, 25 mg at 04/18/25 0943    dofetilide (TIKOSYN) capsule 125 mcg, 125 mcg, Oral, BID, Zaida King APRN - NP, 125 mcg at 04/18/25 0934    ferrous sulfate (IRON 325) tablet 325 mg, 325 mg, Oral, BID, Zaida King APRN - NP, 325 mg at 04/18/25 0934    gabapentin (NEURONTIN) capsule 100 mg, 100 mg, Oral, BID, Zaida King APRN - NP, 100 mg at 04/18/25 0933    latanoprost (XALATAN) 0.005 % ophthalmic solution 1 drop, 1 drop, Both Eyes, Nightly, Zaida King APRN - NP, 1 drop at 04/17/25 2123    metoprolol succinate (TOPROL XL) extended release tablet 12.5 mg, 12.5 mg, Oral, QAM, Zaida King APRN - NP, 12.5 mg at 04/18/25 0933    mirtazapine (REMERON) tablet 15 mg, 15 mg, Oral, Nightly, Zaida King APRN - NP, 15 mg at 04/17/25 2122    antioxidant multivitamin (OCUVITE) tablet, 1 tablet, Oral, Fernando DELANEY Nicole M, APRN - NP, 1 tablet at 04/18/25 0934    pantoprazole (PROTONIX) tablet 40 mg, 40 mg, Oral, BID AC, Zaida King APRN - NP,

## 2025-04-18 NOTE — CARE COORDINATION
Social Work / Discharge Planning : Plan at discharge is [facility] -[phone] with Palliative to follow. [facility] ( [phone]) from [facility] DID get pre-cert approval and patient CAN admit over the weekend. . Authorization is good through 04/24. Cardiology consulted. Await discharge. SW to follow. Electronically signed by LUCINA Cid on 4/18/25 at 8:54 AM EDT     Addendum: SW received call from Maddie from [facility]. She udpated SW she spoke to spouse and they would like to forego Skilled and patient to go [facility]. SW spoke to to Spouse Bernadette and she verified  plan Hospice.SW updated nurse and received Hospice consult. Patient will be discharged to [facility] on [address] today between 5:30 and 7:30 [facility]. [facility] from [facility] updated and to call facility directly with N to N and time. MARILIN called and updated Spouse Bernadette on transport time. SW to follow. Electronically signed by LUCINA Cid on 4/18/25 at 12:50 PM EDT .

## 2025-04-18 NOTE — DISCHARGE SUMMARY
[facility] Hospitalist Physician Discharge Summary       [place] [address]  [phone]          Activity level: As tolerated     Dispo: Hospice      Condition on discharge: Stable     Patient ID:  Montana Gerard  [id number]  84 y.o.  1940    Admit date: 4/15/2025    Discharge date and time:  4/18/2025  12:47 PM    Admission Diagnoses: Principal Problem:    GI bleed  Active Problems:    Moderate protein-calorie malnutrition    Rectal bleeding    Radiation colitis    Radiation proctitis  Resolved Problems:    * No resolved hospital problems. *      Discharge Diagnoses: Principal Problem:    GI bleed  Active Problems:    Moderate protein-calorie malnutrition    Rectal bleeding    Radiation colitis    Radiation proctitis  Resolved Problems:    * No resolved hospital problems. *      Consults:  IP CONSULT TO HOSPITALIST  IP CONSULT TO DIETITIAN  IP CONSULT TO GI  IP CONSULT TO PALLIATIVE CARE  IP CONSULT TO UROLOGY  IP CONSULT TO VASCULAR ACCESS TEAM  IP CONSULT TO CARDIOLOGY  IP CONSULT TO HOSPICE    Procedures:     Hospital Course:   Patient Montana Gerard is a 84 y.o. presented with Rectal bleeding [K62.5]  GI bleed [K92.2]    Patient is a 84-year-old male with extensive past medical history including paroxysmal atrial fibrillation, aortic stenosis s/p TAVR, HFpEF, CAD, iron deficiency anemia, diet-controlled DM, prostate cancer s/p brachytherapy, discitis/osteomyelitis, prior stroke, and dermatitis herpetiformis presented from a nursing facility due to GI bleeding. Vitals were stable and Hgb was 9.4 on arrival. Protonix was administered and the patient was admitted for further management. 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 He also had prior hematuria  attributed to radiation cystitis.    During this admission, hemoglobin remained stable above 9. Urology did not recommend further intervention. GI suspected anemia due to a combination of diverticulosis and radiation colitis. Patient continued on ceftriaxone through 4/19 for osteomyelitis. Eliquis was resumed prior to discharge. TAVR valve function remains stable on imaging; the patient is managed on Tikosyn and metoprolol for Afib, with recent increase in metoprolol dose due to RVR.    Per the patient’s spouse and hospice, he expressed a preference to forego further aggressive interventions. The patient was transitioned to hospice care with discharge to [facility] on 4/18/25 with comfort-focused management. PICC line was removed. All specialists, including cardiology and GI, signed off.    Discharge Exam:    General Appearance: Awake,   Skin: warm and dry  Head: normocephalic and atraumatic  Eyes: pupils equal, round, and reactive to light, extraocular eye movements intact, conjunctivae normal  Neck: neck supple and non tender without mass   Pulmonary/Chest: clear to auscultation bilaterally- no wheezes, rales or rhonchi, normal air movement, no respiratory distress  Cardiovascular: normal rate, normal S1 and S2 and no carotid bruits  Abdomen: soft, non-tender, non-distended, normal bowel sounds, no masses or organomegaly  Extremities: no cyanosis, no clubbing and no edema  Neurologic: weakness BLE    I/O last 3 completed shifts:  In: -   Out: 1250 [Urine:1250]  I/O this shift:  In: -   Out: 450 [Urine:450]      LABS:  Recent Labs     04/17/25  0720 04/18/25  0239 04/18/25  0755    138 139   K 4.1 3.9 3.9    103 104   CO2 24 23 25   BUN 31* 34* 34*   CREATININE 0.8 0.8 0.8   GLUCOSE 123* 140* 136*   CALCIUM 9.1 9.8 9.5       Recent Labs     04/16/25  0357 04/17/25  0720 04/18/25  0239   WBC 11.0 9.7 18.8*   RBC 3.15* 3.20* 3.73*   HGB 8.8* 9.0* 10.7*   HCT 29.4* 29.7* 34.8*   MCV 93.3 92.8 93.3

## 2025-04-18 NOTE — PROGRESS NOTES
[facility] Quality Flow/Interdisciplinary Rounds Progress Note        Quality Flow Rounds held on April 18, 2025    Disciplines Attending:  Bedside Nurse, , , and Nursing Unit Leadership    Montana Gerard was admitted on 4/15/2025  6:28 AM    Anticipated Discharge Date:       Disposition:    Kosta Score:  Kosta Scale Score: 16    [facility] RISK OF UNPLANNED READMISSION 2.0             26.2 Total Score        Discussed patient goal for the day, patient clinical progression, and barriers to discharge.  The following Goal(s) of the Day/Commitment(s) have been identified:   iv abx, EKG, cardio eval, monitor bladder scans, PICC- until 4/19      Maddie Brooks RN  April 18, 2025

## 2025-04-18 NOTE — PLAN OF CARE
Problem: ABCDS Injury Assessment  Goal: Absence of physical injury  4/18/2025 1211 by Lucho Saldaña RN  Outcome: Progressing  4/18/2025 0200 by Annette Ballard RN  Outcome: Progressing     Problem: Skin/Tissue Integrity  Goal: Skin integrity remains intact  Description: 1.  Monitor for areas of redness and/or skin breakdown2.  Assess vascular access sites hourly3.  Every 4-6 hours minimum:  Change oxygen saturation probe site4.  Every 4-6 hours:  If on nasal continuous positive airway pressure, respiratory therapy assess nares and determine need for appliance change or resting period  4/18/2025 1211 by Lucho Saldaña, RN  Outcome: Progressing  4/18/2025 0200 by Annette Ballard, RN  Outcome: Progressing     Problem: Discharge Planning  Goal: Discharge to home or other facility with appropriate resources  4/18/2025 1211 by Lucho Saldaña RN  Outcome: Progressing  4/18/2025 0200 by Annette Ballard, RN  Outcome: Progressing

## 2025-04-18 NOTE — PROGRESS NOTES
[facility] Hospitalist Progress Note    Admitting Date and Time: 4/15/2025  6:28 AM  Admit Dx: Rectal bleeding [K62.5]  GI bleed [K92.2]    Subjective:  Patient is being followed for Rectal bleeding [K62.5]  GI bleed [K92.2]   Patient is seen today. No acute complaints     ROS: denies fever, chills, cp, sob, n/v, HA unless stated above.      apixaban  5 mg Oral BID    lactulose  20 g Oral 6 times per day    polyethylene glycol  17 g Oral Daily    mesalamine  1,000 mg Rectal Nightly    oxyCODONE  5 mg Oral Q4H    sodium chloride flush  5-40 mL IntraVENous 2 times per day    atorvastatin  40 mg Oral QAM    buPROPion  100 mg Oral QAM    dapsone  25 mg Oral QAM    dofetilide  125 mcg Oral BID    ferrous sulfate  325 mg Oral BID    gabapentin  100 mg Oral BID    latanoprost  1 drop Both Eyes Nightly    metoprolol succinate  12.5 mg Oral QAM    mirtazapine  15 mg Oral Nightly    ocuvite-lutein  1 tablet Oral QAM    pantoprazole  40 mg Oral BID AC    tamsulosin  0.8 mg Oral Nightly    trospium  20 mg Oral Nightly    cefTRIAXone (ROCEPHIN) IV  2,000 mg IntraVENous Q12H    brimonidine  1 drop Both Eyes BID    And    timolol  1 drop Both Eyes BID    senna  1 tablet Oral BID     oxyCODONE, 5 mg, Q2H PRN  sodium chloride flush, 5-40 mL, PRN  sodium chloride, , PRN  potassium chloride, 40 mEq, PRN   Or  potassium alternative oral replacement, 40 mEq, PRN   Or  potassium chloride, 10 mEq, PRN  magnesium sulfate, 2,000 mg, PRN  polyethylene glycol, 17 g, Daily PRN  acetaminophen, 650 mg, Q6H PRN   Or  acetaminophen, 650 mg, Q6H PRN  promethazine, 6.25 mg, Q6H PRN   Or  promethazine, 12.5 mg, Q6H PRN         Objective:    /82   Pulse (!) 126   Temp 98.3 °F (36.8 °C) (Axillary)   Resp 20   Ht 1.727 m (5' 8\")   Wt 63.6 kg (140 lb 3.2 oz)   SpO2 98%   BMI 21.32 kg/m²     General Appearance: Awake,   Skin: warm and dry  Head: normocephalic and atraumatic  Eyes: pupils equal, round, and reactive to light,  extraocular eye movements intact, conjunctivae normal  Neck: neck supple and non tender without mass   Pulmonary/Chest: clear to auscultation bilaterally- no wheezes, rales or rhonchi, normal air movement, no respiratory distress  Cardiovascular: normal rate, normal S1 and S2 and no carotid bruits  Abdomen: soft, non-tender, non-distended, normal bowel sounds, no masses or organomegaly  Extremities: no cyanosis, no clubbing and no edema  Neurologic: weakness BLE        Recent Labs     04/17/25  0720 04/18/25  0239 04/18/25  0755    138 139   K 4.1 3.9 3.9    103 104   CO2 24 23 25   BUN 31* 34* 34*   CREATININE 0.8 0.8 0.8   GLUCOSE 123* 140* 136*   CALCIUM 9.1 9.8 9.5       Recent Labs     04/16/25  0357 04/17/25  0720 04/18/25  0239   WBC 11.0 9.7 18.8*   RBC 3.15* 3.20* 3.73*   HGB 8.8* 9.0* 10.7*   HCT 29.4* 29.7* 34.8*   MCV 93.3 92.8 93.3   MCH 27.9 28.1 28.7   MCHC 29.9* 30.3* 30.7*   RDW 17.7* 17.8* 17.8*    161 229   MPV 9.7 10.2 9.8         Assessment:    Principal Problem:    GI bleed  Active Problems:    Moderate protein-calorie malnutrition    Rectal bleeding    Radiation colitis    Radiation proctitis  Resolved Problems:    * No resolved hospital problems. *    Hgb stable above 9.  No intervention planned by urology.  Defer to GI. Eliquis resumed. Monitor CBC daily     D/c planing to SNF with palliative.     PICC line to be removed today.    Plan:  Acute blood loss anemia/iron deficiency anemia -2/2 combined dizziness with radiation colitis.  Recent EGD/colonoscopy unremarkable.  Continue to hold Eliquis.  Anticipate Watchman procedure but scheduling is several months away.  Patient considering options including hospice.  Will resume eliquis today while awaiting d/c. Will encourage facility to check h/h if bleeding suspected prior to transfer to ED.   Transaminitis: unclear etiology. Hepaic injury pattern. Likely medication induce. Monitor. R/o hepatitis.   Strep bacteremia with

## 2025-04-18 NOTE — PLAN OF CARE
Problem: ABCDS Injury Assessment  Goal: Absence of physical injury  Outcome: Progressing     Problem: Skin/Tissue Integrity  Goal: Skin integrity remains intact  Description: 1.  Monitor for areas of redness and/or skin breakdown2.  Assess vascular access sites hourly3.  Every 4-6 hours minimum:  Change oxygen saturation probe site4.  Every 4-6 hours:  If on nasal continuous positive airway pressure, respiratory therapy assess nares and determine need for appliance change or resting period  Outcome: Progressing     Problem: Discharge Planning  Goal: Discharge to home or other facility with appropriate resources  Outcome: Progressing     Problem: Chronic Conditions and Co-morbidities  Goal: Patient's chronic conditions and co-morbidity symptoms are monitored and maintained or improved  Outcome: Progressing     Problem: Safety - Adult  Goal: Free from fall injury  Outcome: Progressing     Problem: Pain  Goal: Verbalizes/displays adequate comfort level or baseline comfort level  Outcome: Progressing     Problem: Nutrition Deficit:  Goal: Optimize nutritional status  Outcome: Progressing     Problem: Gastrointestinal - Adult  Goal: Minimal or absence of nausea and vomiting  Outcome: Progressing     Problem: Infection - Adult  Goal: Absence of infection at discharge  Outcome: Progressing  Goal: Absence of infection during hospitalization  Outcome: Progressing  Goal: Absence of fever/infection during anticipated neutropenic period  Outcome: Progressing     Problem: Metabolic/Fluid and Electrolytes - Adult  Goal: Electrolytes maintained within normal limits  Outcome: Progressing  Goal: Glucose maintained within prescribed range  Outcome: Progressing     Problem: Hematologic - Adult  Goal: Maintains hematologic stability  Outcome: Progressing     Problem: ABCDS Injury Assessment  Goal: Absence of physical injury  Outcome: Progressing     Problem: Skin/Tissue Integrity  Goal: Skin integrity remains intact  Description: 1.

## 2025-04-18 NOTE — PROGRESS NOTES
PROGRESS NOTE    Patient Presents with/Seen in Consultation For      Reason for Consult:?  Radiation colitis, GI bleed on Eliquis     CHIEF COMPLAINT: GI bleed    Subjective:     Patient seen lying in bed.  Nurse aide working with patient.  Patient reports to lower abdominal pain at times.  Slight confusion noted.  Denies any nausea vomiting.  Full tray at bedside.  Reports to diarrheal stools.  Plan of care reviewed all questions answered.    Review of Systems  Aside from what was mentioned in the PMH and HPI, essentially unremarkable, all others negative.    Objective:     Patient Vitals for the past 8 hrs:   BP Temp Temp src Pulse Resp SpO2   04/18/25 0915 105/61 97.1 °F (36.2 °C) Oral 96 18 96 %   04/18/25 0600 -- -- -- (!) 126 -- --   04/18/25 0230 121/82 98.3 °F (36.8 °C) Axillary (!) 143 20 98 %       General appearance: alert, awake, laying in bed, and cooperative  Eyes: conjunctivae/corneas clear. PERRL.  Lungs: clear to auscultation bilaterally  Heart: regular rate and rhythm, no murmur, 2+ pulses;  without edema  Abdomen: soft, lower abdominal tenderness to palpitation; bowel sounds normal; no masses,   Extremities: extremities without edema  Pulses: 2+ and symmetric  Skin: Skin color, texture, turgor normal.   Neurologic: Slight confusion noted    apixaban (ELIQUIS) tablet 5 mg, BID  lactulose (CHRONULAC) 10 GM/15ML solution 20 g, 6 times per day  polyethylene glycol (GLYCOLAX) packet 17 g, Daily  mesalamine (CANASA) suppository 1,000 mg, Nightly  oxyCODONE (ROXICODONE) immediate release tablet 5 mg, Q4H  oxyCODONE (ROXICODONE) immediate release tablet 5 mg, Q2H PRN  sodium chloride flush 0.9 % injection 5-40 mL, 2 times per day  sodium chloride flush 0.9 % injection 5-40 mL, PRN  0.9 % sodium chloride infusion, PRN  potassium chloride (KLOR-CON M) extended release tablet 40 mEq, PRN   Or  potassium bicarb-citric acid (EFFER-K) effervescent tablet 40 mEq, PRN   Or  potassium chloride 10 mEq/100 mL IVPB

## 2025-04-18 NOTE — PROGRESS NOTES
Palliative Care Department  [phone]  Palliative Care Progress Note  MD Montana Cosme  [id number]  Hospital Day: 3  Location of Service: [facility]  Date of Initial Consult: 04/15/2025  Referring Provider: Tyrone Monique  Palliative Medicine was consulted for assistance with: Assist with goals of care    ASSESSMENT & PLAN:     Prostate Cancer  S/p radiation seed placement and Brachy therapy  Rectal bleeding  Radiation cystitis    Pain  Continue Oxycodone 5 mg IR Q4h around-the clock  Continue oxycodone 5 mg IR Q2H PRN  Continue tylenol 650 mg Q6H PRN  Continue Gabapentin 100 mg BID    Nausea  Continue Phenergan 12.5 mg PO Q6H PRN    Anorexia/Insomnia  Continue Mirtazapine 15 mg HS     Palliative Care Encounter  At this time, the patient does have capacity for medical decision-making  Will continue to follow for ongoing monitoring of progression of Pain, Nausea, Anxiety, and Anorexia as well as for appropriateness for hospice care due to Cancer  Will continue to evaluate test results related to and response to treatment of Cancer and medication effectiveness for Pain, Nausea, Anxiety, Constipation, and Anorexia,  Will obtain testing as needed to monitor medication results:N/A  Will continue to assess patient status including monitor for side effects of treatments-Traomadol and monitor for opiate induced constipation  Ongoing counseling of patient and family regarding diagnoses and prognosis of Cancer  Will continue treatment including tylenol    Goals of care: To Be Determined  Surrogate:    Primary Decision Maker: Bernadette Gerard - Spouse - [phone]  Secondary Decision Maker: Ivan Boyle - Child - [phone]  Prognosis: depends upon goals  Spiritual assessment: No spiritual distress identified   Bereavement and grief: Low Risk Bereavement    Advance Care Planning Documents:    Healthcare Power of : Completed  Financial Power of : Completed  Living  SNARE/BIOPSY performed by Satinder Parekh [place] at [facility]    IR BIOPSY DEEP BONE  2025    IR BIOPSY DEEP BONE 3/11/2025 Gregoria Tomlinson MD [facility]    UPPER GASTROINTESTINAL ENDOSCOPY N/A 2025    ESOPHAGOGASTRODUODENOSCOPY performed by Satinder Parekh MD at [facility]       Family History   Problem Relation Age of Onset    Stroke Father        Unable to obtain family history due to N/A- family history available    Allergies   Allergen Reactions    Tea Swelling     Per pt's wife    Jacklyn Salinas (Tall Ragweed) Allergy Skin Test Itching and Rash    Peach [Prunus Persica] Other (See Comments)     UNKNOWN REACTION, LISTED ON FACILITY PAPERWORK.        Social History     Tobacco Use    Smoking status: Former     Current packs/day: 0.00     Average packs/day: 0.1 packs/day for 17.0 years (1.7 ttl pk-yrs)     Types: Cigarettes, Pipe     Start date: 1960     Quit date: 1977     Years since quittin.3    Smokeless tobacco: Never   Vaping Use    Vaping status: Never Used   Substance Use Topics    Alcohol use: Not Currently     Comment: beer three times weekly    Drug use: No       Social history:   status: no  Marital status:   Living status: with family:  spouse   Work history:     OBJECTIVE:     Physical Exam:  BP (!) 149/84   Pulse 83   Temp 97 °F (36.1 °C) (Temporal)   Resp 18   Ht 1.727 m (5' 8\")   Wt 63.6 kg (140 lb 3.2 oz)   SpO2 95%   BMI 21.32 kg/m²   Physical Exam  Constitutional:       Comments: Awake, frail, Oriented x 4   Cardiovascular:      Rate and Rhythm: Normal rate and regular rhythm.      Pulses: Normal pulses.      Heart sounds: Normal heart sounds.   Pulmonary:      Effort: Pulmonary effort is normal.      Breath sounds: Rhonchi present.   Abdominal:      General: Abdomen is flat. Bowel sounds are normal.      Palpations: Abdomen is soft.   Skin:     General: Skin is warm.   Neurological:      Mental Status: He is

## 2025-04-18 NOTE — PROGRESS NOTES
Patient complaining of urinary retention.  Bladder scan complete and 652ml of urine noted in the bladder.  Yany Spann NP, notified.  Orders received to straight cath.

## (undated) DEVICE — DEFENDO AIR WATER SUCTION AND BIOPSY VALVE KIT FOR  OLYMPUS: Brand: DEFENDO AIR/WATER/SUCTION AND BIOPSY VALVE

## (undated) DEVICE — ENDOSCOPIC KIT 1.1+ OP4 NO CP DE

## (undated) DEVICE — BITEBLOCK 54FR W/ DENT RIM BLOX

## (undated) DEVICE — AIR/WATER CLEANING ADAPTER FOR OLYMPUS® GI ENDOSCOPE: Brand: BULLDOG®

## (undated) DEVICE — FORCEPS BX L240CM JAW DIA2.4MM ORNG L CAP W/ NDL DISP RAD

## (undated) DEVICE — GAUZE,SPONGE,4"X4",8PLY,STRL,LF,10/TRAY: Brand: MEDLINE

## (undated) DEVICE — CONNECTOR IRRIGATION AUXILIARY H2O JET W/ PRT MTL THRD HYDR

## (undated) DEVICE — CONTAINER SPEC 60ML PH 7NEUTRAL BUFF FRMLN RDY TO USE